# Patient Record
Sex: FEMALE | Race: WHITE | NOT HISPANIC OR LATINO | Employment: OTHER | URBAN - METROPOLITAN AREA
[De-identification: names, ages, dates, MRNs, and addresses within clinical notes are randomized per-mention and may not be internally consistent; named-entity substitution may affect disease eponyms.]

---

## 2017-01-09 ENCOUNTER — TELEPHONE (OUTPATIENT)
Dept: PHARMACY | Facility: CLINIC | Age: 67
End: 2017-01-09

## 2017-01-09 NOTE — TELEPHONE ENCOUNTER
renu f/u complete. Name/ confirmed. Medication list reviewed. Consultation included: indication; goals of treatment; administration; storage and handling; side effects; how to handle side effects; the importance of compliance; how to handle missed doses; the importance of laboratory monitoring; the importance of keeping all follow up appointments. Patient understands to report any medication changes to [facility] and provider. All questions answered and addressed to patients satisfaction. Patient feels well. She is surprised how tolerable the medication is. Very few side effects. Has workeed through fatigue and occassionally has a mild headache but is taking nothing for it. Patient understands goals of treatment and thearpy.

## 2017-01-19 ENCOUNTER — TELEPHONE (OUTPATIENT)
Dept: PHARMACY | Facility: CLINIC | Age: 67
End: 2017-01-19

## 2017-01-27 ENCOUNTER — LAB VISIT (OUTPATIENT)
Dept: LAB | Facility: HOSPITAL | Age: 67
End: 2017-01-27
Attending: INTERNAL MEDICINE
Payer: MEDICARE

## 2017-01-27 ENCOUNTER — EPISODE CHANGES (OUTPATIENT)
Dept: HEPATOLOGY | Facility: CLINIC | Age: 67
End: 2017-01-27

## 2017-01-27 DIAGNOSIS — Z11.4 ENCOUNTER FOR SCREENING FOR HIV: ICD-10-CM

## 2017-01-27 DIAGNOSIS — B18.2 CHRONIC HEPATITIS C WITHOUT HEPATIC COMA: ICD-10-CM

## 2017-01-27 LAB
ALBUMIN SERPL BCP-MCNC: 4.2 G/DL
ALP SERPL-CCNC: 66 U/L
ALT SERPL W/O P-5'-P-CCNC: 12 U/L
ANION GAP SERPL CALC-SCNC: 7 MMOL/L
AST SERPL-CCNC: 18 U/L
BILIRUB SERPL-MCNC: 0.5 MG/DL
BUN SERPL-MCNC: 11 MG/DL
CALCIUM SERPL-MCNC: 9.9 MG/DL
CHLORIDE SERPL-SCNC: 98 MMOL/L
CO2 SERPL-SCNC: 26 MMOL/L
CREAT SERPL-MCNC: 0.7 MG/DL
EST. GFR  (AFRICAN AMERICAN): >60 ML/MIN/1.73 M^2
EST. GFR  (NON AFRICAN AMERICAN): >60 ML/MIN/1.73 M^2
GLUCOSE SERPL-MCNC: 81 MG/DL
HBV CORE AB SERPL QL IA: NEGATIVE
HBV SURFACE AB SER-ACNC: NEGATIVE M[IU]/ML
HBV SURFACE AG SERPL QL IA: NEGATIVE
HEPATITIS A ANTIBODY, IGG: POSITIVE
HIV 1+2 AB+HIV1 P24 AG SERPL QL IA: NEGATIVE
POTASSIUM SERPL-SCNC: 4.4 MMOL/L
PROT SERPL-MCNC: 7.9 G/DL
SODIUM SERPL-SCNC: 131 MMOL/L

## 2017-01-27 PROCEDURE — 87340 HEPATITIS B SURFACE AG IA: CPT

## 2017-01-27 PROCEDURE — 86703 HIV-1/HIV-2 1 RESULT ANTBDY: CPT

## 2017-01-27 PROCEDURE — 86706 HEP B SURFACE ANTIBODY: CPT

## 2017-01-27 PROCEDURE — 86704 HEP B CORE ANTIBODY TOTAL: CPT

## 2017-01-27 PROCEDURE — 36415 COLL VENOUS BLD VENIPUNCTURE: CPT | Mod: PO

## 2017-01-27 PROCEDURE — 87522 HEPATITIS C REVRS TRNSCRPJ: CPT

## 2017-01-27 PROCEDURE — 80053 COMPREHEN METABOLIC PANEL: CPT

## 2017-01-27 PROCEDURE — 86790 VIRUS ANTIBODY NOS: CPT

## 2017-01-30 LAB
HCV LOG: 1.28 LOG (10) IU/ML
HCV RNA QUANT PCR: 19 IU/ML
HCV, QUALITATIVE: DETECTED IU/ML

## 2017-01-31 ENCOUNTER — TELEPHONE (OUTPATIENT)
Dept: HEPATOLOGY | Facility: CLINIC | Age: 67
End: 2017-01-31

## 2017-01-31 DIAGNOSIS — B19.20 HEPATITIS C VIRUS INFECTION WITHOUT HEPATIC COMA, UNSPECIFIED CHRONICITY: Primary | ICD-10-CM

## 2017-01-31 NOTE — TELEPHONE ENCOUNTER
HCV LAB REVIEW  Week 4 of Felicia, planning on 12 weeks treatment    Pertinent labs:  CMP: stable, Na 131  HCV RNA: 19 IU/mL  (+) immunity to HAV  (-) immunity to HBV, (-)HBsAg  HIV negative    [facility] message sent to pt:    Next labs due / pls schedule:  - CMP at week 8 - 02/24/17  - CMP, HCV RNA at week 12 - end of treatment - 03/27/17

## 2017-01-31 NOTE — TELEPHONE ENCOUNTER
----- Message from Ariel Gregory, PharmGEORGIA sent at 1/31/2017  2:43 PM CST -----  Regarding: recombivax  Patient's insurance does not cover immunizations in the pharmacy. Called the patient and let her know that your office would contact her about getting the shot in infectious disease.

## 2017-02-01 NOTE — TELEPHONE ENCOUNTER
Patient called back.  She reports talking with insurance carrier and being told that vaccine series is covered if prior auth obtained.  I spoke with financial.  They will run if required before visit.

## 2017-02-14 ENCOUNTER — TELEPHONE (OUTPATIENT)
Dept: PHARMACY | Facility: CLINIC | Age: 67
End: 2017-02-14

## 2017-02-15 ENCOUNTER — CLINICAL SUPPORT (OUTPATIENT)
Dept: INFECTIOUS DISEASES | Facility: CLINIC | Age: 67
End: 2017-02-15
Payer: MEDICARE

## 2017-02-15 DIAGNOSIS — B19.20 HEPATITIS C VIRUS INFECTION WITHOUT HEPATIC COMA, UNSPECIFIED CHRONICITY: ICD-10-CM

## 2017-02-15 PROCEDURE — 99999 PR PBB SHADOW E&M-EST. PATIENT-LVL I: CPT | Mod: PBBFAC,,,

## 2017-02-15 PROCEDURE — 90746 HEPB VACCINE 3 DOSE ADULT IM: CPT | Mod: S$GLB,,, | Performed by: INTERNAL MEDICINE

## 2017-02-15 PROCEDURE — G0010 ADMIN HEPATITIS B VACCINE: HCPCS | Mod: S$GLB,,, | Performed by: INTERNAL MEDICINE

## 2017-02-24 ENCOUNTER — LAB VISIT (OUTPATIENT)
Dept: LAB | Facility: HOSPITAL | Age: 67
End: 2017-02-24
Attending: INTERNAL MEDICINE
Payer: MEDICARE

## 2017-02-24 ENCOUNTER — EPISODE CHANGES (OUTPATIENT)
Dept: HEPATOLOGY | Facility: CLINIC | Age: 67
End: 2017-02-24

## 2017-02-24 DIAGNOSIS — B19.20 HEPATITIS C VIRUS INFECTION WITHOUT HEPATIC COMA, UNSPECIFIED CHRONICITY: ICD-10-CM

## 2017-02-24 DIAGNOSIS — B18.2 CHRONIC HEPATITIS C WITHOUT HEPATIC COMA: ICD-10-CM

## 2017-02-24 LAB
ALBUMIN SERPL BCP-MCNC: 3.8 G/DL
ALP SERPL-CCNC: 67 U/L
ALT SERPL W/O P-5'-P-CCNC: 13 U/L
ANION GAP SERPL CALC-SCNC: 8 MMOL/L
AST SERPL-CCNC: 18 U/L
BILIRUB SERPL-MCNC: 0.4 MG/DL
BUN SERPL-MCNC: 10 MG/DL
CALCIUM SERPL-MCNC: 9.7 MG/DL
CHLORIDE SERPL-SCNC: 102 MMOL/L
CO2 SERPL-SCNC: 26 MMOL/L
CREAT SERPL-MCNC: 0.7 MG/DL
EST. GFR  (AFRICAN AMERICAN): >60 ML/MIN/1.73 M^2
EST. GFR  (NON AFRICAN AMERICAN): >60 ML/MIN/1.73 M^2
GLUCOSE SERPL-MCNC: 70 MG/DL
POTASSIUM SERPL-SCNC: 4.1 MMOL/L
PROT SERPL-MCNC: 7.6 G/DL
SODIUM SERPL-SCNC: 136 MMOL/L

## 2017-02-24 PROCEDURE — 80053 COMPREHEN METABOLIC PANEL: CPT

## 2017-02-24 PROCEDURE — 36415 COLL VENOUS BLD VENIPUNCTURE: CPT | Mod: PO

## 2017-02-24 PROCEDURE — 87522 HEPATITIS C REVRS TRNSCRPJ: CPT

## 2017-02-27 ENCOUNTER — TELEPHONE (OUTPATIENT)
Dept: HEPATOLOGY | Facility: CLINIC | Age: 67
End: 2017-02-27

## 2017-02-27 LAB
HCV LOG: <1.08 LOG (10) IU/ML
HCV RNA QUANT PCR: <12 IU/ML
HCV, QUALITATIVE: NOT DETECTED IU/ML

## 2017-02-27 NOTE — TELEPHONE ENCOUNTER
HCV LAB REVIEW  Week 8 of Felicia, planning on 12 weeks treatment    Pertinent labs:  CMP: stable      [facility] message sent to pt:    Next labs due / pls schedule:  - CMP, HCV RNA at week 12 - end of treatment - 03/27/17

## 2017-03-14 ENCOUNTER — CLINICAL SUPPORT (OUTPATIENT)
Dept: AUDIOLOGY | Facility: CLINIC | Age: 67
End: 2017-03-14
Payer: MEDICARE

## 2017-03-14 ENCOUNTER — CLINICAL SUPPORT (OUTPATIENT)
Dept: INFECTIOUS DISEASES | Facility: CLINIC | Age: 67
End: 2017-03-14
Payer: MEDICARE

## 2017-03-14 ENCOUNTER — OFFICE VISIT (OUTPATIENT)
Dept: HEPATOLOGY | Facility: CLINIC | Age: 67
End: 2017-03-14
Payer: MEDICARE

## 2017-03-14 ENCOUNTER — OFFICE VISIT (OUTPATIENT)
Dept: OTOLARYNGOLOGY | Facility: CLINIC | Age: 67
End: 2017-03-14
Payer: MEDICARE

## 2017-03-14 VITALS — DIASTOLIC BLOOD PRESSURE: 76 MMHG | SYSTOLIC BLOOD PRESSURE: 142 MMHG | TEMPERATURE: 98 F | HEART RATE: 71 BPM

## 2017-03-14 VITALS
BODY MASS INDEX: 26.01 KG/M2 | TEMPERATURE: 98 F | OXYGEN SATURATION: 98 % | HEART RATE: 80 BPM | SYSTOLIC BLOOD PRESSURE: 134 MMHG | WEIGHT: 141.31 LBS | DIASTOLIC BLOOD PRESSURE: 73 MMHG | HEIGHT: 62 IN

## 2017-03-14 DIAGNOSIS — B18.2 CHRONIC HEPATITIS C WITHOUT HEPATIC COMA: Primary | ICD-10-CM

## 2017-03-14 DIAGNOSIS — H93.11 TINNITUS, RIGHT: ICD-10-CM

## 2017-03-14 DIAGNOSIS — H90.3 SENSORINEURAL HEARING LOSS, BILATERAL: Primary | ICD-10-CM

## 2017-03-14 DIAGNOSIS — B19.20 HEPATITIS C VIRUS INFECTION WITHOUT HEPATIC COMA, UNSPECIFIED CHRONICITY: ICD-10-CM

## 2017-03-14 DIAGNOSIS — H93.8X3 EAR PRESSURE, BILATERAL: Primary | ICD-10-CM

## 2017-03-14 DIAGNOSIS — H61.23 IMPACTED CERUMEN, BILATERAL: ICD-10-CM

## 2017-03-14 DIAGNOSIS — J30.9 ALLERGIC RHINITIS, UNSPECIFIED ALLERGIC RHINITIS TRIGGER, UNSPECIFIED RHINITIS SEASONALITY: ICD-10-CM

## 2017-03-14 DIAGNOSIS — H90.3 HEARING LOSS, SENSORINEURAL, HIGH FREQUENCY, BILATERAL: ICD-10-CM

## 2017-03-14 PROCEDURE — 99499 UNLISTED E&M SERVICE: CPT | Mod: S$GLB,,, | Performed by: PHYSICIAN ASSISTANT

## 2017-03-14 PROCEDURE — 1157F ADVNC CARE PLAN IN RCRD: CPT | Mod: S$GLB,,, | Performed by: OTOLARYNGOLOGY

## 2017-03-14 PROCEDURE — 99999 PR PBB SHADOW E&M-EST. PATIENT-LVL I: CPT | Mod: PBBFAC,,,

## 2017-03-14 PROCEDURE — 1126F AMNT PAIN NOTED NONE PRSNT: CPT | Mod: S$GLB,,, | Performed by: OTOLARYNGOLOGY

## 2017-03-14 PROCEDURE — 99999 PR PBB SHADOW E&M-EST. PATIENT-LVL IV: CPT | Mod: PBBFAC,,, | Performed by: PHYSICIAN ASSISTANT

## 2017-03-14 PROCEDURE — 1159F MED LIST DOCD IN RCRD: CPT | Mod: S$GLB,,, | Performed by: OTOLARYNGOLOGY

## 2017-03-14 PROCEDURE — 92557 COMPREHENSIVE HEARING TEST: CPT | Mod: S$GLB,,, | Performed by: AUDIOLOGIST-HEARING AID FITTER

## 2017-03-14 PROCEDURE — G0010 ADMIN HEPATITIS B VACCINE: HCPCS | Mod: S$GLB,,, | Performed by: INTERNAL MEDICINE

## 2017-03-14 PROCEDURE — 1160F RVW MEDS BY RX/DR IN RCRD: CPT | Mod: S$GLB,,, | Performed by: PHYSICIAN ASSISTANT

## 2017-03-14 PROCEDURE — 99999 PR PBB SHADOW E&M-EST. PATIENT-LVL III: CPT | Mod: PBBFAC,,, | Performed by: OTOLARYNGOLOGY

## 2017-03-14 PROCEDURE — 1159F MED LIST DOCD IN RCRD: CPT | Mod: S$GLB,,, | Performed by: PHYSICIAN ASSISTANT

## 2017-03-14 PROCEDURE — 92567 TYMPANOMETRY: CPT | Mod: S$GLB,,, | Performed by: AUDIOLOGIST-HEARING AID FITTER

## 2017-03-14 PROCEDURE — 99213 OFFICE O/P EST LOW 20 MIN: CPT | Mod: S$GLB,,, | Performed by: PHYSICIAN ASSISTANT

## 2017-03-14 PROCEDURE — 1157F ADVNC CARE PLAN IN RCRD: CPT | Mod: S$GLB,,, | Performed by: PHYSICIAN ASSISTANT

## 2017-03-14 PROCEDURE — 1160F RVW MEDS BY RX/DR IN RCRD: CPT | Mod: S$GLB,,, | Performed by: OTOLARYNGOLOGY

## 2017-03-14 PROCEDURE — 1126F AMNT PAIN NOTED NONE PRSNT: CPT | Mod: S$GLB,,, | Performed by: PHYSICIAN ASSISTANT

## 2017-03-14 PROCEDURE — 99213 OFFICE O/P EST LOW 20 MIN: CPT | Mod: S$GLB,,, | Performed by: OTOLARYNGOLOGY

## 2017-03-14 PROCEDURE — 90746 HEPB VACCINE 3 DOSE ADULT IM: CPT | Mod: S$GLB,,, | Performed by: INTERNAL MEDICINE

## 2017-03-14 NOTE — PROGRESS NOTES
Chief complaint: Nose and ear fullness status post ALLERGY attack   HPI:Ms. Reed is a 66-year-old  female who indicates an ALLERGY attack 1 month ago treated with antihistamines i.e. Claritin.  She recently switched to Allegra over the past weekend.  She had been described a nasal steroid spray in the past i.e. Nasacort I believe.  She indicates ear fullness and nasal fullness symptoms which which is not going away.    I last examined her in January 2015 for an ear cleaning procedure.  She complained of right ear tinnitus at that time as well as right otalgia symptoms.  Audiometry was not performed then.  TMJ otalgia symptoms were briefly discussed then.    Medical problem list includes malignant melanoma of the skin of her trunk, hepatitis C, nontoxic multinodular goiter, osteopenia, hyperlipidemia, leukocytopenia and interstitial cystitis.  ALLERGIES: Iodine and iodide-containing products and sulfa    PE: Blood pressure 142/76 pulse 71 temperature 97.7  Gen.: Alert and oriented lady in no acute distress  The patient's ears were examined and cleaned under the microscope and the micro-procedure room.  Hair and some wax is carefully extracted from each ear canal with microforceps.  Both eardrums are intact and clear as visualized directly and there is no evidence for a right or left serous otitis media condition.  Nasal exam reveals evidence for hyperemia of the mucosa of the inferior turbinates bilaterally.  Oropharyngeal exam is unremarkable for inflammation infection ulceration.  Neck examination is benign within normal limits.    The patient was immediately sent for an audiometric study performed by the [facility] service.  The results of duplicated below and reviewed with the patient in detail.    DIAGNOSIS:     ICD-10-CM ICD-9-CM    1. Ear pressure, bilateral H93.8X3 388.8    2. Allergic rhinitis, unspecified allergic rhinitis trigger, unspecified rhinitis seasonality J30.9  477.9     on Claritin; switched to Allegra   3. Impacted cerumen, bilateral H61.23 380.4    4. Hearing loss, sensorineural, high frequency, bilateral H91.93 389.8     AD > AS 4 K SNHL   5. Tinnitus, right H93.11 388.30      PLAN: Hair/wax removed from both eacs with microforceps  Audiometry reviewed: bilateral high frequency SNHL  Pt. is a marginal candidate for hearing amplification   copy of audiogram provided  Hearing protection encouraged prn  Monitor hearing q 2-3 years/prn  Tinnitus literature provided  Continue iuse of non-sedating antihistamine + NSS 1-2 sprays @ nostril one a day  ( Flonase or Nasacort otc)   E.T. Literature provided

## 2017-03-14 NOTE — MR AVS SNAPSHOT
[facility] - Otorhinolaryngology  [address]  [facility] [address]  Phone: [phone]  Fax: [phone]                  Tomasa Reed   3/14/2017 9:45 AM   Office Visit    Description:  Female : 1950   Provider:  Alfonso Mayes III, MD   Department:  [facility] - Otorhinolaryngology           Diagnoses this Visit        Comments    Ear pressure, bilateral    -  Primary     Allergic rhinitis, unspecified allergic rhinitis trigger, unspecified rhinitis seasonality     on Claritin; switched to Allegra    Impacted cerumen, bilateral         Hearing loss, sensorineural, high frequency, bilateral     AD > AS 4 K SNHL    Tinnitus, right                To Do List           Future Appointments        Provider Department Dept Phone    3/27/2017 7:00 AM LAB, [facility] - Laboratory [phone]    2017 8:40 AM MD [facility] - Internal Medicine [phone]    8/15/2017 8:30 AM INJECTION, INFECTIOUS DISEASES [facility]- ID Injection Room [phone]      Goals (5 Years of Data)     None      [facility]     [facility] -  Assistance  Registered nurses in the [facility] provide clinical advisement, health education, appointment booking, and other advisory services.  Call for this free service at [phone].             Medications           Message regarding Medications     Verify the changes and/or additions to your medication regime listed below are the same as discussed with your clinician today.  If any of these changes or additions are incorrect, please notify your healthcare provider.             Verify that the below list of medications is an accurate representation of the medications you are currently taking.  If none reported, the list may be blank. If incorrect, please contact your healthcare provider. Carry this list with you in case of emergency.           Current Medications     B-complex with  vitamin C tablet Take 1 tablet by mouth once daily.      calcium-vitamin D (CALCIUM-VITAMIN D) 500 mg(1,250mg) -200 unit per tablet Twice a day    cetirizine (ZYRTEC) 10 mg Cap PRN    ledipasvir-sofosbuvir (HARVONI)  mg Tab Take 1 tablet by mouth once daily.    MISCELLANEOUS MEDICAL SUPPLY MISC as needed. EYE ITCH RELIEF 0.25 EYE DROPS    MISCELLANEOUS MEDICAL SUPPLY MISC TOPICAL ANALGESIC CREAM CAPS    multivitamin (THERAGRAN) per tablet Every day    multivitamin with minerals tablet Take 1 tablet by mouth once daily.    naproxen sodium (ALEVE) 220 mg Cap PRN    NON FORMULARY MEDICATION 2 (two) times daily. Tumeric-Bid     co-enzyme Q-10 30 mg capsule     glucosamine sulfate 1,000 mg Cap Take by mouth.      glycerin adult suppository as needed.     OCEAN NASAL 0.65 % nasal spray as needed.     THERAPEUTIC MINERAL ICE 2 % Gel     TUMS 200 mg calcium (500 mg) chewable tablet 1 tablet as needed.            Clinical Reference Information           Your Vitals Were     BP Pulse Temp             142/76 (BP Location: Right arm, Patient Position: Sitting, BP Method: Automatic) 71 97.7 °F (36.5 °C) (Tympanic)         Blood Pressure          Most Recent Value    BP  (!)  142/76      Allergies as of 3/14/2017     Iodine And Iodide Containing Products    Sulfa (Sulfonamide Antibiotics)      Immunizations Administered on Date of Encounter - 3/14/2017     Name Date Dose VIS Date Route    Hepatitis B, Adult 3/14/2017 1 mL 7/20/2016 Intramuscular      Instructions    Hair/wax removed from both eacs with microforceps  Audiometry reviewed: bilateral high frequency SNHL  Pt. is a marginal candidate for hearing amplification   copy of audiogram provided  Hearing protection encouraged prn  Monitor hearing q 2-3 years/prn  Tinnitus literature provided  Continue iuse of non-sedating antihistamine + NSS 1-2 sprays @ nostril one a day  ( Flonase or Nasacort otc)   E.T. Literature provided         Language Assistance Services      ATTENTION: Language assistance services are available, free of charge. Please call [phone].      ATENCIÓN: Si habla español, tiene a gutierrez disposición servicios gratuitos de asistencia lingüística. Llame al [phone].     CHÚ Ý: N?u b?n nói Ti?ng Vi?t, có các d?ch v? h? tr? ngôn ng? mi?n phí dành cho b?n. G?i s? [phone].         [facility] complies with applicable Federal civil rights laws and does not discriminate on the basis of race, color, national origin, age, disability, or sex.

## 2017-03-14 NOTE — PROGRESS NOTES
Tomasa Reed was seen in the clinic today for a hearing evaluation. Ms. Reed reported bilateral hearing loss and aural fullness. She stated tinnitus in her right ear.    Audiological testing revealed a mild mid to high frequency sensorineural hearing loss in the left ear and a mild to moderate rising to mild mid to high frequency sensorineural hearing loss in the right ear. A speech reception threshold was obtained at 20 dBHL in both ears. Speech discrimination was 92%, bilaterally.    Tympanometry revealed normal Type A tympanograms in both ears.    Recommendations:  1. Otologic evaluation  2. Annual hearing evaluation  3. Consider hearing aid consult

## 2017-03-14 NOTE — PATIENT INSTRUCTIONS
Hair/wax removed from both eacs with microforceps  Audiometry reviewed: bilateral high frequency SNHL  Pt. is a marginal candidate for hearing amplification   copy of audiogram provided  Hearing protection encouraged prn  Monitor hearing q 2-3 years/prn  Tinnitus literature provided  Continue iuse of non-sedating antihistamine + NSS 1-2 sprays @ nostril one a day  ( Flonase or Nasacort otc)   E.T. Literature provided

## 2017-03-14 NOTE — PROGRESS NOTES
HEPATOLOGY CLINIC VISIT NOTE - HCV clinic    REFERRING PROVIDER: Dr. Rosanna Huizar    CHIEF COMPLAINT: Hepatitis C - discuss treatment    HISTORY: This is a 66 y.o. White female with chronic hepatitis C who returns for on treatment follow up. She has no F0-F1 fibrosis but a pretreatment viral load >6 million. She is currently on week 10 of HCV treatment with Harvoni. Her week 4 HCV RNA was 19 IU/mL and week 8 was unquantifiable. She reports that she feel well. She reports a slight headache with the start of treatment but that it resolved shortly after starting. She does report tiredness. She complains today of sinus pressure and ear fullness and will be seeing her PCP.     PMH significant for goiter, HLD, OA, osteopenia, cataract, neutropenia. She has a h/o malignant melanoma and is followed by dermatology for this.     HCV history:  Genotype 1a  Pre treatment HCV RNA: 6,173,376 IU/mL  Week 4 HCV RNA: 19  Week 8 HCV RNA: <12, not detected  Treatment naive     Risk Factors:  Blood transfusion- ? hospitalization with child birth     Liver staging:  Fibroscan F0-F1  Liver biopsy may years ago, no cirrhosis   AST 33, ALT 30; AST>ALT   Tbili WNL  Albumin >3.5  PLTs WNL     Denies jaundice, dark urine, abdominal distention, hematemesis, melena, slowed mentation.   No abnormal skin rashes. No generalized joint pain.                   Past Medical History:   Diagnosis Date    Acute hepatitis C without mention of hepatic coma     Arthritis     Cataract     OU    Chronic interstitial cystitis     HLD (hyperlipidemia)     Leukocytopenia, unspecified     Malignant melanoma of skin of trunk, except scrotum     Melanoma 2011    right chest    Migraine, unspecified, without mention of intractable migraine without mention of status migrainosus     Nonspecific abnormal results of liver function study     Nontoxic multinodular goiter     Osteopenia        Past Surgical History:   Procedure Laterality Date     COLONOSCOPY N/A 12/14/2016    Procedure: COLONOSCOPY;  Surgeon: Wicho Painting MD;  Location: [facility] ([address] [place] [address]);  Service: Endoscopy;  Laterality: N/A;    FINGER SURGERY Right 2010    index finger fused    MOLE REMOVAL      TONSILLECTOMY, ADENOIDECTOMY      TOTAL ABDOMINAL HYSTERECTOMY       FAMILY HISTORY: Negative for liver disease    SOCIAL HISTORY:   2 children  History   Smoking Status    Never Smoker   Smokeless Tobacco    Never Used       History   Alcohol Use    Yes     Comment: weekends one drink       History   Drug Use Not on file   Denies     ROS:   No fever, chills, (+) fatigue  No acid reflux   No headaches, (+) sinus pressure and ear fullness    PHYSICAL EXAM:  Friendly White female, in no acute distress; alert and oriented to person, place and time  VITALS: reviewed  HEENT: Sclerae anicteric.   NECK: Supple  LUNGS: Normal respiratory effort.  ABDOMEN: Flat, soft, nontender.   SKIN: Warm and dry. No jaundice, No obvious rashes.   NEURO/PSYCH: Normal gate. Memory intact. Thought and speech pattern appropriate. Behavior normal. No depression or anxiety noted.    RECENT LABS:  Lab Results   Component Value Date    WBC 3.34 (L) 11/08/2016    HGB 14.0 11/08/2016     11/08/2016     Lab Results   Component Value Date    INR 1.0 06/27/2005     Lab Results   Component Value Date    AST 18 02/24/2017    ALT 13 02/24/2017    BILITOT 0.4 02/24/2017    ALBUMIN 3.8 02/24/2017    ALKPHOS 67 02/24/2017    CREATININE 0.7 02/24/2017    BUN 10 02/24/2017     02/24/2017    K 4.1 02/24/2017     RECENT IMAGING:  U/S abdomen 12/2016  Narrative   COMPLETE ABDOMINAL ULTRASOUND    Comparison: Ultrasound 10/20/06    Findings:     The liver is normal in size measuring 15 cm in length without extension below the costal margin. The liver is homogeneous in echogenicity with no focal lesions identified.      No intrahepatic biliary ductal dilatation is detected. The common bile duct is within normal limits  at 0.2 cm.  No gallstones, gallbladder wall thickening, pericholecystic fluid, or focal tenderness over the gallbladder.      The visualized portions of the pancreas are within normal limits.     The visualized inferior vena cava and abdominal aorta demonstrate nothing unusual.    The kidneys are normal in size bilaterally. No hydronephrosis or renal masses are identified.      The spleen is unremarkable.   Impression     No sonographic abnormalities detected.     ASSESSMENT  66 y.o. White female with:  1. CHRONIC HEPATITIS C, GENOTYPE 1a - currently on week 10 of HCV treatment with Harvoni  -- Prior HCV treatment - none, naive   -- Elevated transaminases  -- (+) immunity to HAV, undergoing HBV vaccinations, will receive second today.     2. NEUTROPENIA  -- not in the setting of pancytopenia or cirrhosis  -- managed by PCP    EDUCATION:  Discussed that SVR 12=cure   Discussed that there's still a small chance the virus could return between now and then    Discussed that the HCV AB will remain positive for life, that she will be unable to donate blood, and that it does not give immunity. She could become reinfected if ever exposed.     PLAN:  1. Continue Harvoni until all doses are taken  2. Await future labs  3. Continue HBV vaccinations  4. Pt may resume her supplements the day after treatment is complete    Duration of visit: 30 minutes   With >50% of visit spent on counseling pt on HCV end of treatment   Antonino Ty PA-C

## 2017-03-27 ENCOUNTER — EPISODE CHANGES (OUTPATIENT)
Dept: HEPATOLOGY | Facility: CLINIC | Age: 67
End: 2017-03-27

## 2017-03-27 ENCOUNTER — LAB VISIT (OUTPATIENT)
Dept: LAB | Facility: HOSPITAL | Age: 67
End: 2017-03-27
Attending: INTERNAL MEDICINE
Payer: MEDICARE

## 2017-03-27 DIAGNOSIS — B18.2 CHRONIC HEPATITIS C WITHOUT HEPATIC COMA: ICD-10-CM

## 2017-03-27 LAB
ALBUMIN SERPL BCP-MCNC: 3.9 G/DL
ALP SERPL-CCNC: 59 U/L
ALT SERPL W/O P-5'-P-CCNC: 11 U/L
ANION GAP SERPL CALC-SCNC: 8 MMOL/L
AST SERPL-CCNC: 18 U/L
BILIRUB SERPL-MCNC: 0.4 MG/DL
BUN SERPL-MCNC: 9 MG/DL
CALCIUM SERPL-MCNC: 9.7 MG/DL
CHLORIDE SERPL-SCNC: 101 MMOL/L
CO2 SERPL-SCNC: 26 MMOL/L
CREAT SERPL-MCNC: 0.7 MG/DL
EST. GFR  (AFRICAN AMERICAN): >60 ML/MIN/1.73 M^2
EST. GFR  (NON AFRICAN AMERICAN): >60 ML/MIN/1.73 M^2
GLUCOSE SERPL-MCNC: 63 MG/DL
POTASSIUM SERPL-SCNC: 4.3 MMOL/L
PROT SERPL-MCNC: 7.4 G/DL
SODIUM SERPL-SCNC: 135 MMOL/L

## 2017-03-27 PROCEDURE — 80053 COMPREHEN METABOLIC PANEL: CPT

## 2017-03-27 PROCEDURE — 87522 HEPATITIS C REVRS TRNSCRPJ: CPT

## 2017-03-27 PROCEDURE — 36415 COLL VENOUS BLD VENIPUNCTURE: CPT | Mod: PO

## 2017-03-29 ENCOUNTER — EPISODE CHANGES (OUTPATIENT)
Dept: HEPATOLOGY | Facility: CLINIC | Age: 67
End: 2017-03-29

## 2017-03-29 ENCOUNTER — TELEPHONE (OUTPATIENT)
Dept: HEPATOLOGY | Facility: CLINIC | Age: 67
End: 2017-03-29

## 2017-03-29 DIAGNOSIS — B19.20 HEPATITIS C VIRUS INFECTION WITHOUT HEPATIC COMA, UNSPECIFIED CHRONICITY: Primary | ICD-10-CM

## 2017-03-29 LAB
HCV LOG: <1.08 LOG (10) IU/ML
HCV RNA QUANT PCR: <12 IU/ML
HCV, QUALITATIVE: NOT DETECTED IU/ML

## 2017-03-29 NOTE — TELEPHONE ENCOUNTER
HCV LAB REVIEW  Week 12 of Harvoni, End of treatment     Pertinent labs:  CMP: stable  HCV RNA: <12, not detected     [facility] message sent to pt:    Next labs due/Please schedule:  HCV RNA in 12 weeks- SVR 12: 06/19/17

## 2017-05-01 ENCOUNTER — PATIENT MESSAGE (OUTPATIENT)
Dept: INTERNAL MEDICINE | Facility: CLINIC | Age: 67
End: 2017-05-01

## 2017-05-01 DIAGNOSIS — E04.2 MULTINODULAR GOITER: ICD-10-CM

## 2017-05-01 DIAGNOSIS — E78.5 HYPERLIPIDEMIA, UNSPECIFIED HYPERLIPIDEMIA TYPE: Primary | ICD-10-CM

## 2017-05-02 ENCOUNTER — PATIENT MESSAGE (OUTPATIENT)
Dept: INTERNAL MEDICINE | Facility: CLINIC | Age: 67
End: 2017-05-02

## 2017-05-04 ENCOUNTER — LAB VISIT (OUTPATIENT)
Dept: LAB | Facility: HOSPITAL | Age: 67
End: 2017-05-04
Attending: INTERNAL MEDICINE
Payer: MEDICARE

## 2017-05-04 DIAGNOSIS — E04.2 MULTINODULAR GOITER: ICD-10-CM

## 2017-05-04 DIAGNOSIS — E78.5 HYPERLIPIDEMIA, UNSPECIFIED HYPERLIPIDEMIA TYPE: ICD-10-CM

## 2017-05-04 LAB
ALBUMIN SERPL BCP-MCNC: 4 G/DL
ALP SERPL-CCNC: 57 U/L
ALT SERPL W/O P-5'-P-CCNC: 11 U/L
ANION GAP SERPL CALC-SCNC: 10 MMOL/L
AST SERPL-CCNC: 19 U/L
BILIRUB SERPL-MCNC: 0.4 MG/DL
BUN SERPL-MCNC: 19 MG/DL
CALCIUM SERPL-MCNC: 9.9 MG/DL
CHLORIDE SERPL-SCNC: 102 MMOL/L
CHOLEST/HDLC SERPL: 3.5 {RATIO}
CO2 SERPL-SCNC: 25 MMOL/L
CREAT SERPL-MCNC: 0.9 MG/DL
EST. GFR  (AFRICAN AMERICAN): >60 ML/MIN/1.73 M^2
EST. GFR  (NON AFRICAN AMERICAN): >60 ML/MIN/1.73 M^2
GLUCOSE SERPL-MCNC: 89 MG/DL
HDL/CHOLESTEROL RATIO: 28.5 %
HDLC SERPL-MCNC: 221 MG/DL
HDLC SERPL-MCNC: 63 MG/DL
LDLC SERPL CALC-MCNC: 144.6 MG/DL
NONHDLC SERPL-MCNC: 158 MG/DL
POTASSIUM SERPL-SCNC: 4.4 MMOL/L
PROT SERPL-MCNC: 7.6 G/DL
SODIUM SERPL-SCNC: 137 MMOL/L
TRIGL SERPL-MCNC: 67 MG/DL
TSH SERPL DL<=0.005 MIU/L-ACNC: 0.72 UIU/ML

## 2017-05-04 PROCEDURE — 84443 ASSAY THYROID STIM HORMONE: CPT

## 2017-05-04 PROCEDURE — 80053 COMPREHEN METABOLIC PANEL: CPT

## 2017-05-04 PROCEDURE — 80061 LIPID PANEL: CPT

## 2017-05-04 PROCEDURE — 36415 COLL VENOUS BLD VENIPUNCTURE: CPT | Mod: PO

## 2017-05-05 ENCOUNTER — TELEPHONE (OUTPATIENT)
Dept: INTERNAL MEDICINE | Facility: CLINIC | Age: 67
End: 2017-05-05

## 2017-05-09 ENCOUNTER — OFFICE VISIT (OUTPATIENT)
Dept: INTERNAL MEDICINE | Facility: CLINIC | Age: 67
End: 2017-05-09
Payer: MEDICARE

## 2017-05-09 VITALS
DIASTOLIC BLOOD PRESSURE: 75 MMHG | BODY MASS INDEX: 26.09 KG/M2 | WEIGHT: 141.75 LBS | SYSTOLIC BLOOD PRESSURE: 122 MMHG | TEMPERATURE: 98 F | HEIGHT: 62 IN | RESPIRATION RATE: 16 BRPM | HEART RATE: 83 BPM

## 2017-05-09 DIAGNOSIS — E78.5 HYPERLIPIDEMIA, UNSPECIFIED HYPERLIPIDEMIA TYPE: Primary | ICD-10-CM

## 2017-05-09 DIAGNOSIS — Z78.0 POSTMENOPAUSAL ESTROGEN DEFICIENCY: ICD-10-CM

## 2017-05-09 DIAGNOSIS — E04.2 NONTOXIC MULTINODULAR GOITER: ICD-10-CM

## 2017-05-09 DIAGNOSIS — B19.20 HEPATITIS C VIRUS INFECTION WITHOUT HEPATIC COMA, UNSPECIFIED CHRONICITY: ICD-10-CM

## 2017-05-09 DIAGNOSIS — M85.80 OSTEOPENIA, UNSPECIFIED LOCATION: ICD-10-CM

## 2017-05-09 DIAGNOSIS — D70.9 NEUTROPENIA, UNSPECIFIED TYPE: ICD-10-CM

## 2017-05-09 PROCEDURE — 99214 OFFICE O/P EST MOD 30 MIN: CPT | Mod: S$GLB,,, | Performed by: INTERNAL MEDICINE

## 2017-05-09 PROCEDURE — 1160F RVW MEDS BY RX/DR IN RCRD: CPT | Mod: S$GLB,,, | Performed by: INTERNAL MEDICINE

## 2017-05-09 PROCEDURE — 99999 PR PBB SHADOW E&M-EST. PATIENT-LVL III: CPT | Mod: PBBFAC,,, | Performed by: INTERNAL MEDICINE

## 2017-05-09 PROCEDURE — 1159F MED LIST DOCD IN RCRD: CPT | Mod: S$GLB,,, | Performed by: INTERNAL MEDICINE

## 2017-05-09 PROCEDURE — 1126F AMNT PAIN NOTED NONE PRSNT: CPT | Mod: S$GLB,,, | Performed by: INTERNAL MEDICINE

## 2017-05-09 PROCEDURE — 99499 UNLISTED E&M SERVICE: CPT | Mod: S$GLB,,, | Performed by: INTERNAL MEDICINE

## 2017-05-09 NOTE — PROGRESS NOTES
CC:   Chief Complaint   Patient presents with    Follow-up     6 month   HLD, hepatitis C,s/p Harvoni tx, and thyroid ds    66 y.o. female presents for above    HLD, tx w/diet, less exercise w/ winter mos  Denied angina or CANTU  Chol==> 221    Thyroid nodules, 12/2016 US of thyroid revealed smaller and pt now asx  Denied dysphagia or nodule that she had c/o in past  TSH==> 0.719    Neutropenia, x several yrs  Denied increased URI or infections  HCV, RNA Quant <12 ( not detected)  Hep C, s/p Harrvoni  + fatigue w/ sx    MEDCARD:Reviewed  ROS:  No fever, chills , or night sweats  No visual disturbance or itchy/watery eyes  No ear or sinus pain/pressure; + intermittent PND  No chest pain or palpitations  No dysuria or nocturia  No joint swelling or redness  No skin rashes or blisters  No cold or heat intolerance  No increased thirst or urination  No HA or focal deficits  Remainder of review negative except as previously noted    PMHX: Reviewed  PSHX: Reviewed  SHX: Reviewed  FHX: Reviewed    PE:  VSS  GEN: WDWN, A&O, NAD, conversant and co-operative; pleasant as always  EYES: Conj/lids unremarkable, sclera anicteric pupils reactive  ENT:   Nasal mucosa/turbinates w/o exudate or edema  O/P w/o exudate or edema, mucus membranes moist;  Sinus nontender  NECK: Supple w/o lymphadenopathy or thyromegaly  RESP: Efforts unlabored, lungs CTAP  CV: Heart RRR, no carotid bruits, 1+ pedal pulses,   no LE edema  GI:BS+. Soft, NT/ND, -HSM noted  MSK: Gait normal. No CCE noted  NEURO: LARSEN. No tremor noted  SKIN: Warm and dry    IMPRESSION:  HLD, elevated LDL  Thyroid nodules, asx  Neutropenia, asx  Hep C, s/p Harvoni  Osteopenia    PLAN:  Diet  Exercise  Weight loss, pt goal 10 #  Call prn  RTC 6 mos EPP w/ DEXA

## 2017-05-16 ENCOUNTER — OFFICE VISIT (OUTPATIENT)
Dept: OPTOMETRY | Facility: CLINIC | Age: 67
End: 2017-05-16
Payer: MEDICARE

## 2017-05-16 ENCOUNTER — APPOINTMENT (OUTPATIENT)
Dept: RADIOLOGY | Facility: CLINIC | Age: 67
End: 2017-05-16
Attending: INTERNAL MEDICINE
Payer: MEDICARE

## 2017-05-16 DIAGNOSIS — Z78.0 POSTMENOPAUSAL ESTROGEN DEFICIENCY: ICD-10-CM

## 2017-05-16 DIAGNOSIS — H52.4 MYOPIA WITH ASTIGMATISM AND PRESBYOPIA, LEFT: ICD-10-CM

## 2017-05-16 DIAGNOSIS — H52.12 MYOPIA WITH ASTIGMATISM AND PRESBYOPIA, LEFT: ICD-10-CM

## 2017-05-16 DIAGNOSIS — H25.13 NUCLEAR SCLEROSIS, BILATERAL: Primary | ICD-10-CM

## 2017-05-16 DIAGNOSIS — H52.202 MYOPIA WITH ASTIGMATISM AND PRESBYOPIA, LEFT: ICD-10-CM

## 2017-05-16 PROCEDURE — 77080 DXA BONE DENSITY AXIAL: CPT | Mod: 26,,, | Performed by: INTERNAL MEDICINE

## 2017-05-16 PROCEDURE — 99499 UNLISTED E&M SERVICE: CPT | Mod: S$GLB,,, | Performed by: OPTOMETRIST

## 2017-05-16 PROCEDURE — 99999 PR PBB SHADOW E&M-EST. PATIENT-LVL II: CPT | Mod: PBBFAC,,, | Performed by: OPTOMETRIST

## 2017-05-16 PROCEDURE — 92014 COMPRE OPH EXAM EST PT 1/>: CPT | Mod: S$GLB,,, | Performed by: OPTOMETRIST

## 2017-05-16 PROCEDURE — 92310 CONTACT LENS FITTING OU: CPT | Mod: ,,, | Performed by: OPTOMETRIST

## 2017-05-16 PROCEDURE — 92015 DETERMINE REFRACTIVE STATE: CPT | Mod: S$GLB,,, | Performed by: OPTOMETRIST

## 2017-05-16 PROCEDURE — 77080 DXA BONE DENSITY AXIAL: CPT | Mod: TC

## 2017-05-16 NOTE — PROGRESS NOTES
HPI     Wears contact OD for distance OS without correction for near.  Patient   was able to get a double supply of contacts since she only wears one lens,   would like rx for new glasses today.  Hasn't noticed any vision change.       Last edited by Kandice Dubois on 5/16/2017  9:11 AM.     ROS     Negative for: Constitutional, Gastrointestinal, Neurological, Skin,   Genitourinary, Musculoskeletal, HENT, Endocrine, Cardiovascular, Eyes,   Respiratory, Psychiatric, Allergic/Imm, Heme/Lymph    Last edited by Eric Barnes, OD on 5/16/2017  9:40 AM. (History)        Assessment /Plan     For exam results, see Encounter Report.    Nuclear sclerosis, bilateral    Myopia with astigmatism and presbyopia, left      1. Educated pt on presence of cataracts and effects on vision. No surgery at this time. Recheck in one year.  2. Spec Rx given and Contact lens Rx released to pt. Daily wear only advised, with education to risks of extended wear.  Discussed lens care, compliance and solutions. RTC yearly contact lens follow up.    Different lens options discussed with patient. RTC 1 year full exam.

## 2017-05-19 ENCOUNTER — TELEPHONE (OUTPATIENT)
Dept: INTERNAL MEDICINE | Facility: CLINIC | Age: 67
End: 2017-05-19

## 2017-05-19 NOTE — TELEPHONE ENCOUNTER
----- Message from Rosanna Huizar MD sent at 5/18/2017  6:39 PM CDT -----  Please note that your bone density revealed osteopenia ( thinning of the bone)  RECOMMENDATIONS:  1. Adequate Calcium and Vitamin D  2. Repeat BMD in 2 years    Please do not hesitate to call/email if you have any questions or concerns  Rosanna Dominguez

## 2017-06-19 ENCOUNTER — LAB VISIT (OUTPATIENT)
Dept: LAB | Facility: HOSPITAL | Age: 67
End: 2017-06-19
Attending: INTERNAL MEDICINE
Payer: MEDICARE

## 2017-06-19 ENCOUNTER — EPISODE CHANGES (OUTPATIENT)
Dept: HEPATOLOGY | Facility: CLINIC | Age: 67
End: 2017-06-19

## 2017-06-19 DIAGNOSIS — B19.20 HEPATITIS C VIRUS INFECTION WITHOUT HEPATIC COMA, UNSPECIFIED CHRONICITY: ICD-10-CM

## 2017-06-19 PROCEDURE — 87522 HEPATITIS C REVRS TRNSCRPJ: CPT

## 2017-06-19 PROCEDURE — 36415 COLL VENOUS BLD VENIPUNCTURE: CPT | Mod: PO

## 2017-06-21 LAB
HCV LOG: <1.08 LOG (10) IU/ML
HCV RNA QUANT PCR: <12 IU/ML
HCV, QUALITATIVE: NOT DETECTED IU/ML

## 2017-06-22 ENCOUNTER — TELEPHONE (OUTPATIENT)
Dept: HEPATOLOGY | Facility: CLINIC | Age: 67
End: 2017-06-22

## 2017-06-22 DIAGNOSIS — Z86.19 HISTORY OF HEPATITIS C: Primary | ICD-10-CM

## 2017-06-22 NOTE — TELEPHONE ENCOUNTER
HCV LAB REVIEW  SVR 12    Pertinent labs:  HCV RNA: <12, not detected     [facility] sent to pt:    Next labs due/Please schedule:  HCV RNA in 12 weeks- SVR 24: 09/08/17  HCV RNA in 1 year: 06/19/18

## 2017-06-23 ENCOUNTER — EPISODE CHANGES (OUTPATIENT)
Dept: HEPATOLOGY | Facility: CLINIC | Age: 67
End: 2017-06-23

## 2017-06-23 ENCOUNTER — PATIENT MESSAGE (OUTPATIENT)
Dept: HEPATOLOGY | Facility: CLINIC | Age: 67
End: 2017-06-23

## 2017-07-10 ENCOUNTER — PATIENT MESSAGE (OUTPATIENT)
Dept: INTERNAL MEDICINE | Facility: CLINIC | Age: 67
End: 2017-07-10

## 2017-07-10 DIAGNOSIS — Z12.31 ENCOUNTER FOR SCREENING MAMMOGRAM FOR BREAST CANCER: Primary | ICD-10-CM

## 2017-07-12 ENCOUNTER — HOSPITAL ENCOUNTER (OUTPATIENT)
Dept: RADIOLOGY | Facility: HOSPITAL | Age: 67
Discharge: HOME | End: 2017-07-12
Attending: INTERNAL MEDICINE
Payer: MEDICARE

## 2017-07-12 DIAGNOSIS — Z12.31 ENCOUNTER FOR SCREENING MAMMOGRAM FOR BREAST CANCER: ICD-10-CM

## 2017-07-12 PROCEDURE — 77067 SCR MAMMO BI INCL CAD: CPT | Mod: 26,,, | Performed by: RADIOLOGY

## 2017-07-12 PROCEDURE — 77067 SCR MAMMO BI INCL CAD: CPT | Mod: TC

## 2017-07-12 PROCEDURE — 77063 BREAST TOMOSYNTHESIS BI: CPT | Mod: 26,,, | Performed by: RADIOLOGY

## 2017-08-15 ENCOUNTER — CLINICAL SUPPORT (OUTPATIENT)
Dept: INFECTIOUS DISEASES | Facility: CLINIC | Age: 67
End: 2017-08-15
Payer: MEDICARE

## 2017-08-15 DIAGNOSIS — B19.20 HEPATITIS C VIRUS INFECTION WITHOUT HEPATIC COMA, UNSPECIFIED CHRONICITY: ICD-10-CM

## 2017-08-15 PROCEDURE — 90746 HEPB VACCINE 3 DOSE ADULT IM: CPT | Mod: S$GLB,,, | Performed by: INTERNAL MEDICINE

## 2017-08-15 PROCEDURE — G0010 ADMIN HEPATITIS B VACCINE: HCPCS | Mod: S$GLB,,, | Performed by: INTERNAL MEDICINE

## 2017-08-15 PROCEDURE — 99999 PR PBB SHADOW E&M-EST. PATIENT-LVL I: CPT | Mod: PBBFAC,,,

## 2017-09-08 ENCOUNTER — EPISODE CHANGES (OUTPATIENT)
Dept: HEPATOLOGY | Facility: CLINIC | Age: 67
End: 2017-09-08

## 2017-09-08 ENCOUNTER — LAB VISIT (OUTPATIENT)
Dept: LAB | Facility: HOSPITAL | Age: 67
End: 2017-09-08
Attending: INTERNAL MEDICINE
Payer: MEDICARE

## 2017-09-08 DIAGNOSIS — Z86.19 HISTORY OF HEPATITIS C: ICD-10-CM

## 2017-09-08 PROCEDURE — 36415 COLL VENOUS BLD VENIPUNCTURE: CPT | Mod: PO

## 2017-09-08 PROCEDURE — 87522 HEPATITIS C REVRS TRNSCRPJ: CPT

## 2017-09-11 ENCOUNTER — TELEPHONE (OUTPATIENT)
Dept: HEPATOLOGY | Facility: CLINIC | Age: 67
End: 2017-09-11

## 2017-09-11 LAB
HCV LOG: <1.08 LOG (10) IU/ML
HCV RNA QUANT PCR: <12 IU/ML
HCV, QUALITATIVE: NOT DETECTED IU/ML

## 2017-09-11 NOTE — TELEPHONE ENCOUNTER
HCV LAB REVIEW  SVR 24    Pertinent labs:  HCV RNA: <12, not detected     Will mail letter    Next labs due/Please schedule:  HCV RNA in 1 year: 06/19/18

## 2017-09-21 ENCOUNTER — OFFICE VISIT (OUTPATIENT)
Dept: INTERNAL MEDICINE | Facility: CLINIC | Age: 67
End: 2017-09-21
Payer: MEDICARE

## 2017-09-21 VITALS
HEIGHT: 62 IN | TEMPERATURE: 99 F | HEART RATE: 71 BPM | BODY MASS INDEX: 25.03 KG/M2 | SYSTOLIC BLOOD PRESSURE: 130 MMHG | DIASTOLIC BLOOD PRESSURE: 70 MMHG | WEIGHT: 136 LBS | RESPIRATION RATE: 15 BRPM | OXYGEN SATURATION: 99 %

## 2017-09-21 DIAGNOSIS — M19.049 OA (OSTEOARTHRITIS) OF FINGER, UNSPECIFIED LATERALITY: ICD-10-CM

## 2017-09-21 DIAGNOSIS — Z86.19 HISTORY OF HEPATITIS C: ICD-10-CM

## 2017-09-21 DIAGNOSIS — Z00.00 ENCOUNTER FOR PREVENTIVE HEALTH EXAMINATION: Primary | ICD-10-CM

## 2017-09-21 DIAGNOSIS — H25.011 CORTICAL AGE-RELATED CATARACT OF RIGHT EYE: ICD-10-CM

## 2017-09-21 DIAGNOSIS — E78.5 HYPERLIPIDEMIA, UNSPECIFIED HYPERLIPIDEMIA TYPE: ICD-10-CM

## 2017-09-21 DIAGNOSIS — H61.20 CERUMEN DEBRIS ON TYMPANIC MEMBRANE, UNSPECIFIED LATERALITY: ICD-10-CM

## 2017-09-21 DIAGNOSIS — Z01.419 ENCOUNTER FOR WELL WOMAN EXAM WITH ROUTINE GYNECOLOGICAL EXAM: ICD-10-CM

## 2017-09-21 DIAGNOSIS — M85.80 OSTEOPENIA, UNSPECIFIED LOCATION: ICD-10-CM

## 2017-09-21 DIAGNOSIS — M25.549 ARTHRALGIA OF HAND, UNSPECIFIED LATERALITY: ICD-10-CM

## 2017-09-21 DIAGNOSIS — Z85.820 HISTORY OF MELANOMA: ICD-10-CM

## 2017-09-21 DIAGNOSIS — D70.9 NEUTROPENIA, UNSPECIFIED TYPE: ICD-10-CM

## 2017-09-21 DIAGNOSIS — E04.2 NONTOXIC MULTINODULAR GOITER: ICD-10-CM

## 2017-09-21 PROCEDURE — 99499 UNLISTED E&M SERVICE: CPT | Mod: S$GLB,,, | Performed by: NURSE PRACTITIONER

## 2017-09-21 PROCEDURE — 99999 PR PBB SHADOW E&M-EST. PATIENT-LVL V: CPT | Mod: PBBFAC,,, | Performed by: NURSE PRACTITIONER

## 2017-09-21 PROCEDURE — G0439 PPPS, SUBSEQ VISIT: HCPCS | Mod: S$GLB,,, | Performed by: NURSE PRACTITIONER

## 2017-09-21 RX ORDER — BIOTIN 1 MG
1000 TABLET ORAL DAILY
COMMUNITY
End: 2020-11-30

## 2017-09-21 NOTE — PROGRESS NOTES
"Tomasa Reed presented for a  Medicare AWV and comprehensive Health Risk Assessment today. The following components were reviewed and updated:    · Medical history  · Family History  · Social history  · Allergies and Current Medications  · Health Risk Assessment  · Health Maintenance  · Care Team     ** See Completed Assessments for Annual Wellness Visit within the encounter summary.**       The following assessments were completed:  · Living Situation  · CAGE  · Depression Screening  · Timed Get Up and Go  · Whisper Test  · Cognitive Function Screening  · Nutrition Screening  · ADL Screening  · PAQ Screening    Vitals:    09/21/17 0938   BP: 130/70   Pulse: 71   Resp: 15   Temp: 98.6 °F (37 °C)   TempSrc: Oral   SpO2: 99%   Weight: 61.7 kg (136 lb)   Height: 5' 2" (1.575 m)     Body mass index is 24.87 kg/m².  Physical Exam   Constitutional: She is oriented to person, place, and time. She appears well-developed and well-nourished.   HENT:   Head: Normocephalic and atraumatic.   Cardiovascular: Normal rate, regular rhythm and normal heart sounds.    No murmur heard.  Pulmonary/Chest: Effort normal and breath sounds normal.   Abdominal: Soft. Bowel sounds are normal. There is no tenderness.   Musculoskeletal: She exhibits no edema.   Neurological: She is alert and oriented to person, place, and time.   Skin: Skin is warm and dry.   Psychiatric: She has a normal mood and affect. Her behavior is normal. Judgment and thought content normal.   Nursing note and vitals reviewed.        Diagnoses and health risks identified today and associated recommendations/orders:    1. Osteopenia, unspecified location  Stable- followed by PCP    2. Nontoxic multinodular goiter  Stable- followed by PCP    3. OA (osteoarthritis) of finger, unspecified laterality  Stable- followed by PCP    4. Hyperlipidemia, unspecified hyperlipidemia type  Stable- followed by PCP    5. History of melanoma  Stable- followed by PCP    6. History of " hepatitis C; S/p RX with SVR 24 documented 09/2017  Stable- followed by hepatology    7. Cortical age-related cataract of right eye  Stable- followed by opthalmology    8. Neutropenia, unspecified type  Stable- followed by PCP    9. Arthralgia of hand, unspecified laterality  Stable- followed by PCP    10. Cerumen debris on tympanic membrane, unspecified laterality  Stable- followed by PCP  - Ambulatory referral to ENT    11. Encounter for preventive health examination  Assessments completed  Preventative health recommendations reviewed       12. Encounter for well woman exam with routine gynecological exam  - Ambulatory Referral to Gynecology      Provided Tomasa with a 5-10 year written screening schedule and personal prevention plan. Recommendations were developed using the USPSTF age appropriate recommendations. Education, counseling, and referrals were provided as needed. After Visit Summary printed and given to patient which includes a list of additional screenings\tests needed.    Return in about 1 year (around 9/21/2018) for HRA.    Eleonora Montenegro NP

## 2017-09-21 NOTE — PATIENT INSTRUCTIONS
Counseling and Referral of Other Preventative  (Italic type indicates deductible and co-insurance are waived)    Patient Name: Tomasa Reed  Today's Date: 9/21/2017      SERVICE LIMITATIONS RECOMMENDATION    Vaccines    · Pneumococcal (once after 65)    · Influenza (annually)    · Hepatitis B (if medium/high risk)    · Prevnar 13      Hepatitis B medium/high risk factors:       - End-stage renal disease       - Hemophiliacs who received Factor VII or         IX concentrates       - Clients of institutions for the mentally             retarded       - Persons who live in the same house as          a HepB carrier       - Homosexual men       - Illicit injectable drug abusers     Pneumococcal: CURRENT      Influenza: DUE     Hepatitis B: CURRENT     Prevnar 13: CURRENT    Mammogram (biennial age 50-74)  Annually (age 40 or over)  7/12/17    Pap (up to age 70 and after 70 if unknown history or abnormal study last 10 years)      due       Colorectal cancer screening (to age 75)    · Fecal occult blood test (annual)  · Flexible sigmoidoscopy (5y)  · Screening colonoscopy (10y)  · Barium enema     12/14/16-due in 2019    Diabetes self-management training (no USPSTF recommendations)  Requires referral by treating physician for patient with diabetes or renal disease. 10 hours of initial DSMT sessions of no less than 30 minutes each in a continuous 12-month period. 2 hours of follow-up DSMT in subsequent years.  n/a    Bone mass measurements (age 65 & older, biennial)  Requires diagnosis related to osteoporosis or estrogen deficiency. Biennial benefit unless patient has history of long-term glucocorticoid  5/6/17- due in 2019    Glaucoma screening (no USPSTF recommendation)  Diabetes mellitus, family history   , age 50 or over    American, age 65 or over  current-5/16/17    Medical nutrition therapy for diabetes or renal disease (no recommended schedule)  Requires referral by treating physician  for patient with diabetes or renal disease or kidney transplant within the past 3 years.  Can be provided in same year as diabetes self-management training (DSMT), and CMS recommends medical nutrition therapy take place after DSMT. Up to 3 hours for initial year and 2 hours in subsequent years.  n/a    Cardiovascular screening blood tests (every 5 years)  · Fasting lipid panel  Order as a panel if possible  current 5/4/17    Diabetes screening tests (at least every 3 years, Medicare covers annually or at 6-month intervals for prediabetic patients)  · Fasting blood sugar (FBS) or glucose tolerance test (GTT)  Patient must be diagnosed with one of the following:       - Hypertension       - Dyslipidemia       - Obesity (BMI 30kg/m2)       - Previous elevated impaired FBS or GTT       ... or any two of the following:       - Overweight (BMI 25 but <30)       - Family history of diabetes       - Age 65 or older       - History of gestational diabetes or birth of baby weighing more than 9 pounds  current    HIV screening (annually for increased risk patients)  · HIV-1 and HIV-2 by EIA, or KASANDRA, rapid antibody test or oral mucosa transudate  Patients must be at increased risk for HIV infection per USPSTF guidelines or pregnant. Tests covered annually for patient at increased risk or as requested by the patient. Pregnant patients may receive up to 3 tests during pregnancy.  already done 1/27/17    Smoking cessation counseling (up to 8 sessions per year)  Patients must be asymptomatic of tobacco-related conditions to receive as a preventative service.  n/a    Subsequent annual wellness visit  At least 12 months since last AWV  Return in one year     The following information is provided to all patients.  This information is to help you find resources for any of the problems found today that may be affecting your health:                Living healthy guide: www.[facility].[place].gov      Understanding Diabetes: www.diabetes.org       Eating healthy: www.cdc.gov/healthyweight      [facility] home safety checklist: www.cdc.gov/steadi/patient.html      Agency on Aging: www.goea.[place].gov      Alcoholics anonymous (AA): www.aa.org      Physical Activity: www.alisson.nih.gov/ju2mazb      Tobacco use: www.quitwithusla.org

## 2017-10-09 ENCOUNTER — PATIENT MESSAGE (OUTPATIENT)
Dept: INTERNAL MEDICINE | Facility: CLINIC | Age: 67
End: 2017-10-09

## 2017-10-10 ENCOUNTER — OFFICE VISIT (OUTPATIENT)
Dept: OTOLARYNGOLOGY | Facility: CLINIC | Age: 67
End: 2017-10-10
Payer: MEDICARE

## 2017-10-10 VITALS — DIASTOLIC BLOOD PRESSURE: 74 MMHG | TEMPERATURE: 97 F | HEART RATE: 76 BPM | SYSTOLIC BLOOD PRESSURE: 140 MMHG

## 2017-10-10 DIAGNOSIS — R51.9 LEFT FACIAL PRESSURE AND PAIN: ICD-10-CM

## 2017-10-10 DIAGNOSIS — Z88.9 HISTORY OF SEASONAL ALLERGIES: ICD-10-CM

## 2017-10-10 DIAGNOSIS — R09.81 NASAL CONGESTION: ICD-10-CM

## 2017-10-10 DIAGNOSIS — H92.03 EAR DISCOMFORT, BILATERAL: Primary | ICD-10-CM

## 2017-10-10 PROCEDURE — 99213 OFFICE O/P EST LOW 20 MIN: CPT | Mod: S$GLB,,, | Performed by: OTOLARYNGOLOGY

## 2017-10-10 PROCEDURE — 99999 PR PBB SHADOW E&M-EST. PATIENT-LVL III: CPT | Mod: PBBFAC,,, | Performed by: OTOLARYNGOLOGY

## 2017-10-10 NOTE — PATIENT INSTRUCTIONS
Some wax removed from both eacs with microforceps  Use NSS ( fluticasone) 2 sprays @ lateral nostril once a day  Continue Allegra use daily prn  May use Afin type spray x 3 days/nights  Call for celestone injectiuon vs oral prednisone pending course( to receive flu vaccine in 3 days)

## 2017-10-10 NOTE — PROGRESS NOTES
Subjective:       Patient ID: Tomasa Reed is a 67 y.o. female.    Chief Complaint: No chief complaint on file.    HPI: Ms. Reed is a 67 year old CF who indicates ear discomfort. She believes that wax may be the cause of her symptoms.  She also indiacytes exacerbation of sinus and allergy sx causing her to switch from Claritin to Allegra yesterday; it is working a little better than the Claritin .She is also using fluticasone NSS.  She indicates left facial pressure/pain sx lately. She denies any purulent nasl discharge.  She is scheduled to receive a flu vaccine later this week.  She was examined by nurse practitioner 9/21/17 of the internal medicine department for a comprehensive health risk assessment.  She was diagnosed with osteopenia, nontoxic multinodular goiter, osteoarthritis of finger, hyperlipidemia, history of melanoma stable, history of hepatitis C, cataract of right eye, neutropenia, arthralgia hand and cerumen debris visualized on the tympanic membranes which she was referred here.  I had examined her in mid March of this year for ALLERGIC rhinitis symptoms, right ear tinnitus and bilateral ear pressure.  Audiometry was performed which documented 20 dB bilateral SRT scores and 4K right ear greater than left sensorineural hearing loss.  The study is duplicated below        Her medical problem list includes history of melanoma, nontoxic multinodular goiter, osteopenia, hyperlipidemia, neutropenia, osteoarthritis of finger, cataract, history of hepatitis C status post treatment with SVR 24 documented 9/2017  Review of Systems  ALL:  iodine and sulfa       Objective:    /74 T 96.6 P 76  Gen.:Alert and oriented CF in no acute distress  Both ears were examined under the microscope in the micro-procedure room.  Some wax is extracted from the right ear canal manually.  The right eardrum is intact and clear and the right middle ear space is well aerated.  Some wax is extracted from left ear  canal.  Left eardrum is intact and clear and the left middle ear space is well aerated.    Physical Exam   HENT:   Head:       Ears:    Nose:           Assessment:       1. Ear discomfort, bilateral    2. Left facial pressure and pain    3. History of seasonal allergies    4. Nasal congestion      5.    Flu shot scheduled in 3 days  Plan:     Some wax removed from both eacs with microforceps  Use NSS ( fluticasone) 2 sprays @ lateral nostril once a day  Continue Allegra use daily prn  May use Afin type spray x 3 days/nights  Call for celestone injectiuon vs oral prednisone pending course ( to receive flu vaccine in 3 days)

## 2017-10-13 ENCOUNTER — CLINICAL SUPPORT (OUTPATIENT)
Dept: INTERNAL MEDICINE | Facility: CLINIC | Age: 67
End: 2017-10-13
Payer: MEDICARE

## 2017-10-13 PROCEDURE — 90662 IIV NO PRSV INCREASED AG IM: CPT | Mod: S$GLB,,, | Performed by: INTERNAL MEDICINE

## 2017-10-13 PROCEDURE — G0008 ADMIN INFLUENZA VIRUS VAC: HCPCS | Mod: S$GLB,,, | Performed by: INTERNAL MEDICINE

## 2017-10-13 NOTE — PROGRESS NOTES
Flu vaccine administered IM to the RD per standing orders. Pt to wait in lobby for 15 minutes. Pt tolerated injection well with no complaints.

## 2017-11-13 NOTE — PROGRESS NOTES
67 year-old female presents  for well care.   Nonsmoker, nonalcohol consumer.     SCREENING TESTS:   Cholesterol/ lab, pending   Colonoscopy 12/2016 by Dr Painting- 2 tubular adenoma polyps- Mom d. Colon cancer   He recommended 3- years. -update pending  Mammogram, 2017  WWE, MARLENE, ovaries intact.  DEXA, 5/2017   Low bone mass with a significant decrease of 3.4% in the hip BMD compared with the prior study. The estimated 10 year probability of hip fracture is 1.5% and of a major osteoporotic fracture 14%, respectively, using FRAX.   RECOMMENDATIONS:  1. Adequate Calcium and Vitamin D, 1200 mg/day and 800 units/day, respectively.  2. Repeat BMD in 2 years.    2012- holiday off of her Fosamax; no Rx rec   Taking Calcium and Vit D3    Eye-UTD  DDS-UTD    VACCINATIONS:  Tdap, 12/2012  Zostavax, 10/2012, pharmacist sharonbraxton gave vaccine  Flu vaccine yearly  Pneumovax, 12/2013   Prevnar, 9/2015      MEDS: Reviewed.     REVIEW OF SYSTEMS:   GEN: No fever or chills or night sweats.   EYES: No visual disturbances, no diplopia, or d/c.  ENT: occ sinus, postnasal drip, seasonal ;       tx : Nasonex and Benadryl hs prn- now using Allegra     No dysphagia or early satiety  Cv: No chest pain, palpitations, PND, or orthopnea.   No calf pressure   RESPIRATORY: No cough or wheezing.   GI: No change in bowels or blood in stool.   No abdominal pain, no dark urine or light colored stool  RECALL Hep C +, s/p treatment  : Rare nocturia. No dysuria or hematuria. She does have the interstitial cystitis and doing well;   no recur x 5 yrs  MUSCULOSKELETAL: Occ joint pain, hands. Worked out yesterday I@ gym and in yard  Tx: Aleve w/ improvment  NEUROLOGICALLY: No unusual headaches.   No weakness in arms or legs or slurred speech.   ADL's: 100% independent  Memory: Good  Mental Health: Good  AD's: + will, and living will and M/POA   Nutrition: Good  Gait: No falls  Safety: Intact  Urinary incontinence:  + nocturia x 1, close  Remainder of review  negative except as previously noted.     PAST MEDICAL HISTORY:   Hep C,   Constipation,   Neutropenia, ANC 1122    Osteopenia ( osteoporosis s/p Fosamax),   Multinodular thyroid goiter with negative workup,   Interstitial cystitis, Dr. Sexton  DJGEORGIA, neck and back in particular and now the thumb.  L5-S1 ruptured discs, s/p epidural injections  Melanoma, s/p excision       PHYSICAL EXAM:  VSS:  GENERAL: Alert and oriented, in no acute distress. Well developed, well   nourished, conversant and cooperative, pleasant as always   EYES: Conjunctivae and lids unremarkable. Sclerae anictericPupils are reactive. EOMI  E. NT: Canals w/o cerumen, and TMs visualized dull. Nasal mucosa, turbinates, injected w/o exudate  oropharynx injected w/o exudate; sinus NT  NECK: Supple. Prominent thyroid, no LN  RESPIRATORY: Efforts unlabored.   LUNGS: Clear to auscultation.   HEART: Regular rate and rhythm. No carotid bruits noted,   1+ pedal pulse. No edema.   ABDOMEN: Bowel sounds present, soft, nontender.   No hepatosplenomegaly.  MUSCULOSKELETAL: Gait normal.   No clubbing, cyanosis or edema noted.   Calves nontender  NEURO: LARSEN. No tremor noted  SKIN: warm and dry    IMPRESSION:    Annual PE.    PHx melanoma, yearly monitoring   Family history of colon cancer./dm    Hx Hep C - s/p tx   Neutropenia , asx   Osteopenia, on supplements   HLD, elevated LDL   Multinodular thyroid, asx  Abnormal TSH   Vit D deficiency    PLAN:  Fasting lab  Prevnar  Low fat diet  Call prn .  RTC 6mos w/ lab

## 2017-11-14 ENCOUNTER — OFFICE VISIT (OUTPATIENT)
Dept: INTERNAL MEDICINE | Facility: CLINIC | Age: 67
End: 2017-11-14
Payer: MEDICARE

## 2017-11-14 VITALS
TEMPERATURE: 98 F | HEIGHT: 62 IN | HEART RATE: 64 BPM | RESPIRATION RATE: 14 BRPM | DIASTOLIC BLOOD PRESSURE: 68 MMHG | BODY MASS INDEX: 26.21 KG/M2 | SYSTOLIC BLOOD PRESSURE: 132 MMHG | WEIGHT: 142.44 LBS

## 2017-11-14 DIAGNOSIS — M85.80 OSTEOPENIA, UNSPECIFIED LOCATION: ICD-10-CM

## 2017-11-14 DIAGNOSIS — R79.89 ABNORMAL TSH: ICD-10-CM

## 2017-11-14 DIAGNOSIS — E04.2 NONTOXIC MULTINODULAR GOITER: ICD-10-CM

## 2017-11-14 DIAGNOSIS — Z00.00 ANNUAL PHYSICAL EXAM: Primary | ICD-10-CM

## 2017-11-14 DIAGNOSIS — E78.5 HYPERLIPIDEMIA, UNSPECIFIED HYPERLIPIDEMIA TYPE: ICD-10-CM

## 2017-11-14 DIAGNOSIS — D70.9 NEUTROPENIA, UNSPECIFIED TYPE: ICD-10-CM

## 2017-11-14 DIAGNOSIS — E55.9 VITAMIN D DEFICIENCY: ICD-10-CM

## 2017-11-14 DIAGNOSIS — Z86.19 HISTORY OF HEPATITIS C: ICD-10-CM

## 2017-11-14 PROCEDURE — 99999 PR PBB SHADOW E&M-EST. PATIENT-LVL III: CPT | Mod: PBBFAC,,, | Performed by: INTERNAL MEDICINE

## 2017-11-14 PROCEDURE — 99397 PER PM REEVAL EST PAT 65+ YR: CPT | Mod: S$GLB,,, | Performed by: INTERNAL MEDICINE

## 2017-11-17 ENCOUNTER — PATIENT MESSAGE (OUTPATIENT)
Dept: INTERNAL MEDICINE | Facility: CLINIC | Age: 67
End: 2017-11-17

## 2017-11-20 ENCOUNTER — LAB VISIT (OUTPATIENT)
Dept: LAB | Facility: HOSPITAL | Age: 67
End: 2017-11-20
Attending: INTERNAL MEDICINE
Payer: MEDICARE

## 2017-11-20 DIAGNOSIS — E78.5 HYPERLIPIDEMIA, UNSPECIFIED HYPERLIPIDEMIA TYPE: ICD-10-CM

## 2017-11-20 DIAGNOSIS — R79.89 ABNORMAL TSH: ICD-10-CM

## 2017-11-20 DIAGNOSIS — D64.9 ANEMIA, UNSPECIFIED TYPE: Primary | ICD-10-CM

## 2017-11-20 DIAGNOSIS — D70.9 NEUTROPENIA, UNSPECIFIED TYPE: ICD-10-CM

## 2017-11-20 DIAGNOSIS — E55.9 VITAMIN D DEFICIENCY: ICD-10-CM

## 2017-11-20 LAB
25(OH)D3+25(OH)D2 SERPL-MCNC: 28 NG/ML
ALBUMIN SERPL BCP-MCNC: 3.8 G/DL
ALP SERPL-CCNC: 56 U/L
ALT SERPL W/O P-5'-P-CCNC: 11 U/L
ANION GAP SERPL CALC-SCNC: 7 MMOL/L
AST SERPL-CCNC: 18 U/L
BASOPHILS # BLD AUTO: 0.02 K/UL
BASOPHILS NFR BLD: 0.7 %
BILIRUB SERPL-MCNC: 0.5 MG/DL
BUN SERPL-MCNC: 12 MG/DL
CALCIUM SERPL-MCNC: 9.6 MG/DL
CHLORIDE SERPL-SCNC: 104 MMOL/L
CHOLEST SERPL-MCNC: 192 MG/DL
CHOLEST/HDLC SERPL: 2.9 {RATIO}
CO2 SERPL-SCNC: 26 MMOL/L
CREAT SERPL-MCNC: 0.7 MG/DL
DIFFERENTIAL METHOD: ABNORMAL
EOSINOPHIL # BLD AUTO: 0.1 K/UL
EOSINOPHIL NFR BLD: 2.4 %
ERYTHROCYTE [DISTWIDTH] IN BLOOD BY AUTOMATED COUNT: 12.5 %
EST. GFR  (AFRICAN AMERICAN): >60 ML/MIN/1.73 M^2
EST. GFR  (NON AFRICAN AMERICAN): >60 ML/MIN/1.73 M^2
GLUCOSE SERPL-MCNC: 85 MG/DL
HCT VFR BLD AUTO: 36.6 %
HDLC SERPL-MCNC: 67 MG/DL
HDLC SERPL: 34.9 %
HGB BLD-MCNC: 12.7 G/DL
IMM GRANULOCYTES # BLD AUTO: 0 K/UL
IMM GRANULOCYTES NFR BLD AUTO: 0 %
LDLC SERPL CALC-MCNC: 114.2 MG/DL
LYMPHOCYTES # BLD AUTO: 1.5 K/UL
LYMPHOCYTES NFR BLD: 51.6 %
MCH RBC QN AUTO: 32.2 PG
MCHC RBC AUTO-ENTMCNC: 34.7 G/DL
MCV RBC AUTO: 93 FL
MONOCYTES # BLD AUTO: 0.2 K/UL
MONOCYTES NFR BLD: 7.3 %
NEUTROPHILS # BLD AUTO: 1.1 K/UL
NEUTROPHILS NFR BLD: 38 %
NONHDLC SERPL-MCNC: 125 MG/DL
NRBC BLD-RTO: 0 /100 WBC
PLATELET # BLD AUTO: 269 K/UL
PMV BLD AUTO: 9.5 FL
POTASSIUM SERPL-SCNC: 4.3 MMOL/L
PROT SERPL-MCNC: 7.4 G/DL
RBC # BLD AUTO: 3.95 M/UL
SODIUM SERPL-SCNC: 137 MMOL/L
TRIGL SERPL-MCNC: 54 MG/DL
TSH SERPL DL<=0.005 MIU/L-ACNC: 0.55 UIU/ML
WBC # BLD AUTO: 2.87 K/UL

## 2017-11-20 PROCEDURE — 84443 ASSAY THYROID STIM HORMONE: CPT

## 2017-11-20 PROCEDURE — 85025 COMPLETE CBC W/AUTO DIFF WBC: CPT

## 2017-11-20 PROCEDURE — 82306 VITAMIN D 25 HYDROXY: CPT

## 2017-11-20 PROCEDURE — 36415 COLL VENOUS BLD VENIPUNCTURE: CPT | Mod: PO

## 2017-11-20 PROCEDURE — 80053 COMPREHEN METABOLIC PANEL: CPT

## 2017-11-20 PROCEDURE — 80061 LIPID PANEL: CPT

## 2017-11-28 ENCOUNTER — PATIENT MESSAGE (OUTPATIENT)
Dept: INTERNAL MEDICINE | Facility: CLINIC | Age: 67
End: 2017-11-28

## 2017-11-29 ENCOUNTER — PATIENT MESSAGE (OUTPATIENT)
Dept: INTERNAL MEDICINE | Facility: CLINIC | Age: 67
End: 2017-11-29

## 2017-11-29 NOTE — TELEPHONE ENCOUNTER
Notes Recorded by Rosanna Huizar MD on 11/26/2017 at 7:53 PM CST  Please note that your vitamin D level was decreased, please take 2,000 IU of Vitamin D 3 daily  If you are already on that dose, please advise, so other options can be reviewed  Your thyroid and cholesterol levels are good  Your blood count revealed a borderline anemia, often it is reflective of a change in diet or recent illness  If you have noticed blood in your urine or stool please advise  Otherwise-- plan to recheck the blood count in 4 weeks with iron studies  Rosanna Dominguez  ( Mansi, need CBC and iron studies in 4 weeks - non fasting)

## 2017-12-28 ENCOUNTER — LAB VISIT (OUTPATIENT)
Dept: LAB | Facility: HOSPITAL | Age: 67
End: 2017-12-28
Attending: INTERNAL MEDICINE
Payer: MEDICARE

## 2017-12-28 DIAGNOSIS — D64.9 ANEMIA, UNSPECIFIED TYPE: ICD-10-CM

## 2017-12-28 LAB
BASOPHILS # BLD AUTO: 0.04 K/UL
BASOPHILS NFR BLD: 1.1 %
DIFFERENTIAL METHOD: ABNORMAL
EOSINOPHIL # BLD AUTO: 0.1 K/UL
EOSINOPHIL NFR BLD: 1.9 %
ERYTHROCYTE [DISTWIDTH] IN BLOOD BY AUTOMATED COUNT: 12.2 %
HCT VFR BLD AUTO: 36.9 %
HGB BLD-MCNC: 12.8 G/DL
IMM GRANULOCYTES # BLD AUTO: 0.01 K/UL
IMM GRANULOCYTES NFR BLD AUTO: 0.3 %
IRON SERPL-MCNC: 101 UG/DL
LYMPHOCYTES # BLD AUTO: 1.7 K/UL
LYMPHOCYTES NFR BLD: 45.9 %
MCH RBC QN AUTO: 31.8 PG
MCHC RBC AUTO-ENTMCNC: 34.7 G/DL
MCV RBC AUTO: 92 FL
MONOCYTES # BLD AUTO: 0.3 K/UL
MONOCYTES NFR BLD: 7.5 %
NEUTROPHILS # BLD AUTO: 1.6 K/UL
NEUTROPHILS NFR BLD: 43.3 %
NRBC BLD-RTO: 0 /100 WBC
PLATELET # BLD AUTO: 288 K/UL
PMV BLD AUTO: 9.2 FL
RBC # BLD AUTO: 4.02 M/UL
SATURATED IRON: 28 %
TOTAL IRON BINDING CAPACITY: 361 UG/DL
TRANSFERRIN SERPL-MCNC: 244 MG/DL
WBC # BLD AUTO: 3.62 K/UL

## 2017-12-28 PROCEDURE — 36415 COLL VENOUS BLD VENIPUNCTURE: CPT | Mod: PO

## 2017-12-28 PROCEDURE — 85025 COMPLETE CBC W/AUTO DIFF WBC: CPT

## 2017-12-28 PROCEDURE — 83540 ASSAY OF IRON: CPT

## 2018-01-05 ENCOUNTER — TELEPHONE (OUTPATIENT)
Dept: INTERNAL MEDICINE | Facility: CLINIC | Age: 68
End: 2018-01-05

## 2018-01-05 NOTE — TELEPHONE ENCOUNTER
----- Message from Rosanna Huizar MD sent at 1/4/2018  7:07 PM CST -----  Please note that your blood count is stable and your iron studies are in the normal range  Rosanna Dominguez

## 2018-03-13 ENCOUNTER — OFFICE VISIT (OUTPATIENT)
Dept: INTERNAL MEDICINE | Facility: CLINIC | Age: 68
End: 2018-03-13
Payer: MEDICARE

## 2018-03-13 VITALS
TEMPERATURE: 98 F | WEIGHT: 141.75 LBS | RESPIRATION RATE: 16 BRPM | DIASTOLIC BLOOD PRESSURE: 79 MMHG | HEIGHT: 62 IN | SYSTOLIC BLOOD PRESSURE: 131 MMHG | HEART RATE: 70 BPM | BODY MASS INDEX: 26.09 KG/M2

## 2018-03-13 DIAGNOSIS — J30.9 ALLERGIC SINUSITIS: Primary | ICD-10-CM

## 2018-03-13 DIAGNOSIS — D70.9 NEUTROPENIA, UNSPECIFIED TYPE: ICD-10-CM

## 2018-03-13 DIAGNOSIS — R09.82 POST-NASAL DRIP: ICD-10-CM

## 2018-03-13 PROCEDURE — 99999 PR PBB SHADOW E&M-EST. PATIENT-LVL III: CPT | Mod: PBBFAC,,, | Performed by: INTERNAL MEDICINE

## 2018-03-13 PROCEDURE — 99214 OFFICE O/P EST MOD 30 MIN: CPT | Mod: S$GLB,,, | Performed by: INTERNAL MEDICINE

## 2018-03-13 PROCEDURE — 99499 UNLISTED E&M SERVICE: CPT | Mod: S$GLB,,, | Performed by: INTERNAL MEDICINE

## 2018-03-13 RX ORDER — METHYLPREDNISOLONE 4 MG/1
TABLET ORAL
Qty: 1 PACKAGE | Refills: 0 | Status: SHIPPED | OUTPATIENT
Start: 2018-03-13 | End: 2018-05-22

## 2018-03-13 NOTE — PROGRESS NOTES
Subjective:       Patient ID: Tomasa Reed is a 67 y.o. female.    Chief Complaint: Sinus Problem    HPI   Pt with neutropenia is here for evaluation of 10 days of persistent sinus congestion, post nasal drip, runny nose, ear pressure and sore throat. It all started after gardening and cleaning her father's kathleen garage. Minimal relief with Claritin, Nasacort, Dayquil/Nyquil.   Review of Systems   Constitutional: Negative for activity change, appetite change, chills, diaphoresis, fatigue, fever and unexpected weight change.   HENT: Positive for congestion, ear pain, postnasal drip, rhinorrhea, sinus pain and sinus pressure. Negative for sneezing, sore throat, trouble swallowing and voice change.    Respiratory: Negative for cough, shortness of breath and wheezing.    Cardiovascular: Negative for chest pain, palpitations and leg swelling.   Gastrointestinal: Negative for abdominal pain, blood in stool, constipation, diarrhea, nausea and vomiting.   Genitourinary: Negative for dysuria.   Musculoskeletal: Negative for arthralgias and myalgias.   Skin: Negative for rash and wound.   Allergic/Immunologic: Negative for environmental allergies and food allergies.   Hematological: Negative for adenopathy. Does not bruise/bleed easily.       Objective:      Physical Exam   Constitutional: She is oriented to person, place, and time. She appears well-developed and well-nourished. No distress.   HENT:   Head: Normocephalic and atraumatic.   Right Ear: External ear normal.   Left Ear: External ear normal.   Nose: Mucosal edema and rhinorrhea present.   Mouth/Throat: Oropharynx is clear and moist. No oropharyngeal exudate.   Eyes: Conjunctivae and EOM are normal. Pupils are equal, round, and reactive to light. Right eye exhibits no discharge. Left eye exhibits no discharge. No scleral icterus.   Neck: Neck supple. No JVD present.   Cardiovascular: Normal rate, regular rhythm, normal heart sounds and intact distal  pulses.    Pulmonary/Chest: Effort normal and breath sounds normal. No respiratory distress. She has no wheezes. She has no rales.   Abdominal: Soft. Bowel sounds are normal.   Musculoskeletal: She exhibits no edema.   Lymphadenopathy:     She has no cervical adenopathy.   Neurological: She is alert and oriented to person, place, and time.   Skin: Skin is warm and dry. No rash noted. She is not diaphoretic. No pallor.       Assessment:       1. Allergic sinusitis    2. Post-nasal drip    3. Neutropenia, unspecified type        Plan:    1/2. Rx Medrol pack and continue daily antihistamine/Nasacort    3. Stable, continue to monitor

## 2018-04-30 ENCOUNTER — PES CALL (OUTPATIENT)
Dept: ADMINISTRATIVE | Facility: CLINIC | Age: 68
End: 2018-04-30

## 2018-05-22 ENCOUNTER — OFFICE VISIT (OUTPATIENT)
Dept: OPTOMETRY | Facility: CLINIC | Age: 68
End: 2018-05-22
Payer: MEDICARE

## 2018-05-22 DIAGNOSIS — H52.202 MYOPIA WITH ASTIGMATISM AND PRESBYOPIA, LEFT: ICD-10-CM

## 2018-05-22 DIAGNOSIS — H25.13 NUCLEAR SCLEROSIS, BILATERAL: Primary | ICD-10-CM

## 2018-05-22 DIAGNOSIS — H52.12 MYOPIA WITH ASTIGMATISM AND PRESBYOPIA, LEFT: ICD-10-CM

## 2018-05-22 DIAGNOSIS — H52.4 MYOPIA WITH ASTIGMATISM AND PRESBYOPIA, LEFT: ICD-10-CM

## 2018-05-22 DIAGNOSIS — Z46.0 FITTING AND ADJUSTMENT OF SPECTACLES AND CONTACT LENSES: Primary | ICD-10-CM

## 2018-05-22 PROCEDURE — 99999 PR PBB SHADOW E&M-EST. PATIENT-LVL II: CPT | Mod: PBBFAC,,, | Performed by: OPTOMETRIST

## 2018-05-22 PROCEDURE — 92015 DETERMINE REFRACTIVE STATE: CPT | Mod: S$GLB,,, | Performed by: OPTOMETRIST

## 2018-05-22 PROCEDURE — 99499 UNLISTED E&M SERVICE: CPT | Mod: S$PBB,,, | Performed by: OPTOMETRIST

## 2018-05-22 PROCEDURE — 92014 COMPRE OPH EXAM EST PT 1/>: CPT | Mod: S$GLB,,, | Performed by: OPTOMETRIST

## 2018-05-22 PROCEDURE — 92310 CONTACT LENS FITTING OU: CPT | Mod: 52,,, | Performed by: OPTOMETRIST

## 2018-05-22 NOTE — PROGRESS NOTES
HPI     ISABEL 05/2017 wearing contact OD only, comfortable but vision at a a   distance not as clear.  Glasses about 1 yr. Old, only wears at night after   removing contact, still seem fine.  Blur ou at dist, x mos, no assoc pain or red, no relief over time,   constant    Last edited by Eric Barnes, OD on 5/22/2018 10:12 AM. (History)            Assessment /Plan     For exam results, see Encounter Report.    Nuclear sclerosis, bilateral    Myopia with astigmatism and presbyopia, left      1. Educated pt on presence of cataracts and effects on vision. No surgery at this time. Recheck in one year.  2. Spec Rx given and Contact lens Rx released to pt. Daily wear only advised, with education to risks of extended wear.  Discussed lens care, compliance and solutions. RTC yearly contact lens follow up.   . Different lens options discussed with patient. RTC 1 year full exam.           Addendum 5/23/18 gave trial near lens for os per pt request.

## 2018-05-23 ENCOUNTER — TELEPHONE (OUTPATIENT)
Dept: OPTOMETRY | Facility: CLINIC | Age: 68
End: 2018-05-23

## 2018-06-19 ENCOUNTER — LAB VISIT (OUTPATIENT)
Dept: LAB | Facility: HOSPITAL | Age: 68
End: 2018-06-19
Attending: INTERNAL MEDICINE
Payer: MEDICARE

## 2018-06-19 DIAGNOSIS — Z86.19 HISTORY OF HEPATITIS C: ICD-10-CM

## 2018-06-19 PROCEDURE — 36415 COLL VENOUS BLD VENIPUNCTURE: CPT | Mod: PO

## 2018-06-19 PROCEDURE — 87522 HEPATITIS C REVRS TRNSCRPJ: CPT

## 2018-06-21 LAB
HCV LOG: <1.08 LOG (10) IU/ML
HCV RNA QUANT PCR: <12 IU/ML
HCV, QUALITATIVE: NOT DETECTED IU/ML

## 2018-06-25 ENCOUNTER — TELEPHONE (OUTPATIENT)
Dept: HEPATOLOGY | Facility: CLINIC | Age: 68
End: 2018-06-25

## 2018-06-28 ENCOUNTER — OFFICE VISIT (OUTPATIENT)
Dept: INTERNAL MEDICINE | Facility: CLINIC | Age: 68
End: 2018-06-28
Payer: MEDICARE

## 2018-06-28 ENCOUNTER — PATIENT MESSAGE (OUTPATIENT)
Dept: OTOLARYNGOLOGY | Facility: CLINIC | Age: 68
End: 2018-06-28

## 2018-06-28 VITALS
DIASTOLIC BLOOD PRESSURE: 60 MMHG | RESPIRATION RATE: 15 BRPM | WEIGHT: 137 LBS | SYSTOLIC BLOOD PRESSURE: 110 MMHG | BODY MASS INDEX: 25.21 KG/M2 | OXYGEN SATURATION: 98 % | HEIGHT: 62 IN | TEMPERATURE: 98 F | HEART RATE: 77 BPM

## 2018-06-28 DIAGNOSIS — Z00.00 ENCOUNTER FOR PREVENTIVE HEALTH EXAMINATION: Primary | ICD-10-CM

## 2018-06-28 DIAGNOSIS — H61.20 WAX IN EAR: ICD-10-CM

## 2018-06-28 DIAGNOSIS — D70.9 NEUTROPENIA, UNSPECIFIED TYPE: ICD-10-CM

## 2018-06-28 DIAGNOSIS — E55.9 VITAMIN D DEFICIENCY: ICD-10-CM

## 2018-06-28 DIAGNOSIS — E78.5 HYPERLIPIDEMIA, UNSPECIFIED HYPERLIPIDEMIA TYPE: ICD-10-CM

## 2018-06-28 DIAGNOSIS — M85.80 OSTEOPENIA, UNSPECIFIED LOCATION: ICD-10-CM

## 2018-06-28 DIAGNOSIS — E04.2 NONTOXIC MULTINODULAR GOITER: ICD-10-CM

## 2018-06-28 DIAGNOSIS — H25.019 CORTICAL AGE-RELATED CATARACT, UNSPECIFIED LATERALITY: ICD-10-CM

## 2018-06-28 DIAGNOSIS — Z86.19 HISTORY OF HEPATITIS C: ICD-10-CM

## 2018-06-28 DIAGNOSIS — M19.049 OSTEOARTHRITIS OF FINGER, UNSPECIFIED LATERALITY: ICD-10-CM

## 2018-06-28 DIAGNOSIS — Z85.820 HISTORY OF MELANOMA: ICD-10-CM

## 2018-06-28 DIAGNOSIS — E66.3 OVERWEIGHT (BMI 25.0-29.9): ICD-10-CM

## 2018-06-28 DIAGNOSIS — Z12.39 SCREENING FOR BREAST CANCER: ICD-10-CM

## 2018-06-28 DIAGNOSIS — Z12.83 SCREENING EXAM FOR SKIN CANCER: ICD-10-CM

## 2018-06-28 DIAGNOSIS — H90.3 SENSORINEURAL HEARING LOSS (SNHL) OF BOTH EARS: ICD-10-CM

## 2018-06-28 PROCEDURE — 99499 UNLISTED E&M SERVICE: CPT | Mod: S$PBB,,, | Performed by: NURSE PRACTITIONER

## 2018-06-28 PROCEDURE — 99999 PR PBB SHADOW E&M-EST. PATIENT-LVL V: CPT | Mod: PBBFAC,,, | Performed by: NURSE PRACTITIONER

## 2018-06-28 PROCEDURE — G0439 PPPS, SUBSEQ VISIT: HCPCS | Mod: S$GLB,,, | Performed by: NURSE PRACTITIONER

## 2018-06-28 RX ORDER — CHOLECALCIFEROL (VITAMIN D3) 25 MCG
1000 TABLET ORAL DAILY
COMMUNITY
End: 2022-01-11

## 2018-06-28 NOTE — PATIENT INSTRUCTIONS
Counseling and Referral of Other Preventative  (Italic type indicates deductible and co-insurance are waived)    Patient Name: Tomasa Reed  Today's Date: 6/28/2018    Health Maintenance       Date Due Completion Date    Influenza Vaccine 08/01/2018 10/13/2017        Mammogram 07/12/2018 7/12/2017    DEXA SCAN 05/16/2019 5/16/2017    Lipid Panel 11/20/2018 11/20/2017    TETANUS VACCINE 12/03/2022   12/3/2012    Colonoscopy 12/14/2019 12/14/2016        Orders Placed This Encounter   Procedures    Mammo Digital Screening Bilat with Tomosynthesis_CAD    Ambulatory Referral to Dermatology    Ambulatory referral to ENT     The following information is provided to all patients.  This information is to help you find resources for any of the problems found today that may be affecting your health:                Living healthy guide: www.[facility].[place].[facility]      Understanding Diabetes: www.diabetes.org      Eating healthy: www.cdc.gov/healthyweight      [facility] home safety checklist: www.cdc.gov/steadi/patient.html      Agency on Aging: www.goea.[place].[facility]      Alcoholics anonymous (AA): www.aa.org      Physical Activity: www.alisson.nih.gov/dg7miwe      Tobacco use: www.quitwithusla.org     Preventing Falls in the Home  An adult or child can fall for many reasons. If you are an older adult, you may fall because your reaction time slows down. Your muscles and joints may get stiff, weak, or less flexible because of illness, medicines, or a physical condition. These things can also make a child more likely to fall or be injured in a fall.  Other health problems that make falls more likely include:  · Arthritis  · Dizziness or lightheadedness when you get out of bed (orthostatic hypotension)  · History of a stroke  · Dizziness  · Anemia  · Certain medicines taken for mental illness  · Problems with balance or gait  · History of falls with or without an injury  · Changes in vision (vision impairment)  · Changes in  thinking skills and memory (cognitive impairment)  Injuries from a fall can include broken bones, dislocated joints, and cuts. When these injuries are serious enough, they can make it impossible for you or a child who is injured in a fall to live on his or her own.  Prevention tips  To help prevent falls and fall-related injuries, follow the tips below.   Floors  Make floors safer by doing the following:   · Put nonskid pads under area rugs.  · Remove throw rugs.  · Replace worn floor coverings.  · Tack carpets firmly to each step on carpeted stairs. Put nonskid strips on the edges of uncarpeted stairs.  · Keep floors and stairs free of clutter and cords.  · Arrange furniture so there are clear pathways.  · Clean up any spills right away.  · Wear shoes that fit.  Bathrooms    Make bathrooms safer by doing the following:   · Install grab bars in the tub or shower.  · Apply nonskid strips or put a nonskid rubber mat in the tub or shower.  · Sit on a bath chair to bathe.  · Use bathmats with nonskid backing.  Lighting and the environment  Improve lighting in your home by doing the following:   · Keep a flashlight in each room. Or put a lamp next to the bed within easy reach.  · Put nightlights in the bedrooms, hallways, kitchen, and bathrooms.  · Make sure all stairways have good lighting.  · Take your time when going up and down stairs.  · Put handrails on both sides of stairs and in walkways for more support. To prevent injury to your wrist or arm, dont use handrails to pull yourself up.  · Install grab bars to pull yourself up.  · Move or rearrange items that you use often. This will make them easier to find or reach.  · Look at your home to find any safety hazards. Especially look at doorways, walkways, and the driveway. Remove or repair any safety problems that you find.  Date Last Reviewed: 8/1/2016  © 5773-6580 [employer]. [address] [place] [address], [place] [address]. All rights reserved. This  "information is not intended as a substitute for professional medical care. Always follow your healthcare professional's instructions.        Exercises to Prevent Falls  Certain types of exercises may help make you less likely to fall. Try the ones below. Or do other exercises that your health care provider suggests. Depending on your health, you may need to start slowly. Don't let that stop you. Even small amounts of exercise can help you. Be sure to talk to your health care provider before starting any exercise program.       Improve balance  Many types of exercise can help improve balance. Nakul chi and yoga are good examples. Here's another one to try. You can do it anytime and almost anywhere.  · Stand next to a counter or solid support.  · Push yourself up onto your tiptoes.  · Hold for 5 seconds. If you start to lose your balance, hold on to the counter.  · Rest and repeat 5 times. Work up to holding for 20 to 30 seconds, if you can. Increase flexibility  Being more flexible makes it easier for you to move around safely. Try exercises like the seated hamstring stretch.  · Sit in a chair and put one foot on a stool.  · Straighten your leg and reach with both hands down either side of your leg. Reach as far down your leg as you can.  · Hold for about 20 seconds.  · Go back to the starting position. Then repeat 5 times. Switch legs. Build strength  "Resistance" exercises help build strength. You can do them without equipment. Or you can use weights, elastic bands, or special machines. One such exercise is called the biceps curl. You can hold a 1-pound weight or even a can of soup. Do this exercise at least 3 times a week. Strive for every day.  · Sit up straight in a chair.  · Keep your elbow close to your body and your wrist straight.  · Bend your arm, moving your hand up to your shoulder. Then slowly lower your arm.  · Repeat 5 times. Switch to the other arm.   Build your staying power  Aerobic exercises make " your heart and lungs stronger so you can keep moving longer. Walking and swimming are two of the best types of exercises you can do. Using a stationary bike is great, too. Find an aerobic exercise that you enjoy. Start slowly and build up. Even 5 minutes is helpful. Aim for a goal of 30 minutes, at least 3 times a week. You don't have to do 30 minutes in 1 session. Break it up and walk a little throughout the day.  More helpful tips  · Start easy. Slowly work up to doing more.  · Talk with your health care provider about the best exercises for you.  · Call senior centers or health clubs about exercise programs.  · If needed, have a family member watch you walk every so often to check your stability.  · Exercise with a friend. Choose an activity you both enjoy.  · Consider pedro chi or yoga to strengthen your balance.  · Try exercises that you can do anytime, anywhere. Here are 2 examples. Have someone with you when you first try these:  ¨ Practice walking by placing 1 foot right in front of the other.  ¨ Stand up and sit down 10 times. Repeat this throughout the day.   Date Last Reviewed: 6/13/2015  © 4445-5234 [employer]. [address] [place] [address], [place] [address]. All rights reserved. This information is not intended as a substitute for professional medical care. Always follow your healthcare professional's instructions.

## 2018-06-28 NOTE — PROGRESS NOTES
I offered to discuss end of life issues, including information on how to make advance directives that the patient could use to name someone who would make medical decisions on their behalf if they became too ill to make themselves.    _  _X_Patient  Has already.

## 2018-06-28 NOTE — PROGRESS NOTES
"Tomasa Reed presented for a  Medicare AWV and comprehensive Health Risk Assessment today. The following components were reviewed and updated:    · Medical history  · Family History  · Social history  · Allergies and Current Medications  · Health Risk Assessment  · Health Maintenance  · Care Team     ** See Completed Assessments for Annual Wellness Visit within the encounter summary.**       The following assessments were completed:  · Living Situation  · CAGE  · Depression Screening  · Timed Get Up and Go  · Whisper Test  · Cognitive Function Screening  · Nutrition Screening  · ADL Screening  · PAQ Screening    Vitals:    06/28/18 0833   BP: 110/60   Pulse: 77   Resp: 15   Temp: 98.4 °F (36.9 °C)   TempSrc: Oral   SpO2: 98%   Weight: 62.1 kg (137 lb)   Height: 5' 2" (1.575 m)     Body mass index is 25.06 kg/m².  Physical Exam   Constitutional: She is oriented to person, place, and time. She appears well-developed and well-nourished.   HENT:   Head: Normocephalic and atraumatic.   Cardiovascular: Normal rate, regular rhythm and normal heart sounds.    No murmur heard.  Pulmonary/Chest: Effort normal and breath sounds normal. No respiratory distress. She has no wheezes. She has no rales.   Musculoskeletal: Normal range of motion. She exhibits no edema, tenderness or deformity.   Neurological: She is alert and oriented to person, place, and time. No cranial nerve deficit.   Skin: Skin is warm and dry.   Psychiatric: She has a normal mood and affect. Her behavior is normal.   Nursing note and vitals reviewed.        Diagnoses and health risks identified today and associated recommendations/orders:    1. History of melanoma  stable- followed by dermatology  - Ambulatory Referral to Dermatology    2. Nontoxic multinodular goiter  Stable- followed by PCP- last thyroid u/s- 11/21/16    3. Osteopenia, unspecified location  Stable- followed by PCP-last DXA 5/16/17    4. Osteoarthritis of finger, unspecified " laterality  Stable- followed by PCP    5. Cortical age-related cataract, unspecified laterality  Stable- followed by opthalmology    6. History of hepatitis C; S/p RX with SVR 24 documented 09/2017  Stable- followed by hepatology    7. Hyperlipidemia, unspecified hyperlipidemia type  Stable- followed by PCP    8. Sensorineural hearing loss (SNHL) of both ears  Stable- followed by ENT, audiology    9. Neutropenia, unspecified type  Stable- followed by PCP    10. Vitamin D deficiency  Stable- followed by PCP    11. Screening for breast cancer  Stable- followed by PCP  - Mammo Digital Screening Bilat with Tomosynthesis_CAD; Future    12. Screening exam for skin cancer  Stable- followed by PCP  - Ambulatory Referral to Dermatology    13. Wax in ear  Stable- followed by PCP  - Ambulatory referral to ENT    14. Encounter for preventive health examination  Assessments completed  Preventative health recommendations reviewed       15. Overweight (BMI 25.0-29.9)  CHronic with no recent weight loss.Reviewed current BMI & ideal BMI range. Encouraged weight loss,  diet and exercise. Followed by PCP.      Provided Tomasa with a 5-10 year written screening schedule and personal prevention plan. Recommendations were developed using the USPSTF age appropriate recommendations. Education, counseling, and referrals were provided as needed. After Visit Summary printed and given to patient which includes a list of additional screenings\tests needed.    Follow-up in about 1 year (around 6/28/2019) for HRA.    Eleonora Montenegro NP

## 2018-07-02 ENCOUNTER — TELEPHONE (OUTPATIENT)
Dept: INTERNAL MEDICINE | Facility: CLINIC | Age: 68
End: 2018-07-02

## 2018-07-02 DIAGNOSIS — E78.5 HYPERLIPIDEMIA, UNSPECIFIED HYPERLIPIDEMIA TYPE: Primary | ICD-10-CM

## 2018-07-02 DIAGNOSIS — E55.9 VITAMIN D DEFICIENCY: ICD-10-CM

## 2018-07-02 NOTE — TELEPHONE ENCOUNTER
----- Message from Sarai Mccarthy sent at 7/2/2018 12:51 PM CDT -----  Contact: Self Call [phone]  Doctor appointment and lab have been scheduled.  Please link lab orders to the lab appointment.  Date of doctor appointment:  11/19  Physical or EP:  Epp  Date of lab appointment:  11/12  Comments:

## 2018-07-13 ENCOUNTER — OFFICE VISIT (OUTPATIENT)
Dept: DERMATOLOGY | Facility: CLINIC | Age: 68
End: 2018-07-13
Payer: MEDICARE

## 2018-07-13 DIAGNOSIS — L21.9 ACUTE SEBORRHEIC DERMATITIS: ICD-10-CM

## 2018-07-13 DIAGNOSIS — Z86.006 HISTORY OF MELANOMA IN SITU: ICD-10-CM

## 2018-07-13 DIAGNOSIS — L57.0 AK (ACTINIC KERATOSIS): Primary | ICD-10-CM

## 2018-07-13 DIAGNOSIS — D18.00 ANGIOMA: ICD-10-CM

## 2018-07-13 DIAGNOSIS — L81.4 LENTIGINES: ICD-10-CM

## 2018-07-13 DIAGNOSIS — D22.9 MULTIPLE BENIGN NEVI: ICD-10-CM

## 2018-07-13 DIAGNOSIS — Z12.83 SKIN CANCER SCREENING: ICD-10-CM

## 2018-07-13 DIAGNOSIS — L82.1 SK (SEBORRHEIC KERATOSIS): ICD-10-CM

## 2018-07-13 PROCEDURE — 99999 PR PBB SHADOW E&M-EST. PATIENT-LVL II: CPT | Mod: PBBFAC,,, | Performed by: DERMATOLOGY

## 2018-07-13 PROCEDURE — 99214 OFFICE O/P EST MOD 30 MIN: CPT | Mod: 25,S$GLB,, | Performed by: DERMATOLOGY

## 2018-07-13 PROCEDURE — 17000 DESTRUCT PREMALG LESION: CPT | Mod: S$GLB,,, | Performed by: DERMATOLOGY

## 2018-07-13 RX ORDER — TRIAMCINOLONE ACETONIDE 0.25 MG/G
CREAM TOPICAL
Qty: 30 G | Refills: 1 | Status: SHIPPED | OUTPATIENT
Start: 2018-07-13 | End: 2020-11-30

## 2018-07-13 RX ORDER — KETOCONAZOLE 20 MG/G
CREAM TOPICAL
Qty: 30 G | Refills: 1 | Status: SHIPPED | OUTPATIENT
Start: 2018-07-13 | End: 2018-11-19

## 2018-07-13 NOTE — PROGRESS NOTES
Subjective:       Patient ID:  Tomasa Reed is a 68 y.o. female who presents for   Chief Complaint   Patient presents with    Skin Check     tbse     HPI  Interested in total body skin check today.  Pt had a melanoma in situ removed from her R chest in 10/2011 by Dr. Shaw. Also had a mildly atypical nevus biopsied from L chest in 2013.  Pt has a new spot on her nose x few months. Asymptomatic and no prior treatment.  Has intermittent scaling in L ear x yrs. No tx.      Review of Systems   Constitutional: Negative for fever, chills, weight loss, weight gain, fatigue, night sweats and malaise.   Skin: Positive for daily sunscreen use and activity-related sunscreen use. Negative for recent sunburn.   Hematologic/Lymphatic: Bruises/bleeds easily.        Objective:    Physical Exam   Constitutional: She appears well-developed and well-nourished. No distress.   Neurological: She is alert and oriented to person, place, and time. She is not disoriented.   Psychiatric: She has a normal mood and affect.   Skin:   Areas Examined (abnormalities noted in diagram):   Scalp / Hair Palpated and Inspected  Head / Face Inspection Performed  Neck Inspection Performed  Chest / Axilla Inspection Performed  Abdomen Inspection Performed  Genitals / Buttocks / Groin Inspection Performed  Back Inspection Performed  RUE Inspected  LUE Inspection Performed  RLE Inspected  LLE Inspection Performed  Nails and Digits Inspection Performed                   Diagram Legend     Erythematous scaling macule/papule c/w actinic keratosis       Vascular papule c/w angioma      Pigmented verrucoid papule/plaque c/w seborrheic keratosis      Yellow umbilicated papule c/w sebaceous hyperplasia      Irregularly shaped tan macule c/w lentigo     1-2 mm smooth white papules consistent with Milia      Movable subcutaneous cyst with punctum c/w epidermal inclusion cyst      Subcutaneous movable cyst c/w pilar cyst      Firm pink to brown papule  c/w dermatofibroma      Pedunculated fleshy papule(s) c/w skin tag(s)      Evenly pigmented macule c/w junctional nevus     Mildly variegated pigmented, slightly irregular-bordered macule c/w mildly atypical nevus      Flesh colored to evenly pigmented papule c/w intradermal nevus       Pink pearly papule/plaque c/w basal cell carcinoma      Erythematous hyperkeratotic cursted plaque c/w SCC      Surgical scar with no sign of skin cancer recurrence      Open and closed comedones      Inflammatory papules and pustules      Verrucoid papule consistent consistent with wart     Erythematous eczematous patches and plaques     Dystrophic onycholytic nail with subungual debris c/w onychomycosis     Umbilicated papule    Erythematous-base heme-crusted tan verrucoid plaque consistent with inflamed seborrheic keratosis     Erythematous Silvery Scaling Plaque c/w Psoriasis     See annotation      Assessment / Plan:        AK (actinic keratosis)  Cryosurgery Procedure Note    Verbal consent from the patient is obtained including, but not limited to, risk of hypopigmentation/hyperpigmentation, scar, recurrence of lesion. The patient is aware of the precancerous quality and need for treatment of these lesions. Liquid nitrogen cryosurgery is applied to the 1 actinic keratoses, as detailed in the physical exam, to produce a freeze injury. The patient is aware that blisters may form and is instructed on wound care with gentle cleansing and use of vaseline ointment to keep moist until healed. The patient is supplied a handout on cryosurgery and is instructed to call if lesions do not completely resolve.    SK (seborrheic keratosis)  These are benign inherited growths without a malignant potential. Reassurance given to patient. No treatment is necessary.   Treatment of benign, asymptomatic lesions may be considered cosmetic.  Warned about risk of hypo- or hyperpigmentation with treatment and risk of recurrence.    Acute seborrheic  dermatitis  -     ketoconazole (NIZORAL) 2 % cream; AAA inside ear BID x 1-2 wks then prn flares only  Dispense: 30 g; Refill: 1  -     triamcinolone acetonide 0.025% (KENALOG) 0.025 % cream; AAA inside ear BID x 1-2 wks then prn flares only  Dispense: 30 g; Refill: 1    Lentigines  These are benign sun spots which should be monitored for changes. Patient instructed in importance of daily broad spectrum sunscreen use with spf at least 30. Sun avoidance and topical protection/protective clothing discussed.    Multiple benign nevi  Benign-appearing on exam today. Counseled pt to monitor mole(s) and return to clinic if any changes noted or symptoms (bleeding, itching, pain, etc) noted. Brochure provided.    Angioma  This is a benign vascular lesion. Reassurance given. No treatment required. Treatment of benign, asymptomatic lesions may be considered cosmetic.    Skin cancer screening and History of melanoma in situ  Total body skin examination performed today including at least 12 points as noted in physical examination. No lesions suspicious for malignancy noted.  Patient instructed in importance of daily broad spectrum sunscreen use with spf at least 30. Sun avoidance and topical protection/protective clothing discussed.      Follow-up in about 1 year (around 7/13/2019) for skin check or sooner for any concerns.

## 2018-07-13 NOTE — LETTER
July 15, 2018      Rosanna Huizar MD  [address] [facility] [place] [address]           [facility]  [address]  [place] [address]  Phone: [phone]  Fax: [phone]          Patient: Tomasa Reed   MR Number: [id number]   YOB: 1950   Date of Visit: 7/13/2018       Dear Dr. Rosanna Huizar:    Thank you for referring Tomasa Reed to me for evaluation. Attached you will find relevant portions of my assessment and plan of care.    If you have questions, please do not hesitate to call me. I look forward to following Tomasa Reed along with you.    Sincerely,    Angelique Hauser MD    Enclosure  CC:  No Recipients    If you would like to receive this communication electronically, please contact [email or web address] or [phone] to request more information on [facility] access.    For providers and/or their staff who would like to refer a patient to [facility], please contact us through our one-stop-shop provider referral line, [facility], at [phone].    If you feel you have received this communication in error or would no longer like to receive these types of communications, please e-mail [email or web address]

## 2018-07-13 NOTE — PATIENT INSTRUCTIONS
CRYOSURGERY      Your doctor has used a method called cryosurgery to treat your skin condition. Cryosurgery refers to the use of very cold substances to treat a variety of skin conditions such as warts, pre-skin cancers, molluscum contagiosum, sun spots, and several benign growths. The substance we use in cryosurgery is liquid nitrogen and is so cold (-195 degrees Celsius) that is burns when administered.     Following treatment in the office, the skin may immediately burn and become red. You may find the area around the lesion is affected as well. It is sometimes necessary to treat not only the lesion, but a small area of the surrounding normal skin to achieve a good response.     A blister, and even a blood filled blister, may form after treatment.   This is a normal response. If the blister is painful, it is acceptable to sterilize a needle and with rubbing alcohol and gently pop the blister. It is important that you gently wash the area with soap and warm water as the blister fluid may contain wart virus if a wart was treated. Do no remove the roof of the blister.     The area treated can take anywhere from 1-3 weeks to heal. Healing time depends on the kind skin lesion treated, the location, and how aggressively the lesion was treated. It is recommended that the areas treated are covered with Vaseline or bacitracin ointment and a band-aid. If a band-aid is not practical, just ointment applied several times per day will do. Keeping these areas moist will speed the healing time.    Treatment with liquid nitrogen can leave a scar. In dark skin, it may be a light or dark scar, in light skin it may be a white or pink scar. These will generally fade with time, but may never go away completely.     If you have any concerns after your treatment, please feel free to call the office.       [address] [place] [address]/ [phone] FAX/ [email or web address]    Summer Sun Protection      The  [facility] would like to remind you of the importance of sun protection all year round and particularly during the summer when the suns rays are the strongest. It has been proven that both acute and chronic sun exposure damages our cells and leads to skin cancer. Beyond skin cancer, the sun causes 90% of the symptoms of pre-mature skin aging, including wrinkles, lentigines (brown spots), and thin, easily bruised skin. Proper sun protection can help prevent these unwanted conditions.    Many patients report that the dont go in the sun. It has been shown that the average person receives 18 hours of incidental sun exposure per week during activities such as walking through parking lots, driving, or sitting next to windows. This accumulates to several bad sunburns per year!    In choosing sunscreen, you want one that protects against both UVA and UVB rays. It is recommended that you use one of SPF 30 or higher. It is important to apply the sunscreen about 20 minutes prior to sun exposure. Most sunscreens are chemical sunscreens and a reaction must take place in the skin so that they are effective. If they are applied and then you are immediately exposed to the sun or start sweating, this reaction has not had time to take place and you are therefore unprotected. Sunscreen needs to be reapplied every 2 hours if you are participating in water sports or sweating. We recommend Elta MD or Neutrogena Ultra Sheer Dry Touch SPF 55 for daily use; however there are many options and it is most important for you to find one that you will use on a consistent basis.    If you have sensitive skin, you may do best with a sunscreen that contains only physical blockers such as titanium dioxide or zinc oxide. These are typically thicker and harder to apply, however they afford very good protection. Neutrogena Sensitive Skin, Blue Lizard Sensitive Skin (pink top) or Neutrogena Pure and Free are popular ones.      Aside from sunscreen, clothes with UV protection, wide brimmed hats, and sunglasses are other means of sun protection that we recommend.                        [facility] - DERMATOLOGY  [address]  [place] [address]  Dept: [phone]  Dept Fax: [phone]                                                                               SEBORRHEIC KERATOSES        What causes seborrheic keratoses?    Seborrheic keratoses are harmless, common skin growths that first appear during adult life.  As time goes by, more growths appear.  Some persons have a very large number of them.  Seborrheic keratoses appear on both covered and uncovered parts of the body; they are not caused by sunlight.  The tendency to develop seborrheic keratoses is inherited.    Seborrheic keratoses are harmless and never become malignant.  They begin as slightly raised, light brown spots.  Gradually they thicken and take on a rough wartlike surface.  They slowly darken and may turn black.  These color changes are harmless.  Seborrheic keratoses are superficial and look as if they were stuck on the skin.  Persons who have had several seborrheic keratoses can usually recognize this type of benign growth.  However, if you are concerned or unsure about any growth, consult me.    Treatment    Seborrheic keratoses can easily be removed in the office.  The only reason for removing a seborrheic keratosis is your wish to get rid of it.

## 2018-07-18 ENCOUNTER — OFFICE VISIT (OUTPATIENT)
Dept: OTOLARYNGOLOGY | Facility: CLINIC | Age: 68
End: 2018-07-18
Payer: MEDICARE

## 2018-07-18 ENCOUNTER — CLINICAL SUPPORT (OUTPATIENT)
Dept: AUDIOLOGY | Facility: CLINIC | Age: 68
End: 2018-07-18
Payer: MEDICARE

## 2018-07-18 ENCOUNTER — HOSPITAL ENCOUNTER (OUTPATIENT)
Dept: RADIOLOGY | Facility: HOSPITAL | Age: 68
Discharge: HOME | End: 2018-07-18
Attending: NURSE PRACTITIONER
Payer: MEDICARE

## 2018-07-18 VITALS — TEMPERATURE: 97 F | DIASTOLIC BLOOD PRESSURE: 74 MMHG | HEART RATE: 69 BPM | SYSTOLIC BLOOD PRESSURE: 146 MMHG

## 2018-07-18 DIAGNOSIS — H90.3 SENSORINEURAL HEARING LOSS, BILATERAL: ICD-10-CM

## 2018-07-18 DIAGNOSIS — H90.3 SENSORINEURAL HEARING LOSS, BILATERAL: Primary | ICD-10-CM

## 2018-07-18 DIAGNOSIS — Z12.39 SCREENING FOR BREAST CANCER: ICD-10-CM

## 2018-07-18 DIAGNOSIS — H61.22 IMPACTED CERUMEN, LEFT EAR: Primary | ICD-10-CM

## 2018-07-18 DIAGNOSIS — H93.13 TINNITUS, BILATERAL: ICD-10-CM

## 2018-07-18 PROCEDURE — 92567 TYMPANOMETRY: CPT | Mod: S$GLB,,, | Performed by: AUDIOLOGIST

## 2018-07-18 PROCEDURE — 77063 BREAST TOMOSYNTHESIS BI: CPT | Mod: 26,,, | Performed by: RADIOLOGY

## 2018-07-18 PROCEDURE — 99213 OFFICE O/P EST LOW 20 MIN: CPT | Mod: S$GLB,,, | Performed by: OTOLARYNGOLOGY

## 2018-07-18 PROCEDURE — 92557 COMPREHENSIVE HEARING TEST: CPT | Mod: S$GLB,,, | Performed by: AUDIOLOGIST

## 2018-07-18 PROCEDURE — 77067 SCR MAMMO BI INCL CAD: CPT | Mod: 26,,, | Performed by: RADIOLOGY

## 2018-07-18 PROCEDURE — 99999 PR PBB SHADOW E&M-EST. PATIENT-LVL I: CPT | Mod: PBBFAC,,,

## 2018-07-18 PROCEDURE — 77067 SCR MAMMO BI INCL CAD: CPT | Mod: TC

## 2018-07-18 PROCEDURE — 99999 PR PBB SHADOW E&M-EST. PATIENT-LVL III: CPT | Mod: PBBFAC,,, | Performed by: OTOLARYNGOLOGY

## 2018-07-18 NOTE — PROGRESS NOTES
Ms. Tomasa Reed was referred by Dr. Mayes for an audiological evaluation today after he removed cerumen from Ms. Reed's ears.      Audiological testing revealed a mild to moderate sensorineural hearing loss with excellent discrimination ability and Type A tympanograms, bilaterally.      Recommendations:  1. Annual hearing evaluation  2. Hearing protection when in noise   3. Hearing aid consultation

## 2018-07-18 NOTE — PATIENT INSTRUCTIONS
Audiometry reviewed, compared to 2017 study  Pt. is a candidate for amplification for one or both ears  Copy of audiogram  provided  Some cerumen removed fro AS eac  Indications for MRI scanninfg briefly discussed  Monitor hearing yearly; ear cleaning prn  Tinnitus Rx options sheet provided

## 2018-07-18 NOTE — PROGRESS NOTES
"CC: Ear cleaning; audiometric assessment; right greater than left tinnitus   HPI:Ms. Reed is a 68-year-old  female who is quite aggravated this AM as she was scheduled for an appointment at 8:30 this morning and it is now 10:00.  She was supposed of completed an audiogram which was deferred due to the finding of cerumen in her ear canal.  I am just examining her at  10:00 AM in order to clean her ears and then send her for completion of the audiometric study.  She has a mammogram scheduled for 11:00 AM later this morning.  She continues to complain of right ear greater than left tinnitus perception which is described as a constant high-pitched sound.  Her research indicates the possibility of a tumor causing the problem.    I ndications for MRI scanning are discussed with her at this point.  She was last examined by me in October 2017 for ear discomfort.  Some wax was extracted from both ear canals. She had indicated some left periorbital facial and pressure symptoms with congestion at that time.    She had completed an audiometric study in March 2017;  the results of that study are duplicated below for reference.  She had complained of ear fullness and nasal fullness symptoms which she related to a "ALLERGY attack".    Her medical problem list includes malignant melanoma of the skin of her trunk, hepatitis C, nontoxic goiter, osteopenia, hyperlipidemia, leukocytopenia and interstitial cystitis.    Past Medical History:   Diagnosis Date    Arthritis     Cataract     OU    Chronic interstitial cystitis     History of hepatitis C; S/p RX with SVR 12 documented 06/2017 6/22/2017    HLD (hyperlipidemia)     Leukocytopenia, unspecified     Malignant melanoma of skin of trunk, except scrotum     Melanoma 2011    right chest    Migraine, unspecified, without mention of intractable migraine without mention of status migrainosus     Nonspecific abnormal results of liver function study     Nontoxic " multinodular goiter     Osteopenia     ALLERGIES: Iodine, sulfa  Current Outpatient Prescriptions on File Prior to Visit   Medication Sig Dispense Refill    biotin 1 mg tablet Take 1,000 mcg by mouth once daily.      cetirizine (ZYRTEC) 10 mg Cap PRN      dietary supplement Pack Take 1 tablet by mouth 2 (two) times daily.      glycerin adult suppository as needed.       ketoconazole (NIZORAL) 2 % cream AAA inside ear BID x 1-2 wks then prn flares only 30 g 1    MISCELLANEOUS MEDICAL SUPPLY MIS as needed. EYE ITCH RELIEF 0.25 EYE DROPS      naproxen sodium (ALEVE) 220 mg Cap PRN      OCEAN NASAL 0.65 % nasal spray as needed.       triamcinolone acetonide 0.025% (KENALOG) 0.025 % cream AAA inside ear BID x 1-2 wks then prn flares only 30 g 1    TUMS 200 mg calcium (500 mg) chewable tablet 1 tablet as needed.       vitamin D 1000 units Tab Take 1,000 Units by mouth once daily.       No current facility-administered medications on file prior to visit.          PE: Blood pressure 146/74 pulse 69 temperature 97.3 height 5 feet 2 inches weight 137 pounds  Gen.: Alert and oriented lady in no acute distress  Both ears were examined under the microscope and the micro-procedure room.  Right ear canal is devoid of wax.  Some hairs are extracted from the ear canal with microforceps.  Left ear canal contains some wax is removed which is removed with a curette.  Both eardrums are intact and clear when visualized directly.    The patient is immediately scheduled for an audiometric study which is performed by the [facility] audiology service.  The study is reviewed with the patient and comapred with a 2017 study.  There is some diminution in bilateral pure-tone responses and SRT scores.      DIAGNOSIS:     ICD-10-CM ICD-9-CM    1. Impacted cerumen, left ear H61.22 380.4    2. Sensorineural hearing loss, bilateral H90.3 389.18    3. Tinnitus, bilateral H93.13 388.30     AD louder; high pitched, constant   4.      Hx  melanoma  PLAN: Audiometry reviewed, compared to 2017 study  Pt. is a  candidate for amplification for one or both ears  Copy of audiogram  provided  Some cerumen removed fro AS eac  Indications for MRI scanninfg briefly discussed; no study ordered  Monitor hearing yearly; ear cleaning prn  Tinnitus Rx options sheet provided

## 2018-08-07 ENCOUNTER — TELEPHONE (OUTPATIENT)
Dept: ORTHOPEDICS | Facility: CLINIC | Age: 68
End: 2018-08-07

## 2018-08-07 NOTE — TELEPHONE ENCOUNTER
----- Message from Yeimy Chavez sent at 8/7/2018 11:54 AM CDT -----  Contact: self  Pt is returning call Can be reached [phone]    She wanted me to schedule another MD  done

## 2018-08-29 DIAGNOSIS — M79.642 LEFT HAND PAIN: Primary | ICD-10-CM

## 2018-09-05 ENCOUNTER — TELEPHONE (OUTPATIENT)
Dept: ORTHOPEDICS | Facility: CLINIC | Age: 68
End: 2018-09-05

## 2018-09-05 NOTE — TELEPHONE ENCOUNTER
Tomasa Reed reminded of appointment on 9/6/18 with Dr. ANDRIA Cao w/time and location. Notified of need for xray before OV w/date, time, and location of appts.

## 2018-09-06 ENCOUNTER — OFFICE VISIT (OUTPATIENT)
Dept: ORTHOPEDICS | Facility: CLINIC | Age: 68
End: 2018-09-06
Payer: MEDICARE

## 2018-09-06 ENCOUNTER — HOSPITAL ENCOUNTER (OUTPATIENT)
Dept: RADIOLOGY | Facility: OTHER | Age: 68
Discharge: HOME | End: 2018-09-06
Attending: ORTHOPAEDIC SURGERY
Payer: MEDICARE

## 2018-09-06 VITALS
SYSTOLIC BLOOD PRESSURE: 144 MMHG | BODY MASS INDEX: 25.21 KG/M2 | HEART RATE: 68 BPM | WEIGHT: 137 LBS | HEIGHT: 62 IN | DIASTOLIC BLOOD PRESSURE: 75 MMHG

## 2018-09-06 DIAGNOSIS — M19.041 ARTHRITIS OF FINGER OF BOTH HANDS: Primary | ICD-10-CM

## 2018-09-06 DIAGNOSIS — M19.042 ARTHRITIS OF FINGER OF BOTH HANDS: Primary | ICD-10-CM

## 2018-09-06 DIAGNOSIS — M79.642 LEFT HAND PAIN: ICD-10-CM

## 2018-09-06 PROCEDURE — 99213 OFFICE O/P EST LOW 20 MIN: CPT | Mod: PBBFAC,25 | Performed by: ORTHOPAEDIC SURGERY

## 2018-09-06 PROCEDURE — 1101F PT FALLS ASSESS-DOCD LE1/YR: CPT | Mod: CPTII,,, | Performed by: ORTHOPAEDIC SURGERY

## 2018-09-06 PROCEDURE — 20600 DRAIN/INJ JOINT/BURSA W/O US: CPT | Mod: PBBFAC | Performed by: ORTHOPAEDIC SURGERY

## 2018-09-06 PROCEDURE — 99999 PR PBB SHADOW E&M-EST. PATIENT-LVL III: CPT | Mod: PBBFAC,,, | Performed by: ORTHOPAEDIC SURGERY

## 2018-09-06 PROCEDURE — 73130 X-RAY EXAM OF HAND: CPT | Mod: TC,FY,LT

## 2018-09-06 PROCEDURE — 73130 X-RAY EXAM OF HAND: CPT | Mod: 26,LT,, | Performed by: RADIOLOGY

## 2018-09-06 PROCEDURE — 99214 OFFICE O/P EST MOD 30 MIN: CPT | Mod: 25,S$PBB,, | Performed by: ORTHOPAEDIC SURGERY

## 2018-09-06 RX ADMIN — Medication 4 MG: at 11:09

## 2018-09-06 NOTE — PROCEDURES
Small Joint Aspiration/Injection: L index DIP  Date/Time: 9/6/2018 11:56 AM  Performed by: Tabatha Cao MD  Authorized by: Tabatha Cao MD     Consent Done?:  Yes (Verbal)  Indications:  Pain  Timeout: Prior to procedure the correct patient, procedure, and site was verified      Location:  Index finger  Site:  L index DIP  Prep: Patient was prepped and draped in usual sterile fashion    Needle size:  25 G  Medications:  4 mg dexamethasone 4 mg/mL

## 2018-09-10 ENCOUNTER — PATIENT MESSAGE (OUTPATIENT)
Dept: ORTHOPEDICS | Facility: CLINIC | Age: 68
End: 2018-09-10

## 2018-09-10 RX ORDER — DEXAMETHASONE SODIUM PHOSPHATE 4 MG/ML
4 INJECTION, SOLUTION INTRA-ARTICULAR; INTRALESIONAL; INTRAMUSCULAR; INTRAVENOUS; SOFT TISSUE
Status: DISCONTINUED | OUTPATIENT
Start: 2018-09-06 | End: 2018-09-10 | Stop reason: HOSPADM

## 2018-09-11 NOTE — PROGRESS NOTES
CHIEF COMPLAINT:  Pain in joint, left index finger.    HISTORY OF PRESENT ILLNESS:  Ms. Reed is a 68-year-old female, right-hand   dominant.  She complains of a left index finger joint.  She states it is very   painful and it hurts really to bend it.  She has been going on for some time.    She states when she bends it, it is about 6/10 pain, no injury.  No numbness,   tingling.  No fever or chills.    PAST MEDICAL HISTORY, SOCIAL HISTORY, SURGICAL HISTORY, REVIEW OF SYSTEMS:  Per   electronic medical record.    PHYSICAL EXAMINATION:  VITAL SIGNS:  Please see computer entry.  GENERAL:  Alert and oriented x3, well developed, well nourished, in no apparent   distress, friendly individual, well groomed, appears stated age.  MUSCULOSKELETAL:  Gait is normal and nonantalgic.  EXTREMITIES:  On examination of bilateral upper extremity, median, radial,   ulnar, anterior interosseous, posterior interosseous, motor and sensation to   light touch intact.  Brisk capillary refill.  Examination of the finger, she   does have Heberden nodes with deformity at the DIP joint.    Radiographs review show osteoarthritis, left index DIP joint.    PLAN:  After much discussion with the patient, we talked about fusion versus   therapy versus compound cream and splinting.  The patient elected for an   injection and compound cream today.  Please see procedure note.      LES/HN  dd: 09/10/2018 14:14:33 (CDT)  td: 09/10/2018 23:18:28 (CDT)  Doc ID   #[id number]  Job ID #[id number]    CC:

## 2018-09-12 NOTE — PROGRESS NOTES
CC: LEFT knee pain    68 y.o. Female who presents as a new patient to me. Complaint is left knee pain x 3 months of atraumatic onset. Pain localizes along the medial joint line & is worse after her exercise class, which often includes lunges and squats. Mild occasional swelling episdoes. No prominent mechanical symptoms. Denies instability. Better with rest.Treatment thus far has included rest, activity modifications, oral medications.  Here today to discuss diagnosis and treatment options.  She denies any prior pain or problems.  No prior injections.    Negative for tobacco.   Negative for diabetes.     Pain Score:   3    REVIEW OF SYSTEMS:   Constitution: Negative. Negative for chills, fever and night sweats.    Hematologic/Lymphatic: Negative for bleeding problem. Does not bruise/bleed easily.   Skin: Negative for dry skin, itching and rash.   Musculoskeletal: Negative for falls. Positive for left knee pain and  muscle weakness.     PAST MEDICAL HISTORY:   Past Medical History:   Diagnosis Date    Arthritis     Cataract     OU    Chronic interstitial cystitis     History of hepatitis C; S/p RX with SVR 12 documented 06/2017 6/22/2017    HLD (hyperlipidemia)     Leukocytopenia, unspecified     Malignant melanoma of skin of trunk, except scrotum     Melanoma 2011    right chest    Migraine, unspecified, without mention of intractable migraine without mention of status migrainosus     Nonspecific abnormal results of liver function study     Nontoxic multinodular goiter     Osteopenia        PAST SURGICAL HISTORY:   Past Surgical History:   Procedure Laterality Date    BREAST BIOPSY      COLONOSCOPY N/A 12/14/2016    Procedure: COLONOSCOPY;  Surgeon: Wicho Painting MD;  Location: [facility] ([address] [place] [address]);  Service: Endoscopy;  Laterality: N/A;    COLONOSCOPY N/A 12/14/2016    Performed by Wicho Painting MD at [facility] (4TH FLR)    FINGER SURGERY Right 2010    index finger fused    MOLE REMOVAL       TONSILLECTOMY, ADENOIDECTOMY      TOTAL ABDOMINAL HYSTERECTOMY         FAMILY HISTORY:   Family History   Problem Relation Age of Onset    Osteoarthritis Mother         s/p hip fx    Diabetes Mother     Cancer Mother         colon    Thyroid disease Mother     Kidney failure Mother         d.93 s/p PAD procedure    Cataracts Mother     Macular degeneration Mother     Heart failure Father     Stroke Father         d.97 complications    Hypertension Father     Cataracts Father     Fibromyalgia Sister     No Known Problems Sister     No Known Problems Daughter     No Known Problems Daughter     Melanoma Neg Hx     Amblyopia Neg Hx     Blindness Neg Hx     Glaucoma Neg Hx     Retinal detachment Neg Hx     Strabismus Neg Hx        SOCIAL HISTORY:   Social History     Socioeconomic History    Marital status:      Spouse name: Not on file    Number of children: Not on file    Years of education: Not on file    Highest education level: Not on file   Social Needs    Financial resource strain: Not on file    Food insecurity - worry: Not on file    Food insecurity - inability: Not on file    Transportation needs - medical: Not on file    Transportation needs - non-medical: Not on file   Occupational History    Occupation: retired     Employer: [employer]   Tobacco Use    Smoking status: Never Smoker    Smokeless tobacco: Never Used   Substance and Sexual Activity    Alcohol use: Yes     Comment: weekends one drink    Drug use: Not on file    Sexual activity: Yes     Partners: Male   Other Topics Concern    Are you pregnant or think you may be? Not Asked    Breast-feeding Not Asked   Social History Narrative        Retired  of an elementary school    Has 2 daughters, no thyroid problems    Helping out w/ 19yo grandson now @ [employer]    Exercising @ [employer] 2 days/wk            FAMILY HISTORY:     Mom d.93 status post colon cancer, status post hip   "   fracture. She has severe DJD and is diabetic.     Dad d. 97 status post stroke, CHF. Dependent, bed to w/c.      Significant memory decline, usually doesn't recognize family        Two sisters in good health.         SCREENING TESTS:        MEDICATIONS:     Current Outpatient Medications:     biotin 1 mg tablet, Take 1,000 mcg by mouth once daily., Disp: , Rfl:     cetirizine (ZYRTEC) 10 mg Cap, PRN, Disp: , Rfl:     dietary supplement Pack, Take 1 tablet by mouth 2 (two) times daily., Disp: , Rfl:     glycerin adult suppository, as needed. , Disp: , Rfl:     ketoconazole (NIZORAL) 2 % cream, AAA inside ear BID x 1-2 wks then prn flares only, Disp: 30 g, Rfl: 1    MISCELLANEOUS MEDICAL SUPPLY [facility], as needed. EYE ITCH RELIEF 0.25 EYE DROPS, Disp: , Rfl:     naproxen sodium (ALEVE) 220 mg Cap, PRN, Disp: , Rfl:     OCEAN NASAL 0.65 % nasal spray, as needed. , Disp: , Rfl:     triamcinolone acetonide 0.025% (KENALOG) 0.025 % cream, AAA inside ear BID x 1-2 wks then prn flares only, Disp: 30 g, Rfl: 1    TUMS 200 mg calcium (500 mg) chewable tablet, 1 tablet as needed. , Disp: , Rfl:     vitamin D 1000 units Tab, Take 1,000 Units by mouth once daily., Disp: , Rfl:     ALLERGIES:   Review of patient's allergies indicates:   Allergen Reactions    Iodine and iodide containing products Nausea And Vomiting     Other reaction(s): SEVERE    Sulfa (sulfonamide antibiotics)      Other reaction(s): unknown  Other reaction(s): RASH        PHYSICAL EXAMINATION:  /75   Pulse 72   Ht 5' 2" (1.575 m)   Wt 62.1 kg (137 lb)   BMI 25.06 kg/m²   General: Well-developed well-nourished 68 y.o. femalein no acute distress   Cardiovascular: Regular rhythm by palpation of distal pulse, normal color and temperature, no concerning varicosities on symptomatic side   Lungs: No labored breathing or wheezing appreciated   Neuro: Alert and oriented ×3   Psychiatric: well oriented to person, place and time, demonstrates " normal mood and affect   Skin: No rashes, lesions or ulcers, normal temperature, turgor, and texture on involved extremity    Ortho/SPM Exam  Examination of the left knee demonstrates mild swelling without significant effusion.  Intact extensor mechanism. Mildly positive patellar grind test.  Minimal crepitus.  Prominent medial joint line tenderness. Pain medially with varus load.  Minimal tenderness over the pes bursa.  Ligamentously stable.  Neurovascularly intact distally.    IMAGING:  X-rays including standing, weight bearing AP and flexion bilateral knees, LEFT knee lateral and sunrise views ordered and images reviewed by me show:    Mild to moderate degenerative changes most prominent in the medial compartment with joint space narrowing      ASSESSMENT:      ICD-10-CM ICD-9-CM   1. Primary osteoarthritis of left knee M17.12 715.16   2. Left knee pain, unspecified chronicity M25.562 719.46       PLAN:     Findings consistent with symptomatic arthritis.  The usual operative and non operative treatment measures were discussed.  Conservative treatment is recommended.  Intra-articular steroid injection was given. Discussed modification of exercise activities.  Ice as needed.  Continue oral medication.  I will see her back in 6 weeks as needed.    Large Joint Aspiration/Injection: L knee  Date/Time: 9/13/2018 10:00 AM  Performed by: REMY Zuñiga MD  Authorized by: REMY Zuñiga MD     Consent Done?:  Yes (Verbal)  Indications:  Pain  Procedure site marked: Yes    Timeout: Prior to procedure the correct patient, procedure, and site was verified      Location:  Knee  Site:  L knee  Prep: Patient was prepped and draped in usual sterile fashion    Ultrasonic Guidance for needle placement: No  Needle size:  22 G  Approach:  Lateral  Medications:  40 mg triamcinolone acetonide 40 mg/mL  Patient tolerance:  Patient tolerated the procedure well with no immediate complications

## 2018-09-13 ENCOUNTER — HOSPITAL ENCOUNTER (OUTPATIENT)
Dept: RADIOLOGY | Facility: HOSPITAL | Age: 68
Discharge: HOME | End: 2018-09-13
Attending: ORTHOPAEDIC SURGERY
Payer: MEDICARE

## 2018-09-13 ENCOUNTER — OFFICE VISIT (OUTPATIENT)
Dept: SPORTS MEDICINE | Facility: CLINIC | Age: 68
End: 2018-09-13
Payer: MEDICARE

## 2018-09-13 VITALS
WEIGHT: 137 LBS | BODY MASS INDEX: 25.21 KG/M2 | HEART RATE: 72 BPM | DIASTOLIC BLOOD PRESSURE: 75 MMHG | HEIGHT: 62 IN | SYSTOLIC BLOOD PRESSURE: 131 MMHG

## 2018-09-13 DIAGNOSIS — M25.562 LEFT KNEE PAIN, UNSPECIFIED CHRONICITY: ICD-10-CM

## 2018-09-13 DIAGNOSIS — M17.12 PRIMARY OSTEOARTHRITIS OF LEFT KNEE: Primary | ICD-10-CM

## 2018-09-13 PROCEDURE — 99204 OFFICE O/P NEW MOD 45 MIN: CPT | Mod: 25,S$PBB,, | Performed by: ORTHOPAEDIC SURGERY

## 2018-09-13 PROCEDURE — 73564 X-RAY EXAM KNEE 4 OR MORE: CPT | Mod: TC,FY,PO,LT

## 2018-09-13 PROCEDURE — 73562 X-RAY EXAM OF KNEE 3: CPT | Mod: 26,XS,RT, | Performed by: RADIOLOGY

## 2018-09-13 PROCEDURE — 20610 DRAIN/INJ JOINT/BURSA W/O US: CPT | Mod: PBBFAC,PO | Performed by: ORTHOPAEDIC SURGERY

## 2018-09-13 PROCEDURE — 1101F PT FALLS ASSESS-DOCD LE1/YR: CPT | Mod: CPTII,,, | Performed by: ORTHOPAEDIC SURGERY

## 2018-09-13 PROCEDURE — 99999 PR PBB SHADOW E&M-EST. PATIENT-LVL III: CPT | Mod: PBBFAC,,, | Performed by: ORTHOPAEDIC SURGERY

## 2018-09-13 PROCEDURE — 73564 X-RAY EXAM KNEE 4 OR MORE: CPT | Mod: 26,LT,, | Performed by: RADIOLOGY

## 2018-09-13 PROCEDURE — 99213 OFFICE O/P EST LOW 20 MIN: CPT | Mod: PBBFAC,25,PO | Performed by: ORTHOPAEDIC SURGERY

## 2018-09-13 RX ORDER — TRIAMCINOLONE ACETONIDE 40 MG/ML
40 INJECTION, SUSPENSION INTRA-ARTICULAR; INTRAMUSCULAR
Status: DISCONTINUED | OUTPATIENT
Start: 2018-09-13 | End: 2018-09-13 | Stop reason: HOSPADM

## 2018-09-13 RX ADMIN — TRIAMCINOLONE ACETONIDE 40 MG: 40 INJECTION, SUSPENSION INTRA-ARTICULAR; INTRAMUSCULAR at 10:09

## 2018-10-10 ENCOUNTER — PATIENT MESSAGE (OUTPATIENT)
Dept: INTERNAL MEDICINE | Facility: CLINIC | Age: 68
End: 2018-10-10

## 2018-10-11 ENCOUNTER — PATIENT MESSAGE (OUTPATIENT)
Dept: INTERNAL MEDICINE | Facility: CLINIC | Age: 68
End: 2018-10-11

## 2018-10-22 ENCOUNTER — TELEPHONE (OUTPATIENT)
Dept: INTERNAL MEDICINE | Facility: CLINIC | Age: 68
End: 2018-10-22

## 2018-10-22 NOTE — TELEPHONE ENCOUNTER
Spoke to pt. Pt stated that she is having burning when urinating, bladder fullness, flank pain, and discomfort. Pt advised that she needs an appointment since she is having kidney pain.  Pt scheduled to see Dr. Wood tomorrow, 10/23/18 at 11:20 AM. Pt offered a sooner visit today, but declined. Pt opted to wait to see Dr. Wood since she has seen him before.

## 2018-10-22 NOTE — TELEPHONE ENCOUNTER
----- Message from Anselmo Sue sent at 10/22/2018 12:40 PM CDT -----  Contact: Self [phone]  Pt will like to speak to doctor or nurse in regards to UTI, pt will like to the doctor to call in medication.

## 2018-10-23 ENCOUNTER — OFFICE VISIT (OUTPATIENT)
Dept: INTERNAL MEDICINE | Facility: CLINIC | Age: 68
End: 2018-10-23
Payer: MEDICARE

## 2018-10-23 VITALS
DIASTOLIC BLOOD PRESSURE: 72 MMHG | HEART RATE: 73 BPM | RESPIRATION RATE: 15 BRPM | WEIGHT: 142 LBS | BODY MASS INDEX: 25.97 KG/M2 | TEMPERATURE: 99 F | SYSTOLIC BLOOD PRESSURE: 148 MMHG

## 2018-10-23 DIAGNOSIS — N39.0 URINARY TRACT INFECTION WITHOUT HEMATURIA, SITE UNSPECIFIED: Primary | ICD-10-CM

## 2018-10-23 LAB
BILIRUB SERPL-MCNC: NEGATIVE MG/DL
BILIRUB UR QL STRIP: NEGATIVE
BLOOD URINE, POC: NEGATIVE
CLARITY UR REFRACT.AUTO: CLEAR
COLOR UR AUTO: YELLOW
COLOR, POC UA: YELLOW
GLUCOSE UR QL STRIP: NEGATIVE
GLUCOSE UR QL STRIP: NORMAL
HGB UR QL STRIP: NEGATIVE
KETONES UR QL STRIP: NEGATIVE
KETONES UR QL STRIP: NEGATIVE
LEUKOCYTE ESTERASE UR QL STRIP: NEGATIVE
LEUKOCYTE ESTERASE URINE, POC: NORMAL
NITRITE UR QL STRIP: NEGATIVE
NITRITE, POC UA: NORMAL
PH UR STRIP: 8 [PH] (ref 5–8)
PH, POC UA: 9
PROT UR QL STRIP: NEGATIVE
PROTEIN, POC: NORMAL
SP GR UR STRIP: 1 (ref 1–1.03)
SPECIFIC GRAVITY, POC UA: 1
URN SPEC COLLECT METH UR: NORMAL
UROBILINOGEN, POC UA: NEGATIVE

## 2018-10-23 PROCEDURE — 1101F PT FALLS ASSESS-DOCD LE1/YR: CPT | Mod: CPTII,,, | Performed by: INTERNAL MEDICINE

## 2018-10-23 PROCEDURE — 99999 PR PBB SHADOW E&M-EST. PATIENT-LVL III: CPT | Mod: PBBFAC,,, | Performed by: INTERNAL MEDICINE

## 2018-10-23 PROCEDURE — 81003 URINALYSIS AUTO W/O SCOPE: CPT

## 2018-10-23 PROCEDURE — 87086 URINE CULTURE/COLONY COUNT: CPT

## 2018-10-23 PROCEDURE — 99213 OFFICE O/P EST LOW 20 MIN: CPT | Mod: PBBFAC,PO | Performed by: INTERNAL MEDICINE

## 2018-10-23 PROCEDURE — 99212 OFFICE O/P EST SF 10 MIN: CPT | Mod: S$PBB,,, | Performed by: INTERNAL MEDICINE

## 2018-10-23 PROCEDURE — 81001 URINALYSIS AUTO W/SCOPE: CPT | Mod: PBBFAC,PO | Performed by: INTERNAL MEDICINE

## 2018-10-23 RX ORDER — CIPROFLOXACIN 500 MG/1
500 TABLET ORAL 2 TIMES DAILY
Qty: 14 TABLET | Refills: 0 | Status: SHIPPED | OUTPATIENT
Start: 2018-10-23 | End: 2018-10-30

## 2018-10-24 LAB
BACTERIA UR CULT: NORMAL
BACTERIA UR CULT: NORMAL

## 2018-10-29 NOTE — PROGRESS NOTES
Subjective:       Patient ID: Tomasa Reed is a 68 y.o. female.    Chief Complaint: Urinary Tract Infection and Back Pain    HPI     The patient has been noting symptoms suspicious for UTI for the last 2 weeks.  She has noted increased urinary frequency and burning with urination.  Mild right flank pain and cramping in the lower abdomen have also been noted.  No gross hematuria has been noted.  The patient does not have a history of recurrent UTIs.  She relates a past medical history of interstitial cystitis but she has been asymptomatic for nearly 6 years now.  Review of Systems   Constitutional: Negative for activity change, chills and fever.   Gastrointestinal: Negative for abdominal distention.   Genitourinary: Positive for dysuria, flank pain and frequency. Negative for hematuria.       Objective:      Physical Exam   Constitutional: She is oriented to person, place, and time. She appears well-developed and well-nourished. No distress.   HENT:   Head: Normocephalic.   Cardiovascular: Normal rate and regular rhythm.   Pulmonary/Chest: Effort normal and breath sounds normal.   Abdominal: Soft. Bowel sounds are normal. She exhibits no distension. There is no tenderness.   Musculoskeletal: Normal range of motion.   No CVA percussion tenderness is present.   Neurological: She is alert and oriented to person, place, and time.   Skin: Skin is warm and dry. No rash noted.   Nursing note and vitals reviewed.        Dipstick urinalysis showed trace leukocytes.  Assessment:       1. Urinary tract infection without hematuria, site unspecified        Plan:       Tomasa was seen today for urinary tract infection and back pain.  Empiric treatment will be ordered with Cipro.  A urine culture will be obtained.  The patient is to increase her oral water intake.  The patient may follow up as needed.    Diagnoses and all orders for this visit:    Urinary tract infection without hematuria, site unspecified  -     POCT  urinalysis, dipstick or tablet reag  -     Urinalysis  -     Urine culture    Other orders  -     ciprofloxacin HCl (CIPRO) 500 MG tablet; Take 1 tablet (500 mg total) by mouth 2 (two) times daily. for 7 days

## 2018-11-12 ENCOUNTER — PATIENT MESSAGE (OUTPATIENT)
Dept: INTERNAL MEDICINE | Facility: CLINIC | Age: 68
End: 2018-11-12

## 2018-11-12 ENCOUNTER — OFFICE VISIT (OUTPATIENT)
Dept: INTERNAL MEDICINE | Facility: CLINIC | Age: 68
End: 2018-11-12
Payer: MEDICARE

## 2018-11-12 VITALS
BODY MASS INDEX: 25.92 KG/M2 | SYSTOLIC BLOOD PRESSURE: 124 MMHG | HEART RATE: 91 BPM | RESPIRATION RATE: 18 BRPM | TEMPERATURE: 99 F | WEIGHT: 140.88 LBS | DIASTOLIC BLOOD PRESSURE: 78 MMHG | HEIGHT: 62 IN

## 2018-11-12 DIAGNOSIS — J20.9 ACUTE BRONCHITIS, UNSPECIFIED ORGANISM: ICD-10-CM

## 2018-11-12 DIAGNOSIS — J32.9 VIRAL SINUSITIS: Primary | ICD-10-CM

## 2018-11-12 DIAGNOSIS — R51.9 SINUS HEADACHE: ICD-10-CM

## 2018-11-12 DIAGNOSIS — B97.89 VIRAL SINUSITIS: Primary | ICD-10-CM

## 2018-11-12 PROCEDURE — 96372 THER/PROPH/DIAG INJ SC/IM: CPT | Mod: S$GLB,,, | Performed by: INTERNAL MEDICINE

## 2018-11-12 PROCEDURE — 99999 PR PBB SHADOW E&M-EST. PATIENT-LVL III: CPT | Mod: PBBFAC,,, | Performed by: INTERNAL MEDICINE

## 2018-11-12 PROCEDURE — 1101F PT FALLS ASSESS-DOCD LE1/YR: CPT | Mod: CPTII,S$GLB,, | Performed by: INTERNAL MEDICINE

## 2018-11-12 PROCEDURE — 99214 OFFICE O/P EST MOD 30 MIN: CPT | Mod: 25,S$GLB,, | Performed by: INTERNAL MEDICINE

## 2018-11-12 RX ORDER — TRIAMCINOLONE ACETONIDE 40 MG/ML
40 INJECTION, SUSPENSION INTRA-ARTICULAR; INTRAMUSCULAR
Status: COMPLETED | OUTPATIENT
Start: 2018-11-12 | End: 2018-11-12

## 2018-11-12 RX ORDER — CODEINE PHOSPHATE AND GUAIFENESIN 10; 100 MG/5ML; MG/5ML
5 SOLUTION ORAL 3 TIMES DAILY PRN
Qty: 236 ML | Refills: 0 | Status: SHIPPED | OUTPATIENT
Start: 2018-11-12 | End: 2018-11-22

## 2018-11-12 RX ORDER — FLUTICASONE PROPIONATE 50 MCG
2 SPRAY, SUSPENSION (ML) NASAL DAILY
Qty: 16 G | Refills: 12 | Status: SHIPPED | OUTPATIENT
Start: 2018-11-12 | End: 2018-12-12

## 2018-11-12 RX ADMIN — TRIAMCINOLONE ACETONIDE 40 MG: 40 INJECTION, SUSPENSION INTRA-ARTICULAR; INTRAMUSCULAR at 03:11

## 2018-11-12 NOTE — PROGRESS NOTES
Subjective:       Patient ID: Tomasa Reed is a 68 y.o. female.    Chief Complaint: Sore Throat; Nasal Congestion; Cough; and Chest Congestion    HPI   Pt c/o 3 days of worsening sinus/chest congestion, mild productive cough, post nasal drip, sore throat, sinus headache. Minimal relief with Allegra and saline rinses.   Review of Systems   Constitutional: Negative for activity change, appetite change, chills, diaphoresis, fatigue, fever and unexpected weight change.   HENT: Positive for congestion, postnasal drip, rhinorrhea, sinus pressure, sinus pain and sore throat. Negative for sneezing, trouble swallowing and voice change.    Respiratory: Positive for cough. Negative for shortness of breath and wheezing.    Cardiovascular: Negative for chest pain, palpitations and leg swelling.   Gastrointestinal: Negative for abdominal pain, blood in stool, constipation, diarrhea, nausea and vomiting.   Genitourinary: Negative for dysuria.   Musculoskeletal: Negative for arthralgias and myalgias.   Skin: Negative for rash and wound.   Allergic/Immunologic: Negative for environmental allergies and food allergies.   Hematological: Negative for adenopathy. Does not bruise/bleed easily.       Objective:      Physical Exam   Constitutional: She is oriented to person, place, and time. She appears well-developed and well-nourished. No distress.   HENT:   Head: Normocephalic and atraumatic.   Right Ear: External ear normal.   Left Ear: External ear normal.   Nose: Mucosal edema and rhinorrhea present.   Mouth/Throat: Oropharynx is clear and moist. No oropharyngeal exudate.   Eyes: Conjunctivae and EOM are normal. Pupils are equal, round, and reactive to light. Right eye exhibits no discharge. Left eye exhibits no discharge. No scleral icterus.   Neck: Neck supple. No JVD present.   Cardiovascular: Normal rate, regular rhythm, normal heart sounds and intact distal pulses.   Pulmonary/Chest: Effort normal and breath sounds  normal. No respiratory distress. She has no wheezes. She has no rales.   Abdominal: Soft. Bowel sounds are normal. There is no tenderness.   Musculoskeletal: She exhibits no edema.   Lymphadenopathy:     She has no cervical adenopathy.   Neurological: She is alert and oriented to person, place, and time.   Skin: Skin is warm and dry. No rash noted. She is not diaphoretic. No pallor.       Assessment:       1. Viral sinusitis    2. Acute bronchitis, unspecified organism    3. Sinus headache        Plan:    1. Rx Flonase, Kenalog 40 mg IM and continue Allegra   2. Rx Cheratussin AC TID PRN   3. NSAIDs PRN

## 2018-11-14 ENCOUNTER — LAB VISIT (OUTPATIENT)
Dept: LAB | Facility: HOSPITAL | Age: 68
End: 2018-11-14
Attending: INTERNAL MEDICINE
Payer: MEDICARE

## 2018-11-14 DIAGNOSIS — E55.9 VITAMIN D DEFICIENCY: ICD-10-CM

## 2018-11-14 DIAGNOSIS — E78.5 HYPERLIPIDEMIA, UNSPECIFIED HYPERLIPIDEMIA TYPE: ICD-10-CM

## 2018-11-14 LAB
25(OH)D3+25(OH)D2 SERPL-MCNC: 40 NG/ML
ALBUMIN SERPL BCP-MCNC: 4.1 G/DL
ALP SERPL-CCNC: 74 U/L
ALT SERPL W/O P-5'-P-CCNC: 14 U/L
ANION GAP SERPL CALC-SCNC: 10 MMOL/L
AST SERPL-CCNC: 20 U/L
BASOPHILS # BLD AUTO: 0.03 K/UL
BASOPHILS NFR BLD: 0.6 %
BILIRUB SERPL-MCNC: 0.5 MG/DL
BUN SERPL-MCNC: 10 MG/DL
CALCIUM SERPL-MCNC: 9.9 MG/DL
CHLORIDE SERPL-SCNC: 102 MMOL/L
CHOLEST SERPL-MCNC: 189 MG/DL
CHOLEST/HDLC SERPL: 2.6 {RATIO}
CO2 SERPL-SCNC: 26 MMOL/L
CREAT SERPL-MCNC: 0.7 MG/DL
DIFFERENTIAL METHOD: ABNORMAL
EOSINOPHIL # BLD AUTO: 0.1 K/UL
EOSINOPHIL NFR BLD: 2.4 %
ERYTHROCYTE [DISTWIDTH] IN BLOOD BY AUTOMATED COUNT: 11.9 %
EST. GFR  (AFRICAN AMERICAN): >60 ML/MIN/1.73 M^2
EST. GFR  (NON AFRICAN AMERICAN): >60 ML/MIN/1.73 M^2
GLUCOSE SERPL-MCNC: 99 MG/DL
HCT VFR BLD AUTO: 38.8 %
HDLC SERPL-MCNC: 72 MG/DL
HDLC SERPL: 38.1 %
HGB BLD-MCNC: 13.3 G/DL
IMM GRANULOCYTES # BLD AUTO: 0.01 K/UL
IMM GRANULOCYTES NFR BLD AUTO: 0.2 %
LDLC SERPL CALC-MCNC: 108.8 MG/DL
LYMPHOCYTES # BLD AUTO: 1.5 K/UL
LYMPHOCYTES NFR BLD: 27 %
MCH RBC QN AUTO: 32.4 PG
MCHC RBC AUTO-ENTMCNC: 34.3 G/DL
MCV RBC AUTO: 94 FL
MONOCYTES # BLD AUTO: 0.5 K/UL
MONOCYTES NFR BLD: 8.4 %
NEUTROPHILS # BLD AUTO: 3.4 K/UL
NEUTROPHILS NFR BLD: 61.4 %
NONHDLC SERPL-MCNC: 117 MG/DL
NRBC BLD-RTO: 0 /100 WBC
PLATELET # BLD AUTO: 302 K/UL
PMV BLD AUTO: 9.6 FL
POTASSIUM SERPL-SCNC: 4.6 MMOL/L
PROT SERPL-MCNC: 7.4 G/DL
RBC # BLD AUTO: 4.11 M/UL
SODIUM SERPL-SCNC: 138 MMOL/L
TRIGL SERPL-MCNC: 41 MG/DL
TSH SERPL DL<=0.005 MIU/L-ACNC: 0.76 UIU/ML
WBC # BLD AUTO: 5.45 K/UL

## 2018-11-14 PROCEDURE — 82306 VITAMIN D 25 HYDROXY: CPT

## 2018-11-14 PROCEDURE — 85025 COMPLETE CBC W/AUTO DIFF WBC: CPT

## 2018-11-14 PROCEDURE — 80061 LIPID PANEL: CPT

## 2018-11-14 PROCEDURE — 84443 ASSAY THYROID STIM HORMONE: CPT

## 2018-11-14 PROCEDURE — 80053 COMPREHEN METABOLIC PANEL: CPT

## 2018-11-14 PROCEDURE — 36415 COLL VENOUS BLD VENIPUNCTURE: CPT | Mod: PO

## 2018-11-15 ENCOUNTER — TELEPHONE (OUTPATIENT)
Dept: INTERNAL MEDICINE | Facility: CLINIC | Age: 68
End: 2018-11-15

## 2018-11-15 ENCOUNTER — PATIENT MESSAGE (OUTPATIENT)
Dept: INTERNAL MEDICINE | Facility: CLINIC | Age: 68
End: 2018-11-15

## 2018-11-15 RX ORDER — AZITHROMYCIN 250 MG/1
TABLET, FILM COATED ORAL
Qty: 6 TABLET | Refills: 0 | Status: SHIPPED | OUTPATIENT
Start: 2018-11-15 | End: 2018-11-20

## 2018-11-15 RX ORDER — METHYLPREDNISOLONE 4 MG/1
TABLET ORAL
Qty: 1 PACKAGE | Refills: 0 | Status: SHIPPED | OUTPATIENT
Start: 2018-11-15 | End: 2019-04-09

## 2018-11-15 NOTE — TELEPHONE ENCOUNTER
----- Message from Rosanna Huizar MD sent at 11/14/2018  9:52 PM CST -----  Please review your lab work and we will discuss at your pending office visit.  Rosanna Dominguez

## 2018-11-18 NOTE — PROGRESS NOTES
68 year-old female presents  for well care.   Nonsmoker, nonalcohol consumer.     SCREENING TESTS:   Cholesterol/ lab, pending   Colonoscopy 12/2016 by Dr Painting- 2 tubular adenoma polyps- Mom d. Colon cancer   He recommended 3- years. -update pending  Mammogram, 2017  WWE, MARLENE, ovaries intact.  DEXA, 5/2017- update pending 2019- calcium and vitamin D -in pack of MVI   Low bone mass with a significant decrease of 3.4% in the hip BMD compared with the prior study. The estimated 10 year probability of hip fracture is 1.5% and of a major osteoporotic fracture 14%, respectively, using FRAX.   RECOMMENDATIONS:  1. Adequate Calcium and Vitamin D, 1200 mg/day and 800 units/day, respectively.  2. Repeat BMD in 2 years.    2012- holiday off of her Fosamax; no Rx rec   Taking Calcium and Vit D3    Eye-UTD  DDS-UTD    VACCINATIONS:  Tdap, 12/2012  Zostavax, 10/2012, pharmacist niece gave vaccine  Shingrix: pt to consider  Flu vaccine yearly  Pneumovax, 12/2013   Prevnar, 9/2015      MEDS: Reviewed.     REVIEW OF SYSTEMS:   GEN: No fever or chills or night sweats.   EYES: No visual disturbances, no diplopia, or d/c.  ENT: occ sinus, postnasal drip, seasonal ;       tx : Nasonex and Benadryl hs prn- now using Allegra     No dysphagia or early satiety  Cv: No chest pain, palpitations, PND, or orthopnea.   No calf pressure   RESPIRATORY: No cough or wheezing.   GI: No change in bowels or blood in stool.   No abdominal pain, no dark urine or light colored stool  RECALL Hep C +, s/p treatment  : Rare nocturia. No dysuria or hematuria. She does have the interstitial cystitis and doing well;   no recur x 5 yrs  MUSCULOSKELETAL: Occ joint pain, hands. Worked out yesterday I@ gym and in yard  Tx: Aleve w/ improvment  NEUROLOGICALLY: No unusual headaches.   No weakness in arms or legs or slurred speech.   ADL's: 100% independent  Memory: Good  Mental Health: Good  AD's: + will, and living will and M/POA   Nutrition: Good  Gait: No  falls  Safety: Intact  Urinary incontinence:  + nocturia x 1, close  Remainder of review negative except as previously noted.     PAST MEDICAL HISTORY:   Hep C,   Constipation,   Neutropenia, ANC 1122    Osteopenia ( osteoporosis s/p Fosamax),   Multinodular thyroid goiter with negative workup,   Interstitial cystitis, Dr. Carlie FOOTE, neck and back in particular and now the thumb.  L5-S1 ruptured discs, s/p epidural injections  Melanoma, s/p excision       PHYSICAL EXAM:  VSS:  GENERAL: Alert and oriented, in no acute distress. Well developed, well   nourished, conversant and cooperative, pleasant as always   EYES: Conjunctivae and lids unremarkable. Sclerae anictericPupils are reactive. EOMI  ENT: Canals w/ cerumen, and TMs visualized dull and injected. Nasal mucosa, turbinates, injected w/o exudate  oropharynx injected w/o exudate; sinus NT  NECK: Supple. Prominent thyroid, no LN  RESPIRATORY: Efforts unlabored.   LUNGS: Clear to auscultation.   HEART: Regular rate and rhythm. No carotid bruits noted,   1+ pedal pulse. No edema.   ABDOMEN: Bowel sounds present, soft, nontender.   No hepatosplenomegaly.  MUSCULOSKELETAL: Gait normal.   No clubbing, cyanosis or edema noted.   Calves nontender  NEURO: LARSEN. No tremor noted  SKIN: warm and dry    IMPRESSION:    Annual PE.    PHx melanoma, yearly monitoring   Family history of colon cancer./dm    Hx Hep C - s/p tx   Neutropenia , asx- increased WBC w/ URI   Osteopenia, on supplements, 2012- started Fosamax  holiday   HLD, elevated LDL   Multinodular thyroid, asx   Acute bronchitis/sinusitis, resolving    PLAN:  Hydration  Low fat diet  Call prn .  RTC 6mos w/ lab

## 2018-11-19 ENCOUNTER — LAB VISIT (OUTPATIENT)
Dept: LAB | Facility: HOSPITAL | Age: 68
End: 2018-11-19
Attending: INTERNAL MEDICINE
Payer: MEDICARE

## 2018-11-19 ENCOUNTER — OFFICE VISIT (OUTPATIENT)
Dept: INTERNAL MEDICINE | Facility: CLINIC | Age: 68
End: 2018-11-19
Payer: MEDICARE

## 2018-11-19 VITALS
DIASTOLIC BLOOD PRESSURE: 72 MMHG | HEIGHT: 62 IN | BODY MASS INDEX: 25.52 KG/M2 | HEART RATE: 86 BPM | SYSTOLIC BLOOD PRESSURE: 118 MMHG | RESPIRATION RATE: 18 BRPM | WEIGHT: 138.69 LBS | OXYGEN SATURATION: 98 % | TEMPERATURE: 99 F

## 2018-11-19 DIAGNOSIS — D70.9 NEUTROPENIA, UNSPECIFIED TYPE: ICD-10-CM

## 2018-11-19 DIAGNOSIS — Z86.19 HISTORY OF HEPATITIS C: ICD-10-CM

## 2018-11-19 DIAGNOSIS — E78.5 HYPERLIPIDEMIA, UNSPECIFIED HYPERLIPIDEMIA TYPE: ICD-10-CM

## 2018-11-19 DIAGNOSIS — Z00.00 ANNUAL PHYSICAL EXAM: ICD-10-CM

## 2018-11-19 DIAGNOSIS — M85.80 OSTEOPENIA, UNSPECIFIED LOCATION: ICD-10-CM

## 2018-11-19 DIAGNOSIS — E04.2 NONTOXIC MULTINODULAR GOITER: ICD-10-CM

## 2018-11-19 DIAGNOSIS — Z00.00 ANNUAL PHYSICAL EXAM: Primary | ICD-10-CM

## 2018-11-19 LAB
BILIRUB UR QL STRIP: NEGATIVE
CLARITY UR REFRACT.AUTO: ABNORMAL
COLOR UR AUTO: YELLOW
GLUCOSE UR QL STRIP: NEGATIVE
HGB UR QL STRIP: NEGATIVE
KETONES UR QL STRIP: NEGATIVE
LEUKOCYTE ESTERASE UR QL STRIP: NEGATIVE
NITRITE UR QL STRIP: NEGATIVE
PH UR STRIP: 8 [PH] (ref 5–8)
PROT UR QL STRIP: NEGATIVE
SP GR UR STRIP: 1.01 (ref 1–1.03)
URN SPEC COLLECT METH UR: ABNORMAL

## 2018-11-19 PROCEDURE — 81003 URINALYSIS AUTO W/O SCOPE: CPT

## 2018-11-19 PROCEDURE — 99999 PR PBB SHADOW E&M-EST. PATIENT-LVL III: CPT | Mod: PBBFAC,,, | Performed by: INTERNAL MEDICINE

## 2018-11-19 PROCEDURE — 99397 PER PM REEVAL EST PAT 65+ YR: CPT | Mod: S$GLB,,, | Performed by: INTERNAL MEDICINE

## 2018-11-21 ENCOUNTER — TELEPHONE (OUTPATIENT)
Dept: INTERNAL MEDICINE | Facility: CLINIC | Age: 68
End: 2018-11-21

## 2018-11-21 NOTE — TELEPHONE ENCOUNTER
----- Message from Rosanna Huizar MD sent at 11/21/2018  3:22 PM CST -----  Please note that your urine was unremarkable  Rosanna Dominguez

## 2019-04-08 ENCOUNTER — TELEPHONE (OUTPATIENT)
Dept: INTERNAL MEDICINE | Facility: CLINIC | Age: 69
End: 2019-04-08

## 2019-04-09 ENCOUNTER — OFFICE VISIT (OUTPATIENT)
Dept: INTERNAL MEDICINE | Facility: CLINIC | Age: 69
End: 2019-04-09
Payer: MEDICARE

## 2019-04-09 VITALS
HEART RATE: 81 BPM | HEIGHT: 61 IN | RESPIRATION RATE: 15 BRPM | WEIGHT: 136 LBS | SYSTOLIC BLOOD PRESSURE: 120 MMHG | BODY MASS INDEX: 25.68 KG/M2 | OXYGEN SATURATION: 97 % | DIASTOLIC BLOOD PRESSURE: 71 MMHG

## 2019-04-09 DIAGNOSIS — Z00.00 ENCOUNTER FOR PREVENTIVE HEALTH EXAMINATION: Primary | ICD-10-CM

## 2019-04-09 DIAGNOSIS — E04.2 NONTOXIC MULTINODULAR GOITER: ICD-10-CM

## 2019-04-09 DIAGNOSIS — M19.049 OSTEOARTHRITIS OF FINGER, UNSPECIFIED LATERALITY: ICD-10-CM

## 2019-04-09 DIAGNOSIS — H90.3 SENSORINEURAL HEARING LOSS (SNHL) OF BOTH EARS: ICD-10-CM

## 2019-04-09 DIAGNOSIS — Z86.19 HISTORY OF HEPATITIS C: ICD-10-CM

## 2019-04-09 DIAGNOSIS — H61.23 BILATERAL IMPACTED CERUMEN: ICD-10-CM

## 2019-04-09 DIAGNOSIS — H25.019 CORTICAL AGE-RELATED CATARACT, UNSPECIFIED LATERALITY: ICD-10-CM

## 2019-04-09 DIAGNOSIS — Z71.84 TRAVEL ADVICE ENCOUNTER: ICD-10-CM

## 2019-04-09 DIAGNOSIS — E55.9 VITAMIN D DEFICIENCY: ICD-10-CM

## 2019-04-09 DIAGNOSIS — Z71.84 COUNSELING ABOUT TRAVEL: ICD-10-CM

## 2019-04-09 DIAGNOSIS — M85.80 OSTEOPENIA, UNSPECIFIED LOCATION: ICD-10-CM

## 2019-04-09 DIAGNOSIS — E78.5 HYPERLIPIDEMIA, UNSPECIFIED HYPERLIPIDEMIA TYPE: ICD-10-CM

## 2019-04-09 DIAGNOSIS — H91.93 DECREASED HEARING OF BOTH EARS: ICD-10-CM

## 2019-04-09 DIAGNOSIS — Z85.820 HISTORY OF MELANOMA: ICD-10-CM

## 2019-04-09 PROBLEM — E66.3 OVERWEIGHT (BMI 25.0-29.9): Status: RESOLVED | Noted: 2018-06-28 | Resolved: 2019-04-09

## 2019-04-09 PROCEDURE — 99999 PR PBB SHADOW E&M-EST. PATIENT-LVL V: ICD-10-PCS | Mod: PBBFAC,,, | Performed by: NURSE PRACTITIONER

## 2019-04-09 PROCEDURE — 99999 PR PBB SHADOW E&M-EST. PATIENT-LVL V: CPT | Mod: PBBFAC,,, | Performed by: NURSE PRACTITIONER

## 2019-04-09 PROCEDURE — 99499 UNLISTED E&M SERVICE: CPT | Mod: S$GLB,,, | Performed by: NURSE PRACTITIONER

## 2019-04-09 PROCEDURE — 99499 RISK ADDL DX/OHS AUDIT: ICD-10-PCS | Mod: S$GLB,,, | Performed by: NURSE PRACTITIONER

## 2019-04-09 PROCEDURE — G0439 PR MEDICARE ANNUAL WELLNESS SUBSEQUENT VISIT: ICD-10-PCS | Mod: S$GLB,,, | Performed by: NURSE PRACTITIONER

## 2019-04-09 PROCEDURE — G0439 PPPS, SUBSEQ VISIT: HCPCS | Mod: S$GLB,,, | Performed by: NURSE PRACTITIONER

## 2019-04-09 NOTE — PROGRESS NOTES
"I offered to discuss end of life issues, including information on how to make advance directives that the patient could use to name someone who would make medical decisions on their behalf if they became too ill to make themselves.    ___Patient declined  _X_Patient has completed.  Tomasa Reed presented for a  Medicare AWV and comprehensive Health Risk Assessment today. The following components were reviewed and updated:    · Medical history  · Family History  · Social history  · Allergies and Current Medications  · Health Risk Assessment  · Health Maintenance  · Care Team     ** See Completed Assessments for Annual Wellness Visit within the encounter summary.**       The following assessments were completed:  · Living Situation  · CAGE  · Depression Screening  · Timed Get Up and Go  · Whisper Test  · Cognitive Function Screening  · Nutrition Screening  · ADL Screening  · PAQ Screening    Vitals:    04/09/19 0838   BP: 120/71   Pulse: 81   Resp: 15   SpO2: 97%   Weight: 61.7 kg (136 lb)   Height: 5' 1" (1.549 m)     Body mass index is 25.7 kg/m².  Physical Exam   Constitutional: She is oriented to person, place, and time. She appears well-developed and well-nourished.   HENT:   Head: Normocephalic and atraumatic.   Ear wax   Cardiovascular: Normal rate, regular rhythm and normal heart sounds.   No murmur heard.  Pulmonary/Chest: Effort normal and breath sounds normal. No respiratory distress. She has no decreased breath sounds. She has no wheezes. She has no rhonchi. She has no rales.   Abdominal: Soft. Bowel sounds are normal.   Musculoskeletal: Normal range of motion. She exhibits no edema.   Neurological: She is alert and oriented to person, place, and time. She exhibits normal muscle tone.   Skin: Skin is warm and dry. She is not diaphoretic. No pallor.   Psychiatric: She has a normal mood and affect. Her speech is normal and behavior is normal. Judgment and thought content normal.   Nursing note and vitals " reviewed.        Diagnoses and health risks identified today and associated recommendations/orders:    1. Bilateral impacted cerumen  Stable-followed by ENT  - Ambulatory referral to ENT    2. Decreased hearing of both ears  Stable-followed by ENT    - Ambulatory Referral to Audiology    3. Encounter for preventive health examination  Assessments completed. Preventative health recommendations reviewed.       4. Travel advice encounter  Stable-followed by PCP  - Ambulatory Referral to Travel Clinic    5. Counseling about travel  Stable-followed by PCP  - Ambulatory Referral to Travel Clinic    6. Sensorineural hearing loss (SNHL) of both ears  Stable-followed by audiology    7. History of hepatitis C; S/p RX with SVR 24 documented 09/2017  Stable-followed by PCP, hepatology    8. Cortical age-related cataract, unspecified laterality  Stable-followed by opth    9. History of melanoma  Stable-followed by derm    10. Nontoxic multinodular goiter  Stable-followed by PCP-last u/s 11/21/16    11. Osteopenia, unspecified location  Stable-followed by PCP-last DXA 5/16/17    12. Hyperlipidemia, unspecified hyperlipidemia type  Stable-followed by PCP    13. Osteoarthritis of finger, unspecified laterality  Stable-followed by PCP    14. Vitamin D deficiency  Stable-followed by PCP      Provided Tomasa with a 5-10 year written screening schedule and personal prevention plan. Recommendations were developed using the USPSTF age appropriate recommendations. Education, counseling, and referrals were provided as needed. After Visit Summary printed and given to patient which includes a list of additional screenings\tests needed. She will be traveling to [facility] & seeking evaluation of travel immunizations, etc- referral to travel health clinic. Fall prevention.    Follow up in about 1 year (around 4/9/2020) for HRA.    Eleonora Montenegro NP

## 2019-04-09 NOTE — Clinical Note
Primary Care Providers:Rosanna Huizar MD, MD (General)Your patient was seen today for a HRA visit. NO Gap(s) in care (HEDIS gaps) have been identified during this visit that require additional testing and possible follow up.Orders Placed This Encounter    Ambulatory referral to ENT        Referral Priority:Routine        Referral Type:Consultation        Referral Reason:Specialty Services Required        Requested Specialty:Otolaryngology        Number of Visits Requested:1    Ambulatory Referral to Audiology        Referral Priority:Routine        Referral Type:Audiology Exam        Referral Reason:Specialty Services Required        Requested Specialty:Audiology        Number of Visits Requested:1    Ambulatory Referral to Travel Clinic        Referral Priority:Routine        Referral Type:Consultation        Referral Reason:Specialty Services Required        Requested Specialty:Infectious Diseases        Number of Visits Requested:1These order

## 2019-04-09 NOTE — PATIENT INSTRUCTIONS
Counseling and Referral of Other Preventative  (Italic type indicates deductible and co-insurance are waived)    Patient Name: Tomasa Reed  Today's Date: 4/9/2019    Health Maintenance       Date Due Completion Date    DEXA SCAN 05/16/2020 5/16/2017    Mammogram 07/18/2019 7/18/2018    TETANUS VACCINE 12/03/2022   12/3/2012    Lipid Panel 11/14/2019 11/14/2018    Colonoscopy    Thyroid u/s- 12/14/2019 11/21/16 12/14/2016        Orders Placed This Encounter   Procedures    Ambulatory referral to ENT    Ambulatory Referral to Audiology     The following information is provided to all patients.  This information is to help you find resources for any of the problems found today that may be affecting your health:                Living healthy guide: www.[facility].[place].gov      Understanding Diabetes: www.diabetes.org      Eating healthy: www.cdc.gov/healthyweight      [facility] home safety checklist: www.cdc.gov/steadi/patient.html      Agency on Aging: www.goea.[place].gov      Alcoholics anonymous (AA): www.aa.org      Physical Activity: www.alisson.nih.gov/ge4osbu      Tobacco use: www.quitwithusla.org

## 2019-04-16 ENCOUNTER — TELEPHONE (OUTPATIENT)
Dept: OPTOMETRY | Facility: CLINIC | Age: 69
End: 2019-04-16

## 2019-04-16 NOTE — TELEPHONE ENCOUNTER
----- Message from Izzy De Anda sent at 4/16/2019  9:18 AM CDT -----  Contact: Tomasa Reed   Pt would like to speak with  nurse,pt was trying to be seen sooner 05/28/2019,pt can be reached at [phone] please thank you.

## 2019-04-25 DIAGNOSIS — H93.13 TINNITUS, BILATERAL: Primary | ICD-10-CM

## 2019-04-30 ENCOUNTER — OFFICE VISIT (OUTPATIENT)
Dept: OTOLARYNGOLOGY | Facility: CLINIC | Age: 69
End: 2019-04-30
Payer: MEDICARE

## 2019-04-30 ENCOUNTER — CLINICAL SUPPORT (OUTPATIENT)
Dept: AUDIOLOGY | Facility: CLINIC | Age: 69
End: 2019-04-30
Payer: MEDICARE

## 2019-04-30 VITALS
HEART RATE: 72 BPM | DIASTOLIC BLOOD PRESSURE: 72 MMHG | SYSTOLIC BLOOD PRESSURE: 153 MMHG | WEIGHT: 141 LBS | BODY MASS INDEX: 26.64 KG/M2

## 2019-04-30 DIAGNOSIS — Z86.69 HISTORY OF HEARING LOSS: ICD-10-CM

## 2019-04-30 DIAGNOSIS — H91.90 PERCEIVED HEARING LOSS: Primary | ICD-10-CM

## 2019-04-30 DIAGNOSIS — H93.11 TINNITUS, RIGHT EAR: ICD-10-CM

## 2019-04-30 DIAGNOSIS — H90.3 SENSORINEURAL HEARING LOSS, BILATERAL: Primary | ICD-10-CM

## 2019-04-30 PROCEDURE — 99212 OFFICE O/P EST SF 10 MIN: CPT | Mod: 25,S$GLB,, | Performed by: OTOLARYNGOLOGY

## 2019-04-30 PROCEDURE — 92567 PR TYMPA2METRY: ICD-10-PCS | Mod: S$GLB,,, | Performed by: AUDIOLOGIST

## 2019-04-30 PROCEDURE — 99999 PR PBB SHADOW E&M-EST. PATIENT-LVL I: CPT | Mod: PBBFAC,,,

## 2019-04-30 PROCEDURE — G0268 PR REMOVAL OF IMPACTED WAX MD: ICD-10-PCS | Mod: S$GLB,,, | Performed by: OTOLARYNGOLOGY

## 2019-04-30 PROCEDURE — 99212 PR OFFICE/OUTPT VISIT, EST, LEVL II, 10-19 MIN: ICD-10-PCS | Mod: 25,S$GLB,, | Performed by: OTOLARYNGOLOGY

## 2019-04-30 PROCEDURE — 92567 TYMPANOMETRY: CPT | Mod: S$GLB,,, | Performed by: AUDIOLOGIST

## 2019-04-30 PROCEDURE — 92557 PR COMPREHENSIVE HEARING TEST: ICD-10-PCS | Mod: S$GLB,,, | Performed by: AUDIOLOGIST

## 2019-04-30 PROCEDURE — 1101F PT FALLS ASSESS-DOCD LE1/YR: CPT | Mod: CPTII,S$GLB,, | Performed by: OTOLARYNGOLOGY

## 2019-04-30 PROCEDURE — 99999 PR PBB SHADOW E&M-EST. PATIENT-LVL IV: ICD-10-PCS | Mod: PBBFAC,,, | Performed by: OTOLARYNGOLOGY

## 2019-04-30 PROCEDURE — 99999 PR PBB SHADOW E&M-EST. PATIENT-LVL IV: CPT | Mod: PBBFAC,,, | Performed by: OTOLARYNGOLOGY

## 2019-04-30 PROCEDURE — 99999 PR PBB SHADOW E&M-EST. PATIENT-LVL I: ICD-10-PCS | Mod: PBBFAC,,,

## 2019-04-30 PROCEDURE — 92557 COMPREHENSIVE HEARING TEST: CPT | Mod: S$GLB,,, | Performed by: AUDIOLOGIST

## 2019-04-30 PROCEDURE — 1101F PR PT FALLS ASSESS DOC 0-1 FALLS W/OUT INJ PAST YR: ICD-10-PCS | Mod: CPTII,S$GLB,, | Performed by: OTOLARYNGOLOGY

## 2019-04-30 PROCEDURE — G0268 REMOVAL OF IMPACTED WAX MD: HCPCS | Mod: S$GLB,,, | Performed by: OTOLARYNGOLOGY

## 2019-04-30 NOTE — PROGRESS NOTES
Ms. Reed was seen today for a hearing evaluation.     Pure tone audiometry revealed a mild-moderate SNHL, AU  SRT and PTA are in good agreement bilaterally.  Excellent speech discrimination scores bilaterally   Tympanometry revealed Type A (normal middle ear function), bilaterally      Recommendations:  1. Otologic Evaluation  2. Annual Audiogram or repeat audiogram as needed  3. Hearing Protection  4. Hearing Aid Consult

## 2019-04-30 NOTE — PATIENT INSTRUCTIONS
Cerumen removed from bilateral small diameter hairy ear canals with blunt curette; AS C.I. > AD C.I.  Audiometry performed today; results compared to previous study; no significant change in status noted  Patient remains a marginal candidate for amplification for 1 or both ears  Tinnitus treatment options briefly discussed  Return to clinic p.r.n. ear cleaning

## 2019-04-30 NOTE — PROGRESS NOTES
CC:  Ear cleaning +  HPI:Ms. Reed is a 68-year-old  female with a history of treated hepatitis-C and melanoma who originally itended to have her ears cleaned today, only.   Instead, she was scheduled for an audiogram which was temporally postponed this afternoon due to the finding of cerumen in her ear canals.  She waited an hour to be seen for ear cleaning prior to audiometry which was ultimately performed.  She had indicated a muffled sensation in her right ear and right ear tinnitus perception (as before).  She also said that her ear felt blocked recently.  She indicated fullness in her right ear several weeks duration specifically..  She has completed several audiometric studies in the past.  She is aware there is no specific treatment for tinnitus perception per se.  She was recently examined by an internal medicine nurse practitioner 04/09/2019 for a preventative health examination.  She was diagnosed with bilateral cerumen impactions and decreased hearing of both ears for which she was referred to the ENT service for specialty care.  She was also given travel advice as well as diagnosed with history of hepatitis C status post SVR 24 treatment, cortical age-related cataract, history of melanoma, nontoxic MN G, osteopenia, hyperlipidemia, osteoarthritis of the finger and vitamin-D deficiency.    She last completed audiogram here in July 2018.  Prior to that, she completed an audiogram in March 2017.   All of these studies are duplicated below and compared to today's audiometric study which was performed after her ears were cleaned.    Past Medical History:   Diagnosis Date    Cataract     OU    Chronic interstitial cystitis     History of hepatitis C; S/p RX with SVR 12 documented 06/2017 6/22/2017    HLD (hyperlipidemia)     Malignant melanoma of skin of trunk, except scrotum     Migraine, unspecified, without mention of intractable migraine without mention of status migrainosus      Nonspecific abnormal results of liver function study     Nontoxic multinodular goiter     Osteopenia     Allergies:  Iodine and sulfa  Current Outpatient Medications on File Prior to Visit   Medication Sig Dispense Refill    biotin 1 mg tablet Take 1,000 mcg by mouth once daily.      cetirizine (ZYRTEC) 10 mg Cap PRN      dietary supplement Pack Take 1 tablet by mouth 2 (two) times daily.      glycerin adult suppository as needed.       MISCELLANEOUS MEDICAL SUPPLY MISC as needed. EYE ITCH RELIEF 0.25 EYE DROPS      naproxen sodium (ALEVE) 220 mg Cap PRN      OCEAN NASAL 0.65 % nasal spray as needed.       triamcinolone acetonide 0.025% (KENALOG) 0.025 % cream AAA inside ear BID x 1-2 wks then prn flares only 30 g 1    TUMS 200 mg calcium (500 mg) chewable tablet 1 tablet as needed.       vitamin D 1000 units Tab Take 1,000 Units by mouth once daily.       No current facility-administered medications on file prior to visit.          PE:  Blood pressure 153/72 pulse 72 height 5 ft 1 in weight 141 lb  General:  Alert and oriented lady in no acute distress; she is  perturbed about how long the visit has taken this afternoon in its entirety.  Both ears were examined under the microscope in the micro procedure room  Cerumen is removed from the hairy small ear canal the blunt curette.  Right eardrum is intact and clear as visualized.  The right middle ear space is aerated.  A hair is removed from the right ear canal with micro forceps.  A larger volume of cerumen is removed from the hairy left ear canal with a blunt curette.  Left eardrum is intact and clear left middle ear space is well aerated.    The patient is immediately sent for an audiometric study the results of which are duplicated below and compared to her 2 previous studies.      DIAGNOSIS:     ICD-10-CM ICD-9-CM    1. Perceived hearing loss H90.5 389.8 Ambulatory consult to Audiology   2. History of hearing loss Z86.69 V12.49 Ambulatory consult to  Audiology   3. Tinnitus, right ear H93.11 388.30 Ambulatory consult to Audiology    laura     PLAN: Cerumen removed from bilateral small diameter hairy ear canals with blunt curette; AS C.I. > AD C.I.  Audiometry performed today; results compared to previous study; no significant change in status noted  Patient remains a marginal candidate for amplification for 1 or both ears  Tinnitus treatment options briefly discussed  Return to clinic p.r.n. ear cleaning

## 2019-05-27 ENCOUNTER — CLINICAL SUPPORT (OUTPATIENT)
Dept: INFECTIOUS DISEASES | Facility: CLINIC | Age: 69
End: 2019-05-27
Payer: MEDICARE

## 2019-05-27 ENCOUNTER — OFFICE VISIT (OUTPATIENT)
Dept: INFECTIOUS DISEASES | Facility: CLINIC | Age: 69
End: 2019-05-27
Payer: MEDICARE

## 2019-05-27 VITALS
BODY MASS INDEX: 26.68 KG/M2 | DIASTOLIC BLOOD PRESSURE: 76 MMHG | TEMPERATURE: 99 F | HEIGHT: 61 IN | WEIGHT: 141.31 LBS | HEART RATE: 75 BPM | SYSTOLIC BLOOD PRESSURE: 137 MMHG

## 2019-05-27 DIAGNOSIS — Z71.84 TRAVEL ADVICE ENCOUNTER: ICD-10-CM

## 2019-05-27 DIAGNOSIS — Z23 IMMUNIZATION DUE: Primary | ICD-10-CM

## 2019-05-27 DIAGNOSIS — Z23 IMMUNIZATION DUE: ICD-10-CM

## 2019-05-27 PROCEDURE — 90691 TYPHOID VACCINE IM: CPT | Mod: ,,, | Performed by: INTERNAL MEDICINE

## 2019-05-27 PROCEDURE — 99401 PR PREVENT COUNSEL,INDIV,15 MIN: ICD-10-PCS | Mod: 25,,, | Performed by: INTERNAL MEDICINE

## 2019-05-27 PROCEDURE — 90471 YELLOW FEVER VACCINE SQ: ICD-10-PCS | Mod: ,,, | Performed by: INTERNAL MEDICINE

## 2019-05-27 PROCEDURE — 90717 YELLOW FEVER VACCINE SQ: ICD-10-PCS | Mod: ,,, | Performed by: INTERNAL MEDICINE

## 2019-05-27 PROCEDURE — 99401 PREV MED CNSL INDIV APPRX 15: CPT | Mod: 25,,, | Performed by: INTERNAL MEDICINE

## 2019-05-27 PROCEDURE — 99999 PR PBB SHADOW E&M-EST. PATIENT-LVL III: ICD-10-PCS | Mod: PBBFAC,,, | Performed by: INTERNAL MEDICINE

## 2019-05-27 PROCEDURE — 90691 TYPHOID VICPS VACCINE IM: ICD-10-PCS | Mod: ,,, | Performed by: INTERNAL MEDICINE

## 2019-05-27 PROCEDURE — 90472 TYPHOID VICPS VACCINE IM: ICD-10-PCS | Mod: ,,, | Performed by: INTERNAL MEDICINE

## 2019-05-27 PROCEDURE — 99999 PR PBB SHADOW E&M-EST. PATIENT-LVL III: CPT | Mod: PBBFAC,,, | Performed by: INTERNAL MEDICINE

## 2019-05-27 PROCEDURE — 90471 IMMUNIZATION ADMIN: CPT | Mod: ,,, | Performed by: INTERNAL MEDICINE

## 2019-05-27 PROCEDURE — 90717 YELLOW FEVER VACCINE SUBQ: CPT | Mod: ,,, | Performed by: INTERNAL MEDICINE

## 2019-05-27 PROCEDURE — 90472 IMMUNIZATION ADMIN EACH ADD: CPT | Mod: ,,, | Performed by: INTERNAL MEDICINE

## 2019-05-27 RX ORDER — ACETAZOLAMIDE 125 MG/1
TABLET ORAL
Qty: 30 TABLET | Refills: 0 | Status: SHIPPED | OUTPATIENT
Start: 2019-05-27 | End: 2020-02-20

## 2019-05-27 RX ORDER — ONDANSETRON 4 MG/1
4 TABLET, ORALLY DISINTEGRATING ORAL ONCE AS NEEDED
Qty: 10 TABLET | Refills: 0 | Status: SHIPPED | OUTPATIENT
Start: 2019-05-27 | End: 2019-05-27

## 2019-05-27 RX ORDER — AZITHROMYCIN 500 MG/1
500 TABLET, FILM COATED ORAL DAILY
Qty: 3 TABLET | Refills: 0 | Status: SHIPPED | OUTPATIENT
Start: 2019-05-27 | End: 2019-05-30

## 2019-05-27 RX ORDER — ATOVAQUONE AND PROGUANIL HYDROCHLORIDE 250; 100 MG/1; MG/1
TABLET, FILM COATED ORAL
Qty: 14 TABLET | Refills: 0 | Status: SHIPPED | OUTPATIENT
Start: 2019-05-27 | End: 2020-02-20 | Stop reason: ALTCHOICE

## 2019-05-27 NOTE — PROGRESS NOTES
Subjective:      Chief Complaint:   Chief Complaint   Patient presents with    Travel Consult     History of Present Illness    Patient  68 y.o. female who presents today for routine pretravel consultation.  The patient reports a past medical history of tinitus in right ear.  The patient reports the following medication allergies; sulfa (rash), iodine.  The patient reports the following food allergies; none .  The patient will be traveling to  [place] on June 23.  He will fly into [facility].  Then travel to an [facility] lodge.  [place].  [place] 5 they will fly to [facility].  [place] [facility] [place] [facility].  Then they will visit [facility].  Then return to [facility] and return.  The patient will be at this destination for 11 days.  The patient will be lodging at hotels.  The patient has travelled to the following other countries in the past; [place].  The patient reports that they received all their childhood vaccinations.  She believes that she had measles as a child.  The patient reports receipt of the following travel related vaccinations; none.  The purpose of this trip is vacation.      Review of Systems   HENT: Positive for tinnitus.    Endo/Heme/Allergies: Bruises/bleeds easily.       Objective:   Physical Exam   Assessment:     Pre-Travel clinic assessment    Plan:   Patient specific risks:      Patient is of advanced age.    Destination specific risks:      -Infectious Disease risks:       Mosquito Borne pathogens:  Reviewed basic mosquito avoidance precautions including wearing long sleeve clothing and insect repellant.  Risk of yellow fever vaccination in persons of advanced age was discussed with the patient.  She wishes to proceed.  Will order yellow fever vaccination.  Malarone for malaria prevention ordered.     Food Borne pathogens:  Reviewed basic hand, food and water sanitation precautions.  Patient instructed to take hand  on their trip.   She is immune to hepatitis A.  Will give typhoid vaccination.  Zofran was prescribed for use as needed for nausea.  Azithromycin was prescribed for use as needed for severe diarrhea.     Blood Borne Pathogens:  She received 3 doses of hepatitis b.     Routine:  She is up to date on tetanus vaccination.    -Environmental risks:     Precautions to minimize risk/exposure to crime and motor vehicle accidents were reviewed with the patient.     Acetazolamide was prescribed for altitude sickness prevention.

## 2019-05-27 NOTE — LETTER
May 29, 2019      Eleonora Montenegro, SUSY  [address]  [place] [address]           [facility]  [address]  [facility] [address]  Phone: [phone]  Fax: [phone]          Patient: Tomasa Reed   MR Number: [id number]   YOB: 1950   Date of Visit: 5/27/2019       Dear Eleonora Montenegro:    Thank you for referring Tomasa Reed to me for evaluation. Attached you will find relevant portions of my assessment and plan of care.    If you have questions, please do not hesitate to call me. I look forward to following Tomasa Reed along with you.    Sincerely,    Ronnell Jay MD    Enclosure  CC:  No Recipients    If you would like to receive this communication electronically, please contact [email or web address] or [phone] to request more information on [facility] access.    For providers and/or their staff who would like to refer a patient to [facility], please contact us through our one-stop-shop provider referral line, [facility], at [phone].    If you feel you have received this communication in error or would no longer like to receive these types of communications, please e-mail [email or web address]

## 2019-05-27 NOTE — PROGRESS NOTES
Ms. Reed received Yellow Fever and Typhoid vaccines   Tolerated well  Left unit in NAD  Yellow card given to patient

## 2019-05-28 ENCOUNTER — OFFICE VISIT (OUTPATIENT)
Dept: OPTOMETRY | Facility: CLINIC | Age: 69
End: 2019-05-28
Payer: MEDICARE

## 2019-05-28 DIAGNOSIS — H52.202 MYOPIA WITH ASTIGMATISM AND PRESBYOPIA, LEFT: ICD-10-CM

## 2019-05-28 DIAGNOSIS — Z46.0 FITTING AND ADJUSTMENT OF SPECTACLES AND CONTACT LENSES: Primary | ICD-10-CM

## 2019-05-28 DIAGNOSIS — H25.13 NUCLEAR SCLEROSIS, BILATERAL: Primary | ICD-10-CM

## 2019-05-28 DIAGNOSIS — H52.4 MYOPIA WITH ASTIGMATISM AND PRESBYOPIA, LEFT: ICD-10-CM

## 2019-05-28 DIAGNOSIS — H52.12 MYOPIA WITH ASTIGMATISM AND PRESBYOPIA, LEFT: ICD-10-CM

## 2019-05-28 PROCEDURE — 99999 PR PBB SHADOW E&M-EST. PATIENT-LVL II: CPT | Mod: PBBFAC,,, | Performed by: OPTOMETRIST

## 2019-05-28 PROCEDURE — 92310 PR CONTACT LENS FITTING (NO CHANGE): ICD-10-PCS | Mod: ,,, | Performed by: OPTOMETRIST

## 2019-05-28 PROCEDURE — 92310 CONTACT LENS FITTING OU: CPT | Mod: ,,, | Performed by: OPTOMETRIST

## 2019-05-28 PROCEDURE — 92014 PR EYE EXAM, EST PATIENT,COMPREHESV: ICD-10-PCS | Mod: S$GLB,,, | Performed by: OPTOMETRIST

## 2019-05-28 PROCEDURE — 99999 PR PBB SHADOW E&M-EST. PATIENT-LVL II: ICD-10-PCS | Mod: PBBFAC,,, | Performed by: OPTOMETRIST

## 2019-05-28 PROCEDURE — 92014 COMPRE OPH EXAM EST PT 1/>: CPT | Mod: S$GLB,,, | Performed by: OPTOMETRIST

## 2019-05-28 NOTE — PROGRESS NOTES
HPI     Blur ou at dist, x mos, no assoc pain or red, no relief over time,   constant    Last edited by Eric Barnes, OD on 5/28/2019  9:29 AM. (History)            Assessment /Plan     For exam results, see Encounter Report.    Nuclear sclerosis, bilateral    Myopia with astigmatism and presbyopia, left      1. Educated pt on presence of cataracts and effects on vision. No surgery at this time. Recheck in one year.  2. Contact lens Rx released to pt. Daily wear only advised, with education to risks of extended wear.  Discussed lens care, compliance and solutions. RTC yearly contact lens follow up.

## 2019-06-06 ENCOUNTER — IMMUNIZATION (OUTPATIENT)
Dept: PHARMACY | Facility: CLINIC | Age: 69
End: 2019-06-06
Payer: MEDICARE

## 2019-07-11 ENCOUNTER — TELEPHONE (OUTPATIENT)
Dept: INTERNAL MEDICINE | Facility: CLINIC | Age: 69
End: 2019-07-11

## 2019-07-11 DIAGNOSIS — Z12.31 VISIT FOR SCREENING MAMMOGRAM: Primary | ICD-10-CM

## 2019-07-11 NOTE — TELEPHONE ENCOUNTER
----- Message from Tanesha Sandhu sent at 7/11/2019  3:30 PM CDT -----  Contact: pt  Needs Advice    Reason for call: pt calling to have orders entered into the system for her yearly mmg.         Communication Preference: [phone]     Additional Information:

## 2019-07-17 ENCOUNTER — OFFICE VISIT (OUTPATIENT)
Dept: DERMATOLOGY | Facility: CLINIC | Age: 69
End: 2019-07-17
Payer: MEDICARE

## 2019-07-17 DIAGNOSIS — L82.1 SK (SEBORRHEIC KERATOSIS): ICD-10-CM

## 2019-07-17 DIAGNOSIS — D22.9 MULTIPLE BENIGN NEVI: ICD-10-CM

## 2019-07-17 DIAGNOSIS — L81.4 LENTIGINES: ICD-10-CM

## 2019-07-17 DIAGNOSIS — D18.00 ANGIOMA: ICD-10-CM

## 2019-07-17 DIAGNOSIS — Z12.83 SKIN CANCER SCREENING: Primary | ICD-10-CM

## 2019-07-17 PROCEDURE — 99999 PR PBB SHADOW E&M-EST. PATIENT-LVL II: ICD-10-PCS | Mod: PBBFAC,,, | Performed by: DERMATOLOGY

## 2019-07-17 PROCEDURE — 1101F PR PT FALLS ASSESS DOC 0-1 FALLS W/OUT INJ PAST YR: ICD-10-PCS | Mod: CPTII,S$GLB,, | Performed by: DERMATOLOGY

## 2019-07-17 PROCEDURE — 99999 PR PBB SHADOW E&M-EST. PATIENT-LVL II: CPT | Mod: PBBFAC,,, | Performed by: DERMATOLOGY

## 2019-07-17 PROCEDURE — 99214 PR OFFICE/OUTPT VISIT, EST, LEVL IV, 30-39 MIN: ICD-10-PCS | Mod: S$GLB,,, | Performed by: DERMATOLOGY

## 2019-07-17 PROCEDURE — 1101F PT FALLS ASSESS-DOCD LE1/YR: CPT | Mod: CPTII,S$GLB,, | Performed by: DERMATOLOGY

## 2019-07-17 PROCEDURE — 99214 OFFICE O/P EST MOD 30 MIN: CPT | Mod: S$GLB,,, | Performed by: DERMATOLOGY

## 2019-07-17 NOTE — PROGRESS NOTES
Subjective:       Patient ID:  Tomasa Reed is a 69 y.o. female who presents for   Chief Complaint   Patient presents with    Skin Check     tbse     [facility]  Interested in total body skin check today.  Pt had a melanoma in situ removed from her R chest in 10/2011 by Dr. Shaw. Also had a mildly atypical nevus biopsied from L chest in 2013.    Pt is concerned about a mole on her L breast x yrs. Asymptomatic and no prior treatment.    Review of Systems   Constitutional: Negative for fever, chills, weight loss, weight gain, fatigue, night sweats and malaise.   Skin: Positive for daily sunscreen use and activity-related sunscreen use. Negative for recent sunburn.   Hematologic/Lymphatic: Bruises/bleeds easily.        Objective:    Physical Exam   Constitutional: She appears well-developed and well-nourished. No distress.   Lymphadenopathy: No supraclavicular adenopathy is present.     She has no cervical adenopathy.     She has no axillary adenopathy.     She has no inguinal adenopathy.   Neurological: She is alert and oriented to person, place, and time. She is not disoriented.   Psychiatric: She has a normal mood and affect.   Skin:   Areas Examined (abnormalities noted in diagram):   Scalp / Hair Palpated and Inspected  Head / Face Inspection Performed  Neck Inspection Performed  Chest / Axilla Inspection Performed  Abdomen Inspection Performed  Genitals / Buttocks / Groin Inspection Performed  Back Inspection Performed  RUE Inspected  LUE Inspection Performed  RLE Inspected  LLE Inspection Performed  Nails and Digits Inspection Performed                   Diagram Legend     Erythematous scaling macule/papule c/w actinic keratosis       Vascular papule c/w angioma      Pigmented verrucoid papule/plaque c/w seborrheic keratosis      Yellow umbilicated papule c/w sebaceous hyperplasia      Irregularly shaped tan macule c/w lentigo     1-2 mm smooth white papules consistent with Milia      Movable  subcutaneous cyst with punctum c/w epidermal inclusion cyst      Subcutaneous movable cyst c/w pilar cyst      Firm pink to brown papule c/w dermatofibroma      Pedunculated fleshy papule(s) c/w skin tag(s)      Evenly pigmented macule c/w junctional nevus     Mildly variegated pigmented, slightly irregular-bordered macule c/w mildly atypical nevus      Flesh colored to evenly pigmented papule c/w intradermal nevus       Pink pearly papule/plaque c/w basal cell carcinoma      Erythematous hyperkeratotic cursted plaque c/w SCC      Surgical scar with no sign of skin cancer recurrence      Open and closed comedones      Inflammatory papules and pustules      Verrucoid papule consistent consistent with wart     Erythematous eczematous patches and plaques     Dystrophic onycholytic nail with subungual debris c/w onychomycosis     Umbilicated papule    Erythematous-base heme-crusted tan verrucoid plaque consistent with inflamed seborrheic keratosis     Erythematous Silvery Scaling Plaque c/w Psoriasis     See annotation      Assessment / Plan:        Skin cancer screening  Total body skin examination performed today including at least 12 points as noted in physical examination. No lesions suspicious for malignancy noted.  Patient instructed in importance of daily broad spectrum sunscreen use with spf at least 30. Sun avoidance and topical protection/protective clothing discussed.    Multiple benign nevi  Benign-appearing on exam today. Counseled pt to monitor mole(s) and return to clinic if any changes noted or symptoms (bleeding, itching, pain, etc) noted. Brochure provided.    SK (seborrheic keratosis)  These are benign inherited growths without a malignant potential. Reassurance given to patient. No treatment is necessary.   Treatment of benign, asymptomatic lesions may be considered cosmetic.  Warned about risk of hypo- or hyperpigmentation with treatment and risk of recurrence.    Lentigines  These are benign sun  spots which should be monitored for changes. Patient instructed in importance of daily broad spectrum sunscreen use with spf at least 30. Sun avoidance and topical protection/protective clothing discussed.    Angioma  This is a benign vascular lesion. Reassurance given. No treatment required. Treatment of benign, asymptomatic lesions may be considered cosmetic.    Follow up in about 1 year (around 7/17/2020) for skin check or sooner for any concerns.

## 2019-07-17 NOTE — PATIENT INSTRUCTIONS

## 2019-07-31 ENCOUNTER — TELEPHONE (OUTPATIENT)
Dept: INTERNAL MEDICINE | Facility: CLINIC | Age: 69
End: 2019-07-31

## 2019-07-31 ENCOUNTER — HOSPITAL ENCOUNTER (OUTPATIENT)
Dept: RADIOLOGY | Facility: HOSPITAL | Age: 69
Discharge: HOME | End: 2019-07-31
Attending: INTERNAL MEDICINE
Payer: MEDICARE

## 2019-07-31 DIAGNOSIS — Z12.31 VISIT FOR SCREENING MAMMOGRAM: ICD-10-CM

## 2019-07-31 PROCEDURE — 77063 MAMMO DIGITAL SCREENING BILAT WITH TOMOSYNTHESIS_CAD: ICD-10-PCS | Mod: 26,,, | Performed by: RADIOLOGY

## 2019-07-31 PROCEDURE — 77067 MAMMO DIGITAL SCREENING BILAT WITH TOMOSYNTHESIS_CAD: ICD-10-PCS | Mod: 26,,, | Performed by: RADIOLOGY

## 2019-07-31 PROCEDURE — 77063 BREAST TOMOSYNTHESIS BI: CPT | Mod: 26,,, | Performed by: RADIOLOGY

## 2019-07-31 PROCEDURE — 77067 SCR MAMMO BI INCL CAD: CPT | Mod: 26,,, | Performed by: RADIOLOGY

## 2019-07-31 PROCEDURE — 77067 SCR MAMMO BI INCL CAD: CPT | Mod: TC

## 2019-07-31 NOTE — TELEPHONE ENCOUNTER
----- Message from Rosanna Huizar MD sent at 7/31/2019  1:42 PM CDT -----  Please note that your mammogram was read as follows  Impression:  There is no mammographic evidence of malignancy.  Rosanna Dominguez

## 2019-08-01 ENCOUNTER — TELEPHONE (OUTPATIENT)
Dept: INTERNAL MEDICINE | Facility: CLINIC | Age: 69
End: 2019-08-01

## 2019-08-01 DIAGNOSIS — E04.2 NONTOXIC MULTINODULAR GOITER: ICD-10-CM

## 2019-08-01 DIAGNOSIS — E78.5 HYPERLIPIDEMIA, UNSPECIFIED HYPERLIPIDEMIA TYPE: Primary | ICD-10-CM

## 2019-08-01 DIAGNOSIS — E55.9 VITAMIN D DEFICIENCY: ICD-10-CM

## 2019-08-01 NOTE — TELEPHONE ENCOUNTER
----- Message from Anselmo Sue sent at 8/1/2019  8:51 AM CDT -----  Doctor appointment and lab have been scheduled.  Please link lab orders to the lab appointment.  Date of doctor appointment: 12/2   Date of lab appointment:  11/25  Physical or EP: physical  Comments:

## 2019-08-07 ENCOUNTER — IMMUNIZATION (OUTPATIENT)
Dept: PHARMACY | Facility: CLINIC | Age: 69
End: 2019-08-07
Payer: MEDICARE

## 2019-09-16 ENCOUNTER — IMMUNIZATION (OUTPATIENT)
Dept: PHARMACY | Facility: CLINIC | Age: 69
End: 2019-09-16
Payer: MEDICARE

## 2019-11-25 ENCOUNTER — LAB VISIT (OUTPATIENT)
Dept: LAB | Facility: HOSPITAL | Age: 69
End: 2019-11-25
Attending: INTERNAL MEDICINE
Payer: MEDICARE

## 2019-11-25 DIAGNOSIS — E04.2 NONTOXIC MULTINODULAR GOITER: ICD-10-CM

## 2019-11-25 DIAGNOSIS — E55.9 VITAMIN D DEFICIENCY: ICD-10-CM

## 2019-11-25 DIAGNOSIS — E78.5 HYPERLIPIDEMIA, UNSPECIFIED HYPERLIPIDEMIA TYPE: ICD-10-CM

## 2019-11-25 LAB
25(OH)D3+25(OH)D2 SERPL-MCNC: 53 NG/ML (ref 30–96)
ALBUMIN SERPL BCP-MCNC: 4.4 G/DL (ref 3.5–5.2)
ALP SERPL-CCNC: 66 U/L (ref 55–135)
ALT SERPL W/O P-5'-P-CCNC: 22 U/L (ref 10–44)
ANION GAP SERPL CALC-SCNC: 9 MMOL/L (ref 8–16)
AST SERPL-CCNC: 24 U/L (ref 10–40)
BASOPHILS # BLD AUTO: 0.02 K/UL (ref 0–0.2)
BASOPHILS NFR BLD: 0.7 % (ref 0–1.9)
BILIRUB SERPL-MCNC: 0.7 MG/DL (ref 0.1–1)
BUN SERPL-MCNC: 10 MG/DL (ref 8–23)
CALCIUM SERPL-MCNC: 10.1 MG/DL (ref 8.7–10.5)
CHLORIDE SERPL-SCNC: 101 MMOL/L (ref 95–110)
CHOLEST SERPL-MCNC: 211 MG/DL (ref 120–199)
CHOLEST/HDLC SERPL: 2.5 {RATIO} (ref 2–5)
CO2 SERPL-SCNC: 26 MMOL/L (ref 23–29)
CREAT SERPL-MCNC: 0.7 MG/DL (ref 0.5–1.4)
DIFFERENTIAL METHOD: ABNORMAL
EOSINOPHIL # BLD AUTO: 0.1 K/UL (ref 0–0.5)
EOSINOPHIL NFR BLD: 3.9 % (ref 0–8)
ERYTHROCYTE [DISTWIDTH] IN BLOOD BY AUTOMATED COUNT: 12.8 % (ref 11.5–14.5)
EST. GFR  (AFRICAN AMERICAN): >60 ML/MIN/1.73 M^2
EST. GFR  (NON AFRICAN AMERICAN): >60 ML/MIN/1.73 M^2
GLUCOSE SERPL-MCNC: 87 MG/DL (ref 70–110)
HCT VFR BLD AUTO: 42.5 % (ref 37–48.5)
HDLC SERPL-MCNC: 84 MG/DL (ref 40–75)
HDLC SERPL: 39.8 % (ref 20–50)
HGB BLD-MCNC: 13.8 G/DL (ref 12–16)
IMM GRANULOCYTES # BLD AUTO: 0 K/UL (ref 0–0.04)
IMM GRANULOCYTES NFR BLD AUTO: 0 % (ref 0–0.5)
LDLC SERPL CALC-MCNC: 114.2 MG/DL (ref 63–159)
LYMPHOCYTES # BLD AUTO: 1.6 K/UL (ref 1–4.8)
LYMPHOCYTES NFR BLD: 52.3 % (ref 18–48)
MCH RBC QN AUTO: 32.1 PG (ref 27–31)
MCHC RBC AUTO-ENTMCNC: 32.5 G/DL (ref 32–36)
MCV RBC AUTO: 99 FL (ref 82–98)
MONOCYTES # BLD AUTO: 0.2 K/UL (ref 0.3–1)
MONOCYTES NFR BLD: 7.9 % (ref 4–15)
NEUTROPHILS # BLD AUTO: 1.1 K/UL (ref 1.8–7.7)
NEUTROPHILS NFR BLD: 35.2 % (ref 38–73)
NONHDLC SERPL-MCNC: 127 MG/DL
NRBC BLD-RTO: 0 /100 WBC
PLATELET # BLD AUTO: 310 K/UL (ref 150–350)
PMV BLD AUTO: 9.8 FL (ref 9.2–12.9)
POTASSIUM SERPL-SCNC: 3.9 MMOL/L (ref 3.5–5.1)
PROT SERPL-MCNC: 7.8 G/DL (ref 6–8.4)
RBC # BLD AUTO: 4.3 M/UL (ref 4–5.4)
SODIUM SERPL-SCNC: 136 MMOL/L (ref 136–145)
TRIGL SERPL-MCNC: 64 MG/DL (ref 30–150)
TSH SERPL DL<=0.005 MIU/L-ACNC: 0.69 UIU/ML (ref 0.4–4)
WBC # BLD AUTO: 3.04 K/UL (ref 3.9–12.7)

## 2019-11-25 PROCEDURE — 84443 ASSAY THYROID STIM HORMONE: CPT

## 2019-11-25 PROCEDURE — 80061 LIPID PANEL: CPT

## 2019-11-25 PROCEDURE — 80053 COMPREHEN METABOLIC PANEL: CPT

## 2019-11-25 PROCEDURE — 36415 COLL VENOUS BLD VENIPUNCTURE: CPT | Mod: PO

## 2019-11-25 PROCEDURE — 82306 VITAMIN D 25 HYDROXY: CPT

## 2019-11-25 PROCEDURE — 85025 COMPLETE CBC W/AUTO DIFF WBC: CPT

## 2019-11-27 ENCOUNTER — TELEPHONE (OUTPATIENT)
Dept: INTERNAL MEDICINE | Facility: CLINIC | Age: 69
End: 2019-11-27

## 2019-11-27 NOTE — TELEPHONE ENCOUNTER
----- Message from Rosanna Huizar MD sent at 11/26/2019  7:40 PM CST -----  Please review your lab work and we will discuss at your pending office visit.  Rosanna Dominguez
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
## 2019-12-02 ENCOUNTER — OFFICE VISIT (OUTPATIENT)
Dept: INTERNAL MEDICINE | Facility: CLINIC | Age: 69
End: 2019-12-02
Payer: MEDICARE

## 2019-12-02 VITALS
SYSTOLIC BLOOD PRESSURE: 134 MMHG | BODY MASS INDEX: 25.8 KG/M2 | DIASTOLIC BLOOD PRESSURE: 78 MMHG | RESPIRATION RATE: 18 BRPM | WEIGHT: 140.19 LBS | HEART RATE: 68 BPM | HEIGHT: 62 IN | TEMPERATURE: 98 F

## 2019-12-02 DIAGNOSIS — E78.5 HYPERLIPIDEMIA, UNSPECIFIED HYPERLIPIDEMIA TYPE: ICD-10-CM

## 2019-12-02 DIAGNOSIS — M85.80 OSTEOPENIA, UNSPECIFIED LOCATION: ICD-10-CM

## 2019-12-02 DIAGNOSIS — Z23 NEED FOR 23-POLYVALENT PNEUMOCOCCAL POLYSACCHARIDE VACCINE: ICD-10-CM

## 2019-12-02 DIAGNOSIS — E04.2 NONTOXIC MULTINODULAR GOITER: ICD-10-CM

## 2019-12-02 DIAGNOSIS — Z12.11 COLON CANCER SCREENING: ICD-10-CM

## 2019-12-02 DIAGNOSIS — Z00.00 ANNUAL PHYSICAL EXAM: Primary | ICD-10-CM

## 2019-12-02 DIAGNOSIS — D70.9 NEUTROPENIA, UNSPECIFIED TYPE: ICD-10-CM

## 2019-12-02 DIAGNOSIS — R22.1 MASS OF LEFT SIDE OF NECK: ICD-10-CM

## 2019-12-02 DIAGNOSIS — Z78.0 POSTMENOPAUSAL ESTROGEN DEFICIENCY: ICD-10-CM

## 2019-12-02 DIAGNOSIS — Z86.19 HISTORY OF HEPATITIS C: ICD-10-CM

## 2019-12-02 PROCEDURE — 93010 EKG 12-LEAD: ICD-10-PCS | Mod: S$GLB,,, | Performed by: INTERNAL MEDICINE

## 2019-12-02 PROCEDURE — 99499 UNLISTED E&M SERVICE: CPT | Mod: S$GLB,,, | Performed by: INTERNAL MEDICINE

## 2019-12-02 PROCEDURE — 93005 EKG 12-LEAD: ICD-10-PCS | Mod: S$GLB,,, | Performed by: INTERNAL MEDICINE

## 2019-12-02 PROCEDURE — 99499 RISK ADDL DX/OHS AUDIT: ICD-10-PCS | Mod: S$GLB,,, | Performed by: INTERNAL MEDICINE

## 2019-12-02 PROCEDURE — 90732 PPSV23 VACC 2 YRS+ SUBQ/IM: CPT | Mod: S$GLB,,, | Performed by: INTERNAL MEDICINE

## 2019-12-02 PROCEDURE — 99999 PR PBB SHADOW E&M-EST. PATIENT-LVL IV: CPT | Mod: PBBFAC,,, | Performed by: INTERNAL MEDICINE

## 2019-12-02 PROCEDURE — 93010 ELECTROCARDIOGRAM REPORT: CPT | Mod: S$GLB,,, | Performed by: INTERNAL MEDICINE

## 2019-12-02 PROCEDURE — 99214 PR OFFICE/OUTPT VISIT, EST, LEVL IV, 30-39 MIN: ICD-10-PCS | Mod: 25,S$GLB,, | Performed by: INTERNAL MEDICINE

## 2019-12-02 PROCEDURE — G0009 PNEUMOCOCCAL POLYSACCHARIDE VACCINE 23-VALENT =>2YO SQ IM: ICD-10-PCS | Mod: S$GLB,,, | Performed by: INTERNAL MEDICINE

## 2019-12-02 PROCEDURE — 99999 PR PBB SHADOW E&M-EST. PATIENT-LVL IV: ICD-10-PCS | Mod: PBBFAC,,, | Performed by: INTERNAL MEDICINE

## 2019-12-02 PROCEDURE — G0009 ADMIN PNEUMOCOCCAL VACCINE: HCPCS | Mod: S$GLB,,, | Performed by: INTERNAL MEDICINE

## 2019-12-02 PROCEDURE — 99214 OFFICE O/P EST MOD 30 MIN: CPT | Mod: 25,S$GLB,, | Performed by: INTERNAL MEDICINE

## 2019-12-02 PROCEDURE — 90732 PNEUMOCOCCAL POLYSACCHARIDE VACCINE 23-VALENT =>2YO SQ IM: ICD-10-PCS | Mod: S$GLB,,, | Performed by: INTERNAL MEDICINE

## 2019-12-02 PROCEDURE — 93005 ELECTROCARDIOGRAM TRACING: CPT | Mod: S$GLB,,, | Performed by: INTERNAL MEDICINE

## 2019-12-04 DIAGNOSIS — Z86.010 ENCOUNTER FOR COLONOSCOPY DUE TO HISTORY OF COLONIC POLYP: Primary | ICD-10-CM

## 2019-12-04 DIAGNOSIS — Z12.11 ENCOUNTER FOR COLONOSCOPY DUE TO HISTORY OF COLONIC POLYP: Primary | ICD-10-CM

## 2019-12-04 RX ORDER — SODIUM, POTASSIUM,MAG SULFATES 17.5-3.13G
1 SOLUTION, RECONSTITUTED, ORAL ORAL DAILY
Qty: 1 KIT | Refills: 0 | Status: SHIPPED | OUTPATIENT
Start: 2019-12-04 | End: 2019-12-06

## 2019-12-09 ENCOUNTER — HOSPITAL ENCOUNTER (OUTPATIENT)
Dept: RADIOLOGY | Facility: HOSPITAL | Age: 69
Discharge: HOME | End: 2019-12-09
Attending: INTERNAL MEDICINE
Payer: MEDICARE

## 2019-12-09 DIAGNOSIS — R22.1 MASS OF LEFT SIDE OF NECK: ICD-10-CM

## 2019-12-09 PROCEDURE — 76536 US EXAM OF HEAD AND NECK: CPT | Mod: 26,,, | Performed by: RADIOLOGY

## 2019-12-09 PROCEDURE — 76536 US EXAM OF HEAD AND NECK: CPT | Mod: TC

## 2019-12-09 PROCEDURE — 76536 US SOFT TISSUE HEAD NECK THYROID: ICD-10-PCS | Mod: 26,,, | Performed by: RADIOLOGY

## 2019-12-12 ENCOUNTER — APPOINTMENT (OUTPATIENT)
Dept: RADIOLOGY | Facility: CLINIC | Age: 69
End: 2019-12-12
Attending: INTERNAL MEDICINE
Payer: MEDICARE

## 2019-12-12 DIAGNOSIS — Z78.0 POSTMENOPAUSAL ESTROGEN DEFICIENCY: ICD-10-CM

## 2019-12-12 PROCEDURE — 77080 DXA BONE DENSITY AXIAL: CPT | Mod: TC,PO

## 2019-12-12 PROCEDURE — 77080 DXA BONE DENSITY AXIAL: CPT | Mod: 26,,, | Performed by: INTERNAL MEDICINE

## 2019-12-12 PROCEDURE — 77080 DEXA BONE DENSITY SPINE HIP: ICD-10-PCS | Mod: 26,,, | Performed by: INTERNAL MEDICINE

## 2020-01-02 ENCOUNTER — PATIENT MESSAGE (OUTPATIENT)
Dept: INTERNAL MEDICINE | Facility: CLINIC | Age: 70
End: 2020-01-02

## 2020-01-02 ENCOUNTER — TELEPHONE (OUTPATIENT)
Dept: INTERNAL MEDICINE | Facility: CLINIC | Age: 70
End: 2020-01-02

## 2020-01-02 NOTE — TELEPHONE ENCOUNTER
----- Message from Rosanna Huizar MD sent at 1/2/2020  4:07 PM CST -----  Please note that your bone density revealed thinning of your bone, significant enough for a recommendation for a prescriptive medication.   Alendronate ( Fosamax) is the first line medication, it is taken once weekly first morning with 8 + oz of water  And recommendations to remain upright for one hour.   In addition, you should be taking calcium and vitamin D supplements to help keep your bone strong.  Please advise if you are agreeable to starting the medication and a 90 day supply will be sent to your pharmacy.  Rosanna Dominguez

## 2020-01-08 ENCOUNTER — TELEPHONE (OUTPATIENT)
Dept: ENDOSCOPY | Facility: HOSPITAL | Age: 70
End: 2020-01-08

## 2020-01-08 NOTE — TELEPHONE ENCOUNTER
Called patient to confirm colonoscopy on 1/14/20. No answer. Left voicemail with direct number and Endoscopy scheduling department number to confirm or reschedule if needed.

## 2020-01-14 ENCOUNTER — HOSPITAL ENCOUNTER (OUTPATIENT)
Facility: HOSPITAL | Age: 70
Discharge: HOME | End: 2020-01-14
Attending: COLON & RECTAL SURGERY | Admitting: COLON & RECTAL SURGERY
Payer: MEDICARE

## 2020-01-14 ENCOUNTER — PATIENT MESSAGE (OUTPATIENT)
Dept: INTERNAL MEDICINE | Facility: CLINIC | Age: 70
End: 2020-01-14

## 2020-01-14 ENCOUNTER — ANESTHESIA (OUTPATIENT)
Dept: ENDOSCOPY | Facility: HOSPITAL | Age: 70
End: 2020-01-14
Payer: MEDICARE

## 2020-01-14 ENCOUNTER — ANESTHESIA EVENT (OUTPATIENT)
Dept: ENDOSCOPY | Facility: HOSPITAL | Age: 70
End: 2020-01-14
Payer: MEDICARE

## 2020-01-14 VITALS
RESPIRATION RATE: 18 BRPM | SYSTOLIC BLOOD PRESSURE: 144 MMHG | HEART RATE: 63 BPM | HEIGHT: 62 IN | OXYGEN SATURATION: 99 % | TEMPERATURE: 97 F | WEIGHT: 128 LBS | BODY MASS INDEX: 23.55 KG/M2 | DIASTOLIC BLOOD PRESSURE: 67 MMHG

## 2020-01-14 DIAGNOSIS — Z12.11 SCREENING FOR COLON CANCER: ICD-10-CM

## 2020-01-14 PROCEDURE — 63600175 PHARM REV CODE 636 W HCPCS: Performed by: COLON & RECTAL SURGERY

## 2020-01-14 PROCEDURE — 63600175 PHARM REV CODE 636 W HCPCS: Performed by: NURSE ANESTHETIST, CERTIFIED REGISTERED

## 2020-01-14 PROCEDURE — G0105 COLORECTAL SCRN; HI RISK IND: HCPCS | Performed by: COLON & RECTAL SURGERY

## 2020-01-14 PROCEDURE — E9220 PRA ENDO ANESTHESIA: ICD-10-PCS | Mod: ,,, | Performed by: NURSE ANESTHETIST, CERTIFIED REGISTERED

## 2020-01-14 PROCEDURE — G0105 COLORECTAL SCRN; HI RISK IND: ICD-10-PCS | Mod: 53,,, | Performed by: COLON & RECTAL SURGERY

## 2020-01-14 PROCEDURE — 37000008 HC ANESTHESIA 1ST 15 MINUTES: Performed by: COLON & RECTAL SURGERY

## 2020-01-14 PROCEDURE — G0105 COLORECTAL SCRN; HI RISK IND: HCPCS | Mod: 53,,, | Performed by: COLON & RECTAL SURGERY

## 2020-01-14 PROCEDURE — 37000009 HC ANESTHESIA EA ADD 15 MINS: Performed by: COLON & RECTAL SURGERY

## 2020-01-14 PROCEDURE — E9220 PRA ENDO ANESTHESIA: HCPCS | Mod: ,,, | Performed by: NURSE ANESTHETIST, CERTIFIED REGISTERED

## 2020-01-14 RX ORDER — PROPOFOL 10 MG/ML
VIAL (ML) INTRAVENOUS
Status: DISCONTINUED | OUTPATIENT
Start: 2020-01-14 | End: 2020-01-14

## 2020-01-14 RX ORDER — SODIUM CHLORIDE 9 MG/ML
INJECTION, SOLUTION INTRAVENOUS CONTINUOUS
Status: DISCONTINUED | OUTPATIENT
Start: 2020-01-14 | End: 2020-01-14 | Stop reason: HOSPADM

## 2020-01-14 RX ORDER — LIDOCAINE HCL/PF 100 MG/5ML
SYRINGE (ML) INTRAVENOUS
Status: DISCONTINUED | OUTPATIENT
Start: 2020-01-14 | End: 2020-01-14

## 2020-01-14 RX ORDER — PROPOFOL 10 MG/ML
VIAL (ML) INTRAVENOUS CONTINUOUS PRN
Status: DISCONTINUED | OUTPATIENT
Start: 2020-01-14 | End: 2020-01-14

## 2020-01-14 RX ADMIN — PROPOFOL 150 MCG/KG/MIN: 10 INJECTION, EMULSION INTRAVENOUS at 11:01

## 2020-01-14 RX ADMIN — PROPOFOL 70 MG: 10 INJECTION, EMULSION INTRAVENOUS at 11:01

## 2020-01-14 RX ADMIN — LIDOCAINE HYDROCHLORIDE 50 MG: 20 INJECTION, SOLUTION INTRAVENOUS at 11:01

## 2020-01-14 RX ADMIN — SODIUM CHLORIDE: 0.9 INJECTION, SOLUTION INTRAVENOUS at 09:01

## 2020-01-14 NOTE — ANESTHESIA POSTPROCEDURE EVALUATION
Anesthesia Post Evaluation    Patient: Tomasa Reed    Procedure(s) Performed: Procedure(s) (LRB):  COLONOSCOPY (N/A)    Final Anesthesia Type: general    Patient location during evaluation: PACU  Patient participation: Yes- Able to Participate  Level of consciousness: awake and alert  Post-procedure vital signs: reviewed and stable  Pain management: adequate  Airway patency: patent    PONV status at discharge: No PONV  Anesthetic complications: no      Cardiovascular status: blood pressure returned to baseline  Respiratory status: spontaneous ventilation and room air  Hydration status: euvolemic  Follow-up not needed.          Vitals Value Taken Time   /72 1/14/2020 12:36 PM   Temp 36.3 °C (97.4 °F) 1/14/2020 12:21 PM   Pulse 62 1/14/2020 12:36 PM   Resp 18 1/14/2020 12:36 PM   SpO2 100 % 1/14/2020 12:36 PM         No case tracking events are documented in the log.      Pain/Manny Score: Manny Score: 10 (1/14/2020 12:36 PM)

## 2020-01-14 NOTE — ANESTHESIA PREPROCEDURE EVALUATION
01/14/2020  Tomasa Reed is a 69 y.o., female.    Past Medical History:   Diagnosis Date    Cataract     OU    Chronic interstitial cystitis     History of hepatitis C; S/p RX with SVR 12 documented 06/2017 6/22/2017    HLD (hyperlipidemia)     Malignant melanoma of skin of trunk, except scrotum     Migraine, unspecified, without mention of intractable migraine without mention of status migrainosus     Nonspecific abnormal results of liver function study     Nontoxic multinodular goiter     Osteopenia      Patient Active Problem List   Diagnosis    History of melanoma    Nontoxic multinodular goiter    Osteopenia    HLD (hyperlipidemia)    Neutropenia    OA (osteoarthritis) of finger    Cataract    History of hepatitis C; S/p RX with SVR 24 documented 09/2017    Sensorineural hearing loss (SNHL) of both ears    Vitamin D deficiency    Screening for colon cancer     Past Surgical History:   Procedure Laterality Date    BREAST BIOPSY      COLONOSCOPY N/A 12/14/2016    Procedure: COLONOSCOPY;  Surgeon: Wicho Painting MD;  Location: [address];  Service: Endoscopy;  Laterality: N/A;    FINGER SURGERY Right 2010    index finger fused    MOLE REMOVAL      TONSILLECTOMY, ADENOIDECTOMY      TOTAL ABDOMINAL HYSTERECTOMY           Anesthesia Evaluation    I have reviewed the Patient Summary Reports.        Review of Systems  Anesthesia Hx:  No problems with previous Anesthesia    Social:  Non-Smoker, Social Alcohol Use    Cardiovascular:   Exercise tolerance: good    Hepatic/GI:   Liver Disease,    Musculoskeletal:   Arthritis         Physical Exam  General:  Well nourished    Airway/Jaw/Neck:  Airway Findings: Mouth Opening: Normal Tongue: Normal  General Airway Assessment: Adult  Mallampati: II  Improves to II with phonation.       Chest/Lungs:  Chest/Lungs Clear     Heart/Vascular:  Heart Findings: Normal            Anesthesia Plan  Type of Anesthesia, risks & benefits discussed:  Anesthesia Type:  general  Patient's Preference:   Intra-op Monitoring Plan: standard ASA monitors  Intra-op Monitoring Plan Comments:   Post Op Pain Control Plan:   Post Op Pain Control Plan Comments:   Induction:   IV  Beta Blocker:         Informed Consent: Patient understands risks and agrees with Anesthesia plan.  Questions answered. Anesthesia consent signed with patient.  ASA Score: 2     Day of Surgery Review of History & Physical: I have interviewed and examined the patient. I have reviewed the patient's H&P dated:            Ready For Surgery From Anesthesia Perspective.

## 2020-01-14 NOTE — PROVATION PATIENT INSTRUCTIONS
Discharge Summary/Instructions after an Endoscopic Procedure  Patient Name: Tomasa Reed  Patient MRN: [id number]  Patient YOB: 1950 Tuesday, January 14, 2020  Ppaa Maher MD  RESTRICTIONS:  During your procedure today, you received medications for sedation.  These   medications may affect your judgment, balance and coordination.  Therefore,   for 24 hours, you have the following restrictions:   - DO NOT drive a car, operate machinery, make legal/financial decisions,   sign important papers or drink alcohol.    ACTIVITY:  Today: no heavy lifting, straining or running due to procedural   sedation/anesthesia.  The following day: return to full activity including work.  DIET:  Eat and drink normally unless instructed otherwise.     TREATMENT FOR COMMON SIDE EFFECTS:  - Mild abdominal pain, nausea, belching, bloating or excessive gas:  rest,   eat lightly and use a heating pad.  - Sore Throat: treat with throat lozenges and/or gargle with warm salt   water.  - Because air was used during the procedure, expelling large amounts of air   from your rectum or belching is normal.  - If a bowel prep was taken, you may not have a bowel movement for 1-3 days.    This is normal.  SYMPTOMS TO WATCH FOR AND REPORT TO YOUR PHYSICIAN:  1. Abdominal pain or bloating, other than gas cramps.  2. Chest pain.  3. Back pain.  4. Signs of infection such as: chills or fever occurring within 24 hours   after the procedure.  5. Rectal bleeding, which would show as bright red, maroon, or black stools.   (A tablespoon of blood from the rectum is not serious, especially if   hemorrhoids are present.)  6. Vomiting.  7. Weakness or dizziness.  GO DIRECTLY TO THE NEAREST EMERGENCY ROOM IF YOU HAVE ANY OF THE FOLLOWING:      Difficulty breathing              Chills and/or fever over 101 F   Persistent vomiting and/or vomiting blood   Severe abdominal pain   Severe chest pain   Black, tarry stools   Bleeding- more than one  tablespoon   Any other symptom or condition that you feel may need urgent attention  Your doctor recommends these additional instructions:  If any biopsies were taken, your doctors clinic will contact you in 1 to 2   weeks with any results.  - Discharge patient to home (ambulatory).   - Patient has a contact number available for emergencies.  The signs and   symptoms of potential delayed complications were discussed with the   patient.  Return to normal activities tomorrow.  Written discharge   instructions were provided to the patient.   - Clear liquid diet today.   - Perform an air contrast barium enema today.   - Continue present medications.   - Repeat colonoscopy in 5 years for surveillance.  For questions, problems or results please call your physician - Papa Maher MD at Work:  [phone].  [facility], EMERGENCY ROOM PHONE NUMBER: [phone]  IF A COMPLICATION OR EMERGENCY SITUATION ARISES AND YOU ARE UNABLE TO REACH   YOUR PHYSICIAN - GO DIRECTLY TO THE EMERGENCY ROOM.  Papa Maher MD  1/14/2020 12:22:25 PM  This report has been verified and signed electronically.  [employer]

## 2020-01-14 NOTE — H&P
Colon and Rectal Surgery  Endoscopy H&P    Procedure: Colonoscopy    HPI:   Tomasa Reed is a 69 y.o. female who presents today for screening colonoscopy.     Patient reports no interval change in bowel habits, hematochezia, melena, abdominal pain, or thin stools.     Last colonoscopy - 2016    - 3 mm polyps x2 in ascending colon. Both tubular adenoma  Family history of CRC - mother with colon cancer diagnosed in 40s,  in 90s  Family history of IBD - denies     No personal or family history of problems with sedation      Past Medical History:   Diagnosis Date    Cataract     OU    Chronic interstitial cystitis     History of hepatitis C; S/p RX with SVR 12 documented 2017    HLD (hyperlipidemia)     Malignant melanoma of skin of trunk, except scrotum     Migraine, unspecified, without mention of intractable migraine without mention of status migrainosus     Nonspecific abnormal results of liver function study     Nontoxic multinodular goiter     Osteopenia        Family History   Problem Relation Age of Onset    Osteoarthritis Mother         s/p hip fx    Diabetes Mother     Cancer Mother         colon    Thyroid disease Mother     Kidney failure Mother         d.93 s/p PAD procedure    Cataracts Mother     Macular degeneration Mother     Heart failure Father     Stroke Father         d.97 complications    Hypertension Father     Cataracts Father     Fibromyalgia Sister     Glaucoma Sister         dry eyes, s/p duct surgery    Other Daughter         inflammation, better off red meat    Chronic back pain Daughter     GI problems Daughter         liver w/up and on cholestyramine    Obesity Daughter     Melanoma Neg Hx     Amblyopia Neg Hx     Blindness Neg Hx     Retinal detachment Neg Hx     Strabismus Neg Hx        Social History     Socioeconomic History    Marital status:      Spouse name: Not on file    Number of children:  Not on file    Years of education: Not on file    Highest education level: Not on file   Occupational History    Occupation: retired     Employer: [employer]   Social Needs    Financial resource strain: Not hard at all    Food insecurity:     Worry: Never true     Inability: Never true    Transportation needs:     Medical: No     Non-medical: No   Tobacco Use    Smoking status: Never Smoker    Smokeless tobacco: Never Used   Substance and Sexual Activity    Alcohol use: Yes     Frequency: 2-3 times a week     Drinks per session: 1 or 2     Binge frequency: Never     Comment: weekends one drink    Drug use: Not on file    Sexual activity: Yes     Partners: Male   Lifestyle    Physical activity:     Days per week: 3 days     Minutes per session: 40 min    Stress: To some extent   Relationships    Social connections:     Talks on phone: More than three times a week     Gets together: More than three times a week     Attends Synagogue service: Not on file     Active member of club or organization: Yes     Attends meetings of clubs or organizations: More than 4 times per year     Relationship status:    Other Topics Concern    Are you pregnant or think you may be? 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 She has severe DJD and is diabetic.     Dad d. 97 status post stroke, CHF. Dependent, bed to w/c.      Significant memory decline, usually doesn't recognize family        Two sisters in good health.         SCREENING TESTS:        Review of patient's allergies indicates:   Allergen Reactions    Iodine and iodide containing products Nausea And Vomiting     Other reaction(s): SEVERE    Sulfa  (sulfonamide antibiotics)      Other reaction(s): unknown  Other reaction(s): RASH       No current facility-administered medications for this encounter.     Current Outpatient Medications:     acetaZOLAMIDE (DIAMOX) 125 MG Tab, 1 table twice a day to prevent altitude sickness. Start 1 day before high altitude ([place]). Continue for 4 days at high altitude., Disp: 30 tablet, Rfl: 0    atovaquone-proguanil (MALARONE) 250-100 mg Tab, Take 1 tablet daily for malaria prevention. Begin 1 day before [facility]  and continue for 1 week after leaving [facility]., Disp: 14 tablet, Rfl: 0    biotin 1 mg tablet, Take 1,000 mcg by mouth once daily., Disp: , Rfl:     cetirizine (ZYRTEC) 10 mg Cap, PRN, Disp: , Rfl:     dietary supplement Pack, Take 1 tablet by mouth 2 (two) times daily., Disp: , Rfl:     glycerin adult suppository, as needed. , Disp: , Rfl:     MISCELLANEOUS MEDICAL SUPPLY MISC, as needed. EYE ITCH RELIEF 0.25 EYE DROPS, Disp: , Rfl:     naproxen sodium (ALEVE) 220 mg Cap, PRN, Disp: , Rfl:     OCEAN NASAL 0.65 % nasal spray, as needed. , Disp: , Rfl:     triamcinolone acetonide 0.025% (KENALOG) 0.025 % cream, AAA inside ear BID x 1-2 wks then prn flares only (Patient not taking: Reported on 12/2/2019), Disp: 30 g, Rfl: 1    TUMS 200 mg calcium (500 mg) chewable tablet, 1 tablet as needed. , Disp: , Rfl:     vitamin D 1000 units Tab, Take 1,000 Units by mouth once daily., Disp: , Rfl:     Review of Systems:  Respiratory ROS: no cough, shortness of breath, or wheezing  Cardiovascular ROS: no chest pain or dyspnea on exertion  Gastrointestinal ROS: no abdominal pain, change in bowel habits, or black or bloody stools  Musculoskeletal ROS: negative  Neurological ROS: no TIA or stroke symptoms      Physical Exam:  General: Well appearing, NAD  Head: Normocephalic  Airway: Normal oropharynx, airway normal  Neck:: Trachea midline  Lungs: Breathing non-labored  Heart: Regular rate, regular  rhythm  Abdomen: Soft, non-tender, non-distended  Extremities: Warm and well perfused      Deep Sedation: Mallampati Score per anesthesia     Sedation Plan: Choice     ASA: II    Plan:  - Proceed with screening colonoscopy  - Risks, benefits, alternatives to the procedure discussed with the patient who wishes to proceed  - All questions and concerns addressed  - Consents obtained today    Caroline Dyer MD  General Surgery, PGY-1  [phone]

## 2020-01-14 NOTE — TRANSFER OF CARE
"Anesthesia Transfer of Care Note    Patient: Tomasa Reed    Procedure(s) Performed: Procedure(s) (LRB):  COLONOSCOPY (N/A)    Patient location: PACU    Anesthesia Type: general    Post pain: adequate analgesia    Post assessment: no apparent anesthetic complications and tolerated procedure well    Post vital signs: stable    Level of consciousness: awake    Nausea/Vomiting: no nausea/vomiting    Complications: none    Transfer of care protocol was followed      Last vitals:   Visit Vitals  BP (!) 143/88 (BP Location: Left arm, Patient Position: Lying)   Pulse 74   Temp 36.3 °C (97.4 °F) (Temporal)   Resp 14   Ht 5' 2" (1.575 m)   Wt 58.1 kg (128 lb)   SpO2 98%   Breastfeeding? No   BMI 23.41 kg/m²     "

## 2020-01-15 ENCOUNTER — TELEPHONE (OUTPATIENT)
Dept: SURGERY | Facility: CLINIC | Age: 70
End: 2020-01-15

## 2020-01-15 ENCOUNTER — PATIENT MESSAGE (OUTPATIENT)
Dept: INTERNAL MEDICINE | Facility: CLINIC | Age: 70
End: 2020-01-15

## 2020-01-15 ENCOUNTER — TELEPHONE (OUTPATIENT)
Dept: INTERNAL MEDICINE | Facility: CLINIC | Age: 70
End: 2020-01-15

## 2020-01-15 ENCOUNTER — PATIENT MESSAGE (OUTPATIENT)
Dept: SURGERY | Facility: CLINIC | Age: 70
End: 2020-01-15

## 2020-01-15 NOTE — TELEPHONE ENCOUNTER
----- Message from Rosanna Huizar MD sent at 1/15/2020  1:01 PM CST -----  Do we have paperwork for this order?    ----- Message -----  From: NANCY Maher MD  Sent: 1/14/2020   1:01 PM CST  To: Rosanna Huizar MD    Hi Rosanna Singletary  Unfortunately I was not able to complete the colonoscopy as planned due to marked tortuosity of the sigmoid/ left colon.  I had a long discussion with the pt about screening options.  She favors a stool test and I recommended Cologuard.      Could you order this test for her?      I would be very grateful.    Thanks  Papa

## 2020-01-16 NOTE — TELEPHONE ENCOUNTER
----- Message from Aleida Parra sent at 1/15/2020 12:34 PM CST -----  Contact: pt  Pt would like to be called back regarding stool test- need orders her primary Dr does not orde this type of test    Pt can be reached at [phone]

## 2020-01-27 ENCOUNTER — PATIENT MESSAGE (OUTPATIENT)
Dept: INTERNAL MEDICINE | Facility: CLINIC | Age: 70
End: 2020-01-27

## 2020-01-28 ENCOUNTER — PATIENT MESSAGE (OUTPATIENT)
Dept: INTERNAL MEDICINE | Facility: CLINIC | Age: 70
End: 2020-01-28

## 2020-01-29 ENCOUNTER — OFFICE VISIT (OUTPATIENT)
Dept: INTERNAL MEDICINE | Facility: CLINIC | Age: 70
End: 2020-01-29
Payer: MEDICARE

## 2020-01-29 VITALS
SYSTOLIC BLOOD PRESSURE: 126 MMHG | DIASTOLIC BLOOD PRESSURE: 68 MMHG | WEIGHT: 140 LBS | RESPIRATION RATE: 18 BRPM | HEIGHT: 62 IN | HEART RATE: 75 BPM | TEMPERATURE: 98 F | BODY MASS INDEX: 25.76 KG/M2

## 2020-01-29 DIAGNOSIS — D70.9 NEUTROPENIA, UNSPECIFIED TYPE: ICD-10-CM

## 2020-01-29 DIAGNOSIS — R51.9 SINUS HEADACHE: ICD-10-CM

## 2020-01-29 DIAGNOSIS — J32.9 VIRAL SINUSITIS: Primary | ICD-10-CM

## 2020-01-29 DIAGNOSIS — B97.89 VIRAL SINUSITIS: Primary | ICD-10-CM

## 2020-01-29 DIAGNOSIS — J20.9 ACUTE BRONCHITIS, UNSPECIFIED ORGANISM: ICD-10-CM

## 2020-01-29 PROCEDURE — 99499 UNLISTED E&M SERVICE: CPT | Mod: S$GLB,,, | Performed by: INTERNAL MEDICINE

## 2020-01-29 PROCEDURE — 1159F PR MEDICATION LIST DOCUMENTED IN MEDICAL RECORD: ICD-10-PCS | Mod: S$GLB,,, | Performed by: INTERNAL MEDICINE

## 2020-01-29 PROCEDURE — 99214 OFFICE O/P EST MOD 30 MIN: CPT | Mod: S$GLB,,, | Performed by: INTERNAL MEDICINE

## 2020-01-29 PROCEDURE — 1159F MED LIST DOCD IN RCRD: CPT | Mod: S$GLB,,, | Performed by: INTERNAL MEDICINE

## 2020-01-29 PROCEDURE — 99499 RISK ADDL DX/OHS AUDIT: ICD-10-PCS | Mod: S$GLB,,, | Performed by: INTERNAL MEDICINE

## 2020-01-29 PROCEDURE — 99214 PR OFFICE/OUTPT VISIT, EST, LEVL IV, 30-39 MIN: ICD-10-PCS | Mod: S$GLB,,, | Performed by: INTERNAL MEDICINE

## 2020-01-29 PROCEDURE — 99999 PR PBB SHADOW E&M-EST. PATIENT-LVL III: CPT | Mod: PBBFAC,,, | Performed by: INTERNAL MEDICINE

## 2020-01-29 PROCEDURE — 1101F PR PT FALLS ASSESS DOC 0-1 FALLS W/OUT INJ PAST YR: ICD-10-PCS | Mod: CPTII,S$GLB,, | Performed by: INTERNAL MEDICINE

## 2020-01-29 PROCEDURE — 1126F PR PAIN SEVERITY QUANTIFIED, NO PAIN PRESENT: ICD-10-PCS | Mod: S$GLB,,, | Performed by: INTERNAL MEDICINE

## 2020-01-29 PROCEDURE — 99999 PR PBB SHADOW E&M-EST. PATIENT-LVL III: ICD-10-PCS | Mod: PBBFAC,,, | Performed by: INTERNAL MEDICINE

## 2020-01-29 PROCEDURE — 1126F AMNT PAIN NOTED NONE PRSNT: CPT | Mod: S$GLB,,, | Performed by: INTERNAL MEDICINE

## 2020-01-29 PROCEDURE — 1101F PT FALLS ASSESS-DOCD LE1/YR: CPT | Mod: CPTII,S$GLB,, | Performed by: INTERNAL MEDICINE

## 2020-01-29 RX ORDER — IPRATROPIUM BROMIDE 21 UG/1
2 SPRAY, METERED NASAL 3 TIMES DAILY
Qty: 30 ML | Refills: 0 | Status: SHIPPED | OUTPATIENT
Start: 2020-01-29 | End: 2020-11-30

## 2020-01-29 RX ORDER — METHYLPREDNISOLONE 4 MG/1
TABLET ORAL
Qty: 1 PACKAGE | Refills: 0 | Status: SHIPPED | OUTPATIENT
Start: 2020-01-29 | End: 2020-02-20 | Stop reason: ALTCHOICE

## 2020-01-29 RX ORDER — DIPHENHYDRAMINE HCL 25 MG
25 CAPSULE ORAL NIGHTLY PRN
COMMUNITY
End: 2022-01-11

## 2020-01-29 RX ORDER — MOMETASONE FUROATE 50 UG/1
2 SPRAY, METERED NASAL DAILY
COMMUNITY

## 2020-01-29 NOTE — PROGRESS NOTES
Subjective:       Patient ID: Tomasa Reed is a 69 y.o. female.    Chief Complaint: Sinus Problem    HPI   Pt with Neutropenia is here for evaluation of 12 days of persistent sinus/chest congestion, dry cough, post nasal drip, runny nose, sinus headache. Minimal relief with Zyrtec/Benadryl/Flonase.   Review of Systems   Constitutional: Negative for activity change, appetite change, chills, diaphoresis, fatigue, fever and unexpected weight change.   HENT: Positive for congestion, postnasal drip, rhinorrhea, sinus pressure, sinus pain and sore throat. Negative for sneezing, trouble swallowing and voice change.    Respiratory: Positive for cough. Negative for shortness of breath and wheezing.    Cardiovascular: Negative for chest pain, palpitations and leg swelling.   Gastrointestinal: Negative for abdominal pain, blood in stool, constipation, diarrhea, nausea and vomiting.   Genitourinary: Negative for dysuria.   Musculoskeletal: Negative for arthralgias and myalgias.   Skin: Negative for rash and wound.   Allergic/Immunologic: Negative for environmental allergies and food allergies.   Neurological: Positive for headaches.   Hematological: Negative for adenopathy. Does not bruise/bleed easily.       Objective:      Physical Exam   Constitutional: She is oriented to person, place, and time. She appears well-developed and well-nourished. No distress.   HENT:   Head: Normocephalic and atraumatic.   Right Ear: External ear normal.   Left Ear: External ear normal.   Nose: Mucosal edema and rhinorrhea present.   Mouth/Throat: Oropharynx is clear and moist. No oropharyngeal exudate.   Eyes: Pupils are equal, round, and reactive to light. Conjunctivae and EOM are normal. Right eye exhibits no discharge. Left eye exhibits no discharge. No scleral icterus.   Neck: Neck supple. No JVD present.   Cardiovascular: Normal rate, regular rhythm, normal heart sounds and intact distal pulses.   Pulmonary/Chest: Effort normal  and breath sounds normal. No respiratory distress. She has no wheezes. She has no rales.   Musculoskeletal: She exhibits no edema.   Lymphadenopathy:     She has no cervical adenopathy.   Neurological: She is alert and oriented to person, place, and time.   Skin: Skin is warm and dry. No rash noted. She is not diaphoretic. No pallor.       Assessment:       1. Viral sinusitis    2. Acute bronchitis, unspecified organism    3. Sinus headache    4. Neutropenia, unspecified type        Plan:    1. Continue Zyrtec/Flonase, Rx Medrol Pack   2. May use Mucinex DM PRN    3. NSAIDs PRN   4. Stable, will follow

## 2020-02-19 ENCOUNTER — LAB VISIT (OUTPATIENT)
Dept: LAB | Facility: HOSPITAL | Age: 70
End: 2020-02-19
Attending: INTERNAL MEDICINE
Payer: MEDICARE

## 2020-02-19 ENCOUNTER — OFFICE VISIT (OUTPATIENT)
Dept: INTERNAL MEDICINE | Facility: CLINIC | Age: 70
End: 2020-02-19
Payer: MEDICARE

## 2020-02-19 VITALS
WEIGHT: 143.31 LBS | SYSTOLIC BLOOD PRESSURE: 118 MMHG | HEART RATE: 75 BPM | DIASTOLIC BLOOD PRESSURE: 80 MMHG | TEMPERATURE: 98 F | BODY MASS INDEX: 26.37 KG/M2 | HEIGHT: 62 IN | RESPIRATION RATE: 16 BRPM

## 2020-02-19 DIAGNOSIS — Z01.818 PRE-OP EVALUATION: Primary | ICD-10-CM

## 2020-02-19 DIAGNOSIS — Z01.818 PRE-OP EVALUATION: ICD-10-CM

## 2020-02-19 DIAGNOSIS — H25.019 CORTICAL AGE-RELATED CATARACT, UNSPECIFIED LATERALITY: ICD-10-CM

## 2020-02-19 LAB
ANION GAP SERPL CALC-SCNC: 6 MMOL/L (ref 8–16)
BASOPHILS # BLD AUTO: 0.03 K/UL (ref 0–0.2)
BASOPHILS NFR BLD: 0.9 % (ref 0–1.9)
BUN SERPL-MCNC: 13 MG/DL (ref 8–23)
CALCIUM SERPL-MCNC: 9.7 MG/DL (ref 8.7–10.5)
CHLORIDE SERPL-SCNC: 102 MMOL/L (ref 95–110)
CO2 SERPL-SCNC: 26 MMOL/L (ref 23–29)
CREAT SERPL-MCNC: 0.7 MG/DL (ref 0.5–1.4)
DIFFERENTIAL METHOD: ABNORMAL
EOSINOPHIL # BLD AUTO: 0.1 K/UL (ref 0–0.5)
EOSINOPHIL NFR BLD: 2 % (ref 0–8)
ERYTHROCYTE [DISTWIDTH] IN BLOOD BY AUTOMATED COUNT: 12.5 % (ref 11.5–14.5)
EST. GFR  (AFRICAN AMERICAN): >60 ML/MIN/1.73 M^2
EST. GFR  (NON AFRICAN AMERICAN): >60 ML/MIN/1.73 M^2
GLUCOSE SERPL-MCNC: 80 MG/DL (ref 70–110)
HCT VFR BLD AUTO: 40.5 % (ref 37–48.5)
HGB BLD-MCNC: 13.3 G/DL (ref 12–16)
IMM GRANULOCYTES # BLD AUTO: 0 K/UL (ref 0–0.04)
IMM GRANULOCYTES NFR BLD AUTO: 0 % (ref 0–0.5)
LYMPHOCYTES # BLD AUTO: 1.2 K/UL (ref 1–4.8)
LYMPHOCYTES NFR BLD: 35.1 % (ref 18–48)
MCH RBC QN AUTO: 31.9 PG (ref 27–31)
MCHC RBC AUTO-ENTMCNC: 32.8 G/DL (ref 32–36)
MCV RBC AUTO: 97 FL (ref 82–98)
MONOCYTES # BLD AUTO: 0.3 K/UL (ref 0.3–1)
MONOCYTES NFR BLD: 9.2 % (ref 4–15)
NEUTROPHILS # BLD AUTO: 1.8 K/UL (ref 1.8–7.7)
NEUTROPHILS NFR BLD: 52.8 % (ref 38–73)
NRBC BLD-RTO: 0 /100 WBC
PLATELET # BLD AUTO: 274 K/UL (ref 150–350)
PMV BLD AUTO: 9.9 FL (ref 9.2–12.9)
POTASSIUM SERPL-SCNC: 4.5 MMOL/L (ref 3.5–5.1)
RBC # BLD AUTO: 4.17 M/UL (ref 4–5.4)
SODIUM SERPL-SCNC: 134 MMOL/L (ref 136–145)
WBC # BLD AUTO: 3.48 K/UL (ref 3.9–12.7)

## 2020-02-19 PROCEDURE — 85025 COMPLETE CBC W/AUTO DIFF WBC: CPT

## 2020-02-19 PROCEDURE — 1101F PT FALLS ASSESS-DOCD LE1/YR: CPT | Mod: CPTII,S$GLB,, | Performed by: INTERNAL MEDICINE

## 2020-02-19 PROCEDURE — 99214 PR OFFICE/OUTPT VISIT, EST, LEVL IV, 30-39 MIN: ICD-10-PCS | Mod: S$GLB,,, | Performed by: INTERNAL MEDICINE

## 2020-02-19 PROCEDURE — 80048 BASIC METABOLIC PNL TOTAL CA: CPT

## 2020-02-19 PROCEDURE — 99999 PR PBB SHADOW E&M-EST. PATIENT-LVL III: ICD-10-PCS | Mod: PBBFAC,,, | Performed by: INTERNAL MEDICINE

## 2020-02-19 PROCEDURE — 1101F PR PT FALLS ASSESS DOC 0-1 FALLS W/OUT INJ PAST YR: ICD-10-PCS | Mod: CPTII,S$GLB,, | Performed by: INTERNAL MEDICINE

## 2020-02-19 PROCEDURE — 1159F PR MEDICATION LIST DOCUMENTED IN MEDICAL RECORD: ICD-10-PCS | Mod: S$GLB,,, | Performed by: INTERNAL MEDICINE

## 2020-02-19 PROCEDURE — 1159F MED LIST DOCD IN RCRD: CPT | Mod: S$GLB,,, | Performed by: INTERNAL MEDICINE

## 2020-02-19 PROCEDURE — 99999 PR PBB SHADOW E&M-EST. PATIENT-LVL III: CPT | Mod: PBBFAC,,, | Performed by: INTERNAL MEDICINE

## 2020-02-19 PROCEDURE — 36415 COLL VENOUS BLD VENIPUNCTURE: CPT | Mod: PO

## 2020-02-19 PROCEDURE — 1126F AMNT PAIN NOTED NONE PRSNT: CPT | Mod: S$GLB,,, | Performed by: INTERNAL MEDICINE

## 2020-02-19 PROCEDURE — 99214 OFFICE O/P EST MOD 30 MIN: CPT | Mod: S$GLB,,, | Performed by: INTERNAL MEDICINE

## 2020-02-19 PROCEDURE — 1126F PR PAIN SEVERITY QUANTIFIED, NO PAIN PRESENT: ICD-10-PCS | Mod: S$GLB,,, | Performed by: INTERNAL MEDICINE

## 2020-02-19 RX ORDER — NEPAFENAC 3 MG/ML
SUSPENSION/ DROPS OPHTHALMIC
COMMUNITY
Start: 2020-02-18 | End: 2020-11-30

## 2020-02-19 RX ORDER — OFLOXACIN 3 MG/ML
SOLUTION/ DROPS OPHTHALMIC
COMMUNITY
Start: 2020-02-18 | End: 2020-11-30

## 2020-02-19 NOTE — PROGRESS NOTES
Subjective:       Patient ID: Tomasa Reed is a 69 y.o. female.    Chief Complaint: Pre-op Exam (cataract 3-2-2020)    HPI   Pt here for pre-op evaluation for cataract surgery scheduled for 3/2/20. Pt denies any hx of CAD/MI/CVA or any acute cardiopulmonary complaints today.   Review of Systems   Constitutional: Negative for activity change, appetite change, chills, diaphoresis, fatigue, fever and unexpected weight change.   HENT: Negative for congestion, mouth sores, postnasal drip, rhinorrhea, sinus pressure, sneezing, sore throat, trouble swallowing and voice change.    Eyes: Negative for pain, discharge and visual disturbance.   Respiratory: Negative for cough, shortness of breath and wheezing.    Cardiovascular: Negative for chest pain, palpitations and leg swelling.   Gastrointestinal: Negative for abdominal pain, blood in stool, constipation, diarrhea, nausea and vomiting.   Endocrine: Negative for cold intolerance and heat intolerance.   Genitourinary: Negative for difficulty urinating, dysuria, frequency, hematuria and urgency.   Musculoskeletal: Negative for arthralgias and myalgias.   Skin: Negative for rash and wound.   Allergic/Immunologic: Negative for environmental allergies and food allergies.   Neurological: Negative for dizziness, tremors, seizures, syncope, weakness, light-headedness and headaches.   Hematological: Negative for adenopathy. Does not bruise/bleed easily.   Psychiatric/Behavioral: Negative for confusion and sleep disturbance. The patient is not nervous/anxious.        Objective:      Physical Exam   Constitutional: She is oriented to person, place, and time. She appears well-developed and well-nourished. No distress.   HENT:   Head: Normocephalic and atraumatic.   Right Ear: External ear normal.   Left Ear: External ear normal.   Nose: Nose normal.   Mouth/Throat: Oropharynx is clear and moist. No oropharyngeal exudate.   Eyes: Pupils are equal, round, and reactive to  light. Conjunctivae and EOM are normal. Right eye exhibits no discharge. Left eye exhibits no discharge. No scleral icterus.   Neck: Neck supple. No JVD present. No thyromegaly present.   Cardiovascular: Normal rate, regular rhythm, normal heart sounds and intact distal pulses.   No murmur heard.  Pulmonary/Chest: Effort normal and breath sounds normal. No respiratory distress. She has no wheezes. She has no rales. She exhibits no tenderness.   Abdominal: Soft. Bowel sounds are normal. She exhibits no distension. There is no tenderness. There is no guarding.   Musculoskeletal: She exhibits no edema.   Lymphadenopathy:     She has no cervical adenopathy.   Neurological: She is alert and oriented to person, place, and time. No cranial nerve deficit. Coordination normal.   Skin: Skin is warm and dry. No rash noted. She is not diaphoretic. No pallor.   Psychiatric: She has a normal mood and affect. Judgment normal.   Nursing note and vitals reviewed.      Assessment:       1. Pre-op evaluation    2. Cortical age-related cataract, unspecified laterality        Plan:    1. CBC/BMP ordered, recent EKG reviewed and is normal    2. Surgery scheduled for 3/2/20    May proceed with cataract surgery, pt is low risk for complications.

## 2020-02-20 ENCOUNTER — TELEPHONE (OUTPATIENT)
Dept: INTERNAL MEDICINE | Facility: CLINIC | Age: 70
End: 2020-02-20

## 2020-02-28 ENCOUNTER — TELEPHONE (OUTPATIENT)
Dept: INTERNAL MEDICINE | Facility: CLINIC | Age: 70
End: 2020-02-28

## 2020-02-28 NOTE — TELEPHONE ENCOUNTER
I spoke to pt and notified her the results of cologuard was negative.    Pt verbalized understanding

## 2020-03-05 ENCOUNTER — PATIENT OUTREACH (OUTPATIENT)
Dept: ADMINISTRATIVE | Facility: HOSPITAL | Age: 70
End: 2020-03-05

## 2020-03-26 ENCOUNTER — PATIENT MESSAGE (OUTPATIENT)
Dept: OTOLARYNGOLOGY | Facility: CLINIC | Age: 70
End: 2020-03-26

## 2020-06-01 ENCOUNTER — OFFICE VISIT (OUTPATIENT)
Dept: INTERNAL MEDICINE | Facility: CLINIC | Age: 70
End: 2020-06-01
Payer: MEDICARE

## 2020-06-01 VITALS
RESPIRATION RATE: 18 BRPM | BODY MASS INDEX: 26.37 KG/M2 | TEMPERATURE: 98 F | OXYGEN SATURATION: 99 % | HEIGHT: 62 IN | SYSTOLIC BLOOD PRESSURE: 128 MMHG | HEART RATE: 70 BPM | WEIGHT: 143.31 LBS | DIASTOLIC BLOOD PRESSURE: 72 MMHG

## 2020-06-01 DIAGNOSIS — Z01.818 PREOP EXAMINATION: Primary | ICD-10-CM

## 2020-06-01 PROCEDURE — 93010 ELECTROCARDIOGRAM REPORT: CPT | Mod: S$GLB,,, | Performed by: INTERNAL MEDICINE

## 2020-06-01 PROCEDURE — 1101F PT FALLS ASSESS-DOCD LE1/YR: CPT | Mod: CPTII,S$GLB,, | Performed by: HOSPITALIST

## 2020-06-01 PROCEDURE — 99999 PR PBB SHADOW E&M-EST. PATIENT-LVL III: ICD-10-PCS | Mod: PBBFAC,,, | Performed by: HOSPITALIST

## 2020-06-01 PROCEDURE — 1126F AMNT PAIN NOTED NONE PRSNT: CPT | Mod: S$GLB,,, | Performed by: HOSPITALIST

## 2020-06-01 PROCEDURE — 1159F MED LIST DOCD IN RCRD: CPT | Mod: S$GLB,,, | Performed by: HOSPITALIST

## 2020-06-01 PROCEDURE — 1126F PR PAIN SEVERITY QUANTIFIED, NO PAIN PRESENT: ICD-10-PCS | Mod: S$GLB,,, | Performed by: HOSPITALIST

## 2020-06-01 PROCEDURE — 93005 ELECTROCARDIOGRAM TRACING: CPT | Mod: S$GLB,,, | Performed by: HOSPITALIST

## 2020-06-01 PROCEDURE — 93010 EKG 12-LEAD: ICD-10-PCS | Mod: S$GLB,,, | Performed by: INTERNAL MEDICINE

## 2020-06-01 PROCEDURE — 99214 OFFICE O/P EST MOD 30 MIN: CPT | Mod: S$GLB,,, | Performed by: HOSPITALIST

## 2020-06-01 PROCEDURE — 99214 PR OFFICE/OUTPT VISIT, EST, LEVL IV, 30-39 MIN: ICD-10-PCS | Mod: S$GLB,,, | Performed by: HOSPITALIST

## 2020-06-01 PROCEDURE — 1159F PR MEDICATION LIST DOCUMENTED IN MEDICAL RECORD: ICD-10-PCS | Mod: S$GLB,,, | Performed by: HOSPITALIST

## 2020-06-01 PROCEDURE — 1101F PR PT FALLS ASSESS DOC 0-1 FALLS W/OUT INJ PAST YR: ICD-10-PCS | Mod: CPTII,S$GLB,, | Performed by: HOSPITALIST

## 2020-06-01 PROCEDURE — 93005 EKG 12-LEAD: ICD-10-PCS | Mod: S$GLB,,, | Performed by: HOSPITALIST

## 2020-06-01 PROCEDURE — 99999 PR PBB SHADOW E&M-EST. PATIENT-LVL III: CPT | Mod: PBBFAC,,, | Performed by: HOSPITALIST

## 2020-06-01 NOTE — PROGRESS NOTES
Subjective:     @Patient ID: Tomasa Reed is a 70 y.o. female.    Chief Complaint: Pre-op Exam (eye surgery)    HPI    70 y.o. female here for pre-op evaluation of  L cataract removal with Dr. Jermaine Escobar planned for 6/15/2020. Pt had R eye cataract removal on 3/2/2020 and tolerated well   Pt has pmhx of HLD, Hx of hep C s/p treatment, osteopenia, osteoarthritis, Cataract. Pt reports she is doing well. No complaints. She exercises regularly by walking. Plans to start bike riding and take darian class.    History of prior anesthetic complications: none  Chronic Steroid usage: none  none tobacco, occ EtOH,      Surgical Risk Assessment     Active cardiac issues:  Active decompensated heart failure? No   Unstable angina?  No   Significant uncontrolled arrhythmias? No   Severe valvular heart disease-Aortic or Mitral Stenosis? No   Recent MI or coronary revascularization < 30 days? No     Clinical risk factors predicting perioperative major adverse cardiac events per RCRI  High risk surgery (suprainguinal vascular, intraperitoneal, or intrathoracic surgery)? No   History of CAD/ischemic heart disease? No   History of cerebrovascular disease (CVA or TIA)? No   History of compensated heart failure? No   Type 2 diabetes requiring insulin? No   Serum Creatinine > 2? No   Total cardiac risk factors 0     0 predictors = 0.4%, 1 predictor = 0.9%, 2 predictors = 6.6%, ?3 predictors = >11%    According to the revised cardiac risk index, the risk of jozef-procedural major cardiac complications (cardiac death, nonfatal MI, nonfatal cardiac arrest, postoperative cardiogenic pulmonary edema, complete heart block) is: 0.4%     If patient has a low risk of MACE (<1%), proceed to surgery. If the patient is at elevated risk of MACE, then determine functional capacity (pt reported activity or DASI model).     If the patient has moderate, good, or excellent functional capacity (?4 METs), then proceed to surgery without further  "evaluation. If patient has poor or unknown functional capacity, will further testing impact decision making or perioperative care? If yes, then pharmacological stress testing is appropriate. In those patients with unknown functional capacity, exercise stress testing may be reasonable to perform.     Patient's functional mets: > 4 METS     < 4 METs -unable to walk > 2 blocks on level ground without stopping due to symptoms  - eating, dressing, toileting, walking indoors, light housework. POOR   > 4 METs -climbing > 1 flight of stairs without stopping  -walking up hill > 1-2 blocks  -scrubbing floors  -moving furniture  - golf, bowling, dancing or tennis  -running short distance MODERATE to EXCELLENT     OR   [email or web address]    Review of Systems   Constitutional: Negative for chills and fever.   HENT: Negative for congestion and sore throat.    Eyes: Negative for pain and visual disturbance.   Respiratory: Negative for cough and shortness of breath.    Cardiovascular: Negative for chest pain and leg swelling.   Gastrointestinal: Negative for abdominal pain, nausea and vomiting.   Endocrine: Negative for polydipsia and polyuria.   Genitourinary: Negative for difficulty urinating and dysuria.   Musculoskeletal: Negative for arthralgias and back pain.   Skin: Negative for rash and wound.   Neurological: Negative for dizziness, weakness and headaches.   Psychiatric/Behavioral: Negative for agitation and confusion.     Past medical history, surgical history, and family medical history reviewed and updated as appropriate.    Medications and allergies reviewed.     Objective:     Vitals:    06/01/20 0929   BP: 128/72   BP Location: Right arm   Patient Position: Sitting   BP Method: Medium (Manual)   Pulse: 70   Resp: 18   Temp: 98.3 °F (36.8 °C)   TempSrc: Other (see comments)   SpO2: 99%   Weight: 65 kg (143 lb 4.8 oz)   Height: 5' 2" (1.575 m)     Body mass index is 26.21 " kg/m².  Physical Exam   Constitutional: She is oriented to person, place, and time. She appears well-developed and well-nourished. No distress.   HENT:   Head: Normocephalic and atraumatic.   Eyes: Pupils are equal, round, and reactive to light. Conjunctivae are normal. Right eye exhibits no discharge. Left eye exhibits no discharge.   Neck: Normal range of motion. Neck supple.   Cardiovascular: Normal rate and regular rhythm. Exam reveals no friction rub.   No murmur heard.  Pulmonary/Chest: Effort normal and breath sounds normal.   Abdominal: Soft. Bowel sounds are normal. She exhibits no distension. There is no tenderness. There is no guarding.   Musculoskeletal: Normal range of motion. She exhibits no edema.   Neurological: She is alert and oriented to person, place, and time.   Skin: Skin is warm and dry.   Psychiatric: She has a normal mood and affect. Her behavior is normal.   Vitals reviewed.      Lab Results   Component Value Date    WBC 3.48 (L) 02/19/2020    HGB 13.3 02/19/2020    HCT 40.5 02/19/2020     02/19/2020    CHOL 211 (H) 11/25/2019    TRIG 64 11/25/2019    HDL 84 (H) 11/25/2019    ALT 22 11/25/2019    AST 24 11/25/2019     (L) 02/19/2020    K 4.5 02/19/2020     02/19/2020    CREATININE 0.7 02/19/2020    BUN 13 02/19/2020    CO2 26 02/19/2020    TSH 0.686 11/25/2019    INR 1.0 06/27/2005    HGBA1C 4.9 06/23/2004       Assessment:     1. Preop examination      Plan:   Tomasa was seen today for pre-op exam.    Diagnoses and all orders for this visit:    Preop examination  - Pt is low risk for cardiopulmonary complications. Ok to proceed with cataract surgery as planned   -     IN OFFICE EKG 12-LEAD (to Muse)         Alisha López MD  Internal Medicine    6/1/2020

## 2020-07-27 ENCOUNTER — PATIENT MESSAGE (OUTPATIENT)
Dept: INTERNAL MEDICINE | Facility: CLINIC | Age: 70
End: 2020-07-27

## 2020-07-27 DIAGNOSIS — Z12.31 VISIT FOR SCREENING MAMMOGRAM: Primary | ICD-10-CM

## 2020-08-14 ENCOUNTER — HOSPITAL ENCOUNTER (OUTPATIENT)
Dept: RADIOLOGY | Facility: HOSPITAL | Age: 70
Discharge: HOME | End: 2020-08-14
Attending: INTERNAL MEDICINE
Payer: MEDICARE

## 2020-08-14 ENCOUNTER — OFFICE VISIT (OUTPATIENT)
Dept: DERMATOLOGY | Facility: CLINIC | Age: 70
End: 2020-08-14
Payer: MEDICARE

## 2020-08-14 DIAGNOSIS — D18.01 CHERRY ANGIOMA: ICD-10-CM

## 2020-08-14 DIAGNOSIS — D22.9 MULTIPLE BENIGN NEVI: ICD-10-CM

## 2020-08-14 DIAGNOSIS — Z12.31 VISIT FOR SCREENING MAMMOGRAM: ICD-10-CM

## 2020-08-14 DIAGNOSIS — L82.1 SK (SEBORRHEIC KERATOSIS): Primary | ICD-10-CM

## 2020-08-14 DIAGNOSIS — Z85.820 HISTORY OF MELANOMA: ICD-10-CM

## 2020-08-14 DIAGNOSIS — L81.4 LENTIGINES: ICD-10-CM

## 2020-08-14 DIAGNOSIS — Z12.83 SKIN CANCER SCREENING: ICD-10-CM

## 2020-08-14 PROCEDURE — 99214 PR OFFICE/OUTPT VISIT, EST, LEVL IV, 30-39 MIN: ICD-10-PCS | Mod: S$GLB,,, | Performed by: DERMATOLOGY

## 2020-08-14 PROCEDURE — 77063 BREAST TOMOSYNTHESIS BI: CPT | Mod: 26,,, | Performed by: RADIOLOGY

## 2020-08-14 PROCEDURE — 1159F PR MEDICATION LIST DOCUMENTED IN MEDICAL RECORD: ICD-10-PCS | Mod: S$GLB,,, | Performed by: DERMATOLOGY

## 2020-08-14 PROCEDURE — 77063 MAMMO DIGITAL SCREENING BILAT WITH TOMOSYNTHESIS_CAD: ICD-10-PCS | Mod: 26,,, | Performed by: RADIOLOGY

## 2020-08-14 PROCEDURE — 99999 PR PBB SHADOW E&M-EST. PATIENT-LVL III: ICD-10-PCS | Mod: PBBFAC,,, | Performed by: DERMATOLOGY

## 2020-08-14 PROCEDURE — 1101F PT FALLS ASSESS-DOCD LE1/YR: CPT | Mod: CPTII,S$GLB,, | Performed by: DERMATOLOGY

## 2020-08-14 PROCEDURE — 99214 OFFICE O/P EST MOD 30 MIN: CPT | Mod: S$GLB,,, | Performed by: DERMATOLOGY

## 2020-08-14 PROCEDURE — 1126F PR PAIN SEVERITY QUANTIFIED, NO PAIN PRESENT: ICD-10-PCS | Mod: S$GLB,,, | Performed by: DERMATOLOGY

## 2020-08-14 PROCEDURE — 99999 PR PBB SHADOW E&M-EST. PATIENT-LVL III: CPT | Mod: PBBFAC,,, | Performed by: DERMATOLOGY

## 2020-08-14 PROCEDURE — 77067 SCR MAMMO BI INCL CAD: CPT | Mod: 26,,, | Performed by: RADIOLOGY

## 2020-08-14 PROCEDURE — 77067 SCR MAMMO BI INCL CAD: CPT | Mod: TC

## 2020-08-14 PROCEDURE — 1159F MED LIST DOCD IN RCRD: CPT | Mod: S$GLB,,, | Performed by: DERMATOLOGY

## 2020-08-14 PROCEDURE — 1126F AMNT PAIN NOTED NONE PRSNT: CPT | Mod: S$GLB,,, | Performed by: DERMATOLOGY

## 2020-08-14 PROCEDURE — 1101F PR PT FALLS ASSESS DOC 0-1 FALLS W/OUT INJ PAST YR: ICD-10-PCS | Mod: CPTII,S$GLB,, | Performed by: DERMATOLOGY

## 2020-08-14 PROCEDURE — 77067 MAMMO DIGITAL SCREENING BILAT WITH TOMOSYNTHESIS_CAD: ICD-10-PCS | Mod: 26,,, | Performed by: RADIOLOGY

## 2020-08-14 NOTE — PROGRESS NOTES
Subjective:       Patient ID:  Tomasa Reed is a 70 y.o. female who presents for   Chief Complaint   Patient presents with    Skin Check     tbse      [facility]    Interested in total body skin check today.  Pt had a melanoma in situ removed from her R chest in 10/2011 by Dr. Shaw. Also had a mildly atypical nevus biopsied from L chest in 2013.     States that a red mole on left breast has become darker in color. Denies any other concerns on skin today.     Review of Systems   Constitutional: Negative for fever and chills.   Skin: Negative for itching and rash.        Objective:    Physical Exam   Constitutional: She appears well-developed and well-nourished. No distress.   Lymphadenopathy: No supraclavicular adenopathy is present.     She has no cervical adenopathy.     She has no axillary adenopathy.     She has no inguinal adenopathy.   Neurological: She is alert and oriented to person, place, and time. She is not disoriented.   Psychiatric: She has a normal mood and affect.   Skin:   Areas Examined (abnormalities noted in diagram):   Scalp / Hair Palpated and Inspected  Head / Face Inspection Performed  Neck Inspection Performed  Chest / Axilla Inspection Performed  Abdomen Inspection Performed  Genitals / Buttocks / Groin Inspection Performed  Back Inspection Performed  RUE Inspected  LUE Inspection Performed  RLE Inspected  LLE Inspection Performed  Nails and Digits Inspection Performed              Diagram Legend     Erythematous scaling macule/papule c/w actinic keratosis       Vascular papule c/w angioma      Pigmented verrucoid papule/plaque c/w seborrheic keratosis      Yellow umbilicated papule c/w sebaceous hyperplasia      Irregularly shaped tan macule c/w lentigo     1-2 mm smooth white papules consistent with Milia      Movable subcutaneous cyst with punctum c/w epidermal inclusion cyst      Subcutaneous movable cyst c/w pilar cyst      Firm pink to brown papule c/w dermatofibroma       Pedunculated fleshy papule(s) c/w skin tag(s)      Evenly pigmented macule c/w junctional nevus     Mildly variegated pigmented, slightly irregular-bordered macule c/w mildly atypical nevus      Flesh colored to evenly pigmented papule c/w intradermal nevus       Pink pearly papule/plaque c/w basal cell carcinoma      Erythematous hyperkeratotic cursted plaque c/w SCC      Surgical scar with no sign of skin cancer recurrence      Open and closed comedones      Inflammatory papules and pustules      Verrucoid papule consistent consistent with wart     Erythematous eczematous patches and plaques     Dystrophic onycholytic nail with subungual debris c/w onychomycosis     Umbilicated papule    Erythematous-base heme-crusted tan verrucoid plaque consistent with inflamed seborrheic keratosis     Erythematous Silvery Scaling Plaque c/w Psoriasis     See annotation      Assessment / Plan:        SK (seborrheic keratosis)  These are benign inherited growths without a malignant potential. Reassurance given to patient. No treatment is necessary.   Treatment of benign, asymptomatic lesions may be considered cosmetic.  Warned about risk of hypo- or hyperpigmentation with treatment and risk of recurrence.    Multiple benign nevi  Benign-appearing on exam today. Counseled pt to monitor mole(s) and return to clinic if any changes noted or symptoms (bleeding, itching, pain, etc) noted. Brochure provided.    Lentigines  These are benign sun spots which should be monitored for changes. Patient instructed in importance of daily broad spectrum sunscreen use with spf at least 30. Sun avoidance and topical protection/protective clothing discussed.    Hilton angioma  This is a benign vascular lesion. Reassurance given. No treatment required. Treatment of benign, asymptomatic lesions may be considered cosmetic.    Skin cancer screening, History of melanoma in situ  Total body skin examination performed today including at least 12 points as  noted in physical examination. No lesions suspicious for malignancy noted.  Patient instructed in importance of daily broad spectrum sunscreen use with spf at least 30. Sun avoidance and topical protection/protective clothing discussed.  No LAD.    Follow up in about 1 year (around 8/14/2021) for skin check or sooner for any concerns.

## 2020-08-14 NOTE — PATIENT INSTRUCTIONS

## 2020-08-19 ENCOUNTER — PATIENT MESSAGE (OUTPATIENT)
Dept: INTERNAL MEDICINE | Facility: CLINIC | Age: 70
End: 2020-08-19

## 2020-08-19 DIAGNOSIS — E78.5 HYPERLIPIDEMIA, UNSPECIFIED HYPERLIPIDEMIA TYPE: Primary | ICD-10-CM

## 2020-10-05 ENCOUNTER — IMMUNIZATION (OUTPATIENT)
Dept: PHARMACY | Facility: CLINIC | Age: 70
End: 2020-10-05
Payer: MEDICARE

## 2020-11-23 ENCOUNTER — LAB VISIT (OUTPATIENT)
Dept: LAB | Facility: HOSPITAL | Age: 70
End: 2020-11-23
Attending: INTERNAL MEDICINE
Payer: MEDICARE

## 2020-11-23 DIAGNOSIS — E78.5 HYPERLIPIDEMIA, UNSPECIFIED HYPERLIPIDEMIA TYPE: ICD-10-CM

## 2020-11-23 LAB
ALBUMIN SERPL BCP-MCNC: 4.2 G/DL (ref 3.5–5.2)
ALP SERPL-CCNC: 54 U/L (ref 55–135)
ALT SERPL W/O P-5'-P-CCNC: 14 U/L (ref 10–44)
ANION GAP SERPL CALC-SCNC: 10 MMOL/L (ref 8–16)
AST SERPL-CCNC: 18 U/L (ref 10–40)
BASOPHILS # BLD AUTO: 0.03 K/UL (ref 0–0.2)
BASOPHILS NFR BLD: 1 % (ref 0–1.9)
BILIRUB SERPL-MCNC: 0.4 MG/DL (ref 0.1–1)
BUN SERPL-MCNC: 14 MG/DL (ref 8–23)
CALCIUM SERPL-MCNC: 9.5 MG/DL (ref 8.7–10.5)
CHLORIDE SERPL-SCNC: 102 MMOL/L (ref 95–110)
CHOLEST SERPL-MCNC: 196 MG/DL (ref 120–199)
CHOLEST/HDLC SERPL: 2.8 {RATIO} (ref 2–5)
CO2 SERPL-SCNC: 26 MMOL/L (ref 23–29)
CREAT SERPL-MCNC: 0.7 MG/DL (ref 0.5–1.4)
DIFFERENTIAL METHOD: ABNORMAL
EOSINOPHIL # BLD AUTO: 0.1 K/UL (ref 0–0.5)
EOSINOPHIL NFR BLD: 2.9 % (ref 0–8)
ERYTHROCYTE [DISTWIDTH] IN BLOOD BY AUTOMATED COUNT: 12.3 % (ref 11.5–14.5)
EST. GFR  (AFRICAN AMERICAN): >60 ML/MIN/1.73 M^2
EST. GFR  (NON AFRICAN AMERICAN): >60 ML/MIN/1.73 M^2
GLUCOSE SERPL-MCNC: 83 MG/DL (ref 70–110)
HCT VFR BLD AUTO: 39.1 % (ref 37–48.5)
HDLC SERPL-MCNC: 69 MG/DL (ref 40–75)
HDLC SERPL: 35.2 % (ref 20–50)
HGB BLD-MCNC: 13 G/DL (ref 12–16)
IMM GRANULOCYTES # BLD AUTO: 0 K/UL (ref 0–0.04)
IMM GRANULOCYTES NFR BLD AUTO: 0 % (ref 0–0.5)
LDLC SERPL CALC-MCNC: 115.4 MG/DL (ref 63–159)
LYMPHOCYTES # BLD AUTO: 1.7 K/UL (ref 1–4.8)
LYMPHOCYTES NFR BLD: 54.6 % (ref 18–48)
MCH RBC QN AUTO: 31.4 PG (ref 27–31)
MCHC RBC AUTO-ENTMCNC: 33.2 G/DL (ref 32–36)
MCV RBC AUTO: 94 FL (ref 82–98)
MONOCYTES # BLD AUTO: 0.3 K/UL (ref 0.3–1)
MONOCYTES NFR BLD: 8.3 % (ref 4–15)
NEUTROPHILS # BLD AUTO: 1.1 K/UL (ref 1.8–7.7)
NEUTROPHILS NFR BLD: 33.2 % (ref 38–73)
NONHDLC SERPL-MCNC: 127 MG/DL
NRBC BLD-RTO: 0 /100 WBC
PLATELET # BLD AUTO: 291 K/UL (ref 150–350)
PMV BLD AUTO: 9.5 FL (ref 9.2–12.9)
POTASSIUM SERPL-SCNC: 4.4 MMOL/L (ref 3.5–5.1)
PROT SERPL-MCNC: 7.2 G/DL (ref 6–8.4)
RBC # BLD AUTO: 4.14 M/UL (ref 4–5.4)
SODIUM SERPL-SCNC: 138 MMOL/L (ref 136–145)
TRIGL SERPL-MCNC: 58 MG/DL (ref 30–150)
TSH SERPL DL<=0.005 MIU/L-ACNC: 0.66 UIU/ML (ref 0.4–4)
WBC # BLD AUTO: 3.15 K/UL (ref 3.9–12.7)

## 2020-11-23 PROCEDURE — 80053 COMPREHEN METABOLIC PANEL: CPT

## 2020-11-23 PROCEDURE — 85025 COMPLETE CBC W/AUTO DIFF WBC: CPT

## 2020-11-23 PROCEDURE — 84443 ASSAY THYROID STIM HORMONE: CPT

## 2020-11-23 PROCEDURE — 80061 LIPID PANEL: CPT

## 2020-11-23 PROCEDURE — 36415 COLL VENOUS BLD VENIPUNCTURE: CPT | Mod: PO

## 2020-11-24 ENCOUNTER — TELEPHONE (OUTPATIENT)
Dept: INTERNAL MEDICINE | Facility: CLINIC | Age: 70
End: 2020-11-24

## 2020-11-24 NOTE — TELEPHONE ENCOUNTER
----- Message from Rosanna Huizar MD sent at 11/23/2020  8:16 PM CST -----  Please review your lab work and we will discuss at your pending office visit.  Rosanna Dominguez

## 2020-11-28 NOTE — PROGRESS NOTES
70 year-old female presents  for well care.   Nonsmoker, nonalcohol consumer.     SCREENING TESTS:   Cholesterol/ lab, pending   Colonoscopy 12/2016 by Dr Painting- 2 tubular adenoma polyps- Mom d. Colon cancer   He recommended 3- years. -update pending  Mammogram, 2017  WWE, MARLENE, ovaries intact.  DEXA, 5/2017- update pending 2019- calcium and vitamin D -in pack of MVI   Low bone mass with a significant decrease of 3.4% in the hip BMD compared with the prior study. The estimated 10 year probability of hip fracture is 1.5% and of a major osteoporotic fracture 14%, respectively, using FRAX.   RECOMMENDATIONS:  1. Adequate Calcium and Vitamin D, 1200 mg/day and 800 units/day, respectively.  2. Repeat BMD in 2 years.    2012- holiday off of her Fosamax; no Rx rec   Taking Calcium and Vit D3    Eye-UTD  DDS-UTD    VACCINATIONS:  Tdap, 12/2012  Zostavax, 10/2012, pharmacist niece gave vaccine  Shingrix: UTD  Flu vaccine yearly  Pneumovax, 12/2013   Prevnar, 9/2015      MEDS: Reviewed.     REVIEW OF SYSTEMS:   Answers for HPI/ROS submitted by the patient on 11/27/2020   activity change: Yes--decreased w Covid  unexpected weight change: No  neck pain: No  hearing loss: Yes-- s/p hearing test, tinnitus in right ear,  Borderline needing hearing aides  rhinorrhea: No  trouble swallowing: No  eye discharge: No  visual disturbance: No  chest tightness: No  wheezing: No  chest pain: No  palpitations: No  blood in stool: No  constipation: No  vomiting: No  diarrhea: No  polydipsia: No  polyuria: No  difficulty urinating: No  hematuria: No  menstrual problem: No  dysuria: No  joint swelling: Yes-- 3rd right hand- Dr. Cao , Rx CBD butter  Helps significantly  arthralgias: Yes--had 2nd right fused  headaches: No  weakness: No  confusion: No  dysphoric mood: No    ADL's: 100% independent  Memory: Good  Mental Health: Good  AD's: + will, and living will and M/POA   Nutrition: Good  Gait: No falls- 2020  Safety: Intact  Urinary  incontinence:  + nocturia x 1  Remainder of review negative except as previously noted.       PHYSICAL EXAM:  VSS:  GENERAL: Alert and oriented, in no acute distress. Well developed, well   nourished, conversant and cooperative, pleasant as always   EYES: Conjunctivae and lids unremarkable. Sclerae anictericPupils are reactive. EOMI  NECK: Supple. Prominent thyroid, no LN, left lateral small mass, movable, but NT  RESPIRATORY: Efforts unlabored.   LUNGS: Clear to auscultation.   HEART: Regular rate and rhythm. No carotid bruits noted,   1+ pedal pulse. No edema.   ABDOMEN: Bowel sounds present, soft, nontender.   No hepatosplenomegaly.  MUSCULOSKELETAL: Gait normal.   No clubbing, cyanosis or edema noted.   Calves nontender  NEURO: LARSEN. No tremor noted  SKIN: warm and dry    IMPRESSION:    Annual PE.    PHx melanoma, yearly monitoring   Family history of colon cancer./dm    Hx Hep C - s/p tx   Neutropenia , asx- [id number]   Osteopenia, on supplements, 2012- started Fosamax  Holiday- will restart today, pt did not see her Faisal message last year   HLD, elevated LDL   Multinodular thyroid, asx   Vitamin D deficiency     PLAN:  Rx alendronate- w// orecautions  Hydration  Low fat diet  Call prn .  RTC 1 year

## 2020-11-30 ENCOUNTER — OFFICE VISIT (OUTPATIENT)
Dept: INTERNAL MEDICINE | Facility: CLINIC | Age: 70
End: 2020-11-30
Payer: MEDICARE

## 2020-11-30 VITALS
HEIGHT: 62 IN | DIASTOLIC BLOOD PRESSURE: 66 MMHG | BODY MASS INDEX: 26.21 KG/M2 | TEMPERATURE: 97 F | HEART RATE: 83 BPM | RESPIRATION RATE: 16 BRPM | SYSTOLIC BLOOD PRESSURE: 136 MMHG | WEIGHT: 142.44 LBS

## 2020-11-30 DIAGNOSIS — Z00.00 ANNUAL PHYSICAL EXAM: Primary | ICD-10-CM

## 2020-11-30 DIAGNOSIS — D70.9 NEUTROPENIA, UNSPECIFIED TYPE: ICD-10-CM

## 2020-11-30 DIAGNOSIS — E78.5 HYPERLIPIDEMIA, UNSPECIFIED HYPERLIPIDEMIA TYPE: ICD-10-CM

## 2020-11-30 DIAGNOSIS — M85.80 OSTEOPENIA, UNSPECIFIED LOCATION: ICD-10-CM

## 2020-11-30 DIAGNOSIS — Z86.19 HISTORY OF HEPATITIS C: ICD-10-CM

## 2020-11-30 DIAGNOSIS — E04.2 NONTOXIC MULTINODULAR GOITER: ICD-10-CM

## 2020-11-30 DIAGNOSIS — Z85.820 HISTORY OF MELANOMA: ICD-10-CM

## 2020-11-30 PROCEDURE — 99499 UNLISTED E&M SERVICE: CPT | Mod: S$GLB,,, | Performed by: INTERNAL MEDICINE

## 2020-11-30 PROCEDURE — 3288F PR FALLS RISK ASSESSMENT DOCUMENTED: ICD-10-PCS | Mod: CPTII,S$GLB,, | Performed by: INTERNAL MEDICINE

## 2020-11-30 PROCEDURE — 3008F PR BODY MASS INDEX (BMI) DOCUMENTED: ICD-10-PCS | Mod: CPTII,S$GLB,, | Performed by: INTERNAL MEDICINE

## 2020-11-30 PROCEDURE — 99213 OFFICE O/P EST LOW 20 MIN: CPT | Mod: S$GLB,,, | Performed by: INTERNAL MEDICINE

## 2020-11-30 PROCEDURE — 1126F PR PAIN SEVERITY QUANTIFIED, NO PAIN PRESENT: ICD-10-PCS | Mod: S$GLB,,, | Performed by: INTERNAL MEDICINE

## 2020-11-30 PROCEDURE — 1101F PR PT FALLS ASSESS DOC 0-1 FALLS W/OUT INJ PAST YR: ICD-10-PCS | Mod: CPTII,S$GLB,, | Performed by: INTERNAL MEDICINE

## 2020-11-30 PROCEDURE — 3008F BODY MASS INDEX DOCD: CPT | Mod: CPTII,S$GLB,, | Performed by: INTERNAL MEDICINE

## 2020-11-30 PROCEDURE — 99999 PR PBB SHADOW E&M-EST. PATIENT-LVL IV: CPT | Mod: PBBFAC,,, | Performed by: INTERNAL MEDICINE

## 2020-11-30 PROCEDURE — 99213 PR OFFICE/OUTPT VISIT, EST, LEVL III, 20-29 MIN: ICD-10-PCS | Mod: S$GLB,,, | Performed by: INTERNAL MEDICINE

## 2020-11-30 PROCEDURE — 99999 PR PBB SHADOW E&M-EST. PATIENT-LVL IV: ICD-10-PCS | Mod: PBBFAC,,, | Performed by: INTERNAL MEDICINE

## 2020-11-30 PROCEDURE — 1126F AMNT PAIN NOTED NONE PRSNT: CPT | Mod: S$GLB,,, | Performed by: INTERNAL MEDICINE

## 2020-11-30 PROCEDURE — 1101F PT FALLS ASSESS-DOCD LE1/YR: CPT | Mod: CPTII,S$GLB,, | Performed by: INTERNAL MEDICINE

## 2020-11-30 PROCEDURE — 99499 RISK ADDL DX/OHS AUDIT: ICD-10-PCS | Mod: S$GLB,,, | Performed by: INTERNAL MEDICINE

## 2020-11-30 PROCEDURE — 3288F FALL RISK ASSESSMENT DOCD: CPT | Mod: CPTII,S$GLB,, | Performed by: INTERNAL MEDICINE

## 2020-11-30 RX ORDER — ALENDRONATE SODIUM 70 MG/1
70 TABLET ORAL
Qty: 12 TABLET | Refills: 3 | Status: SHIPPED | OUTPATIENT
Start: 2020-11-30 | End: 2021-01-15

## 2021-01-08 ENCOUNTER — IMMUNIZATION (OUTPATIENT)
Dept: INTERNAL MEDICINE | Facility: CLINIC | Age: 71
End: 2021-01-08
Payer: MEDICARE

## 2021-01-08 DIAGNOSIS — Z23 NEED FOR VACCINATION: ICD-10-CM

## 2021-01-08 PROCEDURE — 91300 COVID-19, MRNA, LNP-S, PF, 30 MCG/0.3 ML DOSE VACCINE: CPT | Mod: PBBFAC | Performed by: INTERNAL MEDICINE

## 2021-01-14 ENCOUNTER — PATIENT MESSAGE (OUTPATIENT)
Dept: INTERNAL MEDICINE | Facility: CLINIC | Age: 71
End: 2021-01-14

## 2021-01-22 DIAGNOSIS — R52 PAIN: Primary | ICD-10-CM

## 2021-01-25 ENCOUNTER — TELEPHONE (OUTPATIENT)
Dept: ORTHOPEDICS | Facility: CLINIC | Age: 71
End: 2021-01-25

## 2021-01-28 ENCOUNTER — HOSPITAL ENCOUNTER (OUTPATIENT)
Dept: RADIOLOGY | Facility: OTHER | Age: 71
Discharge: HOME | End: 2021-01-28
Attending: ORTHOPAEDIC SURGERY
Payer: MEDICARE

## 2021-01-28 ENCOUNTER — OFFICE VISIT (OUTPATIENT)
Dept: ORTHOPEDICS | Facility: CLINIC | Age: 71
End: 2021-01-28
Payer: MEDICARE

## 2021-01-28 VITALS
DIASTOLIC BLOOD PRESSURE: 70 MMHG | BODY MASS INDEX: 26.21 KG/M2 | HEART RATE: 74 BPM | HEIGHT: 62 IN | WEIGHT: 142.44 LBS | SYSTOLIC BLOOD PRESSURE: 118 MMHG

## 2021-01-28 DIAGNOSIS — M79.644 PAIN OF RIGHT MIDDLE FINGER: Primary | ICD-10-CM

## 2021-01-28 DIAGNOSIS — M18.0 ARTHRITIS OF CARPOMETACARPAL (CMC) JOINT OF BOTH THUMBS: ICD-10-CM

## 2021-01-28 DIAGNOSIS — R52 PAIN: ICD-10-CM

## 2021-01-28 PROCEDURE — 20600 DRAIN/INJ JOINT/BURSA W/O US: CPT | Mod: 51,F7,S$GLB, | Performed by: ORTHOPAEDIC SURGERY

## 2021-01-28 PROCEDURE — 1125F PR PAIN SEVERITY QUANTIFIED, PAIN PRESENT: ICD-10-PCS | Mod: S$GLB,,, | Performed by: ORTHOPAEDIC SURGERY

## 2021-01-28 PROCEDURE — 3288F PR FALLS RISK ASSESSMENT DOCUMENTED: ICD-10-PCS | Mod: CPTII,S$GLB,, | Performed by: ORTHOPAEDIC SURGERY

## 2021-01-28 PROCEDURE — 1159F MED LIST DOCD IN RCRD: CPT | Mod: S$GLB,,, | Performed by: ORTHOPAEDIC SURGERY

## 2021-01-28 PROCEDURE — 73130 X-RAY EXAM OF HAND: CPT | Mod: 26,LT,, | Performed by: RADIOLOGY

## 2021-01-28 PROCEDURE — 73130 X-RAY EXAM OF HAND: CPT | Mod: 26,RT,, | Performed by: RADIOLOGY

## 2021-01-28 PROCEDURE — 73130 XR HAND COMPLETE 3 VIEWS BILATERAL: ICD-10-PCS | Mod: 26,RT,, | Performed by: RADIOLOGY

## 2021-01-28 PROCEDURE — 3288F FALL RISK ASSESSMENT DOCD: CPT | Mod: CPTII,S$GLB,, | Performed by: ORTHOPAEDIC SURGERY

## 2021-01-28 PROCEDURE — 1159F PR MEDICATION LIST DOCUMENTED IN MEDICAL RECORD: ICD-10-PCS | Mod: S$GLB,,, | Performed by: ORTHOPAEDIC SURGERY

## 2021-01-28 PROCEDURE — 99214 OFFICE O/P EST MOD 30 MIN: CPT | Mod: 25,S$GLB,, | Performed by: ORTHOPAEDIC SURGERY

## 2021-01-28 PROCEDURE — 20600 SMALL JOINT ASPIRATION/INJECTION: R LONG DIP: ICD-10-PCS | Mod: 51,F7,S$GLB, | Performed by: ORTHOPAEDIC SURGERY

## 2021-01-28 PROCEDURE — 20605 DRAIN/INJ JOINT/BURSA W/O US: CPT | Mod: LT,S$GLB,, | Performed by: ORTHOPAEDIC SURGERY

## 2021-01-28 PROCEDURE — 1125F AMNT PAIN NOTED PAIN PRSNT: CPT | Mod: S$GLB,,, | Performed by: ORTHOPAEDIC SURGERY

## 2021-01-28 PROCEDURE — 1101F PT FALLS ASSESS-DOCD LE1/YR: CPT | Mod: CPTII,S$GLB,, | Performed by: ORTHOPAEDIC SURGERY

## 2021-01-28 PROCEDURE — 1101F PR PT FALLS ASSESS DOC 0-1 FALLS W/OUT INJ PAST YR: ICD-10-PCS | Mod: CPTII,S$GLB,, | Performed by: ORTHOPAEDIC SURGERY

## 2021-01-28 PROCEDURE — 73130 X-RAY EXAM OF HAND: CPT | Mod: TC,50,FY

## 2021-01-28 PROCEDURE — 3008F BODY MASS INDEX DOCD: CPT | Mod: CPTII,S$GLB,, | Performed by: ORTHOPAEDIC SURGERY

## 2021-01-28 PROCEDURE — 99214 PR OFFICE/OUTPT VISIT, EST, LEVL IV, 30-39 MIN: ICD-10-PCS | Mod: 25,S$GLB,, | Performed by: ORTHOPAEDIC SURGERY

## 2021-01-28 PROCEDURE — 3008F PR BODY MASS INDEX (BMI) DOCUMENTED: ICD-10-PCS | Mod: CPTII,S$GLB,, | Performed by: ORTHOPAEDIC SURGERY

## 2021-01-28 PROCEDURE — 20605 INTERMEDIATE JOINT ASPIRATION/INJECTION: L INTERCARPAL: ICD-10-PCS | Mod: LT,S$GLB,, | Performed by: ORTHOPAEDIC SURGERY

## 2021-01-28 PROCEDURE — 99999 PR PBB SHADOW E&M-EST. PATIENT-LVL III: CPT | Mod: PBBFAC,,, | Performed by: ORTHOPAEDIC SURGERY

## 2021-01-28 PROCEDURE — 99999 PR PBB SHADOW E&M-EST. PATIENT-LVL III: ICD-10-PCS | Mod: PBBFAC,,, | Performed by: ORTHOPAEDIC SURGERY

## 2021-01-28 RX ADMIN — DEXAMETHASONE SODIUM PHOSPHATE 4 MG: 4 INJECTION, SOLUTION INTRA-ARTICULAR; INTRALESIONAL; INTRAMUSCULAR; INTRAVENOUS; SOFT TISSUE at 09:01

## 2021-01-29 RX ORDER — DEXAMETHASONE SODIUM PHOSPHATE 4 MG/ML
4 INJECTION, SOLUTION INTRA-ARTICULAR; INTRALESIONAL; INTRAMUSCULAR; INTRAVENOUS; SOFT TISSUE
Status: DISCONTINUED | OUTPATIENT
Start: 2021-01-28 | End: 2021-01-29 | Stop reason: HOSPADM

## 2021-01-30 ENCOUNTER — IMMUNIZATION (OUTPATIENT)
Dept: INTERNAL MEDICINE | Facility: CLINIC | Age: 71
End: 2021-01-30
Payer: MEDICARE

## 2021-01-30 DIAGNOSIS — Z23 NEED FOR VACCINATION: Primary | ICD-10-CM

## 2021-01-30 PROCEDURE — 0002A PR IMMUNIZ ADMIN, SARS-COV-2 COVID-19 VACC, 30MCG/0.3ML, 2ND DOSE: CPT | Mod: PBBFAC | Performed by: INTERNAL MEDICINE

## 2021-01-30 PROCEDURE — 91300 PR SARS-COV- 2 COVID-19 VACCINE, NO PRSV, 30MCG/0.3ML, IM: CPT | Mod: PBBFAC | Performed by: INTERNAL MEDICINE

## 2021-01-30 RX ADMIN — RNA INGREDIENT BNT-162B2 0.3 ML: 0.23 INJECTION, SUSPENSION INTRAMUSCULAR at 08:01

## 2021-03-18 ENCOUNTER — PATIENT MESSAGE (OUTPATIENT)
Dept: RESEARCH | Facility: HOSPITAL | Age: 71
End: 2021-03-18

## 2021-03-26 ENCOUNTER — PATIENT MESSAGE (OUTPATIENT)
Dept: RESEARCH | Facility: HOSPITAL | Age: 71
End: 2021-03-26

## 2021-06-29 ENCOUNTER — PATIENT MESSAGE (OUTPATIENT)
Dept: INTERNAL MEDICINE | Facility: CLINIC | Age: 71
End: 2021-06-29

## 2021-06-29 ENCOUNTER — TELEPHONE (OUTPATIENT)
Dept: INTERNAL MEDICINE | Facility: CLINIC | Age: 71
End: 2021-06-29

## 2021-08-04 ENCOUNTER — PATIENT MESSAGE (OUTPATIENT)
Dept: INTERNAL MEDICINE | Facility: CLINIC | Age: 71
End: 2021-08-04

## 2021-08-04 DIAGNOSIS — Z12.31 ENCOUNTER FOR SCREENING MAMMOGRAM FOR BREAST CANCER: Primary | ICD-10-CM

## 2021-08-17 ENCOUNTER — HOSPITAL ENCOUNTER (OUTPATIENT)
Dept: RADIOLOGY | Facility: HOSPITAL | Age: 71
Discharge: HOME | End: 2021-08-17
Attending: INTERNAL MEDICINE
Payer: MEDICARE

## 2021-08-17 VITALS — BODY MASS INDEX: 25.49 KG/M2 | WEIGHT: 135 LBS | HEIGHT: 61 IN

## 2021-08-17 DIAGNOSIS — Z12.31 ENCOUNTER FOR SCREENING MAMMOGRAM FOR BREAST CANCER: ICD-10-CM

## 2021-08-17 PROCEDURE — 77063 MAMMO DIGITAL SCREENING BILAT WITH TOMO: ICD-10-PCS | Mod: 26,,, | Performed by: RADIOLOGY

## 2021-08-17 PROCEDURE — 77067 SCR MAMMO BI INCL CAD: CPT | Mod: 26,,, | Performed by: RADIOLOGY

## 2021-08-17 PROCEDURE — 77067 SCR MAMMO BI INCL CAD: CPT | Mod: TC

## 2021-08-17 PROCEDURE — 77063 BREAST TOMOSYNTHESIS BI: CPT | Mod: 26,,, | Performed by: RADIOLOGY

## 2021-08-17 PROCEDURE — 77067 MAMMO DIGITAL SCREENING BILAT WITH TOMO: ICD-10-PCS | Mod: 26,,, | Performed by: RADIOLOGY

## 2021-08-18 ENCOUNTER — TELEPHONE (OUTPATIENT)
Dept: INTERNAL MEDICINE | Facility: CLINIC | Age: 71
End: 2021-08-18

## 2021-09-17 ENCOUNTER — OFFICE VISIT (OUTPATIENT)
Dept: DERMATOLOGY | Facility: CLINIC | Age: 71
End: 2021-09-17
Payer: MEDICARE

## 2021-09-17 ENCOUNTER — TELEPHONE (OUTPATIENT)
Dept: OTOLARYNGOLOGY | Facility: CLINIC | Age: 71
End: 2021-09-17

## 2021-09-17 DIAGNOSIS — L81.4 LENTIGINES: ICD-10-CM

## 2021-09-17 DIAGNOSIS — D22.9 MULTIPLE BENIGN NEVI: ICD-10-CM

## 2021-09-17 DIAGNOSIS — R22.1 SUBCUTANEOUS MASS OF NECK: ICD-10-CM

## 2021-09-17 DIAGNOSIS — D18.01 CHERRY ANGIOMA: ICD-10-CM

## 2021-09-17 DIAGNOSIS — L82.1 SK (SEBORRHEIC KERATOSIS): ICD-10-CM

## 2021-09-17 DIAGNOSIS — L72.0 MILIA: ICD-10-CM

## 2021-09-17 DIAGNOSIS — Z86.006 HISTORY OF MELANOMA IN SITU: ICD-10-CM

## 2021-09-17 DIAGNOSIS — Z12.83 SKIN CANCER SCREENING: ICD-10-CM

## 2021-09-17 DIAGNOSIS — L21.9 ACUTE SEBORRHEIC DERMATITIS: Primary | ICD-10-CM

## 2021-09-17 PROCEDURE — 1160F PR REVIEW ALL MEDS BY PRESCRIBER/CLIN PHARMACIST DOCUMENTED: ICD-10-PCS | Mod: CPTII,S$GLB,, | Performed by: DERMATOLOGY

## 2021-09-17 PROCEDURE — 99213 PR OFFICE/OUTPT VISIT, EST, LEVL III, 20-29 MIN: ICD-10-PCS | Mod: S$GLB,,, | Performed by: DERMATOLOGY

## 2021-09-17 PROCEDURE — 1159F MED LIST DOCD IN RCRD: CPT | Mod: CPTII,S$GLB,, | Performed by: DERMATOLOGY

## 2021-09-17 PROCEDURE — 1159F PR MEDICATION LIST DOCUMENTED IN MEDICAL RECORD: ICD-10-PCS | Mod: CPTII,S$GLB,, | Performed by: DERMATOLOGY

## 2021-09-17 PROCEDURE — 99213 OFFICE O/P EST LOW 20 MIN: CPT | Mod: S$GLB,,, | Performed by: DERMATOLOGY

## 2021-09-17 PROCEDURE — 99999 PR PBB SHADOW E&M-EST. PATIENT-LVL II: CPT | Mod: PBBFAC,,, | Performed by: DERMATOLOGY

## 2021-09-17 PROCEDURE — 99999 PR PBB SHADOW E&M-EST. PATIENT-LVL II: ICD-10-PCS | Mod: PBBFAC,,, | Performed by: DERMATOLOGY

## 2021-09-17 PROCEDURE — 1160F RVW MEDS BY RX/DR IN RCRD: CPT | Mod: CPTII,S$GLB,, | Performed by: DERMATOLOGY

## 2021-09-17 RX ORDER — KETOCONAZOLE 20 MG/ML
SHAMPOO, SUSPENSION TOPICAL
Qty: 120 ML | Refills: 5 | Status: SHIPPED | OUTPATIENT
Start: 2021-09-17 | End: 2022-09-27 | Stop reason: SDUPTHER

## 2021-09-17 RX ORDER — BETAMETHASONE VALERATE 1.2 MG/G
CREAM TOPICAL 2 TIMES DAILY
Qty: 45 G | Refills: 1 | Status: SHIPPED | OUTPATIENT
Start: 2021-09-17 | End: 2022-12-09

## 2021-09-20 ENCOUNTER — OFFICE VISIT (OUTPATIENT)
Dept: OTOLARYNGOLOGY | Facility: CLINIC | Age: 71
End: 2021-09-20
Payer: MEDICARE

## 2021-09-20 VITALS
WEIGHT: 139.56 LBS | HEART RATE: 87 BPM | DIASTOLIC BLOOD PRESSURE: 81 MMHG | SYSTOLIC BLOOD PRESSURE: 136 MMHG | BODY MASS INDEX: 26.37 KG/M2

## 2021-09-20 DIAGNOSIS — Z86.006 HISTORY OF MELANOMA IN SITU: ICD-10-CM

## 2021-09-20 DIAGNOSIS — R22.1 SUBCUTANEOUS MASS OF NECK: ICD-10-CM

## 2021-09-20 PROCEDURE — 1125F PR PAIN SEVERITY QUANTIFIED, PAIN PRESENT: ICD-10-PCS | Mod: CPTII,S$GLB,, | Performed by: OTOLARYNGOLOGY

## 2021-09-20 PROCEDURE — 3288F PR FALLS RISK ASSESSMENT DOCUMENTED: ICD-10-PCS | Mod: CPTII,S$GLB,, | Performed by: OTOLARYNGOLOGY

## 2021-09-20 PROCEDURE — 99213 PR OFFICE/OUTPT VISIT, EST, LEVL III, 20-29 MIN: ICD-10-PCS | Mod: S$GLB,,, | Performed by: OTOLARYNGOLOGY

## 2021-09-20 PROCEDURE — 1160F RVW MEDS BY RX/DR IN RCRD: CPT | Mod: CPTII,S$GLB,, | Performed by: OTOLARYNGOLOGY

## 2021-09-20 PROCEDURE — 3079F PR MOST RECENT DIASTOLIC BLOOD PRESSURE 80-89 MM HG: ICD-10-PCS | Mod: CPTII,S$GLB,, | Performed by: OTOLARYNGOLOGY

## 2021-09-20 PROCEDURE — 1101F PT FALLS ASSESS-DOCD LE1/YR: CPT | Mod: CPTII,S$GLB,, | Performed by: OTOLARYNGOLOGY

## 2021-09-20 PROCEDURE — 3008F PR BODY MASS INDEX (BMI) DOCUMENTED: ICD-10-PCS | Mod: CPTII,S$GLB,, | Performed by: OTOLARYNGOLOGY

## 2021-09-20 PROCEDURE — 1159F PR MEDICATION LIST DOCUMENTED IN MEDICAL RECORD: ICD-10-PCS | Mod: CPTII,S$GLB,, | Performed by: OTOLARYNGOLOGY

## 2021-09-20 PROCEDURE — 3075F SYST BP GE 130 - 139MM HG: CPT | Mod: CPTII,S$GLB,, | Performed by: OTOLARYNGOLOGY

## 2021-09-20 PROCEDURE — 1159F MED LIST DOCD IN RCRD: CPT | Mod: CPTII,S$GLB,, | Performed by: OTOLARYNGOLOGY

## 2021-09-20 PROCEDURE — 99213 OFFICE O/P EST LOW 20 MIN: CPT | Mod: S$GLB,,, | Performed by: OTOLARYNGOLOGY

## 2021-09-20 PROCEDURE — 1101F PR PT FALLS ASSESS DOC 0-1 FALLS W/OUT INJ PAST YR: ICD-10-PCS | Mod: CPTII,S$GLB,, | Performed by: OTOLARYNGOLOGY

## 2021-09-20 PROCEDURE — 1125F AMNT PAIN NOTED PAIN PRSNT: CPT | Mod: CPTII,S$GLB,, | Performed by: OTOLARYNGOLOGY

## 2021-09-20 PROCEDURE — 3288F FALL RISK ASSESSMENT DOCD: CPT | Mod: CPTII,S$GLB,, | Performed by: OTOLARYNGOLOGY

## 2021-09-20 PROCEDURE — 3075F PR MOST RECENT SYSTOLIC BLOOD PRESS GE 130-139MM HG: ICD-10-PCS | Mod: CPTII,S$GLB,, | Performed by: OTOLARYNGOLOGY

## 2021-09-20 PROCEDURE — 99999 PR PBB SHADOW E&M-EST. PATIENT-LVL IV: CPT | Mod: PBBFAC,,, | Performed by: OTOLARYNGOLOGY

## 2021-09-20 PROCEDURE — 99999 PR PBB SHADOW E&M-EST. PATIENT-LVL IV: ICD-10-PCS | Mod: PBBFAC,,, | Performed by: OTOLARYNGOLOGY

## 2021-09-20 PROCEDURE — 3079F DIAST BP 80-89 MM HG: CPT | Mod: CPTII,S$GLB,, | Performed by: OTOLARYNGOLOGY

## 2021-09-20 PROCEDURE — 3008F BODY MASS INDEX DOCD: CPT | Mod: CPTII,S$GLB,, | Performed by: OTOLARYNGOLOGY

## 2021-09-20 PROCEDURE — 1160F PR REVIEW ALL MEDS BY PRESCRIBER/CLIN PHARMACIST DOCUMENTED: ICD-10-PCS | Mod: CPTII,S$GLB,, | Performed by: OTOLARYNGOLOGY

## 2021-09-23 ENCOUNTER — HOSPITAL ENCOUNTER (OUTPATIENT)
Dept: RADIOLOGY | Facility: HOSPITAL | Age: 71
Discharge: HOME | End: 2021-09-23
Attending: OTOLARYNGOLOGY
Payer: MEDICARE

## 2021-09-23 DIAGNOSIS — R22.1 SUBCUTANEOUS MASS OF NECK: ICD-10-CM

## 2021-09-23 PROCEDURE — 70490 CT SOFT TISSUE NECK W/O DYE: CPT | Mod: 26,,, | Performed by: RADIOLOGY

## 2021-09-23 PROCEDURE — 70490 CT SOFT TISSUE NECK W/O DYE: CPT | Mod: TC

## 2021-09-23 PROCEDURE — 70490 CT SOFT TISSUE NECK WITHOUT CONTRAST: ICD-10-PCS | Mod: 26,,, | Performed by: RADIOLOGY

## 2021-10-08 ENCOUNTER — PES CALL (OUTPATIENT)
Dept: ADMINISTRATIVE | Facility: CLINIC | Age: 71
End: 2021-10-08

## 2021-10-11 ENCOUNTER — PES CALL (OUTPATIENT)
Dept: ADMINISTRATIVE | Facility: CLINIC | Age: 71
End: 2021-10-11

## 2021-10-29 ENCOUNTER — PES CALL (OUTPATIENT)
Dept: ADMINISTRATIVE | Facility: CLINIC | Age: 71
End: 2021-10-29
Payer: MEDICARE

## 2021-12-14 ENCOUNTER — PES CALL (OUTPATIENT)
Dept: ADMINISTRATIVE | Facility: CLINIC | Age: 71
End: 2021-12-14
Payer: MEDICARE

## 2021-12-30 ENCOUNTER — PATIENT MESSAGE (OUTPATIENT)
Dept: INTERNAL MEDICINE | Facility: CLINIC | Age: 71
End: 2021-12-30
Payer: MEDICARE

## 2021-12-30 ENCOUNTER — IMMUNIZATION (OUTPATIENT)
Dept: INTERNAL MEDICINE | Facility: CLINIC | Age: 71
End: 2021-12-30
Payer: MEDICARE

## 2021-12-30 DIAGNOSIS — E78.5 HYPERLIPIDEMIA, UNSPECIFIED HYPERLIPIDEMIA TYPE: Primary | ICD-10-CM

## 2021-12-30 DIAGNOSIS — E04.2 NONTOXIC MULTINODULAR GOITER: ICD-10-CM

## 2021-12-30 DIAGNOSIS — Z23 NEED FOR VACCINATION: Primary | ICD-10-CM

## 2021-12-30 PROCEDURE — 0004A COVID-19, MRNA, LNP-S, PF, 30 MCG/0.3 ML DOSE VACCINE: CPT | Mod: CV19,PBBFAC | Performed by: INTERNAL MEDICINE

## 2022-01-05 PROBLEM — R22.1 SUBCUTANEOUS MASS OF NECK: Status: RESOLVED | Noted: 2021-09-20 | Resolved: 2022-01-05

## 2022-01-05 PROBLEM — Z12.11 SCREENING FOR COLON CANCER: Status: RESOLVED | Noted: 2020-01-14 | Resolved: 2022-01-05

## 2022-01-06 ENCOUNTER — OFFICE VISIT (OUTPATIENT)
Dept: INTERNAL MEDICINE | Facility: CLINIC | Age: 72
End: 2022-01-06
Payer: MEDICARE

## 2022-01-06 ENCOUNTER — LAB VISIT (OUTPATIENT)
Dept: LAB | Facility: HOSPITAL | Age: 72
End: 2022-01-06
Attending: INTERNAL MEDICINE
Payer: MEDICARE

## 2022-01-06 VITALS
SYSTOLIC BLOOD PRESSURE: 130 MMHG | DIASTOLIC BLOOD PRESSURE: 78 MMHG | WEIGHT: 140.88 LBS | HEIGHT: 62 IN | OXYGEN SATURATION: 99 % | HEART RATE: 72 BPM | BODY MASS INDEX: 25.92 KG/M2 | RESPIRATION RATE: 14 BRPM

## 2022-01-06 DIAGNOSIS — M85.80 OSTEOPENIA, UNSPECIFIED LOCATION: ICD-10-CM

## 2022-01-06 DIAGNOSIS — H90.3 SENSORINEURAL HEARING LOSS (SNHL) OF BOTH EARS: ICD-10-CM

## 2022-01-06 DIAGNOSIS — E04.2 NONTOXIC MULTINODULAR GOITER: ICD-10-CM

## 2022-01-06 DIAGNOSIS — E55.9 VITAMIN D DEFICIENCY: ICD-10-CM

## 2022-01-06 DIAGNOSIS — M85.89 OTHER SPECIFIED DISORDERS OF BONE DENSITY AND STRUCTURE, MULTIPLE SITES: ICD-10-CM

## 2022-01-06 DIAGNOSIS — M19.049 OSTEOARTHRITIS OF FINGER, UNSPECIFIED LATERALITY: ICD-10-CM

## 2022-01-06 DIAGNOSIS — D70.9 NEUTROPENIA, UNSPECIFIED TYPE: ICD-10-CM

## 2022-01-06 DIAGNOSIS — Z00.00 ENCOUNTER FOR PREVENTIVE HEALTH EXAMINATION: Primary | ICD-10-CM

## 2022-01-06 DIAGNOSIS — E78.5 HYPERLIPIDEMIA, UNSPECIFIED HYPERLIPIDEMIA TYPE: ICD-10-CM

## 2022-01-06 DIAGNOSIS — H26.9 CATARACT, UNSPECIFIED CATARACT TYPE, UNSPECIFIED LATERALITY: ICD-10-CM

## 2022-01-06 DIAGNOSIS — Z85.820 HISTORY OF MELANOMA: ICD-10-CM

## 2022-01-06 DIAGNOSIS — Z86.19 HISTORY OF HEPATITIS C: ICD-10-CM

## 2022-01-06 DIAGNOSIS — E66.3 OVERWEIGHT (BMI 25.0-29.9): ICD-10-CM

## 2022-01-06 LAB
ALBUMIN SERPL BCP-MCNC: 4.2 G/DL (ref 3.5–5.2)
ALP SERPL-CCNC: 60 U/L (ref 55–135)
ALT SERPL W/O P-5'-P-CCNC: 14 U/L (ref 10–44)
ANION GAP SERPL CALC-SCNC: 11 MMOL/L (ref 8–16)
AST SERPL-CCNC: 18 U/L (ref 10–40)
BASOPHILS # BLD AUTO: 0.03 K/UL (ref 0–0.2)
BASOPHILS NFR BLD: 0.8 % (ref 0–1.9)
BILIRUB SERPL-MCNC: 0.6 MG/DL (ref 0.1–1)
BUN SERPL-MCNC: 12 MG/DL (ref 8–23)
CALCIUM SERPL-MCNC: 9.9 MG/DL (ref 8.7–10.5)
CHLORIDE SERPL-SCNC: 101 MMOL/L (ref 95–110)
CHOLEST SERPL-MCNC: 218 MG/DL (ref 120–199)
CHOLEST/HDLC SERPL: 3.6 {RATIO} (ref 2–5)
CO2 SERPL-SCNC: 23 MMOL/L (ref 23–29)
CREAT SERPL-MCNC: 0.7 MG/DL (ref 0.5–1.4)
DIFFERENTIAL METHOD: ABNORMAL
EOSINOPHIL # BLD AUTO: 0.1 K/UL (ref 0–0.5)
EOSINOPHIL NFR BLD: 2.3 % (ref 0–8)
ERYTHROCYTE [DISTWIDTH] IN BLOOD BY AUTOMATED COUNT: 12.4 % (ref 11.5–14.5)
EST. GFR  (AFRICAN AMERICAN): >60 ML/MIN/1.73 M^2
EST. GFR  (NON AFRICAN AMERICAN): >60 ML/MIN/1.73 M^2
GLUCOSE SERPL-MCNC: 79 MG/DL (ref 70–110)
HCT VFR BLD AUTO: 39.6 % (ref 37–48.5)
HDLC SERPL-MCNC: 61 MG/DL (ref 40–75)
HDLC SERPL: 28 % (ref 20–50)
HGB BLD-MCNC: 13.3 G/DL (ref 12–16)
IMM GRANULOCYTES # BLD AUTO: 0 K/UL (ref 0–0.04)
IMM GRANULOCYTES NFR BLD AUTO: 0 % (ref 0–0.5)
LDLC SERPL CALC-MCNC: 143.2 MG/DL (ref 63–159)
LYMPHOCYTES # BLD AUTO: 2.2 K/UL (ref 1–4.8)
LYMPHOCYTES NFR BLD: 55.6 % (ref 18–48)
MCH RBC QN AUTO: 32.5 PG (ref 27–31)
MCHC RBC AUTO-ENTMCNC: 33.6 G/DL (ref 32–36)
MCV RBC AUTO: 97 FL (ref 82–98)
MONOCYTES # BLD AUTO: 0.3 K/UL (ref 0.3–1)
MONOCYTES NFR BLD: 6.7 % (ref 4–15)
NEUTROPHILS # BLD AUTO: 1.4 K/UL (ref 1.8–7.7)
NEUTROPHILS NFR BLD: 34.6 % (ref 38–73)
NONHDLC SERPL-MCNC: 157 MG/DL
NRBC BLD-RTO: 0 /100 WBC
PLATELET # BLD AUTO: 300 K/UL (ref 150–450)
PMV BLD AUTO: 9.3 FL (ref 9.2–12.9)
POTASSIUM SERPL-SCNC: 3.9 MMOL/L (ref 3.5–5.1)
PROT SERPL-MCNC: 7.7 G/DL (ref 6–8.4)
RBC # BLD AUTO: 4.09 M/UL (ref 4–5.4)
SODIUM SERPL-SCNC: 135 MMOL/L (ref 136–145)
TRIGL SERPL-MCNC: 69 MG/DL (ref 30–150)
TSH SERPL DL<=0.005 MIU/L-ACNC: 0.71 UIU/ML (ref 0.4–4)
WBC # BLD AUTO: 3.9 K/UL (ref 3.9–12.7)

## 2022-01-06 PROCEDURE — 36415 COLL VENOUS BLD VENIPUNCTURE: CPT | Mod: PO | Performed by: INTERNAL MEDICINE

## 2022-01-06 PROCEDURE — 3288F PR FALLS RISK ASSESSMENT DOCUMENTED: ICD-10-PCS | Mod: CPTII,S$GLB,, | Performed by: NURSE PRACTITIONER

## 2022-01-06 PROCEDURE — 99999 PR PBB SHADOW E&M-EST. PATIENT-LVL V: CPT | Mod: PBBFAC,,, | Performed by: NURSE PRACTITIONER

## 2022-01-06 PROCEDURE — 1125F AMNT PAIN NOTED PAIN PRSNT: CPT | Mod: CPTII,S$GLB,, | Performed by: NURSE PRACTITIONER

## 2022-01-06 PROCEDURE — 3075F PR MOST RECENT SYSTOLIC BLOOD PRESS GE 130-139MM HG: ICD-10-PCS | Mod: CPTII,S$GLB,, | Performed by: NURSE PRACTITIONER

## 2022-01-06 PROCEDURE — 80053 COMPREHEN METABOLIC PANEL: CPT | Performed by: INTERNAL MEDICINE

## 2022-01-06 PROCEDURE — G0439 PPPS, SUBSEQ VISIT: HCPCS | Mod: S$GLB,,, | Performed by: NURSE PRACTITIONER

## 2022-01-06 PROCEDURE — 1160F PR REVIEW ALL MEDS BY PRESCRIBER/CLIN PHARMACIST DOCUMENTED: ICD-10-PCS | Mod: CPTII,S$GLB,, | Performed by: NURSE PRACTITIONER

## 2022-01-06 PROCEDURE — 3078F PR MOST RECENT DIASTOLIC BLOOD PRESSURE < 80 MM HG: ICD-10-PCS | Mod: CPTII,S$GLB,, | Performed by: NURSE PRACTITIONER

## 2022-01-06 PROCEDURE — 84443 ASSAY THYROID STIM HORMONE: CPT | Performed by: INTERNAL MEDICINE

## 2022-01-06 PROCEDURE — 3078F DIAST BP <80 MM HG: CPT | Mod: CPTII,S$GLB,, | Performed by: NURSE PRACTITIONER

## 2022-01-06 PROCEDURE — 99999 PR PBB SHADOW E&M-EST. PATIENT-LVL V: ICD-10-PCS | Mod: PBBFAC,,, | Performed by: NURSE PRACTITIONER

## 2022-01-06 PROCEDURE — 1159F MED LIST DOCD IN RCRD: CPT | Mod: CPTII,S$GLB,, | Performed by: NURSE PRACTITIONER

## 2022-01-06 PROCEDURE — 1125F PR PAIN SEVERITY QUANTIFIED, PAIN PRESENT: ICD-10-PCS | Mod: CPTII,S$GLB,, | Performed by: NURSE PRACTITIONER

## 2022-01-06 PROCEDURE — 1170F PR FUNCTIONAL STATUS ASSESSED: ICD-10-PCS | Mod: CPTII,S$GLB,, | Performed by: NURSE PRACTITIONER

## 2022-01-06 PROCEDURE — 80061 LIPID PANEL: CPT | Performed by: INTERNAL MEDICINE

## 2022-01-06 PROCEDURE — 3008F PR BODY MASS INDEX (BMI) DOCUMENTED: ICD-10-PCS | Mod: CPTII,S$GLB,, | Performed by: NURSE PRACTITIONER

## 2022-01-06 PROCEDURE — 3288F FALL RISK ASSESSMENT DOCD: CPT | Mod: CPTII,S$GLB,, | Performed by: NURSE PRACTITIONER

## 2022-01-06 PROCEDURE — 3075F SYST BP GE 130 - 139MM HG: CPT | Mod: CPTII,S$GLB,, | Performed by: NURSE PRACTITIONER

## 2022-01-06 PROCEDURE — 3008F BODY MASS INDEX DOCD: CPT | Mod: CPTII,S$GLB,, | Performed by: NURSE PRACTITIONER

## 2022-01-06 PROCEDURE — 1101F PR PT FALLS ASSESS DOC 0-1 FALLS W/OUT INJ PAST YR: ICD-10-PCS | Mod: CPTII,S$GLB,, | Performed by: NURSE PRACTITIONER

## 2022-01-06 PROCEDURE — 1159F PR MEDICATION LIST DOCUMENTED IN MEDICAL RECORD: ICD-10-PCS | Mod: CPTII,S$GLB,, | Performed by: NURSE PRACTITIONER

## 2022-01-06 PROCEDURE — G0439 PR MEDICARE ANNUAL WELLNESS SUBSEQUENT VISIT: ICD-10-PCS | Mod: S$GLB,,, | Performed by: NURSE PRACTITIONER

## 2022-01-06 PROCEDURE — 1101F PT FALLS ASSESS-DOCD LE1/YR: CPT | Mod: CPTII,S$GLB,, | Performed by: NURSE PRACTITIONER

## 2022-01-06 PROCEDURE — 1170F FXNL STATUS ASSESSED: CPT | Mod: CPTII,S$GLB,, | Performed by: NURSE PRACTITIONER

## 2022-01-06 PROCEDURE — 1160F RVW MEDS BY RX/DR IN RCRD: CPT | Mod: CPTII,S$GLB,, | Performed by: NURSE PRACTITIONER

## 2022-01-06 PROCEDURE — 85025 COMPLETE CBC W/AUTO DIFF WBC: CPT | Performed by: INTERNAL MEDICINE

## 2022-01-06 NOTE — PROGRESS NOTES
"Tomasa Reed presented for a  Medicare AWV and comprehensive Health Risk Assessment today. The following components were reviewed and updated:    · Medical history  · Family History  · Social history  · Allergies and Current Medications  · Health Risk Assessment  · Health Maintenance  · Care Team     ** See Completed Assessments for Annual Wellness Visit within the encounter summary.**       The following assessments were completed:  · Living Situation  · CAGE  · Depression Screening  · Timed Get Up and Go  · Whisper Test  · Cognitive Function Screening  · Nutrition Screening  · ADL Screening  · PAQ Screening    Vitals:    01/06/22 0829 01/06/22 0914   BP: (!) 140/80 130/78   BP Location: Left arm    Pulse: 72    Resp: 14    SpO2: 99%    Weight: 63.9 kg (140 lb 14 oz)    Height: 5' 1.5" (1.562 m)      Body mass index is 26.19 kg/m².  Physical Exam  Vitals and nursing note reviewed.   Constitutional:       Appearance: She is well-developed and well-nourished.   HENT:      Head: Normocephalic.      Comments: Wears mask, ear wax     Right Ear: There is no impacted cerumen.      Left Ear: There is no impacted cerumen.   Cardiovascular:      Rate and Rhythm: Normal rate and regular rhythm.      Heart sounds: Normal heart sounds.   Pulmonary:      Effort: Pulmonary effort is normal.      Breath sounds: Normal breath sounds.   Abdominal:      General: Bowel sounds are normal.      Palpations: Abdomen is soft.   Musculoskeletal:         General: No edema. Normal range of motion.   Skin:     General: Skin is warm and dry.   Neurological:      Mental Status: She is alert and oriented to person, place, and time.      Motor: No abnormal muscle tone.   Psychiatric:         Mood and Affect: Mood and affect and mood normal.         Behavior: Behavior normal.         Thought Content: Thought content normal.         Judgment: Judgment normal.           Lab Results   Component Value Date    HGBA1C 4.9 06/23/2004    CHOL 196 " 11/23/2020    HDL 69 11/23/2020    LDLCALC 115.4 11/23/2020    TRIG 58 11/23/2020    CHOLHDL 35.2 11/23/2020      Results for orders placed in visit on 12/12/19    DXA Bone Density Spine And Hip    Narrative  EXAMINATION:  DEXA BONE DENSITY SPINE HIP    CLINICAL HISTORY:  Asymptomatic menopausal state.68 y/o female with no history of fractures.  She had a hysterectomy at 31 y/o and menopausal symptoms at 51 y/o.  Pt's Mother had a hip and Father had a wrist fracture.  Pt is taking Calcium and Vit D supplements.  She exercises once a week and does not smoke.    TECHNIQUE:  DXA Specification: PLAYSTUDIOS [id number]    Bone Mineral Density scanning was performed over the hip and lumbar spine. Review of the images confirms satisfactory positioning and technique.    COMPARISON:  Comparison study done on 05/16/2017.    Lumbar spine:  BMD 0.793 g/cm2 and T-score -2.3.    Total Hip:        BMD 0.796 g/cm2 and T-score -1.2.    FINDINGS:  There is a 14% risk of a major osteoporotic fracture and a 3.1% risk of hip fracture in the next 10 years (FRAX)    Compared with previous DXA, BMD at the lumbar spine has increased by 3%, and the BMD at the total hip has increased by 3.5%.    Lumbar spine (L1-L4):   BMD is 0.817 g/cm2, T-score is -2.1, and Z-score is 0.1.    Total hip:                    BMD is 0.824 g/cm2, T-score is -1.0, and Z-score is 0.6.    Femoral neck:              BMD is 0.731 g/cm2, T-score is -1.1, and Z-score is 0.8.    Impression  1. Osteopenia of the lumbar spine and femoral neck.  FRAX calculations suggest treatment.  RECOMMENDATIONS of [facility]:    1.  Recommended daily calcium intake 8099-1816 mg, dietary sources preferred; Vitamin D 9961-7520 IU daily.    2.  Adequate exercise and fall precautions.    3.  Consider bisphosphonate or denosumab therapy.    4.  Repeat BMD in 2 years    EXPLANATION OF RESULTS:    The T-score compares these results to the  average bone density of a 20 year-old of the same gender. The Z-score compares this result to the average bone density to people of the same age, gender, and race. The amounts indicate the number of standard deviations above or below the mean.    * Osteoporosis is generally defined as having a T-score between less than or equal to -2.5.    * Osteopenia is generally defined as having a T-score between -1.0 and -2.5.    * The normal range is generally defined as having a t-score greater than or equal to -1.0.    * Calculated FRAX scores for fracture risk prediction may not be accurate in the setting of certain clinical factors such as pharmacologic therapy for osteoporosis, prior fragility fractures, high dose glucocorticoid use etc.      Electronically signed by: Liz Santos MD  Date:    12/18/2019  Time:    08:35    Results for orders placed during the hospital encounter of 01/22/14    Mammo Digital Screening Bilat with CAD    Narrative  The present examination has been compared to prior imaging studies.    BILATERAL MAMMOGRAM FINDINGS:  The breast tissue is heterogeneously dense.  This may lower the sensitivity of  mammography.    No significant abnormalities are seen.  There are no significant changes from  the prior study.    These images  were processed to produce digital images analyzed for potential  abnormalities.    IMPRESSION:    There is no mammographic evidence of malignancy.    Mammogram in 1 year is recommended.    ACR BI-RADS Category 1: Negative      CARL VAZQUEZ M.D.  01/22/2014                Diagnoses and health risks identified today and associated recommendations/orders:    1. Overweight (BMI 25.0-29.9)  Chronic . Followed by PCP.   Centers for Disease Control and Prevention (CDC)  weight recommendations for current BMI & ideal BMI range discussed with patient.  Recommended  gradual weight loss,  mediterranean diet , no added salt diet structured, continue regular exercise  every  day.      2. History of hepatitis C; S/p RX with SVR 24 documented 09/2017  Stable- followed by PCP    3. History of melanoma  Stable- followed by PCP,derm    4. Hyperlipidemia, unspecified hyperlipidemia type  Stable- followed by PCP    5. Sensorineural hearing loss (SNHL) of both ears  Stable- followed by PCP,external ENT-uses sound enhancers with TV    6. Vitamin D deficiency  Stable- followed by PCP  - DXA Bone Density Spine And Hip; Future    7. Osteopenia, unspecified location  Stable- followed by PCP  - DXA Bone Density Spine And Hip; Future    8. Nontoxic multinodular goiter  Stable- followed by PCP    9. Neutropenia, unspecified type  Stable- followed by PCP    10. Osteoarthritis of finger, unspecified laterality  Stable- followed by PCP    11. Encounter for preventive health examination  Here for Health Risk Assessment/Annual Wellness Visit.  Health maintenance reviewed and updated. Follow up in one year.       12. Other specified disorders of bone density and structure, multiple sites   Stable- followed by PCP  - DXA Bone Density Spine And Hip; Future    Provided Tomasa with a 5-10 year written screening schedule and personal prevention plan. Recommendations were developed using the USPSTF age appropriate recommendations. Education, counseling, and referrals were provided as needed. After Visit Summary printed and given to patient which includes a list of additional screenings\tests needed. Last cologard neg 2/19/2020.DXA ordered. Osteopenia- DXA ordered- not on fosamax. Current on external eye exam.  Fall prevention, weight bearing & strengthening & balance exercises.     Follow up in about 1 year (around 1/6/2023) for HRA.    Eleonora Montenegro NP I offered to discuss advanced care planning, including how to pick a person who would make decisions for you if you were unable to make them for yourself, called a health care power of , and what kind of decisions you might make such as use of life  sustaining treatments such as ventilators and tube feeding when faced with a life limiting illness recorded on a living will that they will need to know. (How you want to be cared for as you near the end of your natural life)     X  Patient has advanced directives written and agrees to provide copies to the institution.

## 2022-01-06 NOTE — PATIENT INSTRUCTIONS
Counseling and Referral of Other Preventative  (Italic type indicates deductible and co-insurance are waived)    Patient Name: Tomasa Reed  Today's Date: 1/6/2022    Health Maintenance       Date Due Completion Date    DEXA SCAN Due   12/12/2019    Mammogram 08/17/2022 8/17/2021    TETANUS VACCINE 12/03/2022   12/3/2012    Colorectal Cancer Screening 01/14/2023- for cologard   2/19/2020    Lipid Panel    Annual eye exam- done    Annual derm exam done- done    Annual dental exam- done Done today 11/23/2020        No orders of the defined types were placed in this encounter.    The following information is provided to all patients.  This information is to help you find resources for any of the problems found today that may be affecting your health:                Living healthy guide: www.[facility].[place].gov      Understanding Diabetes: www.diabetes.org      Eating healthy: www.cdc.gov/healthyweight      CDC home safety checklist: www.cdc.gov/steadi/patient.html      Agency on Aging: www.goea.[place].[facility]      Alcoholics anonymous (AA): www.aa.org      Physical Activity: www.alisson.nih.gov/zx1xavn      Tobacco use: www.quitwithusla.org
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
## 2022-01-11 ENCOUNTER — OFFICE VISIT (OUTPATIENT)
Dept: INTERNAL MEDICINE | Facility: CLINIC | Age: 72
End: 2022-01-11
Payer: MEDICARE

## 2022-01-11 ENCOUNTER — HOSPITAL ENCOUNTER (OUTPATIENT)
Dept: RADIOLOGY | Facility: HOSPITAL | Age: 72
Discharge: HOME | End: 2022-01-11
Attending: INTERNAL MEDICINE
Payer: MEDICARE

## 2022-01-11 VITALS
TEMPERATURE: 98 F | OXYGEN SATURATION: 99 % | BODY MASS INDEX: 25.97 KG/M2 | HEART RATE: 84 BPM | WEIGHT: 141.13 LBS | HEIGHT: 62 IN | SYSTOLIC BLOOD PRESSURE: 110 MMHG | DIASTOLIC BLOOD PRESSURE: 70 MMHG

## 2022-01-11 DIAGNOSIS — E78.5 HYPERLIPIDEMIA, UNSPECIFIED HYPERLIPIDEMIA TYPE: ICD-10-CM

## 2022-01-11 DIAGNOSIS — M54.2 NECK PAIN: ICD-10-CM

## 2022-01-11 DIAGNOSIS — E55.9 VITAMIN D DEFICIENCY: ICD-10-CM

## 2022-01-11 DIAGNOSIS — M85.80 OSTEOPENIA, UNSPECIFIED LOCATION: ICD-10-CM

## 2022-01-11 DIAGNOSIS — Z00.00 ANNUAL PHYSICAL EXAM: Primary | ICD-10-CM

## 2022-01-11 DIAGNOSIS — D70.9 NEUTROPENIA, UNSPECIFIED TYPE: ICD-10-CM

## 2022-01-11 DIAGNOSIS — E04.2 NONTOXIC MULTINODULAR GOITER: ICD-10-CM

## 2022-01-11 PROCEDURE — 1101F PR PT FALLS ASSESS DOC 0-1 FALLS W/OUT INJ PAST YR: ICD-10-PCS | Mod: CPTII,S$GLB,, | Performed by: INTERNAL MEDICINE

## 2022-01-11 PROCEDURE — 99999 PR PBB SHADOW E&M-EST. PATIENT-LVL III: ICD-10-PCS | Mod: PBBFAC,,, | Performed by: INTERNAL MEDICINE

## 2022-01-11 PROCEDURE — 3008F BODY MASS INDEX DOCD: CPT | Mod: CPTII,S$GLB,, | Performed by: INTERNAL MEDICINE

## 2022-01-11 PROCEDURE — 3078F DIAST BP <80 MM HG: CPT | Mod: CPTII,S$GLB,, | Performed by: INTERNAL MEDICINE

## 2022-01-11 PROCEDURE — 3078F PR MOST RECENT DIASTOLIC BLOOD PRESSURE < 80 MM HG: ICD-10-PCS | Mod: CPTII,S$GLB,, | Performed by: INTERNAL MEDICINE

## 2022-01-11 PROCEDURE — 1101F PT FALLS ASSESS-DOCD LE1/YR: CPT | Mod: CPTII,S$GLB,, | Performed by: INTERNAL MEDICINE

## 2022-01-11 PROCEDURE — 72050 X-RAY EXAM NECK SPINE 4/5VWS: CPT | Mod: 26,,, | Performed by: RADIOLOGY

## 2022-01-11 PROCEDURE — 1125F PR PAIN SEVERITY QUANTIFIED, PAIN PRESENT: ICD-10-PCS | Mod: CPTII,S$GLB,, | Performed by: INTERNAL MEDICINE

## 2022-01-11 PROCEDURE — 99999 PR PBB SHADOW E&M-EST. PATIENT-LVL III: CPT | Mod: PBBFAC,,, | Performed by: INTERNAL MEDICINE

## 2022-01-11 PROCEDURE — 99499 RISK ADDL DX/OHS AUDIT: ICD-10-PCS | Mod: S$GLB,,, | Performed by: INTERNAL MEDICINE

## 2022-01-11 PROCEDURE — 99214 PR OFFICE/OUTPT VISIT, EST, LEVL IV, 30-39 MIN: ICD-10-PCS | Mod: S$GLB,,, | Performed by: INTERNAL MEDICINE

## 2022-01-11 PROCEDURE — 3008F PR BODY MASS INDEX (BMI) DOCUMENTED: ICD-10-PCS | Mod: CPTII,S$GLB,, | Performed by: INTERNAL MEDICINE

## 2022-01-11 PROCEDURE — 99214 OFFICE O/P EST MOD 30 MIN: CPT | Mod: S$GLB,,, | Performed by: INTERNAL MEDICINE

## 2022-01-11 PROCEDURE — 1125F AMNT PAIN NOTED PAIN PRSNT: CPT | Mod: CPTII,S$GLB,, | Performed by: INTERNAL MEDICINE

## 2022-01-11 PROCEDURE — 3074F PR MOST RECENT SYSTOLIC BLOOD PRESSURE < 130 MM HG: ICD-10-PCS | Mod: CPTII,S$GLB,, | Performed by: INTERNAL MEDICINE

## 2022-01-11 PROCEDURE — 72050 X-RAY EXAM NECK SPINE 4/5VWS: CPT | Mod: TC,PO

## 2022-01-11 PROCEDURE — 99499 UNLISTED E&M SERVICE: CPT | Mod: S$GLB,,, | Performed by: INTERNAL MEDICINE

## 2022-01-11 PROCEDURE — 72050 XR CERVICAL SPINE COMPLETE 5 VIEW: ICD-10-PCS | Mod: 26,,, | Performed by: RADIOLOGY

## 2022-01-11 PROCEDURE — 3288F PR FALLS RISK ASSESSMENT DOCUMENTED: ICD-10-PCS | Mod: CPTII,S$GLB,, | Performed by: INTERNAL MEDICINE

## 2022-01-11 PROCEDURE — 3288F FALL RISK ASSESSMENT DOCD: CPT | Mod: CPTII,S$GLB,, | Performed by: INTERNAL MEDICINE

## 2022-01-11 PROCEDURE — 3074F SYST BP LT 130 MM HG: CPT | Mod: CPTII,S$GLB,, | Performed by: INTERNAL MEDICINE

## 2022-01-11 RX ORDER — METHOCARBAMOL 500 MG/1
500 TABLET, FILM COATED ORAL EVERY 8 HOURS PRN
Qty: 30 TABLET | Refills: 1 | Status: SHIPPED | OUTPATIENT
Start: 2022-01-11 | End: 2022-12-09

## 2022-01-13 ENCOUNTER — TELEPHONE (OUTPATIENT)
Dept: INTERNAL MEDICINE | Facility: CLINIC | Age: 72
End: 2022-01-13
Payer: MEDICARE

## 2022-01-13 ENCOUNTER — PATIENT MESSAGE (OUTPATIENT)
Dept: INTERNAL MEDICINE | Facility: CLINIC | Age: 72
End: 2022-01-13
Payer: MEDICARE

## 2022-01-13 DIAGNOSIS — M54.2 NECK PAIN: Primary | ICD-10-CM

## 2022-01-13 DIAGNOSIS — I77.89 OTHER SPECIFIED DISORDERS OF ARTERIES AND ARTERIOLES: ICD-10-CM
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
## 2022-01-14 ENCOUNTER — PATIENT MESSAGE (OUTPATIENT)
Dept: INTERNAL MEDICINE | Facility: CLINIC | Age: 72
End: 2022-01-14
Payer: MEDICARE

## 2022-01-14 PROBLEM — I77.89 OTHER SPECIFIED DISORDERS OF ARTERIES AND ARTERIOLES: Status: ACTIVE | Noted: 2022-01-14

## 2022-01-14 NOTE — TELEPHONE ENCOUNTER
Orders placed, PT will call, hopefully the PT will help improve her sx  If not then a visit w/ PM would be appropriate    Please check Carotid US for Cards order

## 2022-01-20 ENCOUNTER — PATIENT MESSAGE (OUTPATIENT)
Dept: INTERNAL MEDICINE | Facility: CLINIC | Age: 72
End: 2022-01-20
Payer: MEDICARE

## 2022-01-20 ENCOUNTER — TELEPHONE (OUTPATIENT)
Dept: INTERNAL MEDICINE | Facility: CLINIC | Age: 72
End: 2022-01-20
Payer: MEDICARE

## 2022-01-20 ENCOUNTER — HOSPITAL ENCOUNTER (OUTPATIENT)
Dept: CARDIOLOGY | Facility: HOSPITAL | Age: 72
Discharge: HOME | End: 2022-01-20
Attending: INTERNAL MEDICINE
Payer: MEDICARE

## 2022-01-20 DIAGNOSIS — I77.89 OTHER SPECIFIED DISORDERS OF ARTERIES AND ARTERIOLES: ICD-10-CM

## 2022-01-20 LAB
LEFT ARM DIASTOLIC BLOOD PRESSURE: 70 MMHG
LEFT ARM SYSTOLIC BLOOD PRESSURE: 120 MMHG
LEFT CBA DIAS: 25 CM/S
LEFT CBA SYS: 100 CM/S
LEFT CCA DIST DIAS: 20 CM/S
LEFT CCA DIST SYS: 71 CM/S
LEFT CCA MID DIAS: 21 CM/S
LEFT CCA MID SYS: 82 CM/S
LEFT CCA PROX DIAS: 13 CM/S
LEFT CCA PROX SYS: 76 CM/S
LEFT ECA DIAS: 12 CM/S
LEFT ECA SYS: 84 CM/S
LEFT ICA DIST DIAS: 16 CM/S
LEFT ICA DIST SYS: 71 CM/S
LEFT ICA MID DIAS: 15 CM/S
LEFT ICA MID SYS: 45 CM/S
LEFT ICA PROX DIAS: 20 CM/S
LEFT ICA PROX SYS: 78 CM/S
LEFT VERTEBRAL DIAS: 10 CM/S
LEFT VERTEBRAL SYS: 36 CM/S
OHS CV CAROTID RIGHT ICA EDV HIGHEST: 20
OHS CV CAROTID ULTRASOUND LEFT ICA/CCA RATIO: 1.1
OHS CV CAROTID ULTRASOUND RIGHT ICA/CCA RATIO: 1.35
OHS CV PV CAROTID LEFT HIGHEST CCA: 82
OHS CV PV CAROTID LEFT HIGHEST ICA: 78
OHS CV PV CAROTID RIGHT HIGHEST CCA: 91
OHS CV PV CAROTID RIGHT HIGHEST ICA: 84
OHS CV US CAROTID LEFT HIGHEST EDV: 20
RIGHT ARM DIASTOLIC BLOOD PRESSURE: 70 MMHG
RIGHT ARM SYSTOLIC BLOOD PRESSURE: 118 MMHG
RIGHT CBA DIAS: 18 CM/S
RIGHT CBA SYS: 65 CM/S
RIGHT CCA DIST DIAS: 16 CM/S
RIGHT CCA DIST SYS: 62 CM/S
RIGHT CCA MID DIAS: 17 CM/S
RIGHT CCA MID SYS: 91 CM/S
RIGHT CCA PROX DIAS: 15 CM/S
RIGHT CCA PROX SYS: 77 CM/S
RIGHT ECA DIAS: 14 CM/S
RIGHT ECA SYS: 80 CM/S
RIGHT ICA DIST DIAS: 20 CM/S
RIGHT ICA DIST SYS: 84 CM/S
RIGHT ICA MID DIAS: 19 CM/S
RIGHT ICA MID SYS: 57 CM/S
RIGHT ICA PROX DIAS: 18 CM/S
RIGHT ICA PROX SYS: 64 CM/S
RIGHT VERTEBRAL DIAS: 18 CM/S
RIGHT VERTEBRAL SYS: 48 CM/S

## 2022-01-20 PROCEDURE — 93880 EXTRACRANIAL BILAT STUDY: CPT

## 2022-01-20 PROCEDURE — 93880 EXTRACRANIAL BILAT STUDY: CPT | Mod: 26,,, | Performed by: INTERNAL MEDICINE

## 2022-01-20 PROCEDURE — 93880 CV US DOPPLER CAROTID (CUPID ONLY): ICD-10-PCS | Mod: 26,,, | Performed by: INTERNAL MEDICINE

## 2022-01-20 NOTE — TELEPHONE ENCOUNTER
----- Message from Rosanna Huizar MD sent at 1/20/2022  1:30 PM CST -----  Please note that your carotid artery ultrasound result did not reveal significant blockage in the carotid arteries.  The threshold for significant carotid artery disease would be greater than 50%.  The recommendation would be to treat the disease that is present so it does not progress, and includes taking a statin medication to better control your cholesterol.  When your cardiovascular risk is calculated it is 10%.  This implies that you have a 10% risk of a heart attack or stroke within the next 10 years, and this indicator also suggest that you start a statin medication any time your risk is greater than 7.5%.  Please advise if you are agreeable to starting the medication and prescription will be sent to your pharmacy.  Please do not hesitate to call/email if you have any questions or concerns   Rosanna Dominguez

## 2022-01-21 NOTE — TELEPHONE ENCOUNTER
The recommendations are based on your known carotid artery disease and cardiovascular risk, but the decision is yours if you prefer to try a more conservative route

## 2022-01-25 ENCOUNTER — CLINICAL SUPPORT (OUTPATIENT)
Dept: REHABILITATION | Facility: HOSPITAL | Age: 72
End: 2022-01-25
Payer: MEDICARE

## 2022-01-25 DIAGNOSIS — M54.2 NECK PAIN: ICD-10-CM

## 2022-01-25 DIAGNOSIS — M54.2 PAINFUL CERVICAL RANGE OF MOTION: ICD-10-CM

## 2022-01-25 DIAGNOSIS — R29.898 DECREASED RANGE OF MOTION OF NECK: ICD-10-CM

## 2022-01-25 DIAGNOSIS — R29.3 POSTURE ABNORMALITY: ICD-10-CM

## 2022-01-25 PROCEDURE — 97110 THERAPEUTIC EXERCISES: CPT

## 2022-01-25 PROCEDURE — 97161 PT EVAL LOW COMPLEX 20 MIN: CPT

## 2022-01-26 NOTE — PROGRESS NOTES
"[facility]   Physical Therapy Treatment Note     Name: Tomasa Reed  Clinic Number: [id number]    Therapy Diagnosis: No diagnosis found.  Physician: Rosanna Huizar*    Visit Date: 1/27/2022    [copy and paste header from ollie here]    PTA Visit #: *** / 5     Time In: ***  Time Out: ***  Total Treatment Time: *** minutes  Total Billable Time: *** minutes    SUBJECTIVE     Patient reports: ***.  She {Actions; was/was not:77573} compliant with home exercise program.  Response to previous treatment: ***  Functional change: ***    Pain: {0-10:48055::"0"}/10  Location: {RIGHT/LEFT/BILATERAL:26631} {LOCATION ON BODY:23164}     OBJECTIVE     Objective Measures updated at progress report unless specified.     Treatment     Tomasa received the treatments listed below:      {OTW Treatment:89690}     {OTW Treatment:66398}     {OTW Treatment:80460}     {OTW Treatment:73358}     {OTW Treatment:91598}     {OTW Treatment:37600}     {OTW Treatment:94687}     {OTW Treatment:53040}     Patient Education and Home Exercises     Home Exercises Provided and Patient Education Provided     Education provided:   - ***    Written Home Exercises Provided: {Blank single:53276::"yes","Patient instructed to cont prior HEP"}. Exercises were reviewed and Tomaas was able to demonstrate them prior to the end of the session.  Tomasa demonstrated {Desc; good/fair/poor:70485} understanding of the education provided. See EMR under Patient Instructions for exercises provided during therapy sessions    ASSESSMENT     ***    Tomasa {IS/IS NOT:92359} progressing well towards her goals.   Pt prognosis is {REHAB PROGNOSIS OHS:22771}.     Pt will continue to benefit from skilled outpatient physical therapy to address the deficits listed in the problem list box on initial evaluation, provide pt/family education and to maximize pt's level of independence in the home and community environment.     Pt's spiritual, " cultural and educational needs considered and pt agreeable to plan of care and goals.     Anticipated barriers to physical therapy: ***    Goals: ***    PLAN     ***    Victoria Modi, PTA

## 2022-01-27 ENCOUNTER — CLINICAL SUPPORT (OUTPATIENT)
Dept: REHABILITATION | Facility: HOSPITAL | Age: 72
End: 2022-01-27
Payer: MEDICARE

## 2022-01-27 DIAGNOSIS — M54.2 PAINFUL CERVICAL RANGE OF MOTION: ICD-10-CM

## 2022-01-27 DIAGNOSIS — R29.898 DECREASED RANGE OF MOTION OF NECK: ICD-10-CM

## 2022-01-27 DIAGNOSIS — R29.3 POSTURE ABNORMALITY: ICD-10-CM

## 2022-01-27 PROCEDURE — 97112 NEUROMUSCULAR REEDUCATION: CPT

## 2022-01-27 PROCEDURE — 97140 MANUAL THERAPY 1/> REGIONS: CPT

## 2022-01-27 PROCEDURE — 97110 THERAPEUTIC EXERCISES: CPT
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
## 2022-01-27 NOTE — PROGRESS NOTES
[facility]   Physical Therapy Treatment Note     Name: Tomasa Reed  Clinic Number: [id number]    Therapy Diagnosis:   Encounter Diagnoses   Name Primary?    Decreased range of motion of neck     Posture abnormality     Painful cervical range of motion      Physician: Rosanna Huizar    Visit Date: 1/27/2022    Physician Orders: PT Eval and Treat   Medical Diagnosis from Referral: M54.2 (ICD-10-CM) - Neck pain  Evaluation Date: 1/25/2022  Authorization Period Expiration: 02/08/2022  Plan of Care Expiration: 03/08/2022  Visit # / Visits authorized: 1/ 6  PTA Visit #: 0/5     Time In: 0900 am  Time Out: 0955 am  Total Appointment Time (timed & untimed codes): 55 minutes (1 MT, 1 TE, 2 NMR)    Precautions: Standard    SUBJECTIVE     Pt reports: she feels the same as she did on eval, just very tight.  She was compliant with home exercise program.  Response to previous treatment: no change  Functional change: no change    Pain: 5/10  Location: bilateral neck      OBJECTIVE     Objective Measures updated at progress report unless specified.     Treatment     oTmasa received the treatments listed below:      THERAPEUTIC EXERCISES to develop strength, endurance, ROM, flexibility, posture and core stabilization for 10 minutes including:  - seated thoracic extensions over 1/2 foam in chair; 20x  - open books; 10x ea      MANUAL THERAPY TECHNIQUES including Joint mobilizations, Manual traction and Soft tissue Mobilization were applied to cervical/thoracic spine for 15 minutes.   - t/s HVLAT supine  - t/s prone mobs Gr 2-3  - c/s opening mob L C4/5  - c/s C0-1 closing mob Gr 2-3    NEUROMUSCULAR RE-EDUCATION ACTIVITIES to improve Balance, Coordination, Kinesthetic, Sense, Proprioception and Posture for 30 minutes.  The following were included:  - chin tuck and turn 2 x 10 ea- at wall  - wall posture; 1 min x 3  - supine horiz abd w/ head bobble; RTB; 20x   - supine diagonals w/  "head bobble; RTB; 20x   - standing low rows; 20# cable cross machine; 20x w/ 3" hold- cues to avoid anterior tipping of scap  - standing shld ext; 15# cable cross machine; 20x w/ 3" hold-  cues to avoid anterior tipping of scap      Patient Education and Home Exercises     Home Exercises Provided and Patient Education Provided     Education provided:   - postural mechanics    Written Home Exercises Provided: Patient instructed to cont prior HEP. Exercises were reviewed and Tomasa was able to demonstrate them prior to the end of the session.  Tomasa demonstrated good  understanding of the education provided. See EMR under Patient Instructions for exercises provided during therapy sessions    ASSESSMENT   Tomasa presents to therapy with bilateral neck pain. She was unchanged from evaluation and demonstrated similar joint restrictions. She was greatly challenged with motor control for chin tucks with rotation as well as with cervical dissociation exercises. She will benefit continued deep neck flexor endurance training as well as joint mobility in thoracic spine and R upper cervical and L lower cervical joints.     Tomasa Is progressing well towards her goals.   Pt prognosis is Good.     Pt will continue to benefit from skilled outpatient physical therapy to address the deficits listed in the problem list box on initial evaluation, provide pt/family education and to maximize pt's level of independence in the home and community environment.     Pt's spiritual, cultural and educational needs considered and pt agreeable to plan of care and goals.     Anticipated barriers to physical therapy: none  Goals:   Short Term Goals: 4 weeks  1.Report decreased neck pain  <   / =  4 /10  to increase tolerance for adls, work (Progressing, not met)  2. Increase cervical ROM by 5-10 degrees in order to perform ADLs with decreased difficulty. (Progressing, not met)  3. Increase strength in B scapular stabilizers by 1/3 MMT grade to " increase tolerance for ADL and work activities. (Progressing, not met)  4. Pt to tolerate HEP to improve ROM and independence with ADL's  (Progressing, not met)     Long Term Goals: 8 weeks  1.Report decreased neck pain  <   / =  2  /10  to increase tolerance for adls, work  (Progressing, not met)  2. Improve DNF endurance test by 15 seconds to improve ability to maintain appropriate posture   (Progressing, not met)  3.Increased strength in B scapular stabilizers to >/= 4+/5 MMT grade to increase tolerance for ADL and work activities.  (Progressing, not met)  4.  Pt will report at neck level (39% impaired) on FOTO neck score for neck pain disability to demonstrate decrease in disability and improvement in neck pain.  (Progressing, not met)    PLAN   Progress DNF strength and cervical/thoracic mobility     Reyna Buitrago, PT, DPT

## 2022-01-31 NOTE — PROGRESS NOTES
[facility]   Physical Therapy Treatment Note     Name: Tomasa Reed  Clinic Number: [id number]    Therapy Diagnosis:   Encounter Diagnoses   Name Primary?    Decreased range of motion of neck     Posture abnormality     Painful cervical range of motion      Physician: Rosanna Huizar    Visit Date: 2/1/2022    Physician Orders: PT Eval and Treat   Medical Diagnosis from Referral: M54.2 (ICD-10-CM) - Neck pain  Evaluation Date: 1/25/2022  Authorization Period Expiration: 01/25/23  Plan of Care Expiration: 03/08/2022  Visit # / Visits authorized: 2/ 6  PTA Visit #: 1/5     Time In: 2:00 pm  Time Out: 2:55 pm  Total Appointment Time (timed & untimed codes): 60 minutes (1 MT, 1 TE, 2 NMR)    Precautions: Standard    SUBJECTIVE     Pt reports: she isnt in constant pain but its a 6/10 pain when she tries to move or use it. Pt reports its mainly her right side .   She was compliant with home exercise program.  Response to previous treatment: a little sore but no increased discomfort   Functional change: no change    Pain: 0/10-stationary  (6/10 with movement)  Location: bilateral neck      OBJECTIVE     Objective Measures updated at progress report unless specified.     Treatment     Tomasa received the treatments listed below:      THERAPEUTIC EXERCISES to develop strength, endurance, ROM, flexibility, posture and core stabilization for 10 minutes including:  - seated thoracic extensions over 1/2 foam in chair; 20x  - open books; 10x ea      MANUAL THERAPY TECHNIQUES including Joint mobilizations, Manual traction and Soft tissue Mobilization were applied to cervical/thoracic spine for 10 minutes.   - STM to B upper traps , levator scaps, suboccipitals and cervical paraspinals     NEUROMUSCULAR RE-EDUCATION ACTIVITIES to improve Balance, Coordination, Kinesthetic, Sense, Proprioception and Posture for 35 minutes.  The following were included:  - chin tuck and turn 2 x 10  "ea- at wall  - wall posture; 1 min x 3  - supine horiz abd w/ head bobble; RTB; 20x   - supine diagonals w/ head bobble; RTB; 20x   - standing low rows; 20# cable cross machine; 20x w/ 3" hold- cues to avoid anterior tipping of scap  - standing shld ext; 15# cable cross machine; 20x w/ 3" hold-  cues to avoid anterior tipping of scap      Patient Education and Home Exercises     Home Exercises Provided and Patient Education Provided     Education provided:   - postural mechanics    Written Home Exercises Provided: Patient instructed to cont prior HEP. Exercises were reviewed and Tomasa was able to demonstrate them prior to the end of the session.  Tomasa demonstrated good  understanding of the education provided. See EMR under Patient Instructions for exercises provided during therapy sessions    ASSESSMENT     Pt presents with R sided neck pain/stiffness reported . She required moderate cuing with exercises performed to ensure proper exercise execution and with good carryover noted. She exhibited cervical rotational limitations initially which did improve after performance of exercises above.  She will benefit continued deep neck flexor endurance training as well as joint mobility in thoracic spine and R upper cervical and L lower cervical joints.     Tomasa Is progressing well towards her goals.   Pt prognosis is Good.     Pt will continue to benefit from skilled outpatient physical therapy to address the deficits listed in the problem list box on initial evaluation, provide pt/family education and to maximize pt's level of independence in the home and community environment.     Pt's spiritual, cultural and educational needs considered and pt agreeable to plan of care and goals.     Anticipated barriers to physical therapy: none  Goals:   Short Term Goals: 4 weeks  1.Report decreased neck pain  <   / =  4 /10  to increase tolerance for adls, work (Progressing, not met)  2. Increase cervical ROM by 5-10 degrees " in order to perform ADLs with decreased difficulty. (Progressing, not met)  3. Increase strength in B scapular stabilizers by 1/3 MMT grade to increase tolerance for ADL and work activities. (Progressing, not met)  4. Pt to tolerate HEP to improve ROM and independence with ADL's  (Progressing, not met)     Long Term Goals: 8 weeks  1.Report decreased neck pain  <   / =  2  /10  to increase tolerance for adls, work  (Progressing, not met)  2. Improve DNF endurance test by 15 seconds to improve ability to maintain appropriate posture   (Progressing, not met)  3.Increased strength in B scapular stabilizers to >/= 4+/5 MMT grade to increase tolerance for ADL and work activities.  (Progressing, not met)  4.  Pt will report at neck level (39% impaired) on FOTO neck score for neck pain disability to demonstrate decrease in disability and improvement in neck pain.  (Progressing, not met)    PLAN   Progress DNF strength and cervical/thoracic mobility     Candi Dupree, PTA

## 2022-02-01 ENCOUNTER — CLINICAL SUPPORT (OUTPATIENT)
Dept: REHABILITATION | Facility: HOSPITAL | Age: 72
End: 2022-02-01
Payer: MEDICARE

## 2022-02-01 ENCOUNTER — APPOINTMENT (OUTPATIENT)
Dept: RADIOLOGY | Facility: CLINIC | Age: 72
End: 2022-02-01
Attending: NURSE PRACTITIONER
Payer: MEDICARE

## 2022-02-01 DIAGNOSIS — E55.9 VITAMIN D DEFICIENCY: ICD-10-CM

## 2022-02-01 DIAGNOSIS — R29.3 POSTURE ABNORMALITY: ICD-10-CM

## 2022-02-01 DIAGNOSIS — M54.2 PAINFUL CERVICAL RANGE OF MOTION: ICD-10-CM

## 2022-02-01 DIAGNOSIS — M85.89 OTHER SPECIFIED DISORDERS OF BONE DENSITY AND STRUCTURE, MULTIPLE SITES: ICD-10-CM

## 2022-02-01 DIAGNOSIS — M85.80 OSTEOPENIA, UNSPECIFIED LOCATION: ICD-10-CM

## 2022-02-01 DIAGNOSIS — R29.898 DECREASED RANGE OF MOTION OF NECK: ICD-10-CM

## 2022-02-01 PROCEDURE — 77080 DEXA BONE DENSITY SPINE HIP: ICD-10-PCS | Mod: 26,,, | Performed by: INTERNAL MEDICINE

## 2022-02-01 PROCEDURE — 97140 MANUAL THERAPY 1/> REGIONS: CPT | Mod: CQ

## 2022-02-01 PROCEDURE — 97112 NEUROMUSCULAR REEDUCATION: CPT | Mod: CQ

## 2022-02-01 PROCEDURE — 77080 DXA BONE DENSITY AXIAL: CPT | Mod: TC,PO

## 2022-02-01 PROCEDURE — 77080 DXA BONE DENSITY AXIAL: CPT | Mod: 26,,, | Performed by: INTERNAL MEDICINE

## 2022-02-01 PROCEDURE — 97110 THERAPEUTIC EXERCISES: CPT | Mod: CQ

## 2022-02-02 NOTE — PROGRESS NOTES
[facility]   Physical Therapy Treatment Note     Name: Tomasa Reed  Clinic Number: [id number]    Therapy Diagnosis:   Encounter Diagnoses   Name Primary?    Decreased range of motion of neck     Posture abnormality     Painful cervical range of motion      Physician: Rosanna Huizar    Visit Date: 2/3/2022    Physician Orders: PT Eval and Treat   Medical Diagnosis from Referral: M54.2 (ICD-10-CM) - Neck pain  Evaluation Date: 1/25/2022  Authorization Period Expiration: 01/25/2023  Plan of Care Expiration: 03/08/2022  Visit # / Visits authorized: 3 / 5    PTA Visit #: 2 / 5     Time In: 0900  Time Out: 1000  Total Treatment Time: 50 minutes + 10 minutes of heat (unsupervised)  Total Billable Time: 50 minutes (1 MT, 1 TE, 2 NMR)    Precautions: Standard    SUBJECTIVE     Patient reports: that she feels better today. Notes most of her pain is on the Right side of her neck. Reports improvement since initial evaluation.  She was compliant with home exercise program.  Response to previous treatment: good, no adverse reaction   Functional change: none to note today     Pain: 5-6/10 currently  Location: bilateral neck      OBJECTIVE     Objective Measures updated at progress report unless specified.     Treatment     Tomasa received the treatments listed below:      THERAPEUTIC EXERCISES to develop strength, endurance, ROM, flexibility, posture and core stabilization for 10 minutes including:  - Seated thoracic extensions over 1/2 foam in chair: 2 x 10, 3 second hold  - SL open books: 15x ea side  + Thread the needle in standing: 10 x each     MANUAL THERAPY TECHNIQUES including Joint mobilizations, Manual traction and Soft tissue Mobilization were applied to cervical/thoracic spine for 15 minutes.   - Cervical distraction  - Manual B upper trap stretch: 30 seconds, 2x each  - STM to B upper traps, levator scaps, suboccipitals and cervical paraspinals     NEUROMUSCULAR  "RE-EDUCATION ACTIVITIES to improve Balance, Coordination, Kinesthetic, Sense, Proprioception and Posture for 25 minutes.  The following were included:  - chin tuck and turn 2 x 10 ea; performed at wall  - wall posture: 1 min x 3 trials  - supine horiz abd w/ head bobble: Red TheraBand, 2 x 10   + supine D2 with Red TheraBand, 2 x 10 each UE  - supine diagonals w/ head bobble: Red TheraBand, 2 x 10   - standing low rows; 20# cable cross machine; 20x w/ 3" hold - cues to avoid anterior tipping of scap  - standing shld ext; 15# cable cross machine; 2 x 10 w/ 3" hold-  cues to avoid anterior tipping of scap    Moist heat pack applied to cervical spine x 10 minutes post session.    Patient Education and Home Exercises     Home Exercises Provided and Patient Education Provided     Education provided:   - postural mechanics    Written Home Exercises Provided: Patient instructed to cont prior HEP. Exercises were reviewed and Tomasa was able to demonstrate them prior to the end of the session.  Tomasa demonstrated good  understanding of the education provided. See EMR under Patient Instructions for exercises provided during therapy sessions    ASSESSMENT     Increased tone in Right upper trap compared to Left. She demonstrates decreased postural awareness in standing and sitting. Good tolerance to manual care reporting an improvement in subjective symptoms. Visible muscle fasciculations with shoulder extensions on cable machine likely due to decreased strength and neuromuscular control.  Tomasa Is progressing well towards her goals.   Pt prognosis is Good.     Pt will continue to benefit from skilled outpatient physical therapy to address the deficits listed in the problem list box on initial evaluation, provide pt/family education and to maximize pt's level of independence in the home and community environment.     Pt's spiritual, cultural and educational needs considered and pt agreeable to plan of care and goals.   "   Anticipated barriers to physical therapy: none  Goals:   Short Term Goals: 4 weeks  1.Report decreased neck pain  <   / =  4 /10  to increase tolerance for adls, work (Progressing, not met)  2. Increase cervical ROM by 5-10 degrees in order to perform ADLs with decreased difficulty. (Progressing, not met)  3. Increase strength in B scapular stabilizers by 1/3 MMT grade to increase tolerance for ADL and work activities. (Progressing, not met)  4. Pt to tolerate HEP to improve ROM and independence with ADL's  (Progressing, not met)     Long Term Goals: 8 weeks  1.Report decreased neck pain  <   / =  2  /10  to increase tolerance for adls, work  (Progressing, not met)  2. Improve DNF endurance test by 15 seconds to improve ability to maintain appropriate posture   (Progressing, not met)  3.Increased strength in B scapular stabilizers to >/= 4+/5 MMT grade to increase tolerance for ADL and work activities.  (Progressing, not met)  4.  Pt will report at neck level (39% impaired) on FOTO neck score for neck pain disability to demonstrate decrease in disability and improvement in neck pain.  (Progressing, not met)    PLAN     Progress DNF strength and cervical/thoracic mobility.    Victoria Modi, PTA

## 2022-02-03 ENCOUNTER — CLINICAL SUPPORT (OUTPATIENT)
Dept: REHABILITATION | Facility: HOSPITAL | Age: 72
End: 2022-02-03
Payer: MEDICARE

## 2022-02-03 DIAGNOSIS — R29.3 POSTURE ABNORMALITY: ICD-10-CM

## 2022-02-03 DIAGNOSIS — R29.898 DECREASED RANGE OF MOTION OF NECK: ICD-10-CM

## 2022-02-03 DIAGNOSIS — M54.2 PAINFUL CERVICAL RANGE OF MOTION: ICD-10-CM

## 2022-02-03 PROCEDURE — 97112 NEUROMUSCULAR REEDUCATION: CPT | Mod: CQ

## 2022-02-03 PROCEDURE — 97140 MANUAL THERAPY 1/> REGIONS: CPT | Mod: CQ

## 2022-02-03 PROCEDURE — 97110 THERAPEUTIC EXERCISES: CPT | Mod: CQ

## 2022-02-08 ENCOUNTER — CLINICAL SUPPORT (OUTPATIENT)
Dept: REHABILITATION | Facility: HOSPITAL | Age: 72
End: 2022-02-08
Payer: MEDICARE

## 2022-02-08 DIAGNOSIS — R29.898 DECREASED RANGE OF MOTION OF NECK: ICD-10-CM

## 2022-02-08 DIAGNOSIS — R29.3 POSTURE ABNORMALITY: ICD-10-CM

## 2022-02-08 DIAGNOSIS — M54.2 PAINFUL CERVICAL RANGE OF MOTION: ICD-10-CM

## 2022-02-08 PROCEDURE — 97112 NEUROMUSCULAR REEDUCATION: CPT

## 2022-02-08 PROCEDURE — 97140 MANUAL THERAPY 1/> REGIONS: CPT

## 2022-02-08 PROCEDURE — 97110 THERAPEUTIC EXERCISES: CPT

## 2022-02-08 NOTE — PROGRESS NOTES
"[facility]   Physical Therapy Treatment Note     Name: Tomasa Reed  Clinic Number: [id number]    Therapy Diagnosis:   Encounter Diagnoses   Name Primary?    Decreased range of motion of neck     Posture abnormality     Painful cervical range of motion      Physician: Rosanna Huizar    Visit Date: 2/8/2022    Physician Orders: PT Eval and Treat   Medical Diagnosis from Referral: M54.2 (ICD-10-CM) - Neck pain  Evaluation Date: 1/25/2022  Authorization Period Expiration: 01/25/2023  Plan of Care Expiration: 03/08/2022  Visit # / Visits authorized: 4 / 5    PTA Visit #: 0 / 5     Time In: 0950 am  Time Out: 1050 am  Total Treatment Time: 60 minutes   Total Billable Time: 60 minutes (1 MT, 1 TE, 2 NMR)    Precautions: Standard    SUBJECTIVE     Patient reports: she feels that she is same as the last session  She was compliant with home exercise program.  Response to previous treatment: good, no adverse reaction   Functional change: none to note today     Pain: 2/10 currently  Location: bilateral neck      OBJECTIVE     Objective Measures updated at progress report unless specified.     CERVICAL AROM Pain/Dysfunction with Movement   Flexion 43/45 none   Extension 35/90 "pulling"   Right side bending 20/40 "pulling"   Left side bending 20/40 "pulling"   Right rotation 60/90 "pulling"   Left rotation 45/90 "pulling"          Cervical Special Tests:  DNF Endurance Test 9 seconds         Treatment     Tomasa received the treatments listed below:      THERAPEUTIC EXERCISES to develop strength, endurance, ROM, flexibility, posture and core stabilization for 10 minutes including:  - SL open books: 15x ea side  - Doorway pec stretch; 2 x 30"     Not today:   - Seated thoracic extensions over 1/2 foam in chair: 2 x 10, 3 second hold  - Thread the needle in standing: 10 x each     MANUAL THERAPY TECHNIQUES including Joint mobilizations, Manual traction and Soft tissue Mobilization " "were applied to cervical/thoracic spine for 20 minutes.   - CT junction mobilization in prone  - t/s mobilizations Gr 3-4 in prone  - lower cervical  Distraction  - R 1st rib mobilizations     NEUROMUSCULAR RE-EDUCATION ACTIVITIES to improve Balance, Coordination, Kinesthetic, Sense, Proprioception and Posture for 30 minutes.  The following were included:  - chin tuck and turn 2 x 10 ea; performed at wall  - wall posture: 1 min x 3 trials  - scap retractions at wall; 20x w/ 5" hold  - Seated scap retractions w/ chin tucks; 20x w 5" hold  - seated chin tuck and turn at mirror; 10x each way    - supine chin tuck and lift; 10x 2" hold      Unable to perform with appropriate form today:  - standing low rows; 20# cable cross machine; 20x w/ 3" hold - cues to avoid anterior tipping of scap  - standing shld ext; 15# cable cross machine; 2 x 10 w/ 3" hold-  cues to avoid anterior tipping of scap    - supine horiz abd w/ head bobble: Red TheraBand, 2 x 10   -  supine D2 with Red TheraBand, 2 x 10 each UE  - supine diagonals w/ head bobble: Red TheraBand, 2 x 10     Moist heat pack applied to cervical spine x 00 minutes post session.    Patient Education and Home Exercises     Home Exercises Provided and Patient Education Provided     Education provided:   - postural mechanics    Written Home Exercises Provided: Patient instructed to cont prior HEP. Exercises were reviewed and Tomasa was able to demonstrate them prior to the end of the session.  Tomasa demonstrated good  understanding of the education provided. See EMR under Patient Instructions for exercises provided during therapy sessions    ASSESSMENT   Tomasa presents to therapy today with continued complaints of neck tightness. She is slowly improving with cervical range of motion, and with deep neck flexor muscular endurance. She is greatly challenged by scapular pinching exercises and consistently will perform lumbar extension and shoulder extension in place of " scapular retraction regardless of verbal or tactile cues. Extended time was spent on focusing improving neuromuscular control of scapular positioning and deep neck flexors. She will benefit increased focus on postural training and increased body awareness.     Tomasa Is progressing well towards her goals.   Pt prognosis is Good.     Pt will continue to benefit from skilled outpatient physical therapy to address the deficits listed in the problem list box on initial evaluation, provide pt/family education and to maximize pt's level of independence in the home and community environment.     Pt's spiritual, cultural and educational needs considered and pt agreeable to plan of care and goals.     Anticipated barriers to physical therapy: none  Goals:   Short Term Goals: 4 weeks  1.Report decreased neck pain  <   / =  4 /10  to increase tolerance for adls, work (Progressing, not met)  2. Increase cervical ROM by 5-10 degrees in order to perform ADLs with decreased difficulty. (Progressing, not met)  3. Increase strength in B scapular stabilizers by 1/3 MMT grade to increase tolerance for ADL and work activities. (Progressing, not met)  4. Pt to tolerate HEP to improve ROM and independence with ADL's  (Progressing, not met)     Long Term Goals: 8 weeks  1.Report decreased neck pain  <   / =  2  /10  to increase tolerance for adls, work  (Progressing, not met)  2. Improve DNF endurance test by 15 seconds to improve ability to maintain appropriate posture   (Progressing, not met)  3.Increased strength in B scapular stabilizers to >/= 4+/5 MMT grade to increase tolerance for ADL and work activities.  (Progressing, not met)  4.  Pt will report at neck level (39% impaired) on FOTO neck score for neck pain disability to demonstrate decrease in disability and improvement in neck pain.  (Progressing, not met)    PLAN     Progress DNF strength and cervical/thoracic mobility.    Reyna Buitrago, PT, DPT

## 2022-02-10 ENCOUNTER — CLINICAL SUPPORT (OUTPATIENT)
Dept: REHABILITATION | Facility: HOSPITAL | Age: 72
End: 2022-02-10
Payer: MEDICARE

## 2022-02-10 DIAGNOSIS — R29.3 POSTURE ABNORMALITY: ICD-10-CM

## 2022-02-10 DIAGNOSIS — R29.898 DECREASED RANGE OF MOTION OF NECK: ICD-10-CM

## 2022-02-10 DIAGNOSIS — M54.2 PAINFUL CERVICAL RANGE OF MOTION: ICD-10-CM

## 2022-02-10 PROCEDURE — 97112 NEUROMUSCULAR REEDUCATION: CPT

## 2022-02-10 PROCEDURE — 97140 MANUAL THERAPY 1/> REGIONS: CPT

## 2022-02-10 PROCEDURE — 97110 THERAPEUTIC EXERCISES: CPT

## 2022-02-10 NOTE — PROGRESS NOTES
"[facility]   Physical Therapy Treatment Note     Name: Tomasa Reed  Clinic Number: [id number]    Therapy Diagnosis:   Encounter Diagnoses   Name Primary?    Decreased range of motion of neck     Posture abnormality     Painful cervical range of motion      Physician: Rosanna Huizar    Visit Date: 2/10/2022    Physician Orders: PT Eval and Treat   Medical Diagnosis from Referral: M54.2 (ICD-10-CM) - Neck pain  Evaluation Date: 1/25/2022  Authorization Period Expiration: 01/25/2023  Plan of Care Expiration: 03/08/2022  Visit # / Visits authorized: 5 / 5    PTA Visit #: 0 / 5     Time In: 0100 pm  Time Out: 0155 am  Total Treatment Time: 55 minutes   Total Billable Time: 55 minutes (1 MT, 1 TE, 2 NMR)    Precautions: Standard    SUBJECTIVE     Patient reports: she feels significantly better compared to last session  She was compliant with home exercise program.  Response to previous treatment: good, no adverse reaction   Functional change: none to note today     Pain: 0/10 currently  Location: bilateral neck      OBJECTIVE     Objective Measures updated at progress report unless specified.   2/8/2022    CERVICAL AROM Pain/Dysfunction with Movement   Flexion 43/45 none   Extension 35/90 "pulling"   Right side bending 20/40 "pulling"   Left side bending 20/40 "pulling"   Right rotation 60/90 "pulling"   Left rotation 45/90 "pulling"          Cervical Special Tests:  DNF Endurance Test 9 seconds         Treatment     Tomasa received the treatments listed below:      THERAPEUTIC EXERCISES to develop strength, endurance, ROM, flexibility, posture and core stabilization for 10 minutes including:  - SL open books: 15x ea side  - Doorway pec stretch; 2 x 30"     Not today:   - Seated thoracic extensions over 1/2 foam in chair: 2 x 10, 3 second hold  - Thread the needle in standing: 10 x each     MANUAL THERAPY TECHNIQUES including Joint mobilizations, Manual traction and Soft " "tissue Mobilization were applied to cervical/thoracic spine for 20 minutes.   - CT junction mobilization in prone  - t/s mobilizations Gr 3-4 in prone  - lower cervical  Distraction  - R 1st rib mobilizations   - FMP to R middle scalene    NEUROMUSCULAR RE-EDUCATION ACTIVITIES to improve Balance, Coordination, Kinesthetic, Sense, Proprioception and Posture for 30 minutes.  The following were included:  - chin tuck and turn 2 x 10 ea; performed at wall  - wall posture: 1.5 min x 3 trials  - scap retractions at wall; 20x w/ 5" hold  - Seated scap retractions w/ chin tucks; 20x w 5" hold  - supine chin tuck and lift; 20x w/ 3" hold    - standing low rows; GTB; 4 x 10 w/ 3" hold - cues to avoid anterior tipping of scap      Unable to perform with appropriate form today:    - standing shld ext; RTB; 2 x 10 w/ 3" hold-  cues to avoid anterior tipping of scap    - supine horiz abd w/ head bobble: Red TheraBand, 2 x 10   -  supine D2 with Red TheraBand, 2 x 10 each UE  - supine diagonals w/ head bobble: Red TheraBand, 2 x 10     Moist heat pack applied to cervical spine x 00 minutes post session.    Patient Education and Home Exercises     Home Exercises Provided and Patient Education Provided     Education provided:   - postural mechanics    Written Home Exercises Provided: Patient instructed to cont prior HEP. Exercises were reviewed and Tomasa was able to demonstrate them prior to the end of the session.  Tomasa demonstrated good  understanding of the education provided. See EMR under Patient Instructions for exercises provided during therapy sessions    ASSESSMENT   Tomasa presents to therapy today with continued complaints of neck tightness. She is improving in deep neck flexor endurance but will benefit continued progression of this, parascapular strength, and cervical range of motion. She responded well to manual interventions and is progressing well.     Tomasa Is progressing well towards her goals.   Pt " prognosis is Good.     Pt will continue to benefit from skilled outpatient physical therapy to address the deficits listed in the problem list box on initial evaluation, provide pt/family education and to maximize pt's level of independence in the home and community environment.     Pt's spiritual, cultural and educational needs considered and pt agreeable to plan of care and goals.     Anticipated barriers to physical therapy: none  Goals:   Short Term Goals: 4 weeks  1.Report decreased neck pain  <   / =  4 /10  to increase tolerance for adls, work (Progressing, not met)  2. Increase cervical ROM by 5-10 degrees in order to perform ADLs with decreased difficulty. (Progressing, not met)  3. Increase strength in B scapular stabilizers by 1/3 MMT grade to increase tolerance for ADL and work activities. (Progressing, not met)  4. Pt to tolerate HEP to improve ROM and independence with ADL's  (Progressing, not met)     Long Term Goals: 8 weeks  1.Report decreased neck pain  <   / =  2  /10  to increase tolerance for adls, work  (Progressing, not met)  2. Improve DNF endurance test by 15 seconds to improve ability to maintain appropriate posture   (Progressing, not met)  3.Increased strength in B scapular stabilizers to >/= 4+/5 MMT grade to increase tolerance for ADL and work activities.  (Progressing, not met)  4.  Pt will report at neck level (39% impaired) on FOTO neck score for neck pain disability to demonstrate decrease in disability and improvement in neck pain.  (Progressing, not met)    PLAN     Progress DNF strength and cervical/thoracic mobility.    Reyna Buitrago, PT, DPT

## 2022-02-13 ENCOUNTER — PATIENT MESSAGE (OUTPATIENT)
Dept: INTERNAL MEDICINE | Facility: CLINIC | Age: 72
End: 2022-02-13
Payer: MEDICARE

## 2022-02-13 DIAGNOSIS — E78.5 HYPERLIPIDEMIA, UNSPECIFIED HYPERLIPIDEMIA TYPE: Primary | ICD-10-CM

## 2022-02-14 RX ORDER — ALENDRONATE SODIUM 70 MG/1
70 TABLET ORAL
Qty: 12 TABLET | Refills: 3 | Status: SHIPPED | OUTPATIENT
Start: 2022-02-14 | End: 2022-12-09

## 2022-02-14 RX ORDER — ROSUVASTATIN CALCIUM 10 MG/1
10 TABLET, COATED ORAL DAILY
Qty: 90 TABLET | Refills: 3 | Status: SHIPPED | OUTPATIENT
Start: 2022-02-14 | End: 2023-02-22

## 2022-02-14 NOTE — TELEPHONE ENCOUNTER
The recommendation would be for a statin to help control cholesterol   And a bisphosphonate to prevent further bone loss  They are often taken together and would not expect the combination to be a health issue  ( if she wants to come in to review, please set up appt)

## 2022-02-15 ENCOUNTER — CLINICAL SUPPORT (OUTPATIENT)
Dept: REHABILITATION | Facility: HOSPITAL | Age: 72
End: 2022-02-15
Payer: MEDICARE

## 2022-02-15 DIAGNOSIS — R29.3 POSTURE ABNORMALITY: ICD-10-CM

## 2022-02-15 DIAGNOSIS — R29.898 DECREASED RANGE OF MOTION OF NECK: ICD-10-CM

## 2022-02-15 DIAGNOSIS — M54.2 PAINFUL CERVICAL RANGE OF MOTION: ICD-10-CM

## 2022-02-15 PROCEDURE — 97112 NEUROMUSCULAR REEDUCATION: CPT

## 2022-02-15 PROCEDURE — 97140 MANUAL THERAPY 1/> REGIONS: CPT

## 2022-02-15 NOTE — PROGRESS NOTES
"[facility]   Physical Therapy Treatment Note     Name: Tomasa Reed  Clinic Number: [id number]    Therapy Diagnosis:   Encounter Diagnoses   Name Primary?    Decreased range of motion of neck     Posture abnormality     Painful cervical range of motion      Physician: Rosanna Huizar    Visit Date: 2/15/2022    Physician Orders: PT Eval and Treat   Medical Diagnosis from Referral: M54.2 (ICD-10-CM) - Neck pain  Evaluation Date: 1/25/2022  Authorization Period Expiration: 01/25/2023  Plan of Care Expiration: 03/08/2022  Visit # / Visits authorized: 6 / 5    PTA Visit #: 0 / 5     Time In: 1000 am  Time Out: 1057am  Total Treatment Time: 57 minutes   Total Billable Time: 57 minutes (2 MT,  2 NMR)    Precautions: Standard    SUBJECTIVE     Patient reports: was a more muscularly sore after last session   She was compliant with home exercise program.  Response to previous treatment: good, no adverse reaction   Functional change: none to note today     Pain: 0/10 currently  Location: bilateral neck      OBJECTIVE     Objective Measures updated at progress report unless specified.   2/8/2022    CERVICAL AROM Pain/Dysfunction with Movement   Flexion 43/45 none   Extension 35/90 "pulling"   Right side bending 20/40 "pulling"   Left side bending 20/40 "pulling"   Right rotation 60/90 "pulling"   Left rotation 45/90 "pulling"      Cervical Special Tests:  DNF Endurance Test 9 seconds         Treatment     Tomasa received the treatments listed below:      THERAPEUTIC EXERCISES to develop strength, endurance, ROM, flexibility, posture and core stabilization for 5 minutes including:  - SL open books: 15x ea side    Not today:   - Doorway pec stretch; 2 x 30"   - Seated thoracic extensions over 1/2 foam in chair: 2 x 10, 3 second hold  - Thread the needle in standing: 10 x each     MANUAL THERAPY TECHNIQUES including Joint mobilizations, Manual traction and Soft tissue Mobilization " "were applied to cervical/thoracic spine for 32 minutes.   - CT junction mobilization in prone  - t/s mobilizations Gr 3-4 in prone  - upper cervical  Distraction  - R 1st rib mobilizations   - FMP to R middle scalene  - OA opening mob; G 3-4 R    NEUROMUSCULAR RE-EDUCATION ACTIVITIES to improve Balance, Coordination, Kinesthetic, Sense, Proprioception and Posture for 25 minutes.  The following were included:  - chin tuck and turn 2 x 10 ea; performed at wall  - scap retractions at wall; 20x w/ 5" hold  - Seated scap retractions w/ chin tucks; 20x w 5" hold  - supine chin tuck and lift; 20x w/ 3" hold    - standing low rows; GTB; 3x 10 w/ 3" hold - cues to avoid anterior tipping of scap and core activation  - standing shld ext; RTB; 2 x 10 w/ 3" hold-  cues to avoid anterior tipping of scap and core activation     Unable to perform with appropriate form today:  - supine horiz abd w/ head bobble: Red TheraBand, 2 x 10   -  supine D2 with Red TheraBand, 2 x 10 each UE  - supine diagonals w/ head bobble: Red TheraBand, 2 x 10     Moist heat pack applied to cervical spine x 00 minutes post session.    Patient Education and Home Exercises     Home Exercises Provided and Patient Education Provided     Education provided:   - postural mechanics    Written Home Exercises Provided: Patient instructed to cont prior HEP. Exercises were reviewed and Tomasa was able to demonstrate them prior to the end of the session.  Tomasa demonstrated good  understanding of the education provided. See EMR under Patient Instructions for exercises provided during therapy sessions    ASSESSMENT   Tomasa presents to therapy today with continued complaints of neck tightness and today more focal presentation of sharp pain at C2/3 on R. She responded well to manual interventions with resolution of focal pain and decreased in "tightness" especially with added focus to poor anterior slide of R OA in L rotation. She is improving in overall body " awareness and DNF activation. She continues to struggle with maintaining postural changes outside of exercise performance, but is improving in this in small increments each session.     Tomasa Is progressing well towards her goals.   Pt prognosis is Good.     Pt will continue to benefit from skilled outpatient physical therapy to address the deficits listed in the problem list box on initial evaluation, provide pt/family education and to maximize pt's level of independence in the home and community environment.     Pt's spiritual, cultural and educational needs considered and pt agreeable to plan of care and goals.     Anticipated barriers to physical therapy: none  Goals:   Short Term Goals: 4 weeks  1.Report decreased neck pain  <   / =  4 /10  to increase tolerance for adls, work (Progressing, not met)  2. Increase cervical ROM by 5-10 degrees in order to perform ADLs with decreased difficulty. (Progressing, not met)  3. Increase strength in B scapular stabilizers by 1/3 MMT grade to increase tolerance for ADL and work activities. (Progressing, not met)  4. Pt to tolerate HEP to improve ROM and independence with ADL's  (Progressing, not met)     Long Term Goals: 8 weeks  1.Report decreased neck pain  <   / =  2  /10  to increase tolerance for adls, work  (Progressing, not met)  2. Improve DNF endurance test by 15 seconds to improve ability to maintain appropriate posture   (Progressing, not met)  3.Increased strength in B scapular stabilizers to >/= 4+/5 MMT grade to increase tolerance for ADL and work activities.  (Progressing, not met)  4.  Pt will report at neck level (39% impaired) on FOTO neck score for neck pain disability to demonstrate decrease in disability and improvement in neck pain.  (Progressing, not met)    PLAN     Progress DNF strength and cervical/thoracic mobility.    Reyna Buitrago, PT, DPT

## 2022-02-17 ENCOUNTER — CLINICAL SUPPORT (OUTPATIENT)
Dept: REHABILITATION | Facility: HOSPITAL | Age: 72
End: 2022-02-17
Payer: MEDICARE

## 2022-02-17 DIAGNOSIS — R29.3 POSTURE ABNORMALITY: ICD-10-CM

## 2022-02-17 DIAGNOSIS — R29.898 DECREASED RANGE OF MOTION OF NECK: ICD-10-CM

## 2022-02-17 DIAGNOSIS — M54.2 PAINFUL CERVICAL RANGE OF MOTION: ICD-10-CM

## 2022-02-17 PROCEDURE — 97112 NEUROMUSCULAR REEDUCATION: CPT

## 2022-02-17 PROCEDURE — 97140 MANUAL THERAPY 1/> REGIONS: CPT

## 2022-02-17 PROCEDURE — 97110 THERAPEUTIC EXERCISES: CPT

## 2022-02-17 NOTE — PROGRESS NOTES
"[facility]   Physical Therapy Treatment Note     Name: Tomasa Reed  Clinic Number: [id number]    Therapy Diagnosis:   Encounter Diagnoses   Name Primary?    Decreased range of motion of neck     Posture abnormality     Painful cervical range of motion      Physician: Rosanna Huizar    Visit Date: 2/17/2022    Physician Orders: PT Eval and Treat   Medical Diagnosis from Referral: M54.2 (ICD-10-CM) - Neck pain  Evaluation Date: 1/25/2022  Authorization Period Expiration: 01/25/2023  Plan of Care Expiration: 03/08/2022  Visit # / Visits authorized: 7 / 5  PTA Visit #: 0 / 5     Time In: 1000 am  Time Out: 1055am  Total Treatment Time: 55 minutes   Total Billable Time: 55 minutes (1 MT, 1 TE  2 NMR)    Precautions: Standard    SUBJECTIVE     Patient reports: feels better than last session but still has same tightness, but no focal pain  She was compliant with home exercise program.  Response to previous treatment: good, no adverse reaction   Functional change: none to note today     Pain: 0/10 currently, but 5/10 with L SB  Location: bilateral neck      OBJECTIVE     Objective Measures updated at progress report unless specified.   2/8/2022    CERVICAL AROM Pain/Dysfunction with Movement   Flexion 43/45 none   Extension 35/90 "pulling"   Right side bending 20/40 "pulling"   Left side bending 20/40 "pulling"   Right rotation 60/90 "pulling"   Left rotation 45/90 "pulling"      Cervical Special Tests:  DNF Endurance Test 9 seconds         Treatment     Tomasa received the treatments listed below:      THERAPEUTIC EXERCISES to develop strength, endurance, ROM, flexibility, posture and core stabilization for 15 minutes including:  - SL open books: 15x ea side  - Supine chin tuck and lift; 3 x 10 w/ 3" hold  - Unilateral doorway pec stretch; 4 x 30" ea  - Scap pinch with B ER; 2 x 15 w/ 5" hold      Not today:   - Seated thoracic extensions over 1/2 foam in chair: 2 x 10, 3 " "second hold  - Thread the needle in standing: 10 x each     MANUAL THERAPY TECHNIQUES including Joint mobilizations, Manual traction and Soft tissue Mobilization were applied to cervical/thoracic spine for 15 minutes.   - OA opening mob; G 3-4 R  - upper cervical  Distraction  - CT junction mobilization in prone  - t/s HVLAT in supine    Not today:   - R 1st rib mobilizations   - FMP to R middle scalene      NEUROMUSCULAR RE-EDUCATION ACTIVITIES to improve Balance, Coordination, Kinesthetic, Sense, Proprioception and Posture for 25 minutes.  The following were included:  - chin tuck and turn 2 x 10 ea; performed at wall  - Wall posture; 3 x 1  min  - Seated scap retractions w/ chin tucks; 20x w 5" hold    - standing low rows; GTB; 3x 15 w/ 3" hold - cues to avoid anterior tipping of scap and core activation  - standing shld ext; RTB;  3 x 15 w/ 3" hold-  cues to avoid anterior tipping of scap and core activation     Moist heat pack applied to cervical spine x 00 minutes post session.    Patient Education and Home Exercises     Home Exercises Provided and Patient Education Provided     Education provided:   - postural mechanics    Written Home Exercises Provided: Patient instructed to cont prior HEP. Exercises were reviewed and Tomasa was able to demonstrate them prior to the end of the session.  Tomasa demonstrated good  understanding of the education provided. See EMR under Patient Instructions for exercises provided during therapy sessions    ASSESSMENT   Tomasa presents with continued complaints of R sided neck tightness but only illicited with L sidebending and prolonged positioning. She is improving in overall postural endurance and deep neck flexor activation with fewer compensations visible during chin tucks with a lift. She continues to respond well to manual interventions and strengthening. Will continue to progress as appropriate.     Tomasa Is progressing well towards her goals.   Pt prognosis is " Good.     Pt will continue to benefit from skilled outpatient physical therapy to address the deficits listed in the problem list box on initial evaluation, provide pt/family education and to maximize pt's level of independence in the home and community environment.     Pt's spiritual, cultural and educational needs considered and pt agreeable to plan of care and goals.     Anticipated barriers to physical therapy: none  Goals:   Short Term Goals: 4 weeks  1.Report decreased neck pain  <   / =  4 /10  to increase tolerance for adls, work (Progressing, not met)  2. Increase cervical ROM by 5-10 degrees in order to perform ADLs with decreased difficulty. (Progressing, not met)  3. Increase strength in B scapular stabilizers by 1/3 MMT grade to increase tolerance for ADL and work activities. (Progressing, not met)  4. Pt to tolerate HEP to improve ROM and independence with ADL's  (Progressing, not met)     Long Term Goals: 8 weeks  1.Report decreased neck pain  <   / =  2  /10  to increase tolerance for adls, work  (Progressing, not met)  2. Improve DNF endurance test by 15 seconds to improve ability to maintain appropriate posture   (Progressing, not met)  3.Increased strength in B scapular stabilizers to >/= 4+/5 MMT grade to increase tolerance for ADL and work activities.  (Progressing, not met)  4.  Pt will report at neck level (39% impaired) on FOTO neck score for neck pain disability to demonstrate decrease in disability and improvement in neck pain.  (Progressing, not met)    PLAN     Progress DNF strength and cervical/thoracic mobility.    Reyna Buitrago, PT, DPT

## 2022-02-22 ENCOUNTER — CLINICAL SUPPORT (OUTPATIENT)
Dept: REHABILITATION | Facility: HOSPITAL | Age: 72
End: 2022-02-22
Payer: MEDICARE

## 2022-02-22 DIAGNOSIS — M54.2 PAINFUL CERVICAL RANGE OF MOTION: ICD-10-CM

## 2022-02-22 DIAGNOSIS — R29.898 DECREASED RANGE OF MOTION OF NECK: Primary | ICD-10-CM

## 2022-02-22 DIAGNOSIS — R29.3 POSTURE ABNORMALITY: ICD-10-CM

## 2022-02-22 PROCEDURE — 97140 MANUAL THERAPY 1/> REGIONS: CPT | Mod: CQ

## 2022-02-22 PROCEDURE — 97110 THERAPEUTIC EXERCISES: CPT | Mod: CQ

## 2022-02-22 PROCEDURE — 97112 NEUROMUSCULAR REEDUCATION: CPT | Mod: CQ

## 2022-02-22 NOTE — PROGRESS NOTES
"[facility]   Physical Therapy Treatment Note     Name: Tomasa Reed  Clinic Number: [id number]    Therapy Diagnosis:   Encounter Diagnoses   Name Primary?    Decreased range of motion of neck Yes    Posture abnormality     Painful cervical range of motion      Physician: Rosanna Huizar    Visit Date: 2/22/2022    Physician Orders: PT Eval and Treat   Medical Diagnosis from Referral: M54.2 (ICD-10-CM) - Neck pain  Evaluation Date: 1/25/2022  Authorization Period Expiration: 01/25/2023  Plan of Care Expiration: 03/08/2022  Visit # / Visits authorized: 8/17  PTA Visit #: 1 / 5     Time In: 1000 am  Time Out: 1055am  Total Treatment Time: 55 minutes   Total Billable Time: 55 minutes (1 MT, 1 TE  2 NMR)    Precautions: Standard    SUBJECTIVE     Patient reports: still has tightness with movement in her right neck but no pain.    She was compliant with home exercise program.  Response to previous treatment: felt a tired ache   Functional change: none to note today     Pain: 0/10 currently, but 5/10 with L SB  Location: bilateral neck      OBJECTIVE     Objective Measures updated at progress report unless specified.   2/8/2022    CERVICAL AROM Pain/Dysfunction with Movement   Flexion 43/45 none   Extension 35/90 "pulling"   Right side bending 20/40 "pulling"   Left side bending 20/40 "pulling"   Right rotation 60/90 "pulling"   Left rotation 45/90 "pulling"      Cervical Special Tests:  DNF Endurance Test 9 seconds         Treatment     Tomasa received the treatments listed below:      THERAPEUTIC EXERCISES to develop strength, endurance, ROM, flexibility, posture and core stabilization for 15 minutes including:  - SL open books: 15x ea side  - Supine chin tuck and lift; 3 x 10 w/ 3" hold  - Unilateral doorway pec stretch; 4 x 30" ea  - Scap pinch with B ER; GTB, 2 x 15 w/ 5" hold    Not today:   - Seated thoracic extensions over 1/2 foam in chair: 2 x 10, 3 second hold  - " "Thread the needle in standing: 10 x each     MANUAL THERAPY TECHNIQUES including Joint mobilizations, Manual traction and Soft tissue Mobilization were applied to cervical/thoracic spine for 15 minutes.   STM to R levator scapula, UT and SCM   Manual levator scapula stretch   Suboccipital release     Not performed:   - OA opening mob; G 3-4 R  - upper cervical  Distraction  - CT junction mobilization in prone  - t/s HVLAT in supine  - R 1st rib mobilizations   - FMP to R middle scalene      NEUROMUSCULAR RE-EDUCATION ACTIVITIES to improve Balance, Coordination, Kinesthetic, Sense, Proprioception and Posture for 25 minutes.  The following were included:  - chin tuck and turn 2 x 10 ea; performed at wall  - Wall posture; 3 x 1  min  - Seated scap retractions w/ chin tucks; 20x w 5" hold    - standing low rows; GTB; 3x 15 w/ 3" hold - cues to avoid anterior tipping of scap and core activation  - standing shld ext; RTB;  3 x 15 w/ 3" hold-  cues to avoid anterior tipping of scap and core activation     Moist heat pack applied to cervical spine x 00 minutes post session.    Patient Education and Home Exercises     Home Exercises Provided and Patient Education Provided     Education provided:   - postural mechanics    Written Home Exercises Provided: Patient instructed to cont prior HEP. Exercises were reviewed and Tomasa was able to demonstrate them prior to the end of the session.  Tomasa demonstrated good  understanding of the education provided. See EMR under Patient Instructions for exercises provided during therapy sessions    ASSESSMENT     Pt continues to exhibit R sided cervical tightness noted today in her R SCM and levator scapula which responded well to manual interventions performed. Pt continued to require cuing with rows and extensions to avoid excessive thoracic spine extension and preventing UT compensations and anterior scapular tipping. She continues to respond well to manual interventions and " strengthening. Will continue to progress as appropriate.     Tomasa Is progressing well towards her goals.   Pt prognosis is Good.     Pt will continue to benefit from skilled outpatient physical therapy to address the deficits listed in the problem list box on initial evaluation, provide pt/family education and to maximize pt's level of independence in the home and community environment.     Pt's spiritual, cultural and educational needs considered and pt agreeable to plan of care and goals.     Anticipated barriers to physical therapy: none  Goals:   Short Term Goals: 4 weeks  1.Report decreased neck pain  <   / =  4 /10  to increase tolerance for adls, work (Progressing, not met)  2. Increase cervical ROM by 5-10 degrees in order to perform ADLs with decreased difficulty. (Progressing, not met)  3. Increase strength in B scapular stabilizers by 1/3 MMT grade to increase tolerance for ADL and work activities. (Progressing, not met)  4. Pt to tolerate HEP to improve ROM and independence with ADL's  (Progressing, not met)     Long Term Goals: 8 weeks  1.Report decreased neck pain  <   / =  2  /10  to increase tolerance for adls, work  (Progressing, not met)  2. Improve DNF endurance test by 15 seconds to improve ability to maintain appropriate posture   (Progressing, not met)  3.Increased strength in B scapular stabilizers to >/= 4+/5 MMT grade to increase tolerance for ADL and work activities.  (Progressing, not met)  4.  Pt will report at neck level (39% impaired) on FOTO neck score for neck pain disability to demonstrate decrease in disability and improvement in neck pain.  (Progressing, not met)    PLAN     Progress DNF strength and cervical/thoracic mobility.    Candi Dupree, PTA

## 2022-02-23 NOTE — PROGRESS NOTES
"[facility]   Physical Therapy Treatment Note     Name: Tomasa Reed  Clinic Number: [id number]    Therapy Diagnosis:   Encounter Diagnoses   Name Primary?    Decreased range of motion of neck Yes    Posture abnormality     Painful cervical range of motion      Physician: Roasnna Huizar    Visit Date: 2/24/2022    Physician Orders: PT Eval and Treat   Medical Diagnosis from Referral: M54.2 (ICD-10-CM) - Neck pain  Evaluation Date: 1/25/2022  Authorization Period Expiration: 01/25/2023  Plan of Care Expiration: 03/08/2022  Visit # / Visits authorized: 9/17  PTA Visit #: 2 / 5     Time In: 1000 am  Time Out: 1055am  Total Treatment Time: 55 minutes   Total Billable Time: 55 minutes (1 MT, 1 TE  2 NMR)    Precautions: Standard    SUBJECTIVE     Patient reports: still tight on the right but is not aching as much.    She was compliant with home exercise program.  Response to previous treatment: felt a little sore   Functional change: none to note today     Pain: 0/10 currently  Location: bilateral neck      OBJECTIVE     Objective Measures updated at progress report unless specified.   2/8/2022    CERVICAL AROM Pain/Dysfunction with Movement   Flexion 43/45 none   Extension 35/90 "pulling"   Right side bending 20/40 "pulling"   Left side bending 20/40 "pulling"   Right rotation 60/90 "pulling"   Left rotation 45/90 "pulling"      Cervical Special Tests:  DNF Endurance Test 9 seconds         Treatment     Tomasa received the treatments listed below:      THERAPEUTIC EXERCISES to develop strength, endurance, ROM, flexibility, posture and core stabilization for 15 minutes including:  - SL open books: 15x ea side  - Supine chin tuck and lift; 3 x 10 w/ 3" hold  - Unilateral doorway pec stretch; 4 x 30" ea  - Scap pinch with B ER; GTB, 2 x 15 w/ 5" hold    Not today:   - Seated thoracic extensions over 1/2 foam in chair: 2 x 10, 3 second hold  - Thread the needle in standing: " "10 x each     MANUAL THERAPY TECHNIQUES including Joint mobilizations, Manual traction and Soft tissue Mobilization were applied to cervical/thoracic spine for 15 minutes.   STM to R levator scapula, UT and cervical paraspinals   Manual levator scapula stretch   Suboccipital release     Not performed:   - OA opening mob; G 3-4 R  - upper cervical  Distraction  - CT junction mobilization in prone  - t/s HVLAT in supine  - R 1st rib mobilizations   - FMP to R middle scalene      NEUROMUSCULAR RE-EDUCATION ACTIVITIES to improve Balance, Coordination, Kinesthetic, Sense, Proprioception and Posture for 25 minutes.  The following were included:  - chin tuck and turn 2 x 10 ea; performed at wall  - Wall posture; 3 x 1  min  - Seated scap retractions w/ chin tucks; 20x w 5" hold    - standing low rows; GTB; 3x 15 w/ 3" hold - cues to avoid anterior tipping of scap and core activation  - standing shld ext; RTB;  3 x 15 w/ 3" hold-  cues to avoid anterior tipping of scap and core activation     Moist heat pack applied to cervical spine x 00 minutes post session.    Patient Education and Home Exercises     Home Exercises Provided and Patient Education Provided     Education provided:   - postural mechanics    Written Home Exercises Provided: Patient instructed to cont prior HEP. Exercises were reviewed and Tomasa was able to demonstrate them prior to the end of the session.  Tomasa demonstrated good  understanding of the education provided. See EMR under Patient Instructions for exercises provided during therapy sessions    ASSESSMENT     Pt continues to require cuing with rows and extensions for posture and preventing UT/LS recruitment and noted improved carryover today .She continues to respond well to manual interventions and strengthening. Will continue to progress as appropriate.     Tomasa Is progressing well towards her goals.   Pt prognosis is Good.     Pt will continue to benefit from skilled outpatient physical " therapy to address the deficits listed in the problem list box on initial evaluation, provide pt/family education and to maximize pt's level of independence in the home and community environment.     Pt's spiritual, cultural and educational needs considered and pt agreeable to plan of care and goals.     Anticipated barriers to physical therapy: none  Goals:   Short Term Goals: 4 weeks  1.Report decreased neck pain  <   / =  4 /10  to increase tolerance for adls, work (Progressing, not met)  2. Increase cervical ROM by 5-10 degrees in order to perform ADLs with decreased difficulty. (Progressing, not met)  3. Increase strength in B scapular stabilizers by 1/3 MMT grade to increase tolerance for ADL and work activities. (Progressing, not met)  4. Pt to tolerate HEP to improve ROM and independence with ADL's  (Progressing, not met)     Long Term Goals: 8 weeks  1.Report decreased neck pain  <   / =  2  /10  to increase tolerance for adls, work  (Progressing, not met)  2. Improve DNF endurance test by 15 seconds to improve ability to maintain appropriate posture   (Progressing, not met)  3.Increased strength in B scapular stabilizers to >/= 4+/5 MMT grade to increase tolerance for ADL and work activities.  (Progressing, not met)  4.  Pt will report at neck level (39% impaired) on FOTO neck score for neck pain disability to demonstrate decrease in disability and improvement in neck pain.  (Progressing, not met)    PLAN     Progress DNF strength and cervical/thoracic mobility.    Candi Dupree, PTA

## 2022-02-24 ENCOUNTER — CLINICAL SUPPORT (OUTPATIENT)
Dept: REHABILITATION | Facility: HOSPITAL | Age: 72
End: 2022-02-24
Payer: MEDICARE

## 2022-02-24 DIAGNOSIS — M54.2 PAINFUL CERVICAL RANGE OF MOTION: ICD-10-CM

## 2022-02-24 DIAGNOSIS — R29.898 DECREASED RANGE OF MOTION OF NECK: Primary | ICD-10-CM

## 2022-02-24 DIAGNOSIS — R29.3 POSTURE ABNORMALITY: ICD-10-CM

## 2022-02-24 PROCEDURE — 97112 NEUROMUSCULAR REEDUCATION: CPT | Mod: CQ

## 2022-02-24 PROCEDURE — 97110 THERAPEUTIC EXERCISES: CPT | Mod: CQ

## 2022-02-24 PROCEDURE — 97140 MANUAL THERAPY 1/> REGIONS: CPT | Mod: CQ

## 2022-03-01 ENCOUNTER — PATIENT MESSAGE (OUTPATIENT)
Dept: INTERNAL MEDICINE | Facility: CLINIC | Age: 72
End: 2022-03-01
Payer: MEDICARE

## 2022-03-03 ENCOUNTER — CLINICAL SUPPORT (OUTPATIENT)
Dept: REHABILITATION | Facility: HOSPITAL | Age: 72
End: 2022-03-03
Payer: MEDICARE

## 2022-03-03 DIAGNOSIS — R29.898 DECREASED RANGE OF MOTION OF NECK: Primary | ICD-10-CM

## 2022-03-03 DIAGNOSIS — R29.3 POSTURE ABNORMALITY: ICD-10-CM

## 2022-03-03 DIAGNOSIS — M54.2 PAINFUL CERVICAL RANGE OF MOTION: ICD-10-CM

## 2022-03-03 PROCEDURE — 97110 THERAPEUTIC EXERCISES: CPT

## 2022-03-03 PROCEDURE — 97140 MANUAL THERAPY 1/> REGIONS: CPT

## 2022-03-03 NOTE — PROGRESS NOTES
"[facility]   Physical Therapy Treatment Note     Name: Tomasa Reed  Clinic Number: [id number]    Therapy Diagnosis:   No diagnosis found.  Physician: Rosanna Huizar    Visit Date: 3/3/2022    Physician Orders: PT Eval and Treat   Medical Diagnosis from Referral: M54.2 (ICD-10-CM) - Neck pain  Evaluation Date: 1/25/2022  Authorization Period Expiration: 01/25/2023  Plan of Care Expiration: 03/08/2022  Visit # / Visits authorized: 9/17  PTA Visit #: 2 / 5     Time In: 1000 am  Time Out: 1055am  Total Treatment Time: 55 minutes   Total Billable Time: 55 minutes (1 MT, 1 TE  2 NMR)    Precautions: Standard    SUBJECTIVE     Patient reports: still tight on the right but is not aching as much.    She was compliant with home exercise program.  Response to previous treatment: felt a little sore   Functional change: none to note today     Pain: 0/10 currently  Location: bilateral neck      OBJECTIVE     Objective Measures updated at progress report unless specified.   2/8/2022    CERVICAL AROM Pain/Dysfunction with Movement   Flexion 43/45 none   Extension 35/90 "pulling"   Right side bending 20/40 "pulling"   Left side bending 20/40 "pulling"   Right rotation 60/90 "pulling"   Left rotation 45/90 "pulling"      Cervical Special Tests:  DNF Endurance Test 9 seconds         Treatment     Tomasa received the treatments listed below:      THERAPEUTIC EXERCISES to develop strength, endurance, ROM, flexibility, posture and core stabilization for 15 minutes including:  - SL open books: 15x ea side  - Supine chin tuck and lift; 3 x 10 w/ 3" hold  - Unilateral doorway pec stretch; 4 x 30" ea  - Scap pinch with B ER; GTB, 2 x 15 w/ 5" hold    Not today:   - Seated thoracic extensions over 1/2 foam in chair: 2 x 10, 3 second hold  - Thread the needle in standing: 10 x each     MANUAL THERAPY TECHNIQUES including Joint mobilizations, Manual traction and Soft tissue Mobilization were " "applied to cervical/thoracic spine for 15 minutes.   STM to R levator scapula, UT and cervical paraspinals   Manual levator scapula stretch   Suboccipital release     Not performed:   - OA opening mob; G 3-4 R  - upper cervical  Distraction  - CT junction mobilization in prone  - t/s HVLAT in supine  - R 1st rib mobilizations   - FMP to R middle scalene      NEUROMUSCULAR RE-EDUCATION ACTIVITIES to improve Balance, Coordination, Kinesthetic, Sense, Proprioception and Posture for 25 minutes.  The following were included:  - chin tuck and turn 2 x 10 ea; performed at wall  - Wall posture; 3 x 1  min  - Seated scap retractions w/ chin tucks; 20x w 5" hold    - standing low rows; GTB; 3x 15 w/ 3" hold - cues to avoid anterior tipping of scap and core activation  - standing shld ext; RTB;  3 x 15 w/ 3" hold-  cues to avoid anterior tipping of scap and core activation     Moist heat pack applied to cervical spine x 00 minutes post session.    Patient Education and Home Exercises     Home Exercises Provided and Patient Education Provided     Education provided:   - postural mechanics    Written Home Exercises Provided: Patient instructed to cont prior HEP. Exercises were reviewed and Tomasa was able to demonstrate them prior to the end of the session.  Tomasa demonstrated good  understanding of the education provided. See EMR under Patient Instructions for exercises provided during therapy sessions    ASSESSMENT     Pt continues to require cuing with rows and extensions for posture and preventing UT/LS recruitment and noted improved carryover today .She continues to respond well to manual interventions and strengthening. Will continue to progress as appropriate.     Tomasa Is progressing well towards her goals.   Pt prognosis is Good.     Pt will continue to benefit from skilled outpatient physical therapy to address the deficits listed in the problem list box on initial evaluation, provide pt/family education and to " maximize pt's level of independence in the home and community environment.     Pt's spiritual, cultural and educational needs considered and pt agreeable to plan of care and goals.     Anticipated barriers to physical therapy: none  Goals:   Short Term Goals: 4 weeks  1.Report decreased neck pain  <   / =  4 /10  to increase tolerance for adls, work (Progressing, not met)  2. Increase cervical ROM by 5-10 degrees in order to perform ADLs with decreased difficulty. (Progressing, not met)  3. Increase strength in B scapular stabilizers by 1/3 MMT grade to increase tolerance for ADL and work activities. (Progressing, not met)  4. Pt to tolerate HEP to improve ROM and independence with ADL's  (Progressing, not met)     Long Term Goals: 8 weeks  1.Report decreased neck pain  <   / =  2  /10  to increase tolerance for adls, work  (Progressing, not met)  2. Improve DNF endurance test by 15 seconds to improve ability to maintain appropriate posture   (Progressing, not met)  3.Increased strength in B scapular stabilizers to >/= 4+/5 MMT grade to increase tolerance for ADL and work activities.  (Progressing, not met)  4.  Pt will report at neck level (39% impaired) on FOTO neck score for neck pain disability to demonstrate decrease in disability and improvement in neck pain.  (Progressing, not met)    PLAN     Progress DNF strength and cervical/thoracic mobility.    Candi Dupree, PTA

## 2022-03-03 NOTE — PROGRESS NOTES
"[facility]   Physical Therapy Treatment Note     Name: Tomasa Reed  Clinic Number: [id number]    Therapy Diagnosis:   Encounter Diagnoses   Name Primary?    Decreased range of motion of neck Yes    Posture abnormality     Painful cervical range of motion      Physician: Rosanna Huizar    Visit Date: 3/3/2022    Physician Orders: PT Eval and Treat   Medical Diagnosis from Referral: M54.2 (ICD-10-CM) - Neck pain  Evaluation Date: 1/25/2022  Authorization Period Expiration: 01/25/2023  Plan of Care Expiration: 03/08/2022  Visit # / Visits authorized: 10/17  PTA Visit #: 0 / 5     Time In: 0900 am  Time Out: 1000 am  Total Treatment Time: 60 minutes   Total Billable Time: 30 minutes (1 MT, 1 TE)    Precautions: Standard    SUBJECTIVE     Patient reports: feels a little stiff but also feels like it's overall improving  She was compliant with home exercise program.  Response to previous treatment: felt a little sore   Functional change: none to note today     Pain: 0/10 currently  Location: bilateral neck      OBJECTIVE     Objective Measures updated at progress report unless specified.   2/8/2022    CERVICAL AROM Pain/Dysfunction with Movement   Flexion 43/45 none   Extension 35/90 "pulling"   Right side bending 20/40 "pulling"   Left side bending 20/40 "pulling"   Right rotation 60/90 "pulling"   Left rotation 45/90 "pulling"      Cervical Special Tests:  DNF Endurance Test 9 seconds         Treatment     Tomasa received the treatments listed below:      THERAPEUTIC EXERCISES to develop strength, endurance, ROM, flexibility, posture and core stabilization for 15 minutes of 1:1 and 15 minutes under supervision including:  - SL open books: 15x ea side  - Supine chin tuck and lift; 3 x 15 w/ 3" hold  - Unilateral doorway pec stretch; 4 x 30" ea  - Scap pinch with B ER; GTB, 3 x 15 w/ 5" hold    - supine thoracic extensions over 1/2 foam: 2 x 10, 3 second hold  - " "diaphragmatic breathing in 1/2 foam roll; 2 min       MANUAL THERAPY TECHNIQUES including Joint mobilizations, Manual traction and Soft tissue Mobilization were applied to cervical/thoracic spine for 10 minutes of 1:1.   - OA opening mob; G 3-4 R  - upper cervical  Distraction    Not Today:  - CT junction mobilization in prone  - t/s HVLAT in supine  - R 1st rib mobilizations   - FMP to R middle scalene      NEUROMUSCULAR RE-EDUCATION ACTIVITIES to improve Balance, Coordination, Kinesthetic, Sense, Proprioception and Posture for 15 minutes under supervision.  The following were included:  - chin tuck and turn 2 x 10 ea; performed at wall  - Wall posture; 3 x 1  min  - Seated scap retractions w/ chin tucks; 20x w 5" hold    - standing low rows; GTB; 20x w/ 5" hold - cues to avoid anterior tipping of scap and core activation  - standing shld ext; GTB;  20x w/ 5" hold-  cues to avoid anterior tipping of scap and core activation     Moist heat pack applied to cervical spine x 00 minutes post session.    Patient Education and Home Exercises     Home Exercises Provided and Patient Education Provided     Education provided:   - postural mechanics    Written Home Exercises Provided: Patient instructed to cont prior HEP. Exercises were reviewed and Tomasa was able to demonstrate them prior to the end of the session.  Tomasa demonstrated good  understanding of the education provided. See EMR under Patient Instructions for exercises provided during therapy sessions    ASSESSMENT   Tomasa is improving greatly in postural control and ability to self-monitor form during exercise. She feels she's improving overall and has decreased "tightness" in her neck. Will continue to progress as tolerated.     Tomasa Is progressing well towards her goals.   Pt prognosis is Good.     Pt will continue to benefit from skilled outpatient physical therapy to address the deficits listed in the problem list box on initial evaluation, provide " pt/family education and to maximize pt's level of independence in the home and community environment.     Pt's spiritual, cultural and educational needs considered and pt agreeable to plan of care and goals.     Anticipated barriers to physical therapy: none  Goals:   Short Term Goals: 4 weeks  1.Report decreased neck pain  <   / =  4 /10  to increase tolerance for adls, work (Progressing, not met)  2. Increase cervical ROM by 5-10 degrees in order to perform ADLs with decreased difficulty. (Progressing, not met)  3. Increase strength in B scapular stabilizers by 1/3 MMT grade to increase tolerance for ADL and work activities. (Progressing, not met)  4. Pt to tolerate HEP to improve ROM and independence with ADL's  (Progressing, not met)     Long Term Goals: 8 weeks  1.Report decreased neck pain  <   / =  2  /10  to increase tolerance for adls, work  (Progressing, not met)  2. Improve DNF endurance test by 15 seconds to improve ability to maintain appropriate posture   (Progressing, not met)  3.Increased strength in B scapular stabilizers to >/= 4+/5 MMT grade to increase tolerance for ADL and work activities.  (Progressing, not met)  4.  Pt will report at neck level (39% impaired) on FOTO neck score for neck pain disability to demonstrate decrease in disability and improvement in neck pain.  (Progressing, not met)    PLAN     Progress DNF strength and cervical/thoracic mobility.    Reyna Buitrago, PT, DPT

## 2022-03-08 ENCOUNTER — CLINICAL SUPPORT (OUTPATIENT)
Dept: REHABILITATION | Facility: HOSPITAL | Age: 72
End: 2022-03-08
Payer: MEDICARE

## 2022-03-08 DIAGNOSIS — M54.2 PAINFUL CERVICAL RANGE OF MOTION: ICD-10-CM

## 2022-03-08 DIAGNOSIS — R29.898 DECREASED RANGE OF MOTION OF NECK: Primary | ICD-10-CM

## 2022-03-08 DIAGNOSIS — R29.3 POSTURE ABNORMALITY: ICD-10-CM

## 2022-03-08 PROCEDURE — 97140 MANUAL THERAPY 1/> REGIONS: CPT

## 2022-03-08 PROCEDURE — 97112 NEUROMUSCULAR REEDUCATION: CPT

## 2022-03-08 PROCEDURE — 97110 THERAPEUTIC EXERCISES: CPT

## 2022-03-08 PROCEDURE — 97164 PT RE-EVAL EST PLAN CARE: CPT

## 2022-03-08 NOTE — PLAN OF CARE
"  Outpatient Therapy Updated Plan of Care     Visit Date: 3/8/2022  Name: Tomasa Reed  Clinic Number: [id number]    Therapy Diagnosis:   Encounter Diagnoses   Name Primary?    Decreased range of motion of neck Yes    Posture abnormality     Painful cervical range of motion      Physician: Rosanna Huizar    Physician Orders: PT Eval and Treat   Medical Diagnosis from Referral: M54.2 (ICD-10-CM) - Neck pain  Evaluation Date: 1/25/2022  Authorization Period Expiration: 01/25/2023  Plan of Care Expiration: 03/08/2022 to 04/05/2022  Visit # / Visits authorized: 11/17  PTA Visit #: 0 / 5      Time In: 0100 pm  Time Out: 0200 pm  Total Treatment Time: 60 minutes   Total Billable Time: 60 minutes      Precautions: Standard    Subjective     Update: See daily treatment note.    Objective     Update:    CERVICAL AROM Pain/Dysfunction with Movement   Flexion 45/45 none   Extension 50/90 tightness   Right side bending 20/40 "pulling"   Left side bending 20/40 "pulling"   Right rotation 62/90 "pulling"   Left rotation 50/90 "pulling"      Cervical Special Tests:  DNF Endurance Test 20 seconds       Upper Extremity Strength    (L) UE (R) UE   Shoulder flexion: 4+/5 4+/5   Shoulder Abduction: 4+/5 4+/5   Shoulder ER 4+/5 4+/5   Shoulder IR 4+/5 4+/5   Lower Trap 4-/5 4-/5   Middle Trap 4/5 4/5   Serratus anterior 4-/5 4-/5   Rhomboids 4/5 4/5        Assessment     Update: Tomasa presents with continued, but resolving, R sided neck stiffness. She is improving in cervical range of motion, DNF endurance, and UE strength, but continues to lack joint mobility in upper cervical spine. She has decreased symptoms when she maintains improved posture, but has difficulty avoiding improper mechanics/compensations when not activity performing an exercise. She was educated on need to emphasis this practice in ADLs as well as potential for functional dry needling to decreased muscle tone and improve results of manual " interventions. Will continue to progress as able.     Goals:   Short Term Goals: 4 weeks  1.Increased strength in B scapular stabilizers to >/= 4+/5 MMT grade to increase tolerance for ADL and work activities.  (Progressing, not met)  2.  Pt will report at neck level (39% impaired) on FOTO neck score for neck pain disability to demonstrate decrease in disability and improvement in neck pain.  (Progressing, not met)    Reasons for Recertification of Therapy:   Updated POC    Plan     Updated Certification Period: 3/8/2022 to 04/05/2022  Recommended Treatment Plan: 2 times per week for 4 weeks: Electrical Stimulation NMES, Manual Therapy, Moist Heat/ Ice, Neuromuscular Re-ed, Patient Education, Therapeutic Activities, Therapeutic Exercise and FDN    Reyna Buitrago, PT, DPT  3/8/2022      I CERTIFY THE NEED FOR THESE SERVICES FURNISHED UNDER THIS PLAN OF TREATMENT AND WHILE UNDER MY CARE    Physician's comments:        Physician's Signature: ___________________________________________________

## 2022-03-08 NOTE — PROGRESS NOTES
"[facility]   Physical Therapy Treatment Note     Name: Tomasa Reed  Clinic Number: [id number]    Therapy Diagnosis:   Encounter Diagnoses   Name Primary?    Decreased range of motion of neck Yes    Posture abnormality     Painful cervical range of motion      Physician: Rosanna Huizar    Visit Date: 3/8/2022    Physician Orders: PT Eval and Treat   Medical Diagnosis from Referral: M54.2 (ICD-10-CM) - Neck pain  Evaluation Date: 1/25/2022  Authorization Period Expiration: 01/25/2023  Plan of Care Expiration: 03/08/2022  Visit # / Visits authorized: 11/17  PTA Visit #: 0 / 5     Time In: 0100 pm  Time Out: 0200 pm  Total Treatment Time: 60 minutes   Total Billable Time: 60 minutes (2 TE, 1 MT, 1 NMR, 1 Re-eval)    Precautions: Standard    SUBJECTIVE     Patient reports: she's had a bit of stiffness in her neck since yesterday but is overall improving  She was compliant with home exercise program.  Response to previous treatment: felt a little sore   Functional change: none to note today     Pain: 0/10 currently  Location: bilateral neck      OBJECTIVE     Objective Measures updated at progress report unless specified.- See POC in treatment section     Treatment     Tomasa received the treatments listed below:      THERAPEUTIC EXERCISES to develop strength, endurance, ROM, flexibility, posture and core stabilization for 30 minutes including reassessment and:  - SL open books: 15x ea side  - Pec stretch on 1/2 foam roll; 2 min   - Supine thoracic extensions over 1/2 foam: 2 x 10, 3 second hold  - Supine chin tuck; 20x w/ 3" hold  - Supine chin tuck and lift; 3 x 15 w/ 3" hold  - Scap pinch with B ER; GTB, 3 x 15 w/ 5" hold      MANUAL THERAPY TECHNIQUES including Joint mobilizations, Manual traction and Soft tissue Mobilization were applied to cervical/thoracic spine for 10 minutes.   - OA, C1-2 Closing mob; G 3-4 R  - upper cervical  Distraction    Not Today:  - CT " "junction mobilization in prone  - t/s HVLAT in supine  - R 1st rib mobilizations   - FMP to R middle scalene      NEUROMUSCULAR RE-EDUCATION ACTIVITIES to improve Balance, Coordination, Kinesthetic, Sense, Proprioception and Posture for 20 minutes.  The following were included:  - chin tuck and turn 3 x 10 ea; performed at wall  - Wall posture; 3 x 1  min  - Seated scap retractions w/ chin tucks; 20x w 5" hold    - standing low rows; pink theraband tube; 10x w/ 10" hold - cues to avoid anterior tipping of scap and core activation  - standing shld ext; green theraband tube; 10x w/ 10" hold-  cues to avoid anterior tipping of scap and core activation    Moist heat pack applied to cervical spine x 00 minutes post session.    Patient Education and Home Exercises     Home Exercises Provided and Patient Education Provided     Education provided:   - postural mechanics    Written Home Exercises Provided: Patient instructed to cont prior HEP. Exercises were reviewed and Tomasa was able to demonstrate them prior to the end of the session.  Tomasa demonstrated good  understanding of the education provided. See EMR under Patient Instructions for exercises provided during therapy sessions    ASSESSMENT   See updated POC in treatment section.      Tomasa Is progressing well towards her goals.   Pt prognosis is Good.     Pt will continue to benefit from skilled outpatient physical therapy to address the deficits listed in the problem list box on initial evaluation, provide pt/family education and to maximize pt's level of independence in the home and community environment.     Pt's spiritual, cultural and educational needs considered and pt agreeable to plan of care and goals.     Anticipated barriers to physical therapy: none  Goals:   Short Term Goals: 4 weeks  1.Report decreased neck pain  <   / =  4 /10  to increase tolerance for adls, work MET 3/8/22  2. Increase cervical ROM by 5-10 degrees in order to perform ADLs with " decreased difficulty. MET 3/8/22  3. Increase strength in B scapular stabilizers by 1/3 MMT grade to increase tolerance for ADL and work activities. MET 3/8/22  4. Pt to tolerate HEP to improve ROM and independence with ADL's MET 3/8/22     Long Term Goals: 8 weeks  1.Report decreased neck pain  <   / =  2  /10  to increase tolerance for adls, work  MET 3/8/22  2. Improve DNF endurance test by 15 seconds to improve ability to maintain appropriate posture   MET 3/8/22  3.Increased strength in B scapular stabilizers to >/= 4+/5 MMT grade to increase tolerance for ADL and work activities.  (Progressing, not met)  4.  Pt will report at neck level (39% impaired) on FOTO neck score for neck pain disability to demonstrate decrease in disability and improvement in neck pain.  (Progressing, not met)    PLAN     Progress DNF strength and cervical/thoracic mobility.    Reyna Buitrago, PT, DPT

## 2022-03-09 NOTE — PROGRESS NOTES
"[facility]   Physical Therapy Treatment Note     Name: Tomasa Reed  Clinic Number: [id number]    Therapy Diagnosis:   Encounter Diagnoses   Name Primary?    Decreased range of motion of neck Yes    Posture abnormality     Painful cervical range of motion      Physician: Rosanna Huizar    Visit Date: 3/10/2022    Physician Orders: PT Eval and Treat   Medical Diagnosis from Referral: M54.2 (ICD-10-CM) - Neck pain  Evaluation Date: 1/25/2022  Authorization Period Expiration: 01/25/2023  Plan of Care Expiration: 03/08/2022  Visit # / Visits authorized: 12/17  PTA Visit #: 1 / 5     Time In: 9:00 am  Time Out: 10:00 am  Total Treatment Time: 60 minutes   Total Billable Time: 45 minutes (2 TE, 1 MT,     Precautions: Standard    SUBJECTIVE     Patient reports: past two nights she has woken up in the middle of the night with left sided neck pain and is upset because she felt she was doing so much better.   She was compliant with home exercise program.  Response to previous treatment: felt usual muscular fatigue   Functional change: none to note today     Pain: 7/10 currently  Location: bilateral neck      OBJECTIVE     Objective Measures updated at progress report unless specified.- See POC in treatment section     Treatment     Tomasa received the treatments listed below:      THERAPEUTIC EXERCISES to develop strength, endurance, ROM, flexibility, posture and core stabilization for 30 minutes including reassessment and:  - SL open books: 15x ea side  - Pec stretch on 1/2 foam roll; 2 min   - Supine thoracic extensions over 1/2 foam: 2 x 10, 3 second hold  - Supine chin tuck; 20x w/ 3" hold  - Supine chin tuck and lift; 3 x 15 w/ 3" hold  - Scap pinch with B ER; GTB, 3 x 15 w/ 5" hold      MANUAL THERAPY TECHNIQUES including Joint mobilizations, Manual traction and Soft tissue Mobilization were applied to cervical/thoracic spine for 15 minutes.   - Soft tissue massage to B " "upper traps , levator scap, cervical paraspinals and suboccipitals   - Suboccipital release.     Not Today:  - CT junction mobilization in prone  - t/s HVLAT in supine  - R 1st rib mobilizations   - FMP to R middle scalene      NEUROMUSCULAR RE-EDUCATION ACTIVITIES to improve Balance, Coordination, Kinesthetic, Sense, Proprioception and Posture for 0 minutes.  The following were included:     Not Performed today :   - chin tuck and turn 3 x 10 ea; performed at wall  - Wall posture; 3 x 1  min  - Seated scap retractions w/ chin tucks; 20x w 5" hold  - standing low rows; pink theraband tube; 10x w/ 10" hold - cues to avoid anterior tipping of scap and core activation  - standing shld ext; green theraband tube; 10x w/ 10" hold-  cues to avoid anterior tipping of scap and core activation    Moist heat pack applied to cervical spine x 10 minutes post session.    Patient Education and Home Exercises     Home Exercises Provided and Patient Education Provided     Education provided:   - postural mechanics    Written Home Exercises Provided: Patient instructed to cont prior HEP. Exercises were reviewed and Tomasa was able to demonstrate them prior to the end of the session.  Tomasa demonstrated good  understanding of the education provided. See EMR under Patient Instructions for exercises provided during therapy sessions    ASSESSMENT   Pt presents with significant increase in pain today so session focused on manual interventions and pain reduction . Pt responded well to manual interventions ( STM and manipulations preformed by Reyna Buitrago, PT)  and was able to tolerate performance of there-ex after with no increased pain. Will continue to progress next as tolerated.      Tomasa Is progressing well towards her goals.   Pt prognosis is Good.     Pt will continue to benefit from skilled outpatient physical therapy to address the deficits listed in the problem list box on initial evaluation, provide pt/family education and " to maximize pt's level of independence in the home and community environment.   Pt's spiritual, cultural and educational needs considered and pt agreeable to plan of care and goals.     Anticipated barriers to physical therapy: none  Goals:   Short Term Goals: 4 weeks  1.Report decreased neck pain  <   / =  4 /10  to increase tolerance for adls, work MET 3/8/22  2. Increase cervical ROM by 5-10 degrees in order to perform ADLs with decreased difficulty. MET 3/8/22  3. Increase strength in B scapular stabilizers by 1/3 MMT grade to increase tolerance for ADL and work activities. MET 3/8/22  4. Pt to tolerate HEP to improve ROM and independence with ADL's MET 3/8/22     Long Term Goals: 8 weeks  1.Report decreased neck pain  <   / =  2  /10  to increase tolerance for adls, work  MET 3/8/22  2. Improve DNF endurance test by 15 seconds to improve ability to maintain appropriate posture   MET 3/8/22  3.Increased strength in B scapular stabilizers to >/= 4+/5 MMT grade to increase tolerance for ADL and work activities.  (Progressing, not met)  4.  Pt will report at neck level (39% impaired) on FOTO neck score for neck pain disability to demonstrate decrease in disability and improvement in neck pain.  (Progressing, not met)    PLAN     Progress DNF strength and cervical/thoracic mobility.    Candi Dupree, PTA

## 2022-03-10 ENCOUNTER — CLINICAL SUPPORT (OUTPATIENT)
Dept: REHABILITATION | Facility: HOSPITAL | Age: 72
End: 2022-03-10
Payer: MEDICARE

## 2022-03-10 DIAGNOSIS — R29.898 DECREASED RANGE OF MOTION OF NECK: Primary | ICD-10-CM

## 2022-03-10 DIAGNOSIS — M54.2 PAINFUL CERVICAL RANGE OF MOTION: ICD-10-CM

## 2022-03-10 DIAGNOSIS — R29.3 POSTURE ABNORMALITY: ICD-10-CM

## 2022-03-10 PROCEDURE — 97140 MANUAL THERAPY 1/> REGIONS: CPT | Mod: CQ

## 2022-03-10 PROCEDURE — 97110 THERAPEUTIC EXERCISES: CPT | Mod: CQ

## 2022-03-10 NOTE — PROGRESS NOTES
"[facility]   Physical Therapy Treatment Note     Name: Tomasa Reed  Clinic Number: [id number]    Therapy Diagnosis:   Encounter Diagnoses   Name Primary?    Decreased range of motion of neck Yes    Posture abnormality     Painful cervical range of motion      Physician: Rosanna Huizar    Visit Date: 3/10/2022    Physician Orders: PT Eval and Treat   Medical Diagnosis from Referral: M54.2 (ICD-10-CM) - Neck pain  Evaluation Date: 1/25/2022  Authorization Period Expiration: 01/25/2023  Plan of Care Expiration: 03/08/2022  Visit # / Visits authorized: 12/17  PTA Visit #: 0 / 5     Time In: 0900 am  Time Out: 0915 am  Total Treatment Time: 15 minutes   Total Billable Time: 15 minutes (1 MT)    Precautions: Standard    SUBJECTIVE     Patient reports: intense neck pain that last 2 nights which has kept her from sleeping well  She was compliant with home exercise program.  Response to previous treatment: felt a little sore   Functional change: none to note today     Pain: 7/10 (pre-treatment); 2/10 (post-treatment)  Location: bilateral neck      OBJECTIVE     Objective Measures updated at progress report unless specified.- See POC in treatment section     Treatment     Tomasa received the treatments listed below:      THERAPEUTIC EXERCISES to develop strength, endurance, ROM, flexibility, posture and core stabilization for 00 minutes including reassessment and:  - SL open books: 15x ea side  - Pec stretch on 1/2 foam roll; 2 min   - Supine thoracic extensions over 1/2 foam: 2 x 10, 3 second hold  - Supine chin tuck; 20x w/ 3" hold  - Supine chin tuck and lift; 3 x 15 w/ 3" hold  - Scap pinch with B ER; GTB, 3 x 15 w/ 5" hold      MANUAL THERAPY TECHNIQUES including Joint mobilizations, Manual traction and Soft tissue Mobilization were applied to cervical/thoracic spine for 15 minutes.   - C2-3 opening mob with movement; Gr 2-3 R  - STM cervical paraspinals  - Suboccipital " "release  - OA, C1-2 Closing mob; G 3-4 R  - upper cervical  Distraction    Not Today:  - CT junction mobilization in prone  - t/s HVLAT in supine  - R 1st rib mobilizations   - FMP to R middle scalene      NEUROMUSCULAR RE-EDUCATION ACTIVITIES to improve Balance, Coordination, Kinesthetic, Sense, Proprioception and Posture for  00 minutes.  The following were included:  - chin tuck and turn 3 x 10 ea; performed at wall  - Wall posture; 3 x 1  min  - Seated scap retractions w/ chin tucks; 20x w 5" hold    - standing low rows; pink theraband tube; 10x w/ 10" hold - cues to avoid anterior tipping of scap and core activation  - standing shld ext; green theraband tube; 10x w/ 10" hold-  cues to avoid anterior tipping of scap and core activation    Moist heat pack applied to cervical spine x 00 minutes post session.    Patient Education and Home Exercises     Home Exercises Provided and Patient Education Provided     Education provided:   - postural mechanics    Written Home Exercises Provided: Patient instructed to cont prior HEP. Exercises were reviewed and Tomasa was able to demonstrate them prior to the end of the session.  Tomasa demonstrated good  understanding of the education provided. See EMR under Patient Instructions for exercises provided during therapy sessions    ASSESSMENT   Tomasa presented with increased neck pain today 2/2 acute facet dysfunction. PT performed manual interventions which improved joint mobility and greatly decreased patient's subjective pain report. Session was continued after this as stevenudled with Candi Dupree PTA.   Tomasa Is progressing well towards her goals.   Pt prognosis is Good.     Pt will continue to benefit from skilled outpatient physical therapy to address the deficits listed in the problem list box on initial evaluation, provide pt/family education and to maximize pt's level of independence in the home and community environment.     Pt's spiritual, cultural and " educational needs considered and pt agreeable to plan of care and goals.     Anticipated barriers to physical therapy: none  Goals:   Short Term Goals: 4 weeks  1.Report decreased neck pain  <   / =  4 /10  to increase tolerance for adls, work MET 3/8/22  2. Increase cervical ROM by 5-10 degrees in order to perform ADLs with decreased difficulty. MET 3/8/22  3. Increase strength in B scapular stabilizers by 1/3 MMT grade to increase tolerance for ADL and work activities. MET 3/8/22  4. Pt to tolerate HEP to improve ROM and independence with ADL's MET 3/8/22     Long Term Goals: 8 weeks  1.Report decreased neck pain  <   / =  2  /10  to increase tolerance for adls, work  MET 3/8/22  2. Improve DNF endurance test by 15 seconds to improve ability to maintain appropriate posture   MET 3/8/22  3.Increased strength in B scapular stabilizers to >/= 4+/5 MMT grade to increase tolerance for ADL and work activities.  (Progressing, not met)  4.  Pt will report at neck level (39% impaired) on FOTO neck score for neck pain disability to demonstrate decrease in disability and improvement in neck pain.  (Progressing, not met)    PLAN     Progress DNF strength and cervical/thoracic mobility.    Reyna Buitrago, PT, DPT

## 2022-03-15 ENCOUNTER — CLINICAL SUPPORT (OUTPATIENT)
Dept: REHABILITATION | Facility: HOSPITAL | Age: 72
End: 2022-03-15
Payer: MEDICARE

## 2022-03-15 DIAGNOSIS — R29.898 DECREASED RANGE OF MOTION OF NECK: Primary | ICD-10-CM

## 2022-03-15 DIAGNOSIS — M54.2 PAINFUL CERVICAL RANGE OF MOTION: ICD-10-CM

## 2022-03-15 DIAGNOSIS — R29.3 POSTURE ABNORMALITY: ICD-10-CM

## 2022-03-15 PROCEDURE — 97110 THERAPEUTIC EXERCISES: CPT

## 2022-03-15 PROCEDURE — 97140 MANUAL THERAPY 1/> REGIONS: CPT

## 2022-03-15 NOTE — PROGRESS NOTES
"[facility]   Physical Therapy Treatment Note     Name: Tomasa Reed  Clinic Number: [id number]    Therapy Diagnosis:   Encounter Diagnoses   Name Primary?    Decreased range of motion of neck Yes    Posture abnormality     Painful cervical range of motion      Physician: Rosanna Huizar    Visit Date: 3/15/2022    Physician Orders: PT Eval and Treat   Medical Diagnosis from Referral: M54.2 (ICD-10-CM) - Neck pain  Evaluation Date: 1/25/2022  Authorization Period Expiration: 01/25/2023  Plan of Care Expiration: 03/08/2022  Visit # / Visits authorized: 12/17  PTA Visit #: 0 / 5     Time In: 1000 am  Time Out: 1100 am  Total Treatment Time: 60 minutes   Total Billable Time: 30 minutes (1 MT, 1 TE)    Precautions: Standard    SUBJECTIVE     Patient reports: intense neck pain that last 2 nights which has kept her from sleeping well  She was compliant with home exercise program.  Response to previous treatment: felt a little sore   Functional change: none to note today     Pain: 7/10 (pre-treatment); 2/10 (post-treatment)  Location: bilateral neck      OBJECTIVE     Objective Measures updated at progress report unless specified.- See POC in treatment section     Treatment     Tomasa received the treatments listed below:      THERAPEUTIC EXERCISES to develop strength, endurance, ROM, flexibility, posture and core stabilization for 15 minutes of 1:1 including reassessment and:  - UBE; 3min fwd/bkwd; Lv 2.5  - SL open books: 15x ea side- NP  - Pec stretch on 1/2 foam roll; 2 min   - Supine thoracic extensions over 1/2 foam: 2 x 10, 3 second hold- NP  - Supine chin tuck; 20x w/ 3" hold  - Supine chin tuck and lift; 2 x 20 w/ 5" hold  - Scap pinch with B ER; GTB, 3 x 15 w/ 5" hold      MANUAL THERAPY TECHNIQUES including Joint mobilizations, Manual traction and Soft tissue Mobilization were applied to cervical/thoracic spine for 15 minutes of 1:1.   - C2-3 opening mob with " "movement; Gr 2-3 R  - OA, C1-2 Closing mob; G 3-4 R  - upper cervical  Distraction    Not Today:  - CT junction mobilization in prone  - t/s HVLAT in supine  - R 1st rib mobilizations   - FMP to R middle scalene  - STM cervical paraspinals  - Suboccipital release      NEUROMUSCULAR RE-EDUCATION ACTIVITIES to improve Balance, Coordination, Kinesthetic, Sense, Proprioception and Posture for  30 minutes under supervision.  The following were included:  - chin tuck and turn 3 x 10 ea; performed at wall  - Wall posture; 3 x 1  min  - Seated scap retractions w/ chin tucks; 20x w 5" hold    - standing low rows; pink theraband tube; 10x w/ 10" hold - cues to avoid anterior tipping of scap and core activation--> today w/ BTB 2 x 15 w/ 5" hold  - standing shld ext; green theraband tube; 10x w/ 10" hold-  cues to avoid anterior tipping of scap and core activation--> today w/ GTB 2 x 15 w/ 5" hold    Next Session:   - Prone scap retractions/rows--cues to avoid UT, perform unilaterally  - prone horiz abd w/ scap adduction- cues to avoid UT, perform unilaterally    Moist heat pack applied to cervical spine x 00 minutes post session.    Patient Education and Home Exercises     Home Exercises Provided and Patient Education Provided     Education provided:   - postural mechanics    Written Home Exercises Provided: Patient instructed to cont prior HEP. Exercises were reviewed and Tomasa was able to demonstrate them prior to the end of the session.  Tomasa demonstrated good  understanding of the education provided. See EMR under Patient Instructions for exercises provided during therapy sessions    ASSESSMENT   Tomasa presents with decreased focal pain today compared to last session, however, still felt notable improvement after manual interventions. She will benefit added scapular strengthening to help prevent reoccurrence. Will continue to progress as able.     Tomasa Is progressing well towards her goals.   Pt prognosis is " Good.     Pt will continue to benefit from skilled outpatient physical therapy to address the deficits listed in the problem list box on initial evaluation, provide pt/family education and to maximize pt's level of independence in the home and community environment.     Pt's spiritual, cultural and educational needs considered and pt agreeable to plan of care and goals.     Anticipated barriers to physical therapy: none  Goals:   Short Term Goals: 4 weeks  1.Report decreased neck pain  <   / =  4 /10  to increase tolerance for adls, work MET 3/8/22  2. Increase cervical ROM by 5-10 degrees in order to perform ADLs with decreased difficulty. MET 3/8/22  3. Increase strength in B scapular stabilizers by 1/3 MMT grade to increase tolerance for ADL and work activities. MET 3/8/22  4. Pt to tolerate HEP to improve ROM and independence with ADL's MET 3/8/22     Long Term Goals: 8 weeks  1.Report decreased neck pain  <   / =  2  /10  to increase tolerance for adls, work  MET 3/8/22  2. Improve DNF endurance test by 15 seconds to improve ability to maintain appropriate posture   MET 3/8/22  3.Increased strength in B scapular stabilizers to >/= 4+/5 MMT grade to increase tolerance for ADL and work activities.  (Progressing, not met)  4.  Pt will report at neck level (39% impaired) on FOTO neck score for neck pain disability to demonstrate decrease in disability and improvement in neck pain.  (Progressing, not met)    PLAN     Progress DNF strength and cervical/thoracic mobility.    Reyna Buitrago, PT, DPT

## 2022-03-16 NOTE — PROGRESS NOTES
"[facility]   Physical Therapy Treatment Note     Name: Tomasa Reed  Clinic Number: [id number]    Therapy Diagnosis:   Encounter Diagnoses   Name Primary?    Decreased range of motion of neck Yes    Posture abnormality     Painful cervical range of motion      Physician: Rosanna Huizar    Visit Date: 3/17/2022    Physician Orders: PT Eval and Treat   Medical Diagnosis from Referral: M54.2 (ICD-10-CM) - Neck pain  Evaluation Date: 1/25/2022  Authorization Period Expiration: 01/25/2023  Plan of Care Expiration: 03/08/2022  Visit # / Visits authorized: 14/17  PTA Visit #: 1 / 5     Time In: 1000 am  Time Out: 1100 am  Total Treatment Time: 60 minutes   Total Billable Time: 30 minutes (1 MT, 1 TE)    Precautions: Standard    SUBJECTIVE     Patient reports: that spot is still there but its definitely improved.   She was compliant with home exercise program.  Response to previous treatment: felt a little sore after   Functional change: none to note today     Pain: 7/10 (pre-treatment); 2/10 (post-treatment)  Location: bilateral neck      OBJECTIVE     Objective Measures updated at progress report unless specified.- See POC in treatment section     Treatment     Tomasa received the treatments listed below:      THERAPEUTIC EXERCISES to develop strength, endurance, ROM, flexibility, posture and core stabilization for 20 minutes of 1:1 including:  - UBE; 3min fwd/bkwd; Lv 2.5  - SL open books: 15x ea side- NP  - Pec stretch on 1/2 foam roll; 2 min   - Supine thoracic extensions over 1/2 foam: 2 x 10, 3 second hold- NP  - Supine chin tuck; 20x w/ 3" hold  - Supine chin tuck and lift; 2 x 20 w/ 5" hold  - Scap pinch with B ER; GTB, 3 x 15 w/ 5" hold      MANUAL THERAPY TECHNIQUES including Joint mobilizations, Manual traction and Soft tissue Mobilization were applied to cervical/thoracic spine for 10 minutes of 1:1.   - STM cervical paraspinals  - Suboccipital release    Not " "Today:  - CT junction mobilization in prone  - t/s HVLAT in supine  - R 1st rib mobilizations   - FMP to R middle scalene  - C2-3 opening mob with movement; Gr 2-3 R  - OA, C1-2 Closing mob; G 3-4 R  - upper cervical  Distraction      NEUROMUSCULAR RE-EDUCATION ACTIVITIES to improve Balance, Coordination, Kinesthetic, Sense, Proprioception and Posture for  30 minutes under supervision.  The following were included:  - chin tuck and turn 3 x 10 ea; performed at wall  - Wall posture; 3 x 1  min  - Seated scap retractions w/ chin tucks; 20x w 5" hold    - standing low rows; pink theraband tube; 10x w/ 10" hold - cues to avoid anterior tipping of scap and core activation--> today w/ BTB 2 x 15 w/ 5" hold  - standing shld ext; green theraband tube; 10x w/ 10" hold-  cues to avoid anterior tipping of scap and core activation--> today w/ GTB 2 x 15 w/ 5" hold    Next Session:   - Prone scap retractions/rows--cues to avoid UT, perform unilaterally  - prone horiz abd w/ scap adduction- cues to avoid UT, perform unilaterally    Moist heat pack applied to cervical spine x 00 minutes post session.    Patient Education and Home Exercises     Home Exercises Provided and Patient Education Provided     Education provided:   - postural mechanics    Written Home Exercises Provided: Patient instructed to cont prior HEP. Exercises were reviewed and Tomasa was able to demonstrate them prior to the end of the session.  Tomasa demonstrated good  understanding of the education provided. See EMR under Patient Instructions for exercises provided during therapy sessions    ASSESSMENT   Pt presents with improved pain reported although tightness remains. She continues to require cues for upright posture and preventing anterior scapular tipping with rows and extensions. She tolerated there-ex well and noted decreased tension following manual interventions.      Tomasa Is progressing well towards her goals.   Pt prognosis is Good.     Pt will " continue to benefit from skilled outpatient physical therapy to address the deficits listed in the problem list box on initial evaluation, provide pt/family education and to maximize pt's level of independence in the home and community environment.     Pt's spiritual, cultural and educational needs considered and pt agreeable to plan of care and goals.     Anticipated barriers to physical therapy: none  Goals:   Short Term Goals: 4 weeks  1.Report decreased neck pain  <   / =  4 /10  to increase tolerance for adls, work MET 3/8/22  2. Increase cervical ROM by 5-10 degrees in order to perform ADLs with decreased difficulty. MET 3/8/22  3. Increase strength in B scapular stabilizers by 1/3 MMT grade to increase tolerance for ADL and work activities. MET 3/8/22  4. Pt to tolerate HEP to improve ROM and independence with ADL's MET 3/8/22     Long Term Goals: 8 weeks  1.Report decreased neck pain  <   / =  2  /10  to increase tolerance for adls, work  MET 3/8/22  2. Improve DNF endurance test by 15 seconds to improve ability to maintain appropriate posture   MET 3/8/22  3.Increased strength in B scapular stabilizers to >/= 4+/5 MMT grade to increase tolerance for ADL and work activities.  (Progressing, not met)  4.  Pt will report at neck level (39% impaired) on FOTO neck score for neck pain disability to demonstrate decrease in disability and improvement in neck pain.  (Progressing, not met)    PLAN     Progress DNF strength and cervical/thoracic mobility.    Candi Dupree, PTA

## 2022-03-17 ENCOUNTER — CLINICAL SUPPORT (OUTPATIENT)
Dept: REHABILITATION | Facility: HOSPITAL | Age: 72
End: 2022-03-17
Payer: MEDICARE

## 2022-03-17 DIAGNOSIS — R29.3 POSTURE ABNORMALITY: ICD-10-CM

## 2022-03-17 DIAGNOSIS — M54.2 PAINFUL CERVICAL RANGE OF MOTION: ICD-10-CM

## 2022-03-17 DIAGNOSIS — R29.898 DECREASED RANGE OF MOTION OF NECK: Primary | ICD-10-CM

## 2022-03-17 PROCEDURE — 97110 THERAPEUTIC EXERCISES: CPT | Mod: CQ

## 2022-03-17 PROCEDURE — 97140 MANUAL THERAPY 1/> REGIONS: CPT | Mod: CQ

## 2022-03-22 ENCOUNTER — CLINICAL SUPPORT (OUTPATIENT)
Dept: REHABILITATION | Facility: HOSPITAL | Age: 72
End: 2022-03-22
Payer: MEDICARE

## 2022-03-22 DIAGNOSIS — R29.3 POSTURE ABNORMALITY: ICD-10-CM

## 2022-03-22 DIAGNOSIS — M54.2 PAINFUL CERVICAL RANGE OF MOTION: ICD-10-CM

## 2022-03-22 DIAGNOSIS — R29.898 DECREASED RANGE OF MOTION OF NECK: Primary | ICD-10-CM

## 2022-03-22 PROCEDURE — 97112 NEUROMUSCULAR REEDUCATION: CPT

## 2022-03-22 NOTE — PROGRESS NOTES
"[facility]   Physical Therapy Treatment Note     Name: Tomasa Reed  Clinic Number: [id number]    Therapy Diagnosis:   Encounter Diagnoses   Name Primary?    Decreased range of motion of neck Yes    Posture abnormality     Painful cervical range of motion      Physician: Rosanna Huizar    Visit Date: 3/22/2022    Physician Orders: PT Eval and Treat   Medical Diagnosis from Referral: M54.2 (ICD-10-CM) - Neck pain  Evaluation Date: 1/25/2022  Authorization Period Expiration: 01/25/2023  Plan of Care Expiration: 03/08/2022  Visit # / Visits authorized: 15/17  PTA Visit #: 0 / 5     Time In: 1000 am  Time Out: 1100 am  Total Treatment Time: 60 minutes   Total Billable Time: 35 minutes (2 NMR)    Precautions: Standard    SUBJECTIVE     Patient reports: that spot is still there but its definitely improved.   She was compliant with home exercise program.  Response to previous treatment: felt a little sore after   Functional change: none to note today     Pain: 7/10 (pre-treatment); 2/10 (post-treatment)  Location: bilateral neck      OBJECTIVE     Objective Measures updated at progress report unless specified.- See POC in treatment section     Treatment     Tomasa received the treatments listed below:      THERAPEUTIC EXERCISES to develop strength, endurance, ROM, flexibility, posture and core stabilization for 25 minutes of under supervision  including:  - UBE; 3min fwd/bkwd; Lv 2.5  - R rotation SNAG; 3 min   - SL open books: 15x ea side  - Pec stretch on 1/2 foam roll; 2 min   - Supine thoracic extensions over 1/2 foam: 2 x 10, 3 second hold  - Supine chin tuck and lift; 2 x 20 w/ 5" hold  - Scap pinch with B ER; GTB, 3 x 15 w/ 5" hold- cues for core activation      MANUAL THERAPY TECHNIQUES including Joint mobilizations, Manual traction and Soft tissue Mobilization were applied to cervical/thoracic spine for 00 minutes.  - STM cervical paraspinals  - Suboccipital " "release    Not Today:  - CT junction mobilization in prone  - t/s HVLAT in supine  - R 1st rib mobilizations   - FMP to R middle scalene  - C2-3 opening mob with movement; Gr 2-3 R  - OA, C1-2 Closing mob; G 3-4 R  - upper cervical  Distraction      NEUROMUSCULAR RE-EDUCATION ACTIVITIES to improve Balance, Coordination, Kinesthetic, Sense, Proprioception and Posture for  35 minutes of 1:1.  The following were included:  - chin tuck and turn 3 x 10 ea; performed at wall  - Wall posture; 3 x 1  min  - Seated scap retractions w/ chin tucks; 20x w 5" hold    - standing low rows; pink theraband tube; 10x w/ 10" hold - cues to avoid anterior tipping of scap and core activation--> today w/ BTB 2 x 15 w/ 5" hold  - standing shld ext; green theraband tube; 10x w/ 10" hold-  cues to avoid anterior tipping of scap and core activation--> today w/ GTB 2 x 15 w/ 5" hold  - Prone scap retractions/rows--cues to avoid UT, perform unilaterally; 2 x 15 w/ 5" hol    Add next session:   - prone horiz abd w/ scap adduction- cues to avoid UT, perform unilaterally    Moist heat pack applied to cervical spine x 00 minutes post session.    Patient Education and Home Exercises     Home Exercises Provided and Patient Education Provided     Education provided:   - postural mechanics    Written Home Exercises Provided: Patient instructed to cont prior HEP. Exercises were reviewed and Tomasa was able to demonstrate them prior to the end of the session.  Tomasa demonstrated good  understanding of the education provided. See EMR under Patient Instructions for exercises provided during therapy sessions. Updated 3/22/22    ASSESSMENT   Pt presents with residual neck pain at upper R cervical during closing positions. She responded well to addition of SNAGs and these were added to her HEP to monitor self-management of this for increased carry over between sessions. She required increased cues for scapular position during prone rows, but will likely " need increase facilitation for horizontal abduction with UT compensation.       Tomasa Is progressing well towards her goals.   Pt prognosis is Good.     Pt will continue to benefit from skilled outpatient physical therapy to address the deficits listed in the problem list box on initial evaluation, provide pt/family education and to maximize pt's level of independence in the home and community environment.     Pt's spiritual, cultural and educational needs considered and pt agreeable to plan of care and goals.     Anticipated barriers to physical therapy: none  Goals:   Short Term Goals: 4 weeks  1.Report decreased neck pain  <   / =  4 /10  to increase tolerance for adls, work MET 3/8/22  2. Increase cervical ROM by 5-10 degrees in order to perform ADLs with decreased difficulty. MET 3/8/22  3. Increase strength in B scapular stabilizers by 1/3 MMT grade to increase tolerance for ADL and work activities. MET 3/8/22  4. Pt to tolerate HEP to improve ROM and independence with ADL's MET 3/8/22     Long Term Goals: 8 weeks  1.Report decreased neck pain  <   / =  2  /10  to increase tolerance for adls, work  MET 3/8/22  2. Improve DNF endurance test by 15 seconds to improve ability to maintain appropriate posture   MET 3/8/22  3.Increased strength in B scapular stabilizers to >/= 4+/5 MMT grade to increase tolerance for ADL and work activities.  (Progressing, not met)  4.  Pt will report at neck level (39% impaired) on FOTO neck score for neck pain disability to demonstrate decrease in disability and improvement in neck pain.  (Progressing, not met)    PLAN     Progress DNF strength and cervical/thoracic mobility.    Reyna Buitrago, PT, DPT

## 2022-03-24 ENCOUNTER — CLINICAL SUPPORT (OUTPATIENT)
Dept: REHABILITATION | Facility: HOSPITAL | Age: 72
End: 2022-03-24
Payer: MEDICARE

## 2022-03-24 DIAGNOSIS — R29.3 POSTURE ABNORMALITY: ICD-10-CM

## 2022-03-24 DIAGNOSIS — M54.2 PAINFUL CERVICAL RANGE OF MOTION: ICD-10-CM

## 2022-03-24 DIAGNOSIS — R29.898 DECREASED RANGE OF MOTION OF NECK: Primary | ICD-10-CM

## 2022-03-24 PROCEDURE — 97140 MANUAL THERAPY 1/> REGIONS: CPT

## 2022-03-24 NOTE — PROGRESS NOTES
"[facility]   Physical Therapy Treatment Note     Name: Tomasa Reed  Clinic Number: [id number]    Therapy Diagnosis:   Encounter Diagnoses   Name Primary?    Decreased range of motion of neck Yes    Posture abnormality     Painful cervical range of motion      Physician: Rosanna Huizar    Visit Date: 3/24/2022    Physician Orders: PT Eval and Treat   Medical Diagnosis from Referral: M54.2 (ICD-10-CM) - Neck pain  Evaluation Date: 1/25/2022  Authorization Period Expiration: 01/25/2023  Plan of Care Expiration: 03/08/2022  Visit # / Visits authorized: 15/17  PTA Visit #: 0 / 5     Time In: 1000 am  Time Out: 1100 am  Total Treatment Time: 60 minutes   Total Billable Time: 35 minutes (2 MT)    Precautions: Standard    SUBJECTIVE     Patient reports: she has had a reoccurrence of focal neck pain and did not notice any improvement with SNAG   She was compliant with home exercise program.  Response to previous treatment: felt a little sore after   Functional change: none to note today     Pain: 8/10   Location: bilateral neck      OBJECTIVE     Objective Measures updated at progress report unless specified.- See POC in treatment section     Treatment     Tomasa received the treatments listed below:      THERAPEUTIC EXERCISES to develop strength, endurance, ROM, flexibility, posture and core stabilization for 15 minutes of under supervision  including:  - UBE; 3min fwd/bkwd; Lv 3.0  - R rotation SNAG; 3 min, 5" hold  - SL open books: 15x ea side- NP  - Pec stretch on 1/2 foam roll; 2 min   - Supine thoracic extensions over 1/2 foam: 2 x 10, 3 second hold  - Supine chin tuck and lift; 2 x 20 w/ 5" hold  - Scap pinch with B ER; GTB, 3 x 15 w/ 5" hold- cues for core activation      MANUAL THERAPY TECHNIQUES including Joint mobilizations, Manual traction and Soft tissue Mobilization were applied to cervical/thoracic spine for 35 minutes of 1:1.  - STM cervical paraspinals  - " "Suboccipital release  - C2-3 opening mob with movement; Gr 2-3 R  - OA, C1-2 Closing mob; G 3-4 R  - upper cervical  Distraction  - R scalene stretch      Not Today:  - CT junction mobilization in prone  - R 1st rib mobilizations   - FMP to R middle scalene      NEUROMUSCULAR RE-EDUCATION ACTIVITIES to improve Balance, Coordination, Kinesthetic, Sense, Proprioception and Posture for  10 minutes under supervision.  The following were included:  - chin tuck and turn 2 x 15 ea; performed at wall  - Wall posture; 3 x 1  Min    Not Today:  - Seated scap retractions w/ chin tucks; 20x w 5" hold    - standing low rows; pink theraband tube; 10x w/ 10" hold - cues to avoid anterior tipping of scap and core activation--> today w/ BTB 2 x 15 w/ 5" hold  - standing shld ext; green theraband tube; 10x w/ 10" hold-  cues to avoid anterior tipping of scap and core activation--> today w/ GTB 2 x 15 w/ 5" hold  - Prone scap retractions/rows--cues to avoid UT, perform unilaterally; 2 x 15 w/ 5" hol    Add next session:   - prone horiz abd w/ scap adduction- cues to avoid UT, perform unilaterally    Moist heat pack applied to cervical spine x 00 minutes post session.    Patient Education and Home Exercises     Home Exercises Provided and Patient Education Provided     Education provided:   - postural mechanics    Written Home Exercises Provided: Patient instructed to cont prior HEP. Exercises were reviewed and Tomasa was able to demonstrate them prior to the end of the session.  Tomasa demonstrated good  understanding of the education provided. See EMR under Patient Instructions for exercises provided during therapy sessions. Updated 3/22/22    ASSESSMENT   Pt presents with residual neck pain at upper R cervical during closing positions. She had increased tenderness today with pain with palpation starting at 8/10 and decreasing to a "stretch" after extensive manual interventions. She was given increased instruction on performance of " SNAGs at home. Will continue to monitor symptom change with this intervention.     Tomasa Is progressing well towards her goals.   Pt prognosis is Good.     Pt will continue to benefit from skilled outpatient physical therapy to address the deficits listed in the problem list box on initial evaluation, provide pt/family education and to maximize pt's level of independence in the home and community environment.     Pt's spiritual, cultural and educational needs considered and pt agreeable to plan of care and goals.     Anticipated barriers to physical therapy: none  Goals:   Short Term Goals: 4 weeks  1.Report decreased neck pain  <   / =  4 /10  to increase tolerance for adls, work MET 3/8/22  2. Increase cervical ROM by 5-10 degrees in order to perform ADLs with decreased difficulty. MET 3/8/22  3. Increase strength in B scapular stabilizers by 1/3 MMT grade to increase tolerance for ADL and work activities. MET 3/8/22  4. Pt to tolerate HEP to improve ROM and independence with ADL's MET 3/8/22     Long Term Goals: 8 weeks  1.Report decreased neck pain  <   / =  2  /10  to increase tolerance for adls, work  MET 3/8/22  2. Improve DNF endurance test by 15 seconds to improve ability to maintain appropriate posture   MET 3/8/22  3.Increased strength in B scapular stabilizers to >/= 4+/5 MMT grade to increase tolerance for ADL and work activities.  (Progressing, not met)  4.  Pt will report at neck level (39% impaired) on FOTO neck score for neck pain disability to demonstrate decrease in disability and improvement in neck pain.  (Progressing, not met)    PLAN     Progress DNF strength and cervical/thoracic mobility.    Reyna Buitrago, PT, DPT

## 2022-03-29 ENCOUNTER — CLINICAL SUPPORT (OUTPATIENT)
Dept: REHABILITATION | Facility: HOSPITAL | Age: 72
End: 2022-03-29
Payer: MEDICARE

## 2022-03-29 DIAGNOSIS — M54.2 PAINFUL CERVICAL RANGE OF MOTION: ICD-10-CM

## 2022-03-29 DIAGNOSIS — R29.3 POSTURE ABNORMALITY: ICD-10-CM

## 2022-03-29 DIAGNOSIS — R29.898 DECREASED RANGE OF MOTION OF NECK: Primary | ICD-10-CM

## 2022-03-29 PROCEDURE — 97110 THERAPEUTIC EXERCISES: CPT

## 2022-03-29 PROCEDURE — 97140 MANUAL THERAPY 1/> REGIONS: CPT

## 2022-03-29 NOTE — PROGRESS NOTES
"[facility]   Physical Therapy Treatment Note     Name: Tomasa Reed  Clinic Number: [id number]    Therapy Diagnosis:   Encounter Diagnoses   Name Primary?    Decreased range of motion of neck Yes    Posture abnormality     Painful cervical range of motion      Physician: Rosanna Huizar    Visit Date: 3/29/2022    Physician Orders: PT Eval and Treat   Medical Diagnosis from Referral: M54.2 (ICD-10-CM) - Neck pain  Evaluation Date: 1/25/2022  Authorization Period Expiration: 01/25/2023  Plan of Care Expiration: 03/08/2022  Visit # / Visits authorized: 18/29  PTA Visit #: 0 / 5     Time In: 1000 am  Time Out: 1100 am  Total Treatment Time: 60 minutes   Total Billable Time: 35 minutes (1 MT, 1 TE)    Precautions: Standard    SUBJECTIVE     Patient reports: improvement in the size of the knot in her right upper trap, but still experiencing a grabbing pain when she rotates to the right; is excited about trying dry needling today   She was compliant with home exercise program.  Response to previous treatment: felt a little sore after   Functional change: none to note today     Pain: 4/10   Location: bilateral neck      OBJECTIVE     Objective Measures updated at progress report unless specified.- See POC in treatment section     Treatment     Tomasa received the treatments listed below:      THERAPEUTIC EXERCISES to develop strength, endurance, ROM, flexibility, posture and core stabilization for 18 minutes of under supervision  Including:  right Upper trap stretch; 10 sec x 10 times  right Levator stretch; 10 sec x 10 times   Supine Chin tucks; 30 times; 5 sec hold   Shoulder shrugs; 3 x 10 reps      Not today:  - UBE; 3min fwd/bkwd; Lv 3.0  - R rotation SNAG; 3 min, 5" hold  - SL open books: 15x ea side- NP  - Pec stretch on 1/2 foam roll; 2 min   - Supine thoracic extensions over 1/2 foam: 2 x 10, 3 second hold  - Supine chin tuck and lift; 2 x 20 w/ 5" hold  - Scap " "pinch with B ER; GTB, 3 x 15 w/ 5" hold- cues for core activation      MANUAL THERAPY TECHNIQUES including Joint mobilizations, Manual traction and Soft tissue Mobilization were applied to cervical/thoracic spine for 35 minutes of 1:1.   -Posterior Anterior cervical joint mobilizations   -Functional Dry Needling to right C2-4 paraspinals, multifidi, levator scap and upper trap     What To Expect After Functional Dry Needling ® Treatments           How will I feel after a session of FDN?     You may feel some soreness immediately after treatment in the area of the body you were treated. This does not always occur but should be expected and is considered normal. It can also take up to a few hours, or even until the next day, to feel an onset of soreness. The soreness may vary from person to person and based on the area of the body that was treated, but it typically feels like you had an intense workout at the gym. Soreness typically lasts 24-48 hours. Make sure to indicate to your provider at a follow-up appointment how long the soreness lasted.   Bruising from the treatment is possible, somehow uncommon, but it is not of concern. Some areas are more likely to bruise than others, including the shoulders, chest, face, and portions of the extremities. Large bruising rarely occurs, but is possible. Use ice to help decrease the bruising and if you feel concern, please call your provider.    It is common to feel tired/fatigued, energized, emotional, giggly, or out of it after treatment. This is a normal response that can last up to an hour or two after treatment. If this lasts beyond a day, contact your provider as a precaution.   There are times when treatment may actually exacerbate your symptoms. This is normal and may indicate that you need to follow up sooner with your practitioner to continue treatment. If this continues past the 24-48 hour window, keep note of it, as this can help your provider adjust your " "treatment plan if needed based on your report. This does not mean that FDN cannot help your condition.    What should I do after my treatment and what is recommended?    We highly recommend increasing your water intake for the next 24 hours after treatment to help avoid or reduce soreness. We also recommend soaking in a hot bath or hot tub to help relieve post treatment soreness and to soften the symptoms associated with the treatment you received. After dry needling treatment, you may do the following based on your comfort level. Please note that if it hurts or exacerbates your symptoms, then discontinuing the activity is best.     Work out and/or stretch.   Participate in normal physical activity.   Massage the area.   Use heat or ice as preferred for post treatment soreness.   Stay hydrated.    If you have prescription medicines, continue to take them as prescribed.    What should I avoid after treatment?     Unfamiliar physical activities or sports.   Doing more than you normally do.   Excessive alcohol intake.     If you are feeling light headed, or experience difficulty breathing, chest pain, or any other concerning symptoms after treatment, call us immediately. If you are unable to get a hold of us, please call your physician.    Not today:   - STM cervical paraspinals  - Suboccipital release  - C2-3 opening mob with movement; Gr 2-3 R  - OA, C1-2 Closing mob; G 3-4 R  - upper cervical  Distraction  - R scalene stretch      Not Today:  - CT junction mobilization in prone  - R 1st rib mobilizations   - FMP to R middle scalene      NEUROMUSCULAR RE-EDUCATION ACTIVITIES to improve Balance, Coordination, Kinesthetic, Sense, Proprioception and Posture for  00 minutes under supervision.  The following were included:  - chin tuck and turn 2 x 15 ea; performed at wall  - Wall posture; 3 x 1  Min    Not Today:  - Seated scap retractions w/ chin tucks; 20x w 5" hold    - standing low rows; pink theraband " "tube; 10x w/ 10" hold - cues to avoid anterior tipping of scap and core activation--> today w/ BTB 2 x 15 w/ 5" hold  - standing shld ext; green theraband tube; 10x w/ 10" hold-  cues to avoid anterior tipping of scap and core activation--> today w/ GTB 2 x 15 w/ 5" hold  - Prone scap retractions/rows--cues to avoid UT, perform unilaterally; 2 x 15 w/ 5" hol    Add next session:   - prone horiz abd w/ scap adduction- cues to avoid UT, perform unilaterally    Moist heat pack applied to cervical spine x 10 minutes post session.    Patient Education and Home Exercises     Home Exercises Provided and Patient Education Provided     Education provided:   - postural mechanics    Written Home Exercises Provided: Patient instructed to cont prior HEP. Exercises were reviewed and Tomasa was able to demonstrate them prior to the end of the session.  Tomasa demonstrated good  understanding of the education provided. See EMR under Patient Instructions for exercises provided during therapy sessions. Updated 3/22/22    ASSESSMENT   Pt presents with residual neck pain at upper R cervical during closing positions. Patient demonstrated appropriate response to Functional Dry Needling. Good trigger point contractions observed to treated muscle groups. Pt experiencing minimal soreness post treatment. Will continue to monitor symptom change with this intervention.      Tomasa Is progressing well towards her goals.   Pt prognosis is Good.     Pt will continue to benefit from skilled outpatient physical therapy to address the deficits listed in the problem list box on initial evaluation, provide pt/family education and to maximize pt's level of independence in the home and community environment.     Pt's spiritual, cultural and educational needs considered and pt agreeable to plan of care and goals.     Anticipated barriers to physical therapy: none  Goals:   Short Term Goals: 4 weeks  1.Report decreased neck pain  <   / =  4 /10  to " increase tolerance for adls, work MET 3/8/22  2. Increase cervical ROM by 5-10 degrees in order to perform ADLs with decreased difficulty. MET 3/8/22  3. Increase strength in B scapular stabilizers by 1/3 MMT grade to increase tolerance for ADL and work activities. MET 3/8/22  4. Pt to tolerate HEP to improve ROM and independence with ADL's MET 3/8/22     Long Term Goals: 8 weeks  1.Report decreased neck pain  <   / =  2  /10  to increase tolerance for adls, work  MET 3/8/22  2. Improve DNF endurance test by 15 seconds to improve ability to maintain appropriate posture   MET 3/8/22  3.Increased strength in B scapular stabilizers to >/= 4+/5 MMT grade to increase tolerance for ADL and work activities.  (Progressing, not met)  4.  Pt will report at neck level (39% impaired) on FOTO neck score for neck pain disability to demonstrate decrease in disability and improvement in neck pain.  (Progressing, not met)    PLAN   Progress DNF strength and cervical/thoracic mobility. Monitor response to Functional Dry Needling. Continue with Functional Dry Needling in POC as appropriate.         Charity Rodriguez, PT, DPT

## 2022-03-31 ENCOUNTER — CLINICAL SUPPORT (OUTPATIENT)
Dept: REHABILITATION | Facility: HOSPITAL | Age: 72
End: 2022-03-31
Payer: MEDICARE

## 2022-03-31 DIAGNOSIS — M54.2 PAINFUL CERVICAL RANGE OF MOTION: ICD-10-CM

## 2022-03-31 DIAGNOSIS — R29.898 DECREASED RANGE OF MOTION OF NECK: Primary | ICD-10-CM

## 2022-03-31 DIAGNOSIS — R29.3 POSTURE ABNORMALITY: ICD-10-CM

## 2022-03-31 PROCEDURE — 97140 MANUAL THERAPY 1/> REGIONS: CPT

## 2022-03-31 NOTE — PROGRESS NOTES
"[facility]   Physical Therapy Treatment Note     Name: Tomasa Reed  Clinic Number: [id number]    Therapy Diagnosis:   Encounter Diagnoses   Name Primary?    Decreased range of motion of neck Yes    Posture abnormality     Painful cervical range of motion      Physician: Rosanna Huizar    Visit Date: 3/31/2022    Physician Orders: PT Eval and Treat   Medical Diagnosis from Referral: M54.2 (ICD-10-CM) - Neck pain  Evaluation Date: 1/25/2022  Authorization Period Expiration: 01/25/2023  Plan of Care Expiration: 03/08/2022  Visit # / Visits authorized: 18/29 (+eval)  PTA Visit #: 0 / 5     Time In: 0900 am  Time Out: 1000 am  Total Treatment Time: 60 minutes   Total Billable Time: 30 minutes (2 MT)    Precautions: Standard    SUBJECTIVE     Patient reports: "feeling about the same, I felt a release with the needles but today I'm back to where I was before"  She was compliant with home exercise program.  Response to previous treatment: felt a little sore after   Functional change: none to note today     Pain: 4/10   Location: bilateral neck      OBJECTIVE     Objective Measures updated at progress report unless specified.- See POC in treatment section     Treatment     Tomasa received the treatments listed below:      THERAPEUTIC EXERCISES to develop strength, endurance, ROM, flexibility, posture and core stabilization for 20 minutes of under supervision  including:  - UBE; 3 min fwd/bkwd; Lv 3.0  - R rotation SNAG; 3 min, 5" hold  - SL open books: 15x ea side- NP  - Pec stretch on 1/2 foam roll; 3 min   - Supine thoracic extensions over 1/2 foam: 2 x 10, 3 second hold -NP  - Supine chin tuck and lift; 2 x 20 w/ 5" hold  - Scap pinch with B ER; GTB, 3 x 15 w/ 5" hold- cues for core activation- NP      MANUAL THERAPY TECHNIQUES including Joint mobilizations, Manual traction and Soft tissue Mobilization were applied to cervical/thoracic spine for 30 minutes of 1:1.  - STM " "UT- cupping  - Suboccipital release  - C2-3 opening mob with movement; Gr 2-3 R    Not Today:  - OA, C1-2 Closing mob; G 3-4 R  - upper cervical  Distraction  - R scalene stretch  - CT junction mobilization in prone  - R 1st rib mobilizations   - FMP to R middle scalene      NEUROMUSCULAR RE-EDUCATION ACTIVITIES to improve Balance, Coordination, Kinesthetic, Sense, Proprioception and Posture for  10 minutes under supervision.  The following were included:  - chin tuck and turn 2 x 15 ea; performed at wall  - Wall posture; 3 x 1  Min    Not Today:  - Seated scap retractions w/ chin tucks; 20x w 5" hold    - standing low rows; pink theraband tube; 10x w/ 10" hold - cues to avoid anterior tipping of scap and core activation--> today w/ BTB 2 x 15 w/ 5" hold  - standing shld ext; green theraband tube; 10x w/ 10" hold-  cues to avoid anterior tipping of scap and core activation--> today w/ GTB 2 x 15 w/ 5" hold  - Prone scap retractions/rows--cues to avoid UT, perform unilaterally; 2 x 15 w/ 5" hol    Add next session:   - prone horiz abd w/ scap adduction- cues to avoid UT, perform unilaterally    Moist heat pack applied to cervical spine x 00 minutes post session.    Patient Education and Home Exercises     Home Exercises Provided and Patient Education Provided     Education provided:   - postural mechanics    Written Home Exercises Provided: Patient instructed to cont prior HEP. Exercises were reviewed and Tomasa was able to demonstrate them prior to the end of the session.  Tomasa demonstrated good  understanding of the education provided. See EMR under Patient Instructions for exercises provided during therapy sessions. Updated 3/22/22    ASSESSMENT   Pt presents with residual neck pain at upper R cervical during closing positions. She demonstrated the SNAGs today that were added to her HEP several visits ago after stating that she had increased pain after these. She was then instructed for the cervical rotation " to be passive and not active. She continues to present similarly session to session, and it was discussed with patient that if progress continues to wane she may want to make follow up appointment with MD. Pt was in agreement.      Tomasa Is progressing well towards her goals.   Pt prognosis is Good.     Pt will continue to benefit from skilled outpatient physical therapy to address the deficits listed in the problem list box on initial evaluation, provide pt/family education and to maximize pt's level of independence in the home and community environment.     Pt's spiritual, cultural and educational needs considered and pt agreeable to plan of care and goals.     Anticipated barriers to physical therapy: none  Goals:   Short Term Goals: 4 weeks  1.Report decreased neck pain  <   / =  4 /10  to increase tolerance for adls, work MET 3/8/22  2. Increase cervical ROM by 5-10 degrees in order to perform ADLs with decreased difficulty. MET 3/8/22  3. Increase strength in B scapular stabilizers by 1/3 MMT grade to increase tolerance for ADL and work activities. MET 3/8/22  4. Pt to tolerate HEP to improve ROM and independence with ADL's MET 3/8/22     Long Term Goals: 8 weeks  1.Report decreased neck pain  <   / =  2  /10  to increase tolerance for adls, work  MET 3/8/22  2. Improve DNF endurance test by 15 seconds to improve ability to maintain appropriate posture   MET 3/8/22  3.Increased strength in B scapular stabilizers to >/= 4+/5 MMT grade to increase tolerance for ADL and work activities.  (Progressing, not met)  4.  Pt will report at neck level (39% impaired) on FOTO neck score for neck pain disability to demonstrate decrease in disability and improvement in neck pain.  (Progressing, not met)    PLAN     Progress DNF strength and cervical/thoracic mobility.    Reyna Buitrago, PT, DPT

## 2022-04-05 ENCOUNTER — CLINICAL SUPPORT (OUTPATIENT)
Dept: REHABILITATION | Facility: HOSPITAL | Age: 72
End: 2022-04-05
Payer: MEDICARE

## 2022-04-05 DIAGNOSIS — R29.3 POSTURE ABNORMALITY: ICD-10-CM

## 2022-04-05 DIAGNOSIS — M54.2 PAINFUL CERVICAL RANGE OF MOTION: ICD-10-CM

## 2022-04-05 DIAGNOSIS — R29.898 DECREASED RANGE OF MOTION OF NECK: Primary | ICD-10-CM

## 2022-04-05 PROCEDURE — 97140 MANUAL THERAPY 1/> REGIONS: CPT | Mod: CQ

## 2022-04-05 PROCEDURE — 97110 THERAPEUTIC EXERCISES: CPT | Mod: CQ

## 2022-04-05 PROCEDURE — 97112 NEUROMUSCULAR REEDUCATION: CPT | Mod: CQ

## 2022-04-05 NOTE — PROGRESS NOTES
"[facility]   Physical Therapy Treatment Note     Name: Tomasa Reed  Clinic Number: [id number]    Therapy Diagnosis:   No diagnosis found.  Physician: Rosanna Huizar*    Visit Date: 4/5/2022    Physician Orders: PT Eval and Treat   Medical Diagnosis from Referral: M54.2 (ICD-10-CM) - Neck pain  Evaluation Date: 1/25/2022  Authorization Period Expiration: 01/25/2023  Plan of Care Expiration:04/05/2022***  Visit # / Visits authorized: 18/29 (+eval)  PTA Visit #: 0 / 5     Time In: 0900 am  Time Out: 1000 am  Total Treatment Time: 60 minutes   Total Billable Time: 30 minutes (2 MT)    Precautions: Standard    SUBJECTIVE     Patient reports: "feeling about the same, I felt a release with the needles but today I'm back to where I was before"  She was compliant with home exercise program.  Response to previous treatment: felt a little sore after   Functional change: none to note today     Pain: 4/10   Location: bilateral neck      OBJECTIVE     Objective Measures updated at progress report unless specified.- See POC in treatment section     Treatment     Tomasa received the treatments listed below:      THERAPEUTIC EXERCISES to develop strength, endurance, ROM, flexibility, posture and core stabilization for 20 minutes of under supervision  including:  - UBE; 3 min fwd/bkwd; Lv 3.0  - R rotation SNAG; 3 min, 5" hold  - SL open books: 15x ea side- NP  - Pec stretch on 1/2 foam roll; 3 min   - Supine thoracic extensions over 1/2 foam: 2 x 10, 3 second hold -NP  - Supine chin tuck and lift; 2 x 20 w/ 5" hold  - Scap pinch with B ER; GTB, 3 x 15 w/ 5" hold- cues for core activation- NP      MANUAL THERAPY TECHNIQUES including Joint mobilizations, Manual traction and Soft tissue Mobilization were applied to cervical/thoracic spine for 30 minutes of 1:1.  - STM UT- cupping  - Suboccipital release  - C2-3 opening mob with movement; Gr 2-3 R    Not Today:  - OA, C1-2 Closing mob; G " "3-4 R  - upper cervical  Distraction  - R scalene stretch  - CT junction mobilization in prone  - R 1st rib mobilizations   - FMP to R middle scalene      NEUROMUSCULAR RE-EDUCATION ACTIVITIES to improve Balance, Coordination, Kinesthetic, Sense, Proprioception and Posture for  10 minutes under supervision.  The following were included:  - chin tuck and turn 2 x 15 ea; performed at wall  - Wall posture; 3 x 1  Min    Not Today:  - Seated scap retractions w/ chin tucks; 20x w 5" hold    - standing low rows; pink theraband tube; 10x w/ 10" hold - cues to avoid anterior tipping of scap and core activation--> today w/ BTB 2 x 15 w/ 5" hold  - standing shld ext; green theraband tube; 10x w/ 10" hold-  cues to avoid anterior tipping of scap and core activation--> today w/ GTB 2 x 15 w/ 5" hold  - Prone scap retractions/rows--cues to avoid UT, perform unilaterally; 2 x 15 w/ 5" hol    Add next session:   - prone horiz abd w/ scap adduction- cues to avoid UT, perform unilaterally    Moist heat pack applied to cervical spine x 00 minutes post session.    Patient Education and Home Exercises     Home Exercises Provided and Patient Education Provided     Education provided:   - postural mechanics    Written Home Exercises Provided: Patient instructed to cont prior HEP. Exercises were reviewed and Tomasa was able to demonstrate them prior to the end of the session.  Tomasa demonstrated good  understanding of the education provided. See EMR under Patient Instructions for exercises provided during therapy sessions. Updated 3/22/22    ASSESSMENT   Pt presents with residual neck pain at upper R cervical during closing positions. She demonstrated the SNAGs today that were added to her HEP several visits ago after stating that she had increased pain after these. She was then instructed for the cervical rotation to be passive and not active. She continues to present similarly session to session, and it was discussed with patient " that if progress continues to wane she may want to make follow up appointment with MD. Pt was in agreement.      Tomasa Is progressing well towards her goals.   Pt prognosis is Good.     Pt will continue to benefit from skilled outpatient physical therapy to address the deficits listed in the problem list box on initial evaluation, provide pt/family education and to maximize pt's level of independence in the home and community environment.     Pt's spiritual, cultural and educational needs considered and pt agreeable to plan of care and goals.     Anticipated barriers to physical therapy: none  Goals:   Short Term Goals: 4 weeks  1.Report decreased neck pain  <   / =  4 /10  to increase tolerance for adls, work MET 3/8/22  2. Increase cervical ROM by 5-10 degrees in order to perform ADLs with decreased difficulty. MET 3/8/22  3. Increase strength in B scapular stabilizers by 1/3 MMT grade to increase tolerance for ADL and work activities. MET 3/8/22  4. Pt to tolerate HEP to improve ROM and independence with ADL's MET 3/8/22     Long Term Goals: 8 weeks  1.Report decreased neck pain  <   / =  2  /10  to increase tolerance for adls, work  MET 3/8/22  2. Improve DNF endurance test by 15 seconds to improve ability to maintain appropriate posture   MET 3/8/22  3.Increased strength in B scapular stabilizers to >/= 4+/5 MMT grade to increase tolerance for ADL and work activities.  (Progressing, not met)  4.  Pt will report at neck level (39% impaired) on FOTO neck score for neck pain disability to demonstrate decrease in disability and improvement in neck pain.  (Progressing, not met)    PLAN     Progress DNF strength and cervical/thoracic mobility.    Reyna Buitrago, PT, DPT

## 2022-04-05 NOTE — PROGRESS NOTES
[facility]   Physical Therapy Treatment Note     Name: Tomasa Reed  Clinic Number: [id number]    Therapy Diagnosis:   Encounter Diagnoses   Name Primary?    Decreased range of motion of neck Yes    Posture abnormality     Painful cervical range of motion      Physician: Rosanna Huizar    Visit Date: 4/5/2022    Physician Orders: PT Eval and Treat   Medical Diagnosis from Referral: M54.2 (ICD-10-CM) - Neck pain  Evaluation Date: 1/25/2022  Authorization Period Expiration: 01/25/2023  Plan of Care Expiration: 03/08/2022  Visit # / Visits authorized: 19/29 (+eval)  PTA Visit #: 1 / 5     Time In: 10:00  am  Time Out: 11:00 am  Total Treatment Time: 60 minutes   Total Billable Time: 60  minutes (2 MT , 1 TE , 1 NMR)    Precautions: Standard    SUBJECTIVE     Patient reports: she was feeling great Saturday and Sunday so she decided to spread mulch in her garden on Sunday and now her pain in back and she has been having trouble sleeping again  She was compliant with home exercise program.  Response to previous treatment: felt a little sore after   Functional change: none to note today     Pain: 8/10   Location: bilateral neck      OBJECTIVE     Objective Measures updated at progress report unless specified.- See POC in treatment section     Treatment     Tomasa received the treatments listed below:      Moist heat pack applied to cervical spine x 7 minutes pre session for pain reduction and muscle relaxation . - While collecting subjective , discussing therapy POC and HEP review .    MANUAL THERAPY TECHNIQUES including Joint mobilizations, Manual traction and Soft tissue Mobilization were applied to cervical/thoracic spine for 25 minutes of 1:1.  - STM UT, LS , cervical paraspinals  - Suboccipital release  - Cupping to UT and LS    Not Today:  - OA, C1-2 Closing mob; G 3-4 R  - upper cervical  Distraction  - R scalene stretch  - CT junction mobilization in prone  - R 1st  "rib mobilizations   - FMP to R middle scalene  - C2-3 opening mob with movement; Gr 2-3 R    THERAPEUTIC EXERCISES to develop strength, endurance, ROM, flexibility, posture and core stabilization for 18 minutes of under supervision  including:  - UBE; 3 min fwd/bkwd; Lv 3.0 - NP   - R rotation SNAG; 3 min, 5" hold - performed supine   - Pec stretch on 1/2 foam roll; 3 min   - Supine chin tuck and lift; 2 x 20 w/ 5" hold      NEUROMUSCULAR RE-EDUCATION ACTIVITIES to improve Balance, Coordination, Kinesthetic, Sense, Proprioception and Posture for  10 minutes under supervision.  The following were included:  - chin tuck and turn 2 x 15 ea; performed at wall  - Wall posture; 3 x 1  Min    Not Today:  - SL open books: 15x ea side- NP  - Supine thoracic extensions over 1/2 foam: 2 x 10, 3 second hold -NP  - Scap pinch with B ER; GTB, 3 x 15 w/ 5" hold- cues for core activation- NP  - Seated scap retractions w/ chin tucks; 20x w 5" hold  - standing low rows; pink theraband tube; 10x w/ 10" hold - cues to avoid anterior tipping of scap and core activation--> today w/ BTB 2 x 15 w/ 5" hold  - standing shld ext; green theraband tube; 10x w/ 10" hold-  cues to avoid anterior tipping of scap and core activation--> today w/ GTB 2 x 15 w/ 5" hold  - Prone scap retractions/rows--cues to avoid UT, perform unilaterally; 2 x 15 w/ 5" hol    Add next session:   - prone horiz abd w/ scap adduction- cues to avoid UT, perform unilaterally      Patient Education and Home Exercises     Home Exercises Provided and Patient Education Provided     Education provided:   - postural mechanics    Written Home Exercises Provided: Patient instructed to cont prior HEP. Exercises were reviewed and Tomasa was able to demonstrate them prior to the end of the session.  Tomasa demonstrated good  understanding of the education provided. See EMR under Patient Instructions for exercises provided during therapy sessions. Updated 3/22/22    ASSESSMENT   Pt " presents with increased pain today due to gardening this weekend in her R cervical spine and extending down into shoulder. Session initiated with MHP and manual interventions to help with pain relief prior to there-ex which pt responded well too . Pt with improved tolerance and form with performance of SNAG performed supine VS seated. Pt reports decreased shoulder pain/tightness upon completion and improved cervical pain from 8/10 to 4/10 although still prominent .      Tomasa Is progressing well towards her goals.   Pt prognosis is Good.     Pt will continue to benefit from skilled outpatient physical therapy to address the deficits listed in the problem list box on initial evaluation, provide pt/family education and to maximize pt's level of independence in the home and community environment.   Pt's spiritual, cultural and educational needs considered and pt agreeable to plan of care and goals.     Anticipated barriers to physical therapy: none  Goals:   Short Term Goals: 4 weeks  1.Report decreased neck pain  <   / =  4 /10  to increase tolerance for adls, work MET 3/8/22  2. Increase cervical ROM by 5-10 degrees in order to perform ADLs with decreased difficulty. MET 3/8/22  3. Increase strength in B scapular stabilizers by 1/3 MMT grade to increase tolerance for ADL and work activities. MET 3/8/22  4. Pt to tolerate HEP to improve ROM and independence with ADL's MET 3/8/22     Long Term Goals: 8 weeks  1.Report decreased neck pain  <   / =  2  /10  to increase tolerance for adls, work  MET 3/8/22  2. Improve DNF endurance test by 15 seconds to improve ability to maintain appropriate posture   MET 3/8/22  3.Increased strength in B scapular stabilizers to >/= 4+/5 MMT grade to increase tolerance for ADL and work activities.  (Progressing, not met)  4.  Pt will report at neck level (39% impaired) on FOTO neck score for neck pain disability to demonstrate decrease in disability and improvement in neck pain.   (Progressing, not met)    PLAN     Progress DNF strength and cervical/thoracic mobility.    Candi Dupree, PTA

## 2022-04-07 ENCOUNTER — CLINICAL SUPPORT (OUTPATIENT)
Dept: REHABILITATION | Facility: HOSPITAL | Age: 72
End: 2022-04-07
Payer: MEDICARE

## 2022-04-07 DIAGNOSIS — R29.898 DECREASED RANGE OF MOTION OF NECK: Primary | ICD-10-CM

## 2022-04-07 DIAGNOSIS — R29.3 POSTURE ABNORMALITY: ICD-10-CM

## 2022-04-07 DIAGNOSIS — M54.2 PAINFUL CERVICAL RANGE OF MOTION: ICD-10-CM

## 2022-04-07 PROCEDURE — 97112 NEUROMUSCULAR REEDUCATION: CPT

## 2022-04-07 PROCEDURE — 97110 THERAPEUTIC EXERCISES: CPT

## 2022-04-07 NOTE — PROGRESS NOTES
"[facility]   Physical Therapy Treatment Note     Name: Tomasa Reed  Clinic Number: [id number]    Therapy Diagnosis:   No diagnosis found.  Physician: Rosanna Huizar    Visit Date: 4/7/2022    Physician Orders: PT Eval and Treat   Medical Diagnosis from Referral: M54.2 (ICD-10-CM) - Neck pain  Evaluation Date: 1/25/2022  Authorization Period Expiration: 01/25/2023  Plan of Care Expiration: 03/08/2022  Visit # / Visits authorized: 20/29 (+eval)  PTA Visit #: 0 / 5     Time In: 10:00  am  Time Out: 11:00 am  Total Treatment Time: 60 minutes   Total Billable Time: 60  minutes ( 2 TE , 2 NMR)    Precautions: Standard    SUBJECTIVE     Patient reports: she is able to sleep better, and intense pain is gone, but her original focal stiffness is still there  She was compliant with home exercise program.  Response to previous treatment: felt a little sore after   Functional change: none to note today     Pain: 0/10 (at rest)  Location: bilateral neck      OBJECTIVE     Objective Measures updated at progress report unless specified.      CERVICAL AROM Pain/Dysfunction with Movement   Flexion 48/45 none   Extension 60/90 none   Right side bending 28/40 R pain   Left side bending 25/40 Tightness, dull pain on R   Right rotation 60/90 none   Left rotation 55/90 Sharp pain on R      Upper Extremity Strength    (L) UE (R) UE   Shoulder flexion: 4+/5 4+/5   Shoulder Abduction: 4+/5 4+/5   Shoulder ER 4+/5 4+/5   Shoulder IR 5/5 5/5   Lower Trap 4+/5 4/5   Middle Trap 5/5 5/5   Serratus anterior 4+/5 4/5   Rhomboids 4+/5 4+/5          Treatment     Tomasa received the treatments listed below:        THERAPEUTIC EXERCISES to develop strength, endurance, ROM, flexibility, posture and core stabilization for 30 minutes  including:  - UBE; 3 min fwd/bkwd; Lv 3.0 - NP   - R rotation SNAG; 3 min, 5" hold - performed supine   - Supine chin tuck and lift; 2 x 20 w/ 5" hold    - pt education on " updated HEP    NEUROMUSCULAR RE-EDUCATION ACTIVITIES to improve Balance, Coordination, Kinesthetic, Sense, Proprioception and Posture for  30 minutes.  The following were included:  - chin tuck and turn 2 x 15 ea; performed at wall  - Wall posture; 3 x 1  Min with step away  - cervical rotation with chin tucks at end range; 5 min       Patient Education and Home Exercises     Home Exercises Provided and Patient Education Provided     Education provided:   - postural mechanics    Written Home Exercises Provided: Patient instructed to cont prior HEP. Exercises were reviewed and Tomasa was able to demonstrate them prior to the end of the session.  Tomasa demonstrated good  understanding of the education provided. See EMR under Patient Instructions for exercises provided during therapy sessions. Updated 3/22/22, 4/7/22    ASSESSMENT   Tomasa presents with exacerbation prior to last session resolved. She has progressed well in self-management of neck pain and is able to continue to build upon this at home. She has improved in all objective measures and has met all goals at this time apart from her FOTO score. Pt's primary complaint at this time is recurrences of focal neck pain. She continued to present with a close restriction in her upper-mid R c-spine which causes pain with R rotation, however, this pain is greatly reduced when patient performs this after retracting forward head posture to more upright position. At this time, pt will benefit from continued practice at postural training at home and was instructed that if progress is note main in this in the next 6-8 weeks to return to therapy for re-evaluation as well as returning to [facility] for possible ortho consult.      Tomasa Is progressing well towards her goals.   Pt prognosis is Good.     Pt will continue to benefit from skilled outpatient physical therapy to address the deficits listed in the problem list box on initial evaluation, provide pt/family education  and to maximize pt's level of independence in the home and community environment.   Pt's spiritual, cultural and educational needs considered and pt agreeable to plan of care and goals.     Anticipated barriers to physical therapy: none  Goals:   Short Term Goals: 4 weeks  1.Report decreased neck pain  <   / =  4 /10  to increase tolerance for adls, work MET 3/8/22  2. Increase cervical ROM by 5-10 degrees in order to perform ADLs with decreased difficulty. MET 3/8/22  3. Increase strength in B scapular stabilizers by 1/3 MMT grade to increase tolerance for ADL and work activities. MET 3/8/22  4. Pt to tolerate HEP to improve ROM and independence with ADL's MET 3/8/22     Long Term Goals: 8 weeks  1.Report decreased neck pain  <   / =  2  /10  to increase tolerance for adls, work  MET 3/8/22  2. Improve DNF endurance test by 15 seconds to improve ability to maintain appropriate posture   MET 3/8/22  3.Increased strength in B scapular stabilizers to >/= 4+/5 MMT grade to increase tolerance for ADL and work activities.  MET 4/7/22  4.  Pt will report at neck level (39% impaired) on FOTO neck score for neck pain disability to demonstrate decrease in disability and improvement in neck pain.  NOT MET    PLAN     Place pt on hold for now while she attempts self-managment    Reyna Buitrago, PT, DPT

## 2022-05-06 ENCOUNTER — PATIENT MESSAGE (OUTPATIENT)
Dept: INTERNAL MEDICINE | Facility: CLINIC | Age: 72
End: 2022-05-06
Payer: MEDICARE

## 2022-05-26 ENCOUNTER — LAB VISIT (OUTPATIENT)
Dept: LAB | Facility: HOSPITAL | Age: 72
End: 2022-05-26
Attending: INTERNAL MEDICINE
Payer: MEDICARE

## 2022-05-26 DIAGNOSIS — E78.5 HYPERLIPIDEMIA, UNSPECIFIED HYPERLIPIDEMIA TYPE: ICD-10-CM

## 2022-05-26 LAB
ALT SERPL W/O P-5'-P-CCNC: 16 U/L (ref 10–44)
CHOLEST SERPL-MCNC: 155 MG/DL (ref 120–199)
CHOLEST/HDLC SERPL: 2.2 {RATIO} (ref 2–5)
HDLC SERPL-MCNC: 72 MG/DL (ref 40–75)
HDLC SERPL: 46.5 % (ref 20–50)
LDLC SERPL CALC-MCNC: 74 MG/DL (ref 63–159)
NONHDLC SERPL-MCNC: 83 MG/DL
TRIGL SERPL-MCNC: 45 MG/DL (ref 30–150)

## 2022-05-26 PROCEDURE — 36415 COLL VENOUS BLD VENIPUNCTURE: CPT | Mod: PO | Performed by: INTERNAL MEDICINE

## 2022-05-26 PROCEDURE — 80061 LIPID PANEL: CPT | Performed by: INTERNAL MEDICINE

## 2022-05-26 PROCEDURE — 84460 ALANINE AMINO (ALT) (SGPT): CPT | Performed by: INTERNAL MEDICINE

## 2022-05-27 ENCOUNTER — PATIENT MESSAGE (OUTPATIENT)
Dept: INTERNAL MEDICINE | Facility: CLINIC | Age: 72
End: 2022-05-27
Payer: MEDICARE

## 2022-05-27 NOTE — TELEPHONE ENCOUNTER
Those are not common side effects, but adding a daily miralax dose should help and is not addictive  ++ water and ++ exercise ( think she walks regularly but please clarify as a decrease in activity might also be contributing)    Would try to continue statin as it decreases cardiovascular risk of stroke and heart attack

## 2022-05-30 ENCOUNTER — TELEPHONE (OUTPATIENT)
Dept: INTERNAL MEDICINE | Facility: CLINIC | Age: 72
End: 2022-05-30
Payer: MEDICARE

## 2022-05-30 NOTE — TELEPHONE ENCOUNTER
----- Message from Rosanna Huizar MD sent at 5/27/2022  8:52 AM CDT -----  Your blood work has resulted and your total cholesterol has improved significantly.  Your level was 218 and is now 155, and your liver test is normal.  Please continue your daily rosuvastatin to help control your cardiovascular risk.  Please do not hesitate to call/email if you have any questions or concerns   Rosanna Dominguez

## 2022-06-04 ENCOUNTER — OFFICE VISIT (OUTPATIENT)
Dept: URGENT CARE | Facility: CLINIC | Age: 72
End: 2022-06-04
Payer: MEDICARE

## 2022-06-04 VITALS
SYSTOLIC BLOOD PRESSURE: 155 MMHG | TEMPERATURE: 98 F | HEART RATE: 88 BPM | RESPIRATION RATE: 18 BRPM | HEIGHT: 62 IN | OXYGEN SATURATION: 96 % | DIASTOLIC BLOOD PRESSURE: 75 MMHG | BODY MASS INDEX: 25.4 KG/M2 | WEIGHT: 138 LBS

## 2022-06-04 DIAGNOSIS — J30.1 SEASONAL ALLERGIC RHINITIS DUE TO POLLEN: Primary | ICD-10-CM

## 2022-06-04 DIAGNOSIS — R09.81 NASAL CONGESTION: ICD-10-CM

## 2022-06-04 LAB
CTP QC/QA: YES
SARS-COV-2 RDRP RESP QL NAA+PROBE: NEGATIVE

## 2022-06-04 PROCEDURE — 3077F SYST BP >= 140 MM HG: CPT | Mod: CPTII,S$GLB,, | Performed by: FAMILY MEDICINE

## 2022-06-04 PROCEDURE — 1160F RVW MEDS BY RX/DR IN RCRD: CPT | Mod: CPTII,S$GLB,, | Performed by: FAMILY MEDICINE

## 2022-06-04 PROCEDURE — 1159F MED LIST DOCD IN RCRD: CPT | Mod: CPTII,S$GLB,, | Performed by: FAMILY MEDICINE

## 2022-06-04 PROCEDURE — U0002: ICD-10-PCS | Mod: QW,S$GLB,, | Performed by: FAMILY MEDICINE

## 2022-06-04 PROCEDURE — U0002 COVID-19 LAB TEST NON-CDC: HCPCS | Mod: QW,S$GLB,, | Performed by: FAMILY MEDICINE

## 2022-06-04 PROCEDURE — 99203 PR OFFICE/OUTPT VISIT, NEW, LEVL III, 30-44 MIN: ICD-10-PCS | Mod: S$GLB,,, | Performed by: FAMILY MEDICINE

## 2022-06-04 PROCEDURE — 99203 OFFICE O/P NEW LOW 30 MIN: CPT | Mod: S$GLB,,, | Performed by: FAMILY MEDICINE

## 2022-06-04 PROCEDURE — 1125F PR PAIN SEVERITY QUANTIFIED, PAIN PRESENT: ICD-10-PCS | Mod: CPTII,S$GLB,, | Performed by: FAMILY MEDICINE

## 2022-06-04 PROCEDURE — 3008F BODY MASS INDEX DOCD: CPT | Mod: CPTII,S$GLB,, | Performed by: FAMILY MEDICINE

## 2022-06-04 PROCEDURE — 1160F PR REVIEW ALL MEDS BY PRESCRIBER/CLIN PHARMACIST DOCUMENTED: ICD-10-PCS | Mod: CPTII,S$GLB,, | Performed by: FAMILY MEDICINE

## 2022-06-04 PROCEDURE — 3078F PR MOST RECENT DIASTOLIC BLOOD PRESSURE < 80 MM HG: ICD-10-PCS | Mod: CPTII,S$GLB,, | Performed by: FAMILY MEDICINE

## 2022-06-04 PROCEDURE — 3077F PR MOST RECENT SYSTOLIC BLOOD PRESSURE >= 140 MM HG: ICD-10-PCS | Mod: CPTII,S$GLB,, | Performed by: FAMILY MEDICINE

## 2022-06-04 PROCEDURE — 3008F PR BODY MASS INDEX (BMI) DOCUMENTED: ICD-10-PCS | Mod: CPTII,S$GLB,, | Performed by: FAMILY MEDICINE

## 2022-06-04 PROCEDURE — 1125F AMNT PAIN NOTED PAIN PRSNT: CPT | Mod: CPTII,S$GLB,, | Performed by: FAMILY MEDICINE

## 2022-06-04 PROCEDURE — 3078F DIAST BP <80 MM HG: CPT | Mod: CPTII,S$GLB,, | Performed by: FAMILY MEDICINE

## 2022-06-04 PROCEDURE — 1159F PR MEDICATION LIST DOCUMENTED IN MEDICAL RECORD: ICD-10-PCS | Mod: CPTII,S$GLB,, | Performed by: FAMILY MEDICINE

## 2022-06-04 RX ORDER — FLUTICASONE PROPIONATE 50 MCG
1 SPRAY, SUSPENSION (ML) NASAL DAILY
Qty: 16 G | Refills: 0 | Status: SHIPPED | OUTPATIENT
Start: 2022-06-04

## 2022-06-04 RX ORDER — CETIRIZINE HYDROCHLORIDE 10 MG/1
10 TABLET ORAL DAILY
Qty: 30 TABLET | Refills: 0 | Status: SHIPPED | OUTPATIENT
Start: 2022-06-04 | End: 2022-07-04

## 2022-06-04 RX ORDER — AZELASTINE 1 MG/ML
1 SPRAY, METERED NASAL 2 TIMES DAILY
Qty: 30 ML | Refills: 0 | Status: SHIPPED | OUTPATIENT
Start: 2022-06-04 | End: 2023-03-06

## 2022-06-04 NOTE — PATIENT INSTRUCTIONS
General Discharge Instructions   PLEASE READ YOUR DISCHARGE INSTRUCTIONS ENTIRELY AS IT CONTAINS IMPORTANT INFORMATION.  If you were prescribed a narcotic or controlled medication, do not drive or operate heavy equipment or machinery while taking these medications.  If you were prescribed antibiotics, please take them to completion.  You must understand that you've received an Urgent Care treatment only and that you may be released before all your medical problems are known or treated. You, the patient, will arrange for follow up care as instructed.    OVER THE COUNTER RECOMMENDATIONS/SUGGESTIONS.    Make sure to stay well hydrated.    Use Nasal Saline to mechanically move any post nasal drip from your eustachian tube or from the back of your throat.    Use warm salt water gargles to ease your throat pain. Warm salt water gargles as needed for sore throat- 1/2 tsp salt to 1 cup warm water, gargle as desired.    Use an antihistamine such as Claritin, Zyrtec or Allegra to dry you out.    Use pseudoephedrine (behind the counter) to decongest. Pseudoephedrine 30 mg up to 240 mg /day. It can raise your blood pressure and give you palpitations.    Use mucinex (guaifenesin) to break up mucous up to 2400mg/day to loosen any mucous.    The mucinex DM pill has a cough suppressant that can be sedating. It can be used at night to stop the tickle at the back of your throat.    You can use Mucinex D (it has guaifenesin and a high dose of pseudoephedrine) in the mornings to help decongest.    Use Afrin in each nare for no longer than 3 days, as it is addictive. It can also dry out your mucous membranes and cause elevated blood pressure. This is especially useful if you are flying.    Use Flonase 1-2 sprays/nostril per day. It is a local acting steroid nasal spray, if you develop a bloody nose, stop using the medication immediately.    Sometimes Nyquil at night is beneficial to help you get some rest, however it is sedating and it  does have an antihistamine, and tylenol.    Honey is a natural cough suppressant that can be used.    Tylenol up to 4,000 mg a day is safe for short periods and can be used for body aches, pain, and fever. However in high doses and prolonged use it can cause liver irritation.    Ibuprofen is a non-steroidal anti-inflammatory that can be used for body aches, pain, and fever.However it can also cause stomach irritation if over used.     Follow up with your PCP or specialty clinic as instructed in the next 2-3 days if not improved or as needed. You can call [phone] to schedule an appointment with appropriate provider.      If you condition worsens, we recommend that you receive another evaluation at the emergency room immediately or contact your primary medical clinic's after hours call service to discuss your concerns.      Please return here or go to the Emergency Department for any concerns or worsening condition.   You can also call [phone] to schedule an appointment with the appropriate provider.    Please return here or go to the Emergency Department for any concerns or worsening of condition.    Thank you for choosing [facility]!    Our goal in the Urgent Care is to always provide outstanding medical care. You may receive a survey by mail or e-mail in the next week regarding your experience today. We would greatly appreciate you completing and returning the survey. Your feedback provides us with a way to recognize our staff who provide very good care, and it helps us learn how to improve when your experience was below our aspiration of excellence.      We appreciate you trusting us with your medical care. We hope you feel better soon. We will be happy to take care of you for all of your future medical needs.    Sincerely,    SKYLER Powell  Allergic Rhinnitis  If your condition worsens or fails to improve we recommend that you receive another evaluation at the ER immediately or  "contact your PCP to discuss your concerns or return here. You must understand that you've received an urgent care treatment only and that you may be released before all your medical problems are known or treated. You the patient will arrange for followup care as instructed.   If we discussed that I think your illness is viral, it will not respond to antibiotics and will last 5-7 days. If we discussed "wait and see" antibiotics and if over the next few days the symptoms worsen start the antibiotics I have given you.   -  Flonase (fluticasone) is a nasal spray which is available over the counter and may help with your symptoms.   -  Zyrtec D, Claritin D or Allegra D can also help with symptoms of congestion and drainage.   -  If you have hypertension avoid using the "D" which is the decongestant.  Instead you can use Coricidin HBP for cold and cough symptoms.    -  If you just have drainage you can take plain Zyrtec, Claritin or Allegra   -  If you just have a congested feeling you can take pseudoephedrine (unless you have high blood pressure) which you have to sign for behind the counter. Do not buy the phenylephrine which is on the shelf as it is not effective   -  Rest and fluids are also important.   -  Tylenol or ibuprofen can also be used as directed for pain unless you have an allergy to them or medical condition such as stomach ulcers, kidney or liver disease or blood thinners etc for which you should not be taking these type of medications.   -  If you are flying in the next few days Afrin nose drops for the airplane flight upon take off and landing may help. Other than at those times refrain from using afrin.   - If you were prescribed a narcotic do not drive or operate heavy machinery while taking these medications.       Please return here or go to the Emergency Department for any concerns or worsening of condition.   If you were given wait & see antibiotics, please wait 3-5 days before taking them, and " only take them if your symptoms have worsened or not improved. If you do begin taking the antibiotics, please take them to completion.   Use an antihistamine such as Claritin, Zyrtec or Allegra to dry you out.   If you do not have Hypertension or any history of palpitations, it is ok to take over the counter Sudafed or Mucinex D or Allegra-D or Claritin-D or Zyrtec-D.   Use mucinex (guaifenisin) to break up mucous up to 2400mg/day to loosen any mucous. The mucinex DM pill has a cough suppressant that can be used at night to stop the tickle at the back of your throat.  If you do have Hypertension or palpitations, it is safe to take Coricidin HBP for relief of sinus symptoms.   We recommend you take over the counter Flonase (Fluticasone) or another nasally inhaled steroid unless you are already taking one.   Nasal irrigation with a saline spray or Netti Pot like device per their directions is also recommended.   If not allergic, please take over the counter Tylenol (Acetaminophen) and/or Motrin (Ibuprofen) as directed for control of pain and/or fever.

## 2022-06-04 NOTE — PROGRESS NOTES
"Subjective:       Patient ID: Tomasa Reed is a 72 y.o. female.    Vitals:  height is 5' 2" (1.575 m) and weight is 62.6 kg (138 lb). Her temperature is 97.5 °F (36.4 °C). Her blood pressure is 155/75 (abnormal) and her pulse is 88. Her respiration is 18 and oxygen saturation is 96%.     Chief Complaint: Nasal Congestion    Pt complains of x4 days of nasal congestion, post nasal drip.  Provider note begins below:  She c/o runny nose, itchy nose, watery eyes, sinus congestion and head pressure, symptoms started Tuesday night. She says these symptoms are similar to her allergies. She has been taking dayquil, nyquil, nasonex, benadryl, vit c. She took home cv test and was neg. She takes crestor daily for her hld. No fever or chills. No cough, cp or SOB. No GI related symptoms, including, N/v/D or constipation. No anosmia or ageusia.  Past Medical History:  No date: Cataract      Comment:  OU  No date: Chronic interstitial cystitis  6/22/2017: History of hepatitis C; S/p RX with SVR 12 documented 06/ 2017  No date: HLD (hyperlipidemia)  No date: Malignant melanoma of skin of trunk, except scrotum  No date: Migraine, unspecified, without mention of intractable   migraine without mention of status migrainosus  No date: Nonspecific abnormal results of liver function study  No date: Nontoxic multinodular goiter  No date: Osteopenia     Sinus Problem  This is a new problem. The current episode started in the past 7 days. The problem is unchanged. There has been no fever. The fever has been present for less than 1 day. Her pain is at a severity of 8/10. The pain is mild. Associated symptoms include congestion, sinus pressure and sneezing. Pertinent negatives include no chills, coughing, diaphoresis, ear pain, headaches, hoarse voice, neck pain, shortness of breath, sore throat or swollen glands.       Constitution: Negative for activity change, appetite change, chills, sweating, fatigue, fever, unexpected weight " change and generalized weakness.   HENT: Positive for congestion, sinus pain and sinus pressure. Negative for ear pain, ear discharge, nosebleeds, foreign body in nose, postnasal drip, sore throat, trouble swallowing and voice change.    Neck: Negative for neck pain and neck stiffness.   Cardiovascular: Negative for chest pain, leg swelling, palpitations, sob on exertion and passing out.   Eyes: Positive for eye discharge and eye itching. Negative for eye trauma, foreign body in eye, eye pain, eye redness, photophobia, vision loss, double vision, blurred vision and eyelid swelling.        Clear watery eyes, itchy   Respiratory: Negative for chest tightness, cough, sputum production, bloody sputum, COPD, shortness of breath, stridor, wheezing and asthma.    Allergic/Immunologic: Positive for environmental allergies, seasonal allergies and sneezing. Negative for asthma.   Neurological: Negative for headaches.       Objective:      Physical Exam   Constitutional: She is oriented to person, place, and time. She appears well-developed.  Non-toxic appearance. She does not appear ill. No distress.      Comments:There is no erythema or edema over the involved cheekbone or periorbital area, there is no cheek tenderness or tenderness with percussion of the upper teeth, and purulent drainage within the nose or in the posterior pharynx.   There is no ocular pain or eyelid swelling and erythema.  There is no  pain with eye movements, proptosis, or diplopia       HENT:   Head: Normocephalic and atraumatic.   Ears:   Right Ear: External ear normal.   Left Ear: External ear normal.   Nose: Nose normal.   Mouth/Throat: Uvula is midline, oropharynx is clear and moist and mucous membranes are normal. No trismus in the jaw. No uvula swelling. No oropharyngeal exudate, posterior oropharyngeal edema, posterior oropharyngeal erythema, tonsillar abscesses or cobblestoning. No tonsillar exudate.   Eyes: Conjunctivae, EOM and lids are  normal. Pupils are equal, round, and reactive to light. Right eye exhibits normal extraocular motion. Left eye exhibits normal extraocular motion. Extraocular movement intact      Comments: Denies pain with EOM.    Neck: Trachea normal and phonation normal. Neck supple.   Musculoskeletal: Normal range of motion.         General: Normal range of motion.   Neurological: She is alert and oriented to person, place, and time.   Skin: Skin is warm, dry, intact and not diaphoretic.   Psychiatric: Her speech is normal and behavior is normal. Judgment and thought content normal.   Nursing note and vitals reviewed.        Assessment:       1. Seasonal allergic rhinitis due to pollen    2. Nasal congestion        Results for orders placed or performed in visit on 06/04/22   POCT COVID-19 Rapid Screening   Result Value Ref Range    POC Rapid COVID Negative Negative     Acceptable Yes       Plan:       Negative COVID, symptoms similar to allergy flare up, with itchy watery eyes, congestion.  Advised to try sudafed.  She asked for steroid injection, discussed side effects of steroid injection detail with her she also has history of osteopenia advised risks do not outweigh benefits.      Discussed results/diagnosis/plan with patient in clinic. Strict precautions given to patient to monitor for worsening signs and symptoms. Advised to follow up with PCP or specialist.    Explained side effects of medications prescribed with patient and informed him/her to discontinue use if he/she has any side effects and to inform UC or PCP if this occurs. All questions answered. Strict ED verses clinic return precautions stressed and given in depth. Advised if symptoms worsens of fail to improve he/she should go to the Emergency Room. Discharge and follow-up instructions given verbally/printed with the patient who expressed understanding and willingness to comply with my recommendations. Patient voiced understanding and in  agreement with current treatment plan. Patient exits the exam room in no acute distress. Conversant and engaged during discharge discussion, verbalized understanding.      Seasonal allergic rhinitis due to pollen  -     fluticasone propionate (FLONASE) 50 mcg/actuation nasal spray; 1 spray (50 mcg total) by Each Nostril route once daily.  Dispense: 16 g; Refill: 0  -     azelastine (ASTELIN) 137 mcg (0.1 %) nasal spray; 1 spray (137 mcg total) by Nasal route 2 (two) times daily.  Dispense: 30 mL; Refill: 0  -     cetirizine (ZYRTEC) 10 MG tablet; Take 1 tablet (10 mg total) by mouth once daily.  Dispense: 30 tablet; Refill: 0    Nasal congestion  -     POCT COVID-19 Rapid Screening              Additional MDM:     Heart Failure Score:   COPD = No    Patient Instructions   General Discharge Instructions   PLEASE READ YOUR DISCHARGE INSTRUCTIONS ENTIRELY AS IT CONTAINS IMPORTANT INFORMATION.  If you were prescribed a narcotic or controlled medication, do not drive or operate heavy equipment or machinery while taking these medications.  If you were prescribed antibiotics, please take them to completion.  You must understand that you've received an Urgent Care treatment only and that you may be released before all your medical problems are known or treated. You, the patient, will arrange for follow up care as instructed.    OVER THE COUNTER RECOMMENDATIONS/SUGGESTIONS.    Make sure to stay well hydrated.    Use Nasal Saline to mechanically move any post nasal drip from your eustachian tube or from the back of your throat.    Use warm salt water gargles to ease your throat pain. Warm salt water gargles as needed for sore throat- 1/2 tsp salt to 1 cup warm water, gargle as desired.    Use an antihistamine such as Claritin, Zyrtec or Allegra to dry you out.    Use pseudoephedrine (behind the counter) to decongest. Pseudoephedrine 30 mg up to 240 mg /day. It can raise your blood pressure and give you palpitations.    Use  mucinex (guaifenesin) to break up mucous up to 2400mg/day to loosen any mucous.    The mucinex DM pill has a cough suppressant that can be sedating. It can be used at night to stop the tickle at the back of your throat.    You can use Mucinex D (it has guaifenesin and a high dose of pseudoephedrine) in the mornings to help decongest.    Use Afrin in each nare for no longer than 3 days, as it is addictive. It can also dry out your mucous membranes and cause elevated blood pressure. This is especially useful if you are flying.    Use Flonase 1-2 sprays/nostril per day. It is a local acting steroid nasal spray, if you develop a bloody nose, stop using the medication immediately.    Sometimes Nyquil at night is beneficial to help you get some rest, however it is sedating and it does have an antihistamine, and tylenol.    Honey is a natural cough suppressant that can be used.    Tylenol up to 4,000 mg a day is safe for short periods and can be used for body aches, pain, and fever. However in high doses and prolonged use it can cause liver irritation.    Ibuprofen is a non-steroidal anti-inflammatory that can be used for body aches, pain, and fever.However it can also cause stomach irritation if over used.     Follow up with your PCP or specialty clinic as instructed in the next 2-3 days if not improved or as needed. You can call [phone] to schedule an appointment with appropriate provider.      If you condition worsens, we recommend that you receive another evaluation at the emergency room immediately or contact your primary medical clinic's after hours call service to discuss your concerns.      Please return here or go to the Emergency Department for any concerns or worsening condition.   You can also call [phone] to schedule an appointment with the appropriate provider.    Please return here or go to the Emergency Department for any concerns or worsening of condition.    Thank you for choosing [facility]!    Our goal in the Urgent Care is to always provide outstanding medical care. You may receive a survey by mail or e-mail in the next week regarding your experience today. We would greatly appreciate you completing and returning the survey. Your feedback provides us with a way to recognize our staff who provide very good care, and it helps us learn how to improve when your experience was below our aspiration of excellence.      We appreciate you trusting us with your medical care. We hope you feel better soon. We will be happy to take care of you for all of your future medical needs.    Sincerely,    SKYLER Powell  Allergic Rhinnitis  If your condition worsens or fails to improve we recommend that you receive another evaluation at the ER immediately or contact your PCP to discuss your concerns or return here. You must understand that you've received an urgent care treatment only and that you may be released before all your medical problems are known or treated. You the patient will arrange for followup care as instructed.   If we discussed that I think your illness is viral, it will not respond to antibiotics and will last 5-7 days. If we discussed "wait and see" antibiotics and if over the next few days the symptoms worsen start the antibiotics I have given you.   -  Flonase (fluticasone) is a nasal spray which is available over the counter and may help with your symptoms.   -  Zyrtec D, Claritin D or Allegra D can also help with symptoms of congestion and drainage.   -  If you have hypertension avoid using the "D" which is the decongestant.  Instead you can use Coricidin HBP for cold and cough symptoms.    -  If you just have drainage you can take plain Zyrtec, Claritin or Allegra   -  If you just have a congested feeling you can take pseudoephedrine (unless you have high blood pressure) which you have to sign for behind the counter. Do not buy the phenylephrine which is on the shelf as it is " not effective   -  Rest and fluids are also important.   -  Tylenol or ibuprofen can also be used as directed for pain unless you have an allergy to them or medical condition such as stomach ulcers, kidney or liver disease or blood thinners etc for which you should not be taking these type of medications.   -  If you are flying in the next few days Afrin nose drops for the airplane flight upon take off and landing may help. Other than at those times refrain from using afrin.   - If you were prescribed a narcotic do not drive or operate heavy machinery while taking these medications.       Please return here or go to the Emergency Department for any concerns or worsening of condition.   If you were given wait & see antibiotics, please wait 3-5 days before taking them, and only take them if your symptoms have worsened or not improved. If you do begin taking the antibiotics, please take them to completion.   Use an antihistamine such as Claritin, Zyrtec or Allegra to dry you out.   If you do not have Hypertension or any history of palpitations, it is ok to take over the counter Sudafed or Mucinex D or Allegra-D or Claritin-D or Zyrtec-D.   Use mucinex (guaifenisin) to break up mucous up to 2400mg/day to loosen any mucous. The mucinex DM pill has a cough suppressant that can be used at night to stop the tickle at the back of your throat.  If you do have Hypertension or palpitations, it is safe to take Coricidin HBP for relief of sinus symptoms.   We recommend you take over the counter Flonase (Fluticasone) or another nasally inhaled steroid unless you are already taking one.   Nasal irrigation with a saline spray or Netti Pot like device per their directions is also recommended.   If not allergic, please take over the counter Tylenol (Acetaminophen) and/or Motrin (Ibuprofen) as directed for control of pain and/or fever.

## 2022-07-18 ENCOUNTER — OFFICE VISIT (OUTPATIENT)
Dept: URGENT CARE | Facility: CLINIC | Age: 72
End: 2022-07-18
Payer: MEDICARE

## 2022-07-18 VITALS
WEIGHT: 138 LBS | TEMPERATURE: 98 F | HEIGHT: 62 IN | RESPIRATION RATE: 20 BRPM | HEART RATE: 97 BPM | BODY MASS INDEX: 25.4 KG/M2 | SYSTOLIC BLOOD PRESSURE: 127 MMHG | DIASTOLIC BLOOD PRESSURE: 75 MMHG | OXYGEN SATURATION: 95 %

## 2022-07-18 DIAGNOSIS — R05.9 COUGH: ICD-10-CM

## 2022-07-18 DIAGNOSIS — U07.1 COVID-19 VIRUS INFECTION: Primary | ICD-10-CM

## 2022-07-18 DIAGNOSIS — U07.1 COVID-19 VIRUS DETECTED: ICD-10-CM

## 2022-07-18 LAB
CTP QC/QA: YES
SARS-COV-2 RDRP RESP QL NAA+PROBE: POSITIVE

## 2022-07-18 PROCEDURE — 1160F PR REVIEW ALL MEDS BY PRESCRIBER/CLIN PHARMACIST DOCUMENTED: ICD-10-PCS | Mod: CPTII,S$GLB,, | Performed by: NURSE PRACTITIONER

## 2022-07-18 PROCEDURE — 3078F PR MOST RECENT DIASTOLIC BLOOD PRESSURE < 80 MM HG: ICD-10-PCS | Mod: CPTII,S$GLB,, | Performed by: NURSE PRACTITIONER

## 2022-07-18 PROCEDURE — U0002: ICD-10-PCS | Mod: QW,S$GLB,, | Performed by: NURSE PRACTITIONER

## 2022-07-18 PROCEDURE — U0002 COVID-19 LAB TEST NON-CDC: HCPCS | Mod: QW,S$GLB,, | Performed by: NURSE PRACTITIONER

## 2022-07-18 PROCEDURE — 99213 OFFICE O/P EST LOW 20 MIN: CPT | Mod: S$GLB,,, | Performed by: NURSE PRACTITIONER

## 2022-07-18 PROCEDURE — 3074F SYST BP LT 130 MM HG: CPT | Mod: CPTII,S$GLB,, | Performed by: NURSE PRACTITIONER

## 2022-07-18 PROCEDURE — 3078F DIAST BP <80 MM HG: CPT | Mod: CPTII,S$GLB,, | Performed by: NURSE PRACTITIONER

## 2022-07-18 PROCEDURE — 1160F RVW MEDS BY RX/DR IN RCRD: CPT | Mod: CPTII,S$GLB,, | Performed by: NURSE PRACTITIONER

## 2022-07-18 PROCEDURE — 1125F PR PAIN SEVERITY QUANTIFIED, PAIN PRESENT: ICD-10-PCS | Mod: CPTII,S$GLB,, | Performed by: NURSE PRACTITIONER

## 2022-07-18 PROCEDURE — 1125F AMNT PAIN NOTED PAIN PRSNT: CPT | Mod: CPTII,S$GLB,, | Performed by: NURSE PRACTITIONER

## 2022-07-18 PROCEDURE — 99213 PR OFFICE/OUTPT VISIT, EST, LEVL III, 20-29 MIN: ICD-10-PCS | Mod: S$GLB,,, | Performed by: NURSE PRACTITIONER

## 2022-07-18 PROCEDURE — 1159F PR MEDICATION LIST DOCUMENTED IN MEDICAL RECORD: ICD-10-PCS | Mod: CPTII,S$GLB,, | Performed by: NURSE PRACTITIONER

## 2022-07-18 PROCEDURE — 3074F PR MOST RECENT SYSTOLIC BLOOD PRESSURE < 130 MM HG: ICD-10-PCS | Mod: CPTII,S$GLB,, | Performed by: NURSE PRACTITIONER

## 2022-07-18 PROCEDURE — 1159F MED LIST DOCD IN RCRD: CPT | Mod: CPTII,S$GLB,, | Performed by: NURSE PRACTITIONER

## 2022-07-18 PROCEDURE — 3008F PR BODY MASS INDEX (BMI) DOCUMENTED: ICD-10-PCS | Mod: CPTII,S$GLB,, | Performed by: NURSE PRACTITIONER

## 2022-07-18 PROCEDURE — 3008F BODY MASS INDEX DOCD: CPT | Mod: CPTII,S$GLB,, | Performed by: NURSE PRACTITIONER

## 2022-07-18 NOTE — PROGRESS NOTES
"Subjective:       Patient ID: Tmoasa Reed is a 72 y.o. female.    Vitals:  height is 5' 2" (1.575 m) and weight is 62.6 kg (138 lb). Her temperature is 98.3 °F (36.8 °C). Her blood pressure is 127/75 and her pulse is 97. Her respiration is 20 and oxygen saturation is 95%.     Chief Complaint: Sore Throat    This is a 72 y.o. female who presents today with a chief complaint of sore throat and dry cough x yesterday. Pt was exposed to Covid and tested Positive for Covid with a home test.       Sore Throat   This is a new problem. The current episode started yesterday. The problem has been unchanged. Neither side of throat is experiencing more pain than the other. There has been no fever. The pain is at a severity of 4/10. The pain is mild. Associated symptoms include coughing. Pertinent negatives include no abdominal pain, congestion, diarrhea, ear discharge, ear pain, headaches, hoarse voice, plugged ear sensation, neck pain, shortness of breath, trouble swallowing or vomiting. She has had no exposure to strep or mono. She has tried nothing for the symptoms.       Constitution: Negative for chills, sweating, fatigue and fever.   HENT: Positive for sore throat. Negative for ear pain, ear discharge, tinnitus, hearing loss, congestion, sinus pain, sinus pressure, trouble swallowing and voice change.    Neck: Negative for neck pain and painful lymph nodes.   Cardiovascular: Negative for chest pain, palpitations and sob on exertion.   Respiratory: Positive for cough. Negative for sputum production, shortness of breath and wheezing.    Gastrointestinal: Negative for abdominal pain, nausea, vomiting and diarrhea.   Musculoskeletal: Negative for muscle ache.   Skin: Negative for color change, pale and rash.   Allergic/Immunologic: Negative for sneezing.   Neurological: Negative for headaches, numbness and tingling.   Hematologic/Lymphatic: Negative for swollen lymph nodes.       Objective:      Physical Exam "   Constitutional: She is oriented to person, place, and time. She appears well-developed.  Non-toxic appearance. She does not appear ill. No distress.   HENT:   Head: Normocephalic and atraumatic.   Ears:   Right Ear: Hearing and external ear normal.   Left Ear: Hearing and external ear normal.   Nose: Nose normal. No mucosal edema, rhinorrhea, nasal deformity or congestion. No epistaxis. Right sinus exhibits no maxillary sinus tenderness and no frontal sinus tenderness. Left sinus exhibits no maxillary sinus tenderness and no frontal sinus tenderness.   Mouth/Throat: Uvula is midline, oropharynx is clear and moist and mucous membranes are normal. No trismus in the jaw. Normal dentition. No uvula swelling. No posterior oropharyngeal edema.   Eyes: Conjunctivae and lids are normal. No scleral icterus.   Neck: Trachea normal and phonation normal. Neck supple. No edema present. No erythema present. No neck rigidity present.   Cardiovascular: Normal rate.   Pulmonary/Chest: Effort normal. No respiratory distress. She has no decreased breath sounds.   Abdominal: Normal appearance.   Musculoskeletal: Normal range of motion.         General: No deformity. Normal range of motion.   Neurological: She is alert and oriented to person, place, and time. She exhibits normal muscle tone. Coordination normal.   Skin: Skin is warm, dry, intact, not diaphoretic and not pale.   Nursing note and vitals reviewed.        Results for orders placed or performed in visit on 07/18/22   POCT COVID-19 Rapid Screening   Result Value Ref Range    POC Rapid COVID Positive (A) Negative     Acceptable Yes        Covid risk score:   2    Assessment:       1. COVID-19 virus infection    2. Cough          Plan:         COVID-19 virus infection  -     nirmatrelvir-ritonavir 300 mg (150 mg x 2)-100 mg copackaged tablets (EUA); Take 3 tablets by mouth 2 (two) times daily for 5 days. Each dose contains 2 nirmatrelvir (pink tablets) and 1  ritonavir (white tablet). Take all 3 tablets together  Dispense: 30 tablet; Refill: 0    - patient states the only medication she is taking daily is Crestor.  Instructed to hold medication for 10 days.  Verbalized understanding    Cough  -     POCT COVID-19 Rapid Screening                 Patient Instructions     See additional patient Instructions provided    - Rest.    - Drink plenty of fluids.  - Viral upper respiratory infections typically run their course in 10-14 days.     - Tylenol or Ibuprofen as directed as needed for fever/pain. Avoid tylenol if you have a history of liver disease. Do not take ibuprofen if you have a history of GI bleeding, kidney disease, or if you take blood thinners.     - You can take over-the-counter claritin, zyrtec, allegra, or xyzal as directed. These are antihistamines that can help with runny nose, nasal congestion, sneezing, and helps to dry up post-nasal drip, which usually causes sore throat and cough.   - If you do NOT have high blood pressure, you may use a decongestant form (D)  of this medication (ie. Claritin- D, zyrtec-D, allegra-D) or if you do not take the D form, you can take sudafed (pseudoephedrine) over the counter, which is a decongestant. Do NOT take two decongestant (D) medications at the same time (such as mucinex-D and claritin-D or plain sudafed and claritin D)    - You can use Flonase (fluticasone) nasal spray as directed for sinus congestion and postnasal drip. This is a steroid nasal spray that works locally over time to decrease the inflammation in your nose/sinuses and help with allergic symptoms. This is not an quick- relief spray like afrin, but it works well if used daily.  Discontinue if you develop nose bleed  - use nasal saline prior to Flonase.  - Use Ocean Spray Nasal Saline 1-3 puffs each nostril every 2-3 hours then blow out onto tissue. This is to irrigate the nasal passage way to clear the sinus openings. Use until sinus problem  resolved.    - you can take plain Mucinex (guaifenesin) 1200 mg twice a day to help loosen mucous.     -warm salt water gargles can help with sore throat    - warm tea with honey can help with cough. Honey is a natural cough suppressant.    - Dextromethorphan (DM) is a cough suppressant over the counter (ie. mucinex DM, robitussin, delsym; dayquil/nyquil has DM as well.)    - Follow up with your PCP or specialty clinic as directed in the next 1-2 weeks if not improved or as needed.  You can call [phone] to schedule an appointment with the appropriate provider.      - Go to the ER if you develop new or worsening symptoms.     - You must understand that you have received an Urgent Care treatment only and that you may be released before all of your medical problems are known or treated.   - You, the patient, will arrange for follow up care as instructed.   - If your condition worsens or fails to improve we recommend that you receive another evaluation at the ER immediately or contact your PCP to discuss your concerns or return here.     You have tested positive for COVID-19 today.           ISOLATION    If you tested positive and do not have symptoms, you must isolate for 5 days starting on the day of the positive test.          If you tested positive and have symptoms, you must isolate for 5 days starting on the day of the first symptoms,  not the day of the positive test.       Both the CDC and the [facility] emphasize that you do not test out of isolation.         After 5 days, if your symptoms have improved and you have not had fever on day 5, you can return to the community on day 6- NO TESTING REQUIRED!          In fact, we do not retest if you were positive in the last 90 days.         After your 5 days of isolation are completed, the CDC recommends strict mask use for the first 5 days that you come out of isolation.    Patient Instructions   - You must understand that you have received an Urgent Care treatment  only and that you may be released before all of your medical problems are known or treated.   - You, the patient, will arrange for follow up care as instructed.   - If your condition worsens or fails to improve we recommend that you receive another evaluation at the ER immediately or contact your PCP to discuss your concerns or return here.     Advised on return/follow-up precautions. Advised on ER precautions. Answered all patient questions. Patient verbalized understanding and voiced agreement with current treatment plan.

## 2022-07-18 NOTE — PATIENT INSTRUCTIONS
See additional patient Instructions provided    - Rest.    - Drink plenty of fluids.  - Viral upper respiratory infections typically run their course in 10-14 days.     - Tylenol or Ibuprofen as directed as needed for fever/pain. Avoid tylenol if you have a history of liver disease. Do not take ibuprofen if you have a history of GI bleeding, kidney disease, or if you take blood thinners.     - You can take over-the-counter claritin, zyrtec, allegra, or xyzal as directed. These are antihistamines that can help with runny nose, nasal congestion, sneezing, and helps to dry up post-nasal drip, which usually causes sore throat and cough.   - If you do NOT have high blood pressure, you may use a decongestant form (D)  of this medication (ie. Claritin- D, zyrtec-D, allegra-D) or if you do not take the D form, you can take sudafed (pseudoephedrine) over the counter, which is a decongestant. Do NOT take two decongestant (D) medications at the same time (such as mucinex-D and claritin-D or plain sudafed and claritin D)    - You can use Flonase (fluticasone) nasal spray as directed for sinus congestion and postnasal drip. This is a steroid nasal spray that works locally over time to decrease the inflammation in your nose/sinuses and help with allergic symptoms. This is not an quick- relief spray like afrin, but it works well if used daily.  Discontinue if you develop nose bleed  - use nasal saline prior to Flonase.  - Use Ocean Spray Nasal Saline 1-3 puffs each nostril every 2-3 hours then blow out onto tissue. This is to irrigate the nasal passage way to clear the sinus openings. Use until sinus problem resolved.    - you can take plain Mucinex (guaifenesin) 1200 mg twice a day to help loosen mucous.     -warm salt water gargles can help with sore throat    - warm tea with honey can help with cough. Honey is a natural cough suppressant.    - Dextromethorphan (DM) is a cough suppressant over the counter (ie. mucinex DM,  robitussin, delsym; dayquil/nyquil has DM as well.)    - Follow up with your PCP or specialty clinic as directed in the next 1-2 weeks if not improved or as needed.  You can call [phone] to schedule an appointment with the appropriate provider.      - Go to the ER if you develop new or worsening symptoms.     - You must understand that you have received an Urgent Care treatment only and that you may be released before all of your medical problems are known or treated.   - You, the patient, will arrange for follow up care as instructed.   - If your condition worsens or fails to improve we recommend that you receive another evaluation at the ER immediately or contact your PCP to discuss your concerns or return here.     You have tested positive for COVID-19 today.           ISOLATION    If you tested positive and do not have symptoms, you must isolate for 5 days starting on the day of the positive test.          If you tested positive and have symptoms, you must isolate for 5 days starting on the day of the first symptoms,  not the day of the positive test.       Both the CDC and the [facility] emphasize that you do not test out of isolation.         After 5 days, if your symptoms have improved and you have not had fever on day 5, you can return to the community on day 6- NO TESTING REQUIRED!          In fact, we do not retest if you were positive in the last 90 days.         After your 5 days of isolation are completed, the CDC recommends strict mask use for the first 5 days that you come out of isolation.    Patient Instructions   - You must understand that you have received an Urgent Care treatment only and that you may be released before all of your medical problems are known or treated.   - You, the patient, will arrange for follow up care as instructed.   - If your condition worsens or fails to improve we recommend that you receive another evaluation at the ER immediately or contact your PCP to discuss your  concerns or return here.     Advised on return/follow-up precautions. Advised on ER precautions. Answered all patient questions. Patient verbalized understanding and voiced agreement with current treatment plan.

## 2022-07-21 ENCOUNTER — NURSE TRIAGE (OUTPATIENT)
Dept: ADMINISTRATIVE | Facility: CLINIC | Age: 72
End: 2022-07-21
Payer: MEDICARE

## 2022-07-21 NOTE — TELEPHONE ENCOUNTER
Pt called in response to [facility] escalation and she said that she is feeling much better and thinks its the paxlovid that has helped her and she is c/o of sore throat. Pt triaged for this and care advice to be seen for possible strept from protocol used. . Pt said that she does have a drip in her throat and told to try some warm salt water gargles and maybe something to dry up nasal drip. I will message provider to see if they would recommend anything else in treatment and pt to call back as needed. Pt told care advice to be seen today.    Reason for Disposition   Sore throat is main symptom and persists > 48 hours    Additional Information   Negative: SEVERE difficulty breathing (e.g., struggling for each breath, speaks in single words)   Negative: Sounds like a life-threatening emergency to the triager   Negative: Drooling or spitting out saliva (because can't swallow)   Negative: Unable to open mouth completely   Negative: Drinking very little and has signs of dehydration (e.g., no urine > 12 hours, very dry mouth, very lightheaded)   Negative: Patient sounds very sick or weak to the triager   Negative: Difficulty breathing (per caller) but not severe   Negative: Fever > 103 F (39.4 C)   Negative: Refuses to drink anything for > 12 hours   Negative: SEVERE sore throat pain   Negative: Pus on tonsils (back of throat) and swollen neck lymph nodes ('glands')   Negative: Earache also present   Negative: Widespread rash (especially chest and abdomen)   Negative: Diabetes mellitus or weak immune system (e.g., HIV positive, cancer chemo, splenectomy, organ transplant, chronic steroids)   Negative: History of rheumatic fever   Negative: Patient wants to be seen   Negative: Fever present > 3 days (72 hours)   Negative: Patient requesting a strep throat test   Negative: Strep exposure within last 10 days    Protocols used: SORE THROAT-A-OH

## 2022-08-03 ENCOUNTER — TELEPHONE (OUTPATIENT)
Dept: SPORTS MEDICINE | Facility: CLINIC | Age: 72
End: 2022-08-03
Payer: MEDICARE

## 2022-08-03 ENCOUNTER — HOSPITAL ENCOUNTER (OUTPATIENT)
Dept: RADIOLOGY | Facility: HOSPITAL | Age: 72
Discharge: HOME | End: 2022-08-03
Attending: ORTHOPAEDIC SURGERY
Payer: MEDICARE

## 2022-08-03 ENCOUNTER — PATIENT MESSAGE (OUTPATIENT)
Dept: SPORTS MEDICINE | Facility: CLINIC | Age: 72
End: 2022-08-03

## 2022-08-03 ENCOUNTER — OFFICE VISIT (OUTPATIENT)
Dept: SPORTS MEDICINE | Facility: CLINIC | Age: 72
End: 2022-08-03
Payer: MEDICARE

## 2022-08-03 VITALS
HEIGHT: 62 IN | SYSTOLIC BLOOD PRESSURE: 137 MMHG | WEIGHT: 143.19 LBS | BODY MASS INDEX: 26.35 KG/M2 | DIASTOLIC BLOOD PRESSURE: 84 MMHG | HEART RATE: 82 BPM

## 2022-08-03 DIAGNOSIS — M25.562 LEFT KNEE PAIN, UNSPECIFIED CHRONICITY: ICD-10-CM

## 2022-08-03 DIAGNOSIS — G89.29 CHRONIC PAIN OF LEFT KNEE: ICD-10-CM

## 2022-08-03 DIAGNOSIS — M17.12 PRIMARY OSTEOARTHRITIS OF LEFT KNEE: Primary | ICD-10-CM

## 2022-08-03 DIAGNOSIS — M25.562 CHRONIC PAIN OF LEFT KNEE: ICD-10-CM

## 2022-08-03 PROCEDURE — 3075F PR MOST RECENT SYSTOLIC BLOOD PRESS GE 130-139MM HG: ICD-10-PCS | Mod: CPTII,S$GLB,, | Performed by: ORTHOPAEDIC SURGERY

## 2022-08-03 PROCEDURE — 20610 LARGE JOINT ASPIRATION/INJECTION: L KNEE: ICD-10-PCS | Mod: LT,S$GLB,, | Performed by: ORTHOPAEDIC SURGERY

## 2022-08-03 PROCEDURE — 1125F PR PAIN SEVERITY QUANTIFIED, PAIN PRESENT: ICD-10-PCS | Mod: CPTII,S$GLB,, | Performed by: ORTHOPAEDIC SURGERY

## 2022-08-03 PROCEDURE — 99204 PR OFFICE/OUTPT VISIT, NEW, LEVL IV, 45-59 MIN: ICD-10-PCS | Mod: 25,S$GLB,, | Performed by: ORTHOPAEDIC SURGERY

## 2022-08-03 PROCEDURE — 3288F PR FALLS RISK ASSESSMENT DOCUMENTED: ICD-10-PCS | Mod: CPTII,S$GLB,, | Performed by: ORTHOPAEDIC SURGERY

## 2022-08-03 PROCEDURE — 3079F DIAST BP 80-89 MM HG: CPT | Mod: CPTII,S$GLB,, | Performed by: ORTHOPAEDIC SURGERY

## 2022-08-03 PROCEDURE — 99204 OFFICE O/P NEW MOD 45 MIN: CPT | Mod: 25,S$GLB,, | Performed by: ORTHOPAEDIC SURGERY

## 2022-08-03 PROCEDURE — 73564 X-RAY EXAM KNEE 4 OR MORE: CPT | Mod: 26,LT,, | Performed by: RADIOLOGY

## 2022-08-03 PROCEDURE — 73562 XR KNEE ORTHO LEFT WITH FLEXION: ICD-10-PCS | Mod: 26,RT,, | Performed by: RADIOLOGY

## 2022-08-03 PROCEDURE — 73564 XR KNEE ORTHO LEFT WITH FLEXION: ICD-10-PCS | Mod: 26,LT,, | Performed by: RADIOLOGY

## 2022-08-03 PROCEDURE — 3079F PR MOST RECENT DIASTOLIC BLOOD PRESSURE 80-89 MM HG: ICD-10-PCS | Mod: CPTII,S$GLB,, | Performed by: ORTHOPAEDIC SURGERY

## 2022-08-03 PROCEDURE — 99999 PR PBB SHADOW E&M-EST. PATIENT-LVL III: CPT | Mod: PBBFAC,,, | Performed by: ORTHOPAEDIC SURGERY

## 2022-08-03 PROCEDURE — 20610 DRAIN/INJ JOINT/BURSA W/O US: CPT | Mod: LT,S$GLB,, | Performed by: ORTHOPAEDIC SURGERY

## 2022-08-03 PROCEDURE — 73562 X-RAY EXAM OF KNEE 3: CPT | Mod: TC,RT

## 2022-08-03 PROCEDURE — 1159F PR MEDICATION LIST DOCUMENTED IN MEDICAL RECORD: ICD-10-PCS | Mod: CPTII,S$GLB,, | Performed by: ORTHOPAEDIC SURGERY

## 2022-08-03 PROCEDURE — 1159F MED LIST DOCD IN RCRD: CPT | Mod: CPTII,S$GLB,, | Performed by: ORTHOPAEDIC SURGERY

## 2022-08-03 PROCEDURE — 3288F FALL RISK ASSESSMENT DOCD: CPT | Mod: CPTII,S$GLB,, | Performed by: ORTHOPAEDIC SURGERY

## 2022-08-03 PROCEDURE — 73562 X-RAY EXAM OF KNEE 3: CPT | Mod: 26,RT,, | Performed by: RADIOLOGY

## 2022-08-03 PROCEDURE — 3075F SYST BP GE 130 - 139MM HG: CPT | Mod: CPTII,S$GLB,, | Performed by: ORTHOPAEDIC SURGERY

## 2022-08-03 PROCEDURE — 1125F AMNT PAIN NOTED PAIN PRSNT: CPT | Mod: CPTII,S$GLB,, | Performed by: ORTHOPAEDIC SURGERY

## 2022-08-03 PROCEDURE — 1101F PR PT FALLS ASSESS DOC 0-1 FALLS W/OUT INJ PAST YR: ICD-10-PCS | Mod: CPTII,S$GLB,, | Performed by: ORTHOPAEDIC SURGERY

## 2022-08-03 PROCEDURE — 3008F BODY MASS INDEX DOCD: CPT | Mod: CPTII,S$GLB,, | Performed by: ORTHOPAEDIC SURGERY

## 2022-08-03 PROCEDURE — 1101F PT FALLS ASSESS-DOCD LE1/YR: CPT | Mod: CPTII,S$GLB,, | Performed by: ORTHOPAEDIC SURGERY

## 2022-08-03 PROCEDURE — 99999 PR PBB SHADOW E&M-EST. PATIENT-LVL III: ICD-10-PCS | Mod: PBBFAC,,, | Performed by: ORTHOPAEDIC SURGERY

## 2022-08-03 PROCEDURE — 3008F PR BODY MASS INDEX (BMI) DOCUMENTED: ICD-10-PCS | Mod: CPTII,S$GLB,, | Performed by: ORTHOPAEDIC SURGERY

## 2022-08-03 RX ADMIN — TRIAMCINOLONE ACETONIDE 40 MG: 40 INJECTION, SUSPENSION INTRA-ARTICULAR; INTRAMUSCULAR at 02:08

## 2022-08-03 NOTE — PROGRESS NOTES
CC: Left knee pain    72 y.o. Female who presents as a new patient to me.  She complains of left knee pain and subjective stiffness with worsening after prolonged periods of walking/standing.  All some subjectively stiff when she first gets up in the morning.  Pain has been present for several months.  Denies prior treatment of significance.  Sometimes upwards of 9/10.  Treatment has included rest, activity modifications, and oral medication.  Denies ipsilateral groin or hip pain.  No back or radicular symptoms.    Pain Score:   9    REVIEW OF SYSTEMS:   Constitution: Negative. Negative for chills, fever and night sweats.    Hematologic/Lymphatic: Negative for bleeding problem. Does not bruise/bleed easily.   Skin: Negative for dry skin, itching and rash.   Musculoskeletal: Negative for falls. Positive for left knee pain and muscle weakness.     All other review of symptoms were reviewed and found to be noncontributory.     PAST MEDICAL HISTORY:   Past Medical History:   Diagnosis Date    Cataract     OU    Chronic interstitial cystitis     History of hepatitis C; S/p RX with SVR 12 documented 06/2017 6/22/2017    HLD (hyperlipidemia)     Malignant melanoma of skin of trunk, except scrotum     Migraine, unspecified, without mention of intractable migraine without mention of status migrainosus     Nonspecific abnormal results of liver function study     Nontoxic multinodular goiter     Osteopenia        PAST SURGICAL HISTORY:   Past Surgical History:   Procedure Laterality Date    BREAST BIOPSY      CATARACT EXTRACTION Right 03/02/2020    cataracts      B/L [facility]    COLONOSCOPY N/A 12/14/2016    Procedure: COLONOSCOPY;  Surgeon: Wicho Painting MD;  Location: [address]);  Service: Endoscopy;  Laterality: N/A;    COLONOSCOPY N/A 1/14/2020    Procedure: COLONOSCOPY;  Surgeon: NANCY Maher MD;  Location: [facility] ([address] [place] [address]);  Service: Endoscopy;  Laterality: N/A;  1/8/20 left  vm to confirm appt-rb    FINGER SURGERY Right 2010    index finger fused    MOLE REMOVAL      TONSILLECTOMY, ADENOIDECTOMY      TOTAL ABDOMINAL HYSTERECTOMY         FAMILY HISTORY:   Family History   Problem Relation Age of Onset    Osteoarthritis Mother         s/p hip fx    Diabetes Mother     Cancer Mother         colon    Thyroid disease Mother     Kidney failure Mother         d.93 s/p PAD procedure    Cataracts Mother     Macular degeneration Mother     Heart failure Father     Stroke Father         d.97 complications    Hypertension Father     Cataracts Father     Fibromyalgia Sister     Glaucoma Sister         dry eyes, s/p duct surgery    Other Sister         acoustic tumor,s/p removal & vertigo resolved, +cochlear implant    Other Daughter         inflammation, better off red meat    Chronic back pain Daughter     GI problems Daughter         liver w/up and on cholestyramine    Obesity Daughter     Melanoma Neg Hx     Amblyopia Neg Hx     Blindness Neg Hx     Retinal detachment Neg Hx     Strabismus Neg Hx        SOCIAL HISTORY:   Social History     Socioeconomic History    Marital status:    Occupational History    Occupation: retired     Employer: [employer]   Tobacco Use    Smoking status: Never Smoker    Smokeless tobacco: Never Used   Substance and Sexual Activity    Alcohol use: Not Currently     Comment: socially    Sexual activity: Yes     Partners: Male   Social History Narrative        Retired  of an elementary school    Has 2 daughters, no thyroid problems    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 She has severe DJD and is diabetic.     Dad d. 97 status post stroke, CHF. Dependent, bed to w/c.      Significant memory decline, usually doesn't recognize family        Two sisters in good health.          SCREENING TESTS:      Social Determinants of Health     Financial Resource Strain: Low Risk     Difficulty of Paying Living Expenses: Not hard at all   Food Insecurity: No Food Insecurity    Worried About Running Out of Food in the Last Year: Never true    Ran Out of Food in the Last Year: Never true   Transportation Needs: No Transportation Needs    Lack of Transportation (Medical): No    Lack of Transportation (Non-Medical): No   Physical Activity: Insufficiently Active    Days of Exercise per Week: 3 days    Minutes of Exercise per Session: 30 min   Stress: Stress Concern Present    Feeling of Stress : To some extent   Social Connections: Unknown    Frequency of Communication with Friends and Family: More than three times a week    Frequency of Social Gatherings with Friends and Family: Twice a week    Active Member of Clubs or Organizations: No    Attends Club or Organization Meetings: Never    Marital Status:    Housing Stability: Low Risk     Unable to Pay for Housing in the Last Year: No    Number of Places Lived in the Last Year: 1    Unstable Housing in the Last Year: No       MEDICATIONS:     Current Outpatient Medications:     cetirizine 10 mg Cap, PRN, Disp: , Rfl:     dietary supplement Pack, Take 1 tablet by mouth 2 (two) times daily., Disp: , Rfl:     glycerin adult suppository, as needed. , Disp: , Rfl:     MISCELLANEOUS MEDICAL SUPPLY MIS, as needed. EYE ITCH RELIEF 0.25 EYE DROPS, Disp: , Rfl:     mometasone (NASONEX) 50 mcg/actuation nasal spray, 2 sprays by Nasal route once daily., Disp: , Rfl:     naproxen sodium 220 mg Cap, PRN, Disp: , Rfl:     OCEAN NASAL 0.65 % nasal spray, as needed. , Disp: , Rfl:     rosuvastatin (CRESTOR) 10 MG tablet, Take 1 tablet (10 mg total) by mouth once daily., Disp: 90 tablet, Rfl: 3    TUMS 200 mg calcium (500 mg) chewable tablet, 1 tablet as needed. , Disp: , Rfl:     alendronate (FOSAMAX) 70 MG tablet, Take 1 tablet (70  "mg total) by mouth every 7 days. (Patient not taking: No sig reported), Disp: 12 tablet, Rfl: 3    azelastine (ASTELIN) 137 mcg (0.1 %) nasal spray, 1 spray (137 mcg total) by Nasal route 2 (two) times daily., Disp: 30 mL, Rfl: 0    betamethasone valerate 0.1% (VALISONE) 0.1 % Crea, Apply topically 2 (two) times daily. x 1-2 wks then prn flares only (Patient not taking: No sig reported), Disp: 45 g, Rfl: 1    fluticasone propionate (FLONASE) 50 mcg/actuation nasal spray, 1 spray (50 mcg total) by Each Nostril route once daily. (Patient not taking: No sig reported), Disp: 16 g, Rfl: 0    ketoconazole (NIZORAL) 2 % shampoo, Wash hair with medicated shampoo at least 2x/week - let sit on scalp at least 5 minutes prior to rinsing (Patient not taking: No sig reported), Disp: 120 mL, Rfl: 5    methocarbamoL (ROBAXIN) 500 MG Tab, Take 1 tablet (500 mg total) by mouth every 8 (eight) hours as needed (muscle tightness). (Patient not taking: No sig reported), Disp: 30 tablet, Rfl: 1    ALLERGIES:   Review of patient's allergies indicates:   Allergen Reactions    Iodine and iodide containing products Nausea And Vomiting     Other reaction(s): SEVERE    Sulfa (sulfonamide antibiotics)      Other reaction(s): unknown  Other reaction(s): RASH        PHYSICAL EXAMINATION:  /84   Pulse 82   Ht 5' 2" (1.575 m)   Wt 65 kg (143 lb 3.2 oz)   BMI 26.19 kg/m²   General: Well-developed well-nourished 72 y.o. femalein no acute distress   Cardiovascular: Regular rhythm by palpation of distal pulse, normal color and temperature, no concerning varicosities on symptomatic side   Lungs: No labored breathing or wheezing appreciated   Neuro: Alert and oriented ×3   Psychiatric: well oriented to person, place and time, demonstrates normal mood and affect   Skin: No rashes, lesions or ulcers, normal temperature, turgor, and texture on involved extremity    Ortho/SPM Exam  Examination of the left knee demonstrates intact extensor " mechanism.  Mild chronic soft tissue swelling.  No effusion.  Pain over the medial joint line.  Positive patellar crepitus and grind.  Pain medially with varus load.  Ligamentously stable.  No peripheral vascular disease.  2+ DP.    IMAGING:  X-rays including standing, weight bearing AP and flexion bilateral knees, LEFT knee lateral and sunrise views ordered and images reviewed by me show:    Moderate to advanced DJD.  Near bone-on-bone in full extension medially.      ASSESSMENT:      ICD-10-CM ICD-9-CM   1. Primary osteoarthritis of left knee  M17.12 715.16   2. Chronic pain of left knee  M25.562 719.46    G89.29 338.29       PLAN:     Findings discussed with the patient.  No prior corticosteroid injection.  The patient would like to pursue non operative treatment.  I think that is very reasonable.  Steroid injection given.  Discussed low-impact cardio exercise.  Quad and functional strengthening.  Knee arthroplasty is an option in the future should she fail to respond to conservative measures.  Return to clinic as needed.      Large Joint Aspiration/Injection: L knee    Date/Time: 8/3/2022 2:00 PM  Performed by: REMY Zuñiga MD  Authorized by: REMY Zuñiga MD     Consent Done?:  Yes (Verbal)  Indications:  Pain  Site marked: the procedure site was marked    Timeout: prior to procedure the correct patient, procedure, and site was verified    Prep: patient was prepped and draped in usual sterile fashion      Local anesthesia used?: Yes    Local anesthetic:  Co-phenylcaine spray (0.2% Naropin)  Anesthetic total (ml):  4      Details:  Needle Size:  22 G  Ultrasonic Guidance for needle placement?: No    Approach:  Lateral  Location:  Knee  Site:  L knee  Medications:  40 mg triamcinolone acetonide 40 mg/mL  Patient tolerance:  Patient tolerated the procedure well with no immediate complications

## 2022-08-03 NOTE — TELEPHONE ENCOUNTER
----- Message from Jeniffer Carroll sent at 8/3/2022  8:50 AM CDT -----  Contact: self @ [phone]  Pt says she is returning Caroline's call concerning a sooner appt with Dr Zuñiga.  Pls call.

## 2022-08-07 RX ORDER — TRIAMCINOLONE ACETONIDE 40 MG/ML
40 INJECTION, SUSPENSION INTRA-ARTICULAR; INTRAMUSCULAR
Status: DISCONTINUED | OUTPATIENT
Start: 2022-08-03 | End: 2022-08-07 | Stop reason: HOSPADM

## 2022-08-08 ENCOUNTER — PATIENT MESSAGE (OUTPATIENT)
Dept: INTERNAL MEDICINE | Facility: CLINIC | Age: 72
End: 2022-08-08
Payer: MEDICARE

## 2022-08-08 DIAGNOSIS — Z12.31 VISIT FOR SCREENING MAMMOGRAM: Primary | ICD-10-CM

## 2022-08-24 ENCOUNTER — IMMUNIZATION (OUTPATIENT)
Dept: INTERNAL MEDICINE | Facility: CLINIC | Age: 72
End: 2022-08-24
Payer: MEDICARE

## 2022-08-24 DIAGNOSIS — Z23 NEED FOR VACCINATION: Primary | ICD-10-CM

## 2022-08-24 PROCEDURE — 0054A COVID-19, MRNA, LNP-S, PF, 30 MCG/0.3 ML DOSE VACCINE (PFIZER): CPT | Mod: CV19,PBBFAC | Performed by: INTERNAL MEDICINE

## 2022-09-01 ENCOUNTER — HOSPITAL ENCOUNTER (OUTPATIENT)
Dept: RADIOLOGY | Facility: HOSPITAL | Age: 72
Discharge: HOME | End: 2022-09-01
Attending: INTERNAL MEDICINE
Payer: MEDICARE

## 2022-09-01 DIAGNOSIS — Z12.31 VISIT FOR SCREENING MAMMOGRAM: ICD-10-CM

## 2022-09-01 PROCEDURE — 77063 BREAST TOMOSYNTHESIS BI: CPT | Mod: TC

## 2022-09-01 PROCEDURE — 77063 MAMMO DIGITAL SCREENING BILAT WITH TOMO: ICD-10-PCS | Mod: 26,,, | Performed by: RADIOLOGY

## 2022-09-01 PROCEDURE — 77067 SCR MAMMO BI INCL CAD: CPT | Mod: TC

## 2022-09-01 PROCEDURE — 77063 BREAST TOMOSYNTHESIS BI: CPT | Mod: 26,,, | Performed by: RADIOLOGY

## 2022-09-01 PROCEDURE — 77067 SCR MAMMO BI INCL CAD: CPT | Mod: 26,,, | Performed by: RADIOLOGY

## 2022-09-01 PROCEDURE — 77067 MAMMO DIGITAL SCREENING BILAT WITH TOMO: ICD-10-PCS | Mod: 26,,, | Performed by: RADIOLOGY

## 2022-09-06 ENCOUNTER — TELEPHONE (OUTPATIENT)
Dept: INTERNAL MEDICINE | Facility: CLINIC | Age: 72
End: 2022-09-06
Payer: MEDICARE

## 2022-09-06 NOTE — TELEPHONE ENCOUNTER
----- Message from Rosanna Huizar MD sent at 9/4/2022  1:36 PM CDT -----  Please note that your mammogram was read as follows  Impression:  There is no mammographic evidence of malignancy.   verna

## 2022-09-26 ENCOUNTER — IMMUNIZATION (OUTPATIENT)
Dept: INTERNAL MEDICINE | Facility: CLINIC | Age: 72
End: 2022-09-26
Payer: MEDICARE

## 2022-09-26 PROCEDURE — G0008 FLU VACCINE - QUADRIVALENT - ADJUVANTED: ICD-10-PCS | Mod: S$GLB,,, | Performed by: INTERNAL MEDICINE

## 2022-09-26 PROCEDURE — G0008 ADMIN INFLUENZA VIRUS VAC: HCPCS | Mod: S$GLB,,, | Performed by: INTERNAL MEDICINE

## 2022-09-26 PROCEDURE — 90694 VACC AIIV4 NO PRSRV 0.5ML IM: CPT | Mod: S$GLB,,, | Performed by: INTERNAL MEDICINE

## 2022-09-26 PROCEDURE — 90694 FLU VACCINE - QUADRIVALENT - ADJUVANTED: ICD-10-PCS | Mod: S$GLB,,, | Performed by: INTERNAL MEDICINE

## 2022-09-27 ENCOUNTER — PATIENT MESSAGE (OUTPATIENT)
Dept: DERMATOLOGY | Facility: CLINIC | Age: 72
End: 2022-09-27
Payer: MEDICARE

## 2022-09-27 DIAGNOSIS — L21.9 ACUTE SEBORRHEIC DERMATITIS: ICD-10-CM

## 2022-09-28 RX ORDER — KETOCONAZOLE 20 MG/ML
SHAMPOO, SUSPENSION TOPICAL
Qty: 120 ML | Refills: 5 | Status: SHIPPED | OUTPATIENT
Start: 2022-09-28 | End: 2022-09-29 | Stop reason: SDUPTHER

## 2022-09-29 DIAGNOSIS — L21.9 ACUTE SEBORRHEIC DERMATITIS: ICD-10-CM

## 2022-09-30 RX ORDER — KETOCONAZOLE 20 MG/ML
SHAMPOO, SUSPENSION TOPICAL
Qty: 120 ML | Refills: 5 | Status: SHIPPED | OUTPATIENT
Start: 2022-09-30 | End: 2023-03-06

## 2022-10-06 ENCOUNTER — PATIENT MESSAGE (OUTPATIENT)
Dept: RESEARCH | Facility: HOSPITAL | Age: 72
End: 2022-10-06
Payer: MEDICARE

## 2022-11-29 ENCOUNTER — TELEPHONE (OUTPATIENT)
Dept: SPORTS MEDICINE | Facility: CLINIC | Age: 72
End: 2022-11-29
Payer: MEDICARE

## 2022-11-29 ENCOUNTER — OFFICE VISIT (OUTPATIENT)
Dept: SPORTS MEDICINE | Facility: CLINIC | Age: 72
End: 2022-11-29
Payer: MEDICARE

## 2022-11-29 VITALS
HEIGHT: 62 IN | DIASTOLIC BLOOD PRESSURE: 79 MMHG | HEART RATE: 76 BPM | WEIGHT: 144 LBS | SYSTOLIC BLOOD PRESSURE: 139 MMHG | BODY MASS INDEX: 26.5 KG/M2

## 2022-11-29 DIAGNOSIS — M17.12 PRIMARY OSTEOARTHRITIS OF LEFT KNEE: Primary | ICD-10-CM

## 2022-11-29 PROCEDURE — 3075F SYST BP GE 130 - 139MM HG: CPT | Mod: CPTII,S$GLB,, | Performed by: ORTHOPAEDIC SURGERY

## 2022-11-29 PROCEDURE — 99214 PR OFFICE/OUTPT VISIT, EST, LEVL IV, 30-39 MIN: ICD-10-PCS | Mod: S$GLB,,, | Performed by: ORTHOPAEDIC SURGERY

## 2022-11-29 PROCEDURE — 3008F PR BODY MASS INDEX (BMI) DOCUMENTED: ICD-10-PCS | Mod: CPTII,S$GLB,, | Performed by: ORTHOPAEDIC SURGERY

## 2022-11-29 PROCEDURE — 1101F PT FALLS ASSESS-DOCD LE1/YR: CPT | Mod: CPTII,S$GLB,, | Performed by: ORTHOPAEDIC SURGERY

## 2022-11-29 PROCEDURE — 3008F BODY MASS INDEX DOCD: CPT | Mod: CPTII,S$GLB,, | Performed by: ORTHOPAEDIC SURGERY

## 2022-11-29 PROCEDURE — 3288F PR FALLS RISK ASSESSMENT DOCUMENTED: ICD-10-PCS | Mod: CPTII,S$GLB,, | Performed by: ORTHOPAEDIC SURGERY

## 2022-11-29 PROCEDURE — 3288F FALL RISK ASSESSMENT DOCD: CPT | Mod: CPTII,S$GLB,, | Performed by: ORTHOPAEDIC SURGERY

## 2022-11-29 PROCEDURE — 99214 OFFICE O/P EST MOD 30 MIN: CPT | Mod: S$GLB,,, | Performed by: ORTHOPAEDIC SURGERY

## 2022-11-29 PROCEDURE — 99999 PR PBB SHADOW E&M-EST. PATIENT-LVL III: ICD-10-PCS | Mod: PBBFAC,,, | Performed by: ORTHOPAEDIC SURGERY

## 2022-11-29 PROCEDURE — 1159F MED LIST DOCD IN RCRD: CPT | Mod: CPTII,S$GLB,, | Performed by: ORTHOPAEDIC SURGERY

## 2022-11-29 PROCEDURE — 3075F PR MOST RECENT SYSTOLIC BLOOD PRESS GE 130-139MM HG: ICD-10-PCS | Mod: CPTII,S$GLB,, | Performed by: ORTHOPAEDIC SURGERY

## 2022-11-29 PROCEDURE — 3078F PR MOST RECENT DIASTOLIC BLOOD PRESSURE < 80 MM HG: ICD-10-PCS | Mod: CPTII,S$GLB,, | Performed by: ORTHOPAEDIC SURGERY

## 2022-11-29 PROCEDURE — 1159F PR MEDICATION LIST DOCUMENTED IN MEDICAL RECORD: ICD-10-PCS | Mod: CPTII,S$GLB,, | Performed by: ORTHOPAEDIC SURGERY

## 2022-11-29 PROCEDURE — 1125F AMNT PAIN NOTED PAIN PRSNT: CPT | Mod: CPTII,S$GLB,, | Performed by: ORTHOPAEDIC SURGERY

## 2022-11-29 PROCEDURE — 99999 PR PBB SHADOW E&M-EST. PATIENT-LVL III: CPT | Mod: PBBFAC,,, | Performed by: ORTHOPAEDIC SURGERY

## 2022-11-29 PROCEDURE — 1125F PR PAIN SEVERITY QUANTIFIED, PAIN PRESENT: ICD-10-PCS | Mod: CPTII,S$GLB,, | Performed by: ORTHOPAEDIC SURGERY

## 2022-11-29 PROCEDURE — 1101F PR PT FALLS ASSESS DOC 0-1 FALLS W/OUT INJ PAST YR: ICD-10-PCS | Mod: CPTII,S$GLB,, | Performed by: ORTHOPAEDIC SURGERY

## 2022-11-29 PROCEDURE — 3078F DIAST BP <80 MM HG: CPT | Mod: CPTII,S$GLB,, | Performed by: ORTHOPAEDIC SURGERY

## 2022-11-29 NOTE — TELEPHONE ENCOUNTER
LV for patient to call back to help her schedule Euflexxa injection.  Please contact the office at [phone]      ----- Message from Caroline Lambert MA sent at 11/29/2022 10:19 AM [facility] -----  Regarding: Euflexxa Inj Appts  Please call patient to schedule Euflexxa Inj appts with Balbir

## 2022-11-29 NOTE — TELEPHONE ENCOUNTER
LVM for patient to let her know that I was return her call to help her schedule an apt for Euflexxa inj.    ----- Message from Caroline Lambert MA sent at 11/29/2022 10:54 AM CST -----  Contact: pt    ----- Message -----  From: Radha Gates  Sent: 11/29/2022  10:53 AM CST  To: Zara Downey Staff    Pt returning call to office       Confirmed patient's contact info below:  Contact Name: Tomasa Reed  Phone Number: [phone]

## 2022-11-29 NOTE — PROGRESS NOTES
CC: Left knee pain  72 y.o. female returns to clinic with left knee pain. She had  steroid injection 3 months ago that provided 100% relief for 2 weeks. Her pain has returned.  Medially based.  Bothers her on a daily basis.  Here to discuss additional treatment options.    Prior Hx:  72 y.o. Female who presents as a new patient to me.  She complains of left knee pain and subjective stiffness with worsening after prolonged periods of walking/standing.  All some subjectively stiff when she first gets up in the morning.  Pain has been present for several months.  Denies prior treatment of significance.  Sometimes upwards of 9/10.  Treatment has included rest, activity modifications, and oral medication.  Denies ipsilateral groin or hip pain.  No back or radicular symptoms.    Pain Score:   7    REVIEW OF SYSTEMS:   Constitution: Negative. Negative for chills, fever and night sweats.    Hematologic/Lymphatic: Negative for bleeding problem. Does not bruise/bleed easily.   Skin: Negative for dry skin, itching and rash.   Musculoskeletal: Negative for falls. Positive for left knee pain and muscle weakness.     All other review of symptoms were reviewed and found to be noncontributory.     PAST MEDICAL HISTORY:   Past Medical History:   Diagnosis Date    Cataract     OU    Chronic interstitial cystitis     History of hepatitis C; S/p RX with SVR 12 documented 06/2017 6/22/2017    HLD (hyperlipidemia)     Malignant melanoma of skin of trunk, except scrotum     Migraine, unspecified, without mention of intractable migraine without mention of status migrainosus     Nonspecific abnormal results of liver function study     Nontoxic multinodular goiter     Osteopenia        PAST SURGICAL HISTORY:   Past Surgical History:   Procedure Laterality Date    BREAST BIOPSY      CATARACT EXTRACTION Right 03/02/2020    cataracts      B/L at [facility]    COLONOSCOPY N/A 12/14/2016    Procedure: COLONOSCOPY;  Surgeon: Wicho BROOKS  MD Garima;  Location: [facility] ENDO (4TH FLR);  Service: Endoscopy;  Laterality: N/A;    COLONOSCOPY N/A 1/14/2020    Procedure: COLONOSCOPY;  Surgeon: NANCY Maher MD;  Location: [facility] ENDO (4TH FLR);  Service: Endoscopy;  Laterality: N/A;  1/8/20 left vm to confirm appt-rb    FINGER SURGERY Right 2010    index finger fused    MOLE REMOVAL      TONSILLECTOMY, ADENOIDECTOMY      TOTAL ABDOMINAL HYSTERECTOMY         FAMILY HISTORY:   Family History   Problem Relation Age of Onset    Osteoarthritis Mother         s/p hip fx    Diabetes Mother     Cancer Mother         colon    Thyroid disease Mother     Kidney failure Mother         d.93 s/p PAD procedure    Cataracts Mother     Macular degeneration Mother     Heart failure Father     Stroke Father         d.97 complications    Hypertension Father     Cataracts Father     Fibromyalgia Sister     Glaucoma Sister         dry eyes, s/p duct surgery    Other Sister         acoustic tumor,s/p removal & vertigo resolved, +cochlear implant    Other Daughter         inflammation, better off red meat    Chronic back pain Daughter     GI problems Daughter         liver w/up and on cholestyramine    Obesity Daughter     Melanoma Neg Hx     Amblyopia Neg Hx     Blindness Neg Hx     Retinal detachment Neg Hx     Strabismus Neg Hx        SOCIAL HISTORY:   Social History     Socioeconomic History    Marital status:    Occupational History    Occupation: retired     Employer: [employer]   Tobacco Use    Smoking status: Never    Smokeless tobacco: Never   Substance and Sexual Activity    Alcohol use: Not Currently     Comment: socially    Sexual activity: Yes     Partners: Male   Social History Narrative        Retired  of an elementary school    Has 2 daughters, no thyroid problems    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 She has severe DJD and is diabetic.     Dad d. 97 status post stroke, CHF. Dependent, bed to w/c.      Significant memory decline, usually doesn't recognize family        Two sisters in good health.         SCREENING TESTS:      Social Determinants of Health     Financial Resource Strain: Low Risk     Difficulty of Paying Living Expenses: Not hard at all   Food Insecurity: No Food Insecurity    Worried About Running Out of Food in the Last Year: Never true    Ran Out of Food in the Last Year: Never true   Transportation Needs: No Transportation Needs    Lack of Transportation (Medical): No    Lack of Transportation (Non-Medical): No   Physical Activity: Insufficiently Active    Days of Exercise per Week: 3 days    Minutes of Exercise per Session: 30 min   Stress: Stress Concern Present    Feeling of Stress : To some extent   Social Connections: Unknown    Frequency of Communication with Friends and Family: More than three times a week    Frequency of Social Gatherings with Friends and Family: Twice a week    Active Member of Clubs or Organizations: No    Attends Club or Organization Meetings: Never    Marital Status:    Housing Stability: Low Risk     Unable to Pay for Housing in the Last Year: No    Number of Places Lived in the Last Year: 1    Unstable Housing in the Last Year: No       MEDICATIONS:     Current Outpatient Medications:     cetirizine 10 mg Cap, PRN, Disp: , Rfl:     dietary supplement Pack, Take 1 tablet by mouth 2 (two) times daily., Disp: , Rfl:     fluticasone propionate (FLONASE) 50 mcg/actuation nasal spray, 1 spray (50 mcg total) by Each Nostril route once daily., Disp: 16 g, Rfl: 0    glycerin adult suppository, as needed. , Disp: , Rfl:     ketoconazole (NIZORAL) 2 % shampoo, Wash hair with medicated shampoo at least 2x/week - let sit on scalp at least 5 minutes prior to rinsing, Disp: 120 mL, Rfl: 5    MISCELLANEOUS MEDICAL SUPPLY MISC, as needed. EYE ITCH RELIEF 0.25 EYE  "DROPS, Disp: , Rfl:     mometasone (NASONEX) 50 mcg/actuation nasal spray, 2 sprays by Nasal route once daily., Disp: , Rfl:     naproxen sodium 220 mg Cap, PRN, Disp: , Rfl:     OCEAN NASAL 0.65 % nasal spray, as needed. , Disp: , Rfl:     rosuvastatin (CRESTOR) 10 MG tablet, Take 1 tablet (10 mg total) by mouth once daily., Disp: 90 tablet, Rfl: 3    TUMS 200 mg calcium (500 mg) chewable tablet, 1 tablet as needed. , Disp: , Rfl:     alendronate (FOSAMAX) 70 MG tablet, Take 1 tablet (70 mg total) by mouth every 7 days. (Patient not taking: No sig reported), Disp: 12 tablet, Rfl: 3    azelastine (ASTELIN) 137 mcg (0.1 %) nasal spray, 1 spray (137 mcg total) by Nasal route 2 (two) times daily., Disp: 30 mL, Rfl: 0    betamethasone valerate 0.1% (VALISONE) 0.1 % Crea, Apply topically 2 (two) times daily. x 1-2 wks then prn flares only (Patient not taking: No sig reported), Disp: 45 g, Rfl: 1    methocarbamoL (ROBAXIN) 500 MG Tab, Take 1 tablet (500 mg total) by mouth every 8 (eight) hours as needed (muscle tightness). (Patient not taking: No sig reported), Disp: 30 tablet, Rfl: 1    ALLERGIES:   Review of patient's allergies indicates:   Allergen Reactions    Iodine and iodide containing products Nausea And Vomiting     Other reaction(s): SEVERE    Sulfa (sulfonamide antibiotics)      Other reaction(s): unknown  Other reaction(s): RASH        PHYSICAL EXAMINATION:  /79   Pulse 76   Ht 5' 2" (1.575 m)   Wt 65.3 kg (144 lb)   BMI 26.34 kg/m²   General: Well-developed well-nourished 72 y.o. femalein no acute distress   Cardiovascular: Regular rhythm by palpation of distal pulse, normal color and temperature, no concerning varicosities on symptomatic side   Lungs: No labored breathing or wheezing appreciated   Neuro: Alert and oriented ×3   Psychiatric: well oriented to person, place and time, demonstrates normal mood and affect   Skin: No rashes, lesions or ulcers, normal temperature, turgor, and texture on " involved extremity    Ortho/SPM Exam  Examination of the left knee again demonstrates intact extensor mechanism.  Mild chronic soft tissue swelling.  No effusion.  Pain over the medial joint line.  Positive patellar crepitus and grind.  Pain medially with varus load.  Ligamentously stable.  No peripheral vascular disease.  2+ DP.    IMAGING:  X-rays including standing, weight bearing AP and flexion bilateral knees, LEFT knee lateral and sunrise views ordered and images reviewed by me show:    Moderate to advanced DJD.  Near bone-on-bone in full extension medially. Kellgren-Nadeem stage 3.    ASSESSMENT:      ICD-10-CM ICD-9-CM   1. Primary osteoarthritis of left knee  M17.12 715.16       PLAN:     Findings discussed with the patient.  Discussed the details of a total knee replacement in addition to other non operative measures.  I gather that the knee bothers her on a daily basis but she is unsure whether she wants to proceed with the replacement.  Alternatively we discussed potential benefits of viscosupplementation.  She will want to proceed with that.  -Plan for Euflexxa injections. Will return next week after authorization  -Continue to wear brace as she wants.  -PT referral  -If she continues to have significant pain and problems despite these additional conservative treatment measures, next step would be the knee replacement surgery as discussed.

## 2022-11-30 ENCOUNTER — TELEPHONE (OUTPATIENT)
Dept: SPORTS MEDICINE | Facility: CLINIC | Age: 72
End: 2022-11-30
Payer: MEDICARE

## 2022-11-30 DIAGNOSIS — M25.562 CHRONIC PAIN OF LEFT KNEE: ICD-10-CM

## 2022-11-30 DIAGNOSIS — M17.12 PRIMARY OSTEOARTHRITIS OF LEFT KNEE: Primary | ICD-10-CM

## 2022-11-30 DIAGNOSIS — G89.29 CHRONIC PAIN OF LEFT KNEE: ICD-10-CM

## 2022-11-30 NOTE — TELEPHONE ENCOUNTER
LVM for patient to call the office to help her schedule Synvisc injection for her left knee.  A new order was put in from Euflexxa to Synvisc due to insurance not the Euflexxa injection.

## 2022-12-07 ENCOUNTER — PES CALL (OUTPATIENT)
Dept: ADMINISTRATIVE | Facility: CLINIC | Age: 72
End: 2022-12-07
Payer: MEDICARE

## 2022-12-08 ENCOUNTER — CLINICAL SUPPORT (OUTPATIENT)
Dept: REHABILITATION | Facility: HOSPITAL | Age: 72
End: 2022-12-08
Attending: ORTHOPAEDIC SURGERY
Payer: MEDICARE

## 2022-12-08 DIAGNOSIS — M25.562 CHRONIC PAIN OF LEFT KNEE: ICD-10-CM

## 2022-12-08 DIAGNOSIS — M17.12 PRIMARY OSTEOARTHRITIS OF LEFT KNEE: ICD-10-CM

## 2022-12-08 DIAGNOSIS — G89.29 CHRONIC PAIN OF LEFT KNEE: ICD-10-CM

## 2022-12-08 PROCEDURE — 97161 PT EVAL LOW COMPLEX 20 MIN: CPT | Mod: KX

## 2022-12-08 PROCEDURE — 97110 THERAPEUTIC EXERCISES: CPT | Mod: KX

## 2022-12-08 NOTE — PLAN OF CARE
[facility]  Physical Therapy Initial Evaluation    Date: 12/8/2022   Name: Tomasa Reed  Clinic Number: [id number]    Therapy Diagnosis: No diagnosis found.  Physician: REMY Zuñiga MD    Physician Orders: PT Eval and Treat   Medical Diagnosis from Referral: M17.12 (ICD-10-CM) - Primary osteoarthritis of left knee  Evaluation Date: 12/8/2022  Authorization Period Expiration: 01/05/2023  Plan of Care Expiration: 2/8/2023  Visit # / Visits authorized: 1/ 1    Time In: 1400  Time Out: 1500  Total Appointment Time (timed & untimed codes): 60 minutes    Precautions: Standard    Subjective   Date of onset: >6 months  History of current condition - Tomasa reports:     Patient reports worsening L knee pain about 6 months ago. She sought medical care and was given a CSI, which helped with knee symptoms. After about 2 weeks the pain returned. Imaging outlined below. She will be getting viscosupplementation injections starting next week and has been prescribed PT.    Pain is medially in the knee. It is aggravated with prolonged standing and walking. Pain also occurs with kneeling. She wears a brace to help prevent sharp pains that occasionally occur when she takes a step, but denies sensation of instability. Ice and NSAIDs help with pain.    She was previously very active walking and biking at gym x2 a week, but has halted due to pain.     PMH - L sided sciatica (2011), High cholesterol    Goals - ADLs without complaint      PER MD NOTE 11/29/2022:    CC: Left knee pain  72 y.o. female returns to clinic with left knee pain. She had  steroid injection 3 months ago that provided 100% relief for 2 weeks. Her pain has returned.  Medially based.  Bothers her on a daily basis.  Here to discuss additional treatment options.     Prior Hx:  72 y.o. Female who presents as a new patient to me.  She complains of left knee pain and subjective stiffness with worsening after prolonged periods of  walking/standing.  All some subjectively stiff when she first gets up in the morning.  Pain has been present for several months.  Denies prior treatment of significance.  Sometimes upwards of 9/10.  Treatment has included rest, activity modifications, and oral medication.  Denies ipsilateral groin or hip pain.  No back or radicular symptoms.     Pain Score:   7    PLAN:     Findings discussed with the patient.  Discussed the details of a total knee replacement in addition to other non operative measures.  I gather that the knee bothers her on a daily basis but she is unsure whether she wants to proceed with the replacement.  Alternatively we discussed potential benefits of viscosupplementation.  She will want to proceed with that.  -Plan for Euflexxa injections. Will return next week after authorization  -Continue to wear brace as she wants.  -PT referral  -If she continues to have significant pain and problems despite these additional conservative treatment measures, next step would be the knee replacement surgery as discussed.     Medical History:   Past Medical History:   Diagnosis Date    Cataract     OU    Chronic interstitial cystitis     History of hepatitis C; S/p RX with SVR 12 documented 06/2017 6/22/2017    HLD (hyperlipidemia)     Malignant melanoma of skin of trunk, except scrotum     Migraine, unspecified, without mention of intractable migraine without mention of status migrainosus     Nonspecific abnormal results of liver function study     Nontoxic multinodular goiter     Osteopenia        Surgical History:   Tomasa Reed  has a past surgical history that includes Total abdominal hysterectomy; TONSILLECTOMY, ADENOIDECTOMY; Mole removal; Finger surgery (Right, 2010); Colonoscopy (N/A, 12/14/2016); Breast biopsy; Colonoscopy (N/A, 1/14/2020); Cataract extraction (Right, 03/02/2020); and cataracts.    Medications:   Tomasa has a current medication list which includes the following  prescription(s): alendronate, azelastine, betamethasone valerate 0.1%, cetirizine, dietary supplement, fluticasone propionate, glycerin adult, ketoconazole, methocarbamol, medical supply, miscellaneous, mometasone, naproxen sodium, ocean nasal, rosuvastatin, and tums.    Allergies:   Review of patient's allergies indicates:   Allergen Reactions    Iodine and iodide containing products Nausea And Vomiting     Other reaction(s): SEVERE    Sulfa (sulfonamide antibiotics)      Other reaction(s): unknown  Other reaction(s): RASH        Imaging, X-ray:     FINDINGS:  Degenerative change left greater than right medial femoral tibial compartment.  Chondrocalcinosis noted.  Bones are demineralized.  Patellofemoral joints are fairly well maintained.  No effusion on the left.     Impression:     Degenerative change left slightly greater than right.    Prior Therapy: none for knee  Social History:  lives with their family  Occupation: retired  Prior Level of Function: ADLs, walking and biking for exercise  Current Level of Function: pain with walking and weight bearing along with kneeling    Pain:  Current 3/10, worst 7/10, best 1/10   Location: { left knee(s)   Description: Aching, Dull, and Sharp  Aggravating Factors: Standing, Walking, Night Time, Lifting, Getting out of bed/chair, and kneeling  Easing Factors: ice, rest, and NSAID    Pts goals: ADLs without complaint    Objective     Observation / Gait: excessive lateral sway in stance    Posture: WFL    Palpation: TTP medial joint line and pes anserine    Sensation: WNL    Edema: mild swelling    Range of Motion (Passive):   Knee Right  Left    Flexion 145 145   Extension +5 +5     Range of Motion (Active):   Knee Right  Left    Flexion 145 145   Extension +5 +5     Lower Extremity Strength:  Right LE  Left LE    Quadriceps: 5/5 Quadriceps: 5-/5   Hamstrings: 5/5 Hamstrings: 4+/5   Hip Flexion: 4/5 Hip Flexion: 4/5   Hip Extension:  4/5 Hip Extension: 4-/5   Hip ABD: 4-/5  Hip ADD: 3/5     Joint Mobility: WNL     Flexibility:    90/90 Hamstrings: R = - ; L = -    Richa's test: R = - ; L = -   Jose Luis test: R = - ; L = -   Prone knee bend (RF): R = -; L = -    Special Tests:   Right Left   Valgus Stress Test - -   Varus Stress Test - -   Anterior Drawer - -   Posterior Drawer at 30 deg (PLC) / 90 deg (PCL) - -   Dial Test (PLC) - -   Lachman's Test - -   Posterior Sag Test - -   Bucky's Test np np   Thessaly's Test np np   Patellar Grind Test - -     Functional tests:   Sit to stand: quad dominant movement pattern  30 sec sit to stand: x9 reps      Limitation/Restriction for FOTO Knee Survey    Therapist reviewed FOTO scores for Tomasa Reed on 12/8/2022.   FOTO documents entered into [employer] - see Media section.    Limitation Score: see media%         TREATMENT   Treatment Time In: 1435  Treatment Time Out: 1500  Total Treatment time (time-based codes) separate from Evaluation: 25 minutes    Tomasa received therapeutic exercises to develop strength, endurance, ROM, flexibility, posture, and core stabilization for 25 minutes including:    Patient education:  - diagnosis, prognosis, HEP   - bike at gym    SLR supine 3x10  Bridge adduction 3x10  Clam 3x10 ea    NEXT VISITS - Bike, knee extension > knee flexion machine, shuttle      Home Exercises and Patient Education Provided    Education provided:   - see above    Written Home Exercises Provided: yes.  Exercises were reviewed and patient was able to demonstrate them prior to the end of the session.  Patient demonstrated good  understanding of the education provided.     See EMR under Patient Instructions for exercisesprovided 12/8/2022.    Assessment   Tomasa is a 72 y.o. female referred to outpatient Physical Therapy with a medical diagnosis of M17.12 (ICD-10-CM) - Primary osteoarthritis of left knee. Pt presents with pain, mild swelling, degenerative changes per imaging, LE strength deficits, and excessive anterior chain  dominance. Impairments contributory to pain affecting ADL performance and gym activities.    Pt prognosis is Good.   Pt will benefit from skilled outpatient Physical Therapy to address the deficits stated above and in the chart below, provide pt/family education, and to maximize pt's level of independence.     Plan of care discussed with patient: Yes  Pt's spiritual, cultural and educational needs considered and patient is agreeable to the plan of care and goals as stated below:     Anticipated Barriers for therapy: age    Medical Necessity is demonstrated by the following  History  Co-morbidities and personal factors that may impact the plan of care Co-morbidities:   advanced age    Personal Factors:   no deficits     moderate   Examination  Body Structures and Functions, activity limitations and participation restrictions that may impact the plan of care Body Regions:   lower extremities    Body Systems:    strength  balance  gait  transfers  transitions  motor control  motor learning  edema  scar formation    Participation Restrictions:   covid-19    Activity limitations:   Learning and applying knowledge  no deficits    General Tasks and Commands  no deficits    Communication  no deficits    Mobility  lifting and carrying objects  walking    Self care  no deficits    Domestic Life  no deficits    Interactions/Relationships  no deficits    Life Areas  no deficits    Community and Social Life  no deficits         high   Clinical Presentation stable and uncomplicated low   Decision Making/ Complexity Score: low     GOALS: Short Term Goals:  0-3 weeks  1.Report decreased L knee pain  < / =  3/10  to increase tolerance for ADLs  2. 30 sec sit to stand >10 reps  3. Increase strength by 1/3 MMT grade in quad and hips  to increase tolerance for ADL and work activities.  4. Pt to tolerate HEP to improve ROM and independence with ADL's    Long Term Goals: 4-6 weeks  1.Report decreased L knee pain < / = 1/10  to increase  tolerance for ADLs  2.Patient goal: ADLs without complaint  3.Increase strength to >/= 4+/5 in quad and hips  to increase tolerance for ADL and work activities.  4. Pt will report at CJ level (20-40% impaired) on FOTO knee to demonstrate increase in LE function with every day tasks.     Plan   Plan of care Certification: 12/8/2022 to 2/8/2023.    Outpatient Physical Therapy 2 times weekly for 6 weeks to include the following interventions: Gait Training, Manual Therapy, Moist Heat/ Ice, Neuromuscular Re-ed, Patient Education, Self Care, Therapeutic Activities, and Therapeutic Exercise.     KEILA NIX, PT, DPT, OCS

## 2022-12-09 ENCOUNTER — OFFICE VISIT (OUTPATIENT)
Dept: DERMATOLOGY | Facility: CLINIC | Age: 72
End: 2022-12-09
Payer: MEDICARE

## 2022-12-09 DIAGNOSIS — D22.9 MULTIPLE BENIGN NEVI: ICD-10-CM

## 2022-12-09 DIAGNOSIS — L81.4 LENTIGINES: ICD-10-CM

## 2022-12-09 DIAGNOSIS — D18.01 CHERRY ANGIOMA: ICD-10-CM

## 2022-12-09 DIAGNOSIS — L82.1 SK (SEBORRHEIC KERATOSIS): ICD-10-CM

## 2022-12-09 DIAGNOSIS — Z12.83 SKIN CANCER SCREENING: Primary | ICD-10-CM

## 2022-12-09 DIAGNOSIS — Z86.006 HISTORY OF MELANOMA IN SITU: ICD-10-CM

## 2022-12-09 PROCEDURE — 3288F PR FALLS RISK ASSESSMENT DOCUMENTED: ICD-10-PCS | Mod: CPTII,S$GLB,, | Performed by: DERMATOLOGY

## 2022-12-09 PROCEDURE — 1126F AMNT PAIN NOTED NONE PRSNT: CPT | Mod: CPTII,S$GLB,, | Performed by: DERMATOLOGY

## 2022-12-09 PROCEDURE — 1159F MED LIST DOCD IN RCRD: CPT | Mod: CPTII,S$GLB,, | Performed by: DERMATOLOGY

## 2022-12-09 PROCEDURE — 99999 PR PBB SHADOW E&M-EST. PATIENT-LVL III: CPT | Mod: PBBFAC,,, | Performed by: DERMATOLOGY

## 2022-12-09 PROCEDURE — 1160F PR REVIEW ALL MEDS BY PRESCRIBER/CLIN PHARMACIST DOCUMENTED: ICD-10-PCS | Mod: CPTII,S$GLB,, | Performed by: DERMATOLOGY

## 2022-12-09 PROCEDURE — 1101F PT FALLS ASSESS-DOCD LE1/YR: CPT | Mod: CPTII,S$GLB,, | Performed by: DERMATOLOGY

## 2022-12-09 PROCEDURE — 99213 PR OFFICE/OUTPT VISIT, EST, LEVL III, 20-29 MIN: ICD-10-PCS | Mod: S$GLB,,, | Performed by: DERMATOLOGY

## 2022-12-09 PROCEDURE — 99999 PR PBB SHADOW E&M-EST. PATIENT-LVL III: ICD-10-PCS | Mod: PBBFAC,,, | Performed by: DERMATOLOGY

## 2022-12-09 PROCEDURE — 1126F PR PAIN SEVERITY QUANTIFIED, NO PAIN PRESENT: ICD-10-PCS | Mod: CPTII,S$GLB,, | Performed by: DERMATOLOGY

## 2022-12-09 PROCEDURE — 1101F PR PT FALLS ASSESS DOC 0-1 FALLS W/OUT INJ PAST YR: ICD-10-PCS | Mod: CPTII,S$GLB,, | Performed by: DERMATOLOGY

## 2022-12-09 PROCEDURE — 1160F RVW MEDS BY RX/DR IN RCRD: CPT | Mod: CPTII,S$GLB,, | Performed by: DERMATOLOGY

## 2022-12-09 PROCEDURE — 99213 OFFICE O/P EST LOW 20 MIN: CPT | Mod: S$GLB,,, | Performed by: DERMATOLOGY

## 2022-12-09 PROCEDURE — 3288F FALL RISK ASSESSMENT DOCD: CPT | Mod: CPTII,S$GLB,, | Performed by: DERMATOLOGY

## 2022-12-09 PROCEDURE — 1159F PR MEDICATION LIST DOCUMENTED IN MEDICAL RECORD: ICD-10-PCS | Mod: CPTII,S$GLB,, | Performed by: DERMATOLOGY

## 2022-12-09 NOTE — PROGRESS NOTES
Subjective:       Patient ID:  Tomasa Reed is a 72 y.o. female who presents for   Chief Complaint   Patient presents with    Skin Check     TBSE     HPI  Interested in total body skin check today.  Pt had a melanoma in situ removed from her r chest in 10/2011 by Dr. Shaw. Also had a mildly atypical nevus biopsied from L chest in 2013.     Today, pt c/o a raised spot on L cheek x 2-3 months. States that spot is flaky. Tried moisturizing with no improvement. Also has a spot on the R eyebrow that is flaky.     Review of Systems   Constitutional:  Negative for fever and chills.   Skin:  Negative for itching and rash.      Objective:    Physical Exam   Constitutional: She appears well-developed and well-nourished. No distress.   Lymphadenopathy: No supraclavicular adenopathy is present.     She has no cervical adenopathy.     She has no axillary adenopathy.     She has no inguinal adenopathy.   Neurological: She is alert and oriented to person, place, and time. She is not disoriented.   Psychiatric: She has a normal mood and affect.   Skin:   Areas Examined (abnormalities noted in diagram):   Scalp / Hair Palpated and Inspected  Head / Face Inspection Performed  Neck Inspection Performed  Chest / Axilla Inspection Performed  Abdomen Inspection Performed  Genitals / Buttocks / Groin Inspection Performed  Back Inspection Performed  RUE Inspected  LUE Inspection Performed  RLE Inspected  LLE Inspection Performed  Nails and Digits Inspection Performed                 Diagram Legend     Erythematous scaling macule/papule c/w actinic keratosis       Vascular papule c/w angioma      Pigmented verrucoid papule/plaque c/w seborrheic keratosis      Yellow umbilicated papule c/w sebaceous hyperplasia      Irregularly shaped tan macule c/w lentigo     1-2 mm smooth white papules consistent with Milia      Movable subcutaneous cyst with punctum c/w epidermal inclusion cyst      Subcutaneous movable cyst c/w pilar  cyst      Firm pink to brown papule c/w dermatofibroma      Pedunculated fleshy papule(s) c/w skin tag(s)      Evenly pigmented macule c/w junctional nevus     Mildly variegated pigmented, slightly irregular-bordered macule c/w mildly atypical nevus      Flesh colored to evenly pigmented papule c/w intradermal nevus       Pink pearly papule/plaque c/w basal cell carcinoma      Erythematous hyperkeratotic cursted plaque c/w SCC      Surgical scar with no sign of skin cancer recurrence      Open and closed comedones      Inflammatory papules and pustules      Verrucoid papule consistent consistent with wart     Erythematous eczematous patches and plaques     Dystrophic onycholytic nail with subungual debris c/w onychomycosis     Umbilicated papule    Erythematous-base heme-crusted tan verrucoid plaque consistent with inflamed seborrheic keratosis     Erythematous Silvery Scaling Plaque c/w Psoriasis     See annotation      Assessment / Plan:        Skin cancer screening  History of melanoma in situ  Total body skin examination performed today including at least 12 points as noted in physical examination. No lesions suspicious for malignancy noted.  Patient instructed in importance of daily broad spectrum sunscreen use with spf at least 30. Sun avoidance and topical protection/protective clothing discussed.    Hilton angioma  This is a benign vascular lesion. Reassurance given. No treatment required. Treatment of benign, asymptomatic lesions may be considered cosmetic.    Multiple benign nevi  Benign-appearing nevi present on exam today. Reassurance provided. Periodically examine moles and return to clinic if any moles change or become symptomatic (bleeding, itching, pain, etc).    Lentigines  These are benign sun spots which should be monitored for changes. Patient instructed in importance of daily broad spectrum sunscreen use with spf at least 30. Sun avoidance and topical protection/protective clothing  discussed.    SK (seborrheic keratosis)  These are benign inherited growths without a malignant potential. Reassurance given to patient. No treatment is necessary.   Treatment of benign, asymptomatic lesions may be considered cosmetic.  Warned about risk of hypo- or hyperpigmentation with treatment and risk of recurrence.    Follow up in about 1 year (around 12/9/2023) for skin check or sooner for any concerns.

## 2022-12-12 ENCOUNTER — CLINICAL SUPPORT (OUTPATIENT)
Dept: REHABILITATION | Facility: HOSPITAL | Age: 72
End: 2022-12-12
Payer: MEDICARE

## 2022-12-12 DIAGNOSIS — G89.29 CHRONIC PAIN OF LEFT KNEE: Primary | ICD-10-CM

## 2022-12-12 DIAGNOSIS — M25.562 CHRONIC PAIN OF LEFT KNEE: Primary | ICD-10-CM

## 2022-12-12 PROCEDURE — 97110 THERAPEUTIC EXERCISES: CPT | Mod: CQ

## 2022-12-12 NOTE — PROGRESS NOTES
[facility]   Physical Therapy Treatment Note     Name: Tomasa Reed  Clinic Number: [id number]    Therapy Diagnosis:   Encounter Diagnosis   Name Primary?    Chronic pain of left knee Yes     Physician: REMY Zuñiga MD    Visit Date: 12/12/2022    Physician Orders: PT Eval and Treat   Medical Diagnosis from Referral: M17.12 (ICD-10-CM) - Primary osteoarthritis of left knee  Evaluation Date: 12/8/2022  Authorization Period Expiration: 01/05/2023  Plan of Care Expiration: 2/8/2023  Visit # / Visits authorized: 1/ 11     PTA Visit #: 1/5     Time In: 2:00 pm  Time Out: 2:48 pm  Total Billable Time: 30 minutes    Precautions: Standard    SUBJECTIVE     Pt reports: doing somewhat better , just has been careful not to turn when standing to avoid pulling in the knee. She gets her gel injection tomorrow .   She was compliant with home exercise program.  Response to previous treatment: eval - no adverse effects  Functional change: none    Pain: 0/10 - seated . 4/10 with walking.   Location: L knee    OBJECTIVE     Objective Measures updated at progress report unless specified.     Treatment     Tomasa received the treatments listed below:      therapeutic exercises to develop strength, endurance, and ROM for 48 minutes including:    Aerobic activity and endurance training for reciprocal motion of lower limbs on Recumbent Bike for 6 minutes at Level 3.0 at > or equal to 50 spm w/ rest to increase mobility, blood flow and improve tissue tolerance    Quad sets half bolster under knee : 2 x 10 reps , 5'' hold   SLR c/ emphasis on quad set prior to lift : 2 x 10 reps   Bridges c/ ball adduction: 2 x 10 reps , 5'' hold   SL clamshells RTB : 2 x 12 reps , 3'' hold   SL abduction: next  Matrix knee extension 25# : 4 x 10 reps   Matric HS curls : next  Shuttle DL press 38lb: 3 x 10 reps   STS: next      Patient Education and Home Exercises     Home Exercises Provided and Patient Education  Provided     Education provided:   - Continue HEP provided at [facility] and addition of new HEP provided today     Written Home Exercises Provided: yes. Exercises were reviewed and Tomasa was able to demonstrate them prior to the end of the session.  Tomasa demonstrated good  understanding of the education provided. See EMR under Patient Instructions for exercises provided during therapy sessions    ASSESSMENT     Pt presents for first session following initial evaluation. Improvement in pain reported overall and good compliance with HEP and focusing on proper movement patterns to avoid further knee strain. Pt was challenged with performance of quad set with difficulty isolating quad without co-contraction of glutes . Improvements after practicing and with cues . Good tolerance to all activities above with no symptom exacerbation and appropriate muscle fatigue noted following session completion. Will continue to progress next.     Tomasa Is progressing well towards her goals.   Pt prognosis is Good.     Pt will continue to benefit from skilled outpatient physical therapy to address the deficits listed in the problem list box on initial evaluation, provide pt/family education and to maximize pt's level of independence in the home and community environment.   Pt's spiritual, cultural and educational needs considered and pt agreeable to plan of care and goals.     Anticipated barriers to physical therapy: age    GOALS: Short Term Goals:  0-3 weeks  1.Report decreased L knee pain  < / =  3/10  to increase tolerance for ADLs  2. 30 sec sit to stand >10 reps  3. Increase strength by 1/3 MMT grade in quad and hips  to increase tolerance for ADL and work activities.  4. Pt to tolerate HEP to improve ROM and independence with ADL's     Long Term Goals: 4-6 weeks  1.Report decreased L knee pain < / = 1/10  to increase tolerance for ADLs  2.Patient goal: ADLs without complaint  3.Increase strength to >/= 4+/5 in quad and hips   to increase tolerance for ADL and work activities.  4. Pt will report at CJ level (20-40% impaired) on FOTO knee to demonstrate increase in LE function with every day tasks.     PLAN     Continue to progress LE functional strengthening and quad strengthening .     Candi Dupree, PTA

## 2022-12-13 ENCOUNTER — OFFICE VISIT (OUTPATIENT)
Dept: SPORTS MEDICINE | Facility: CLINIC | Age: 72
End: 2022-12-13
Payer: MEDICARE

## 2022-12-13 VITALS
WEIGHT: 144 LBS | HEIGHT: 62 IN | BODY MASS INDEX: 26.5 KG/M2 | HEART RATE: 71 BPM | SYSTOLIC BLOOD PRESSURE: 151 MMHG | DIASTOLIC BLOOD PRESSURE: 74 MMHG

## 2022-12-13 DIAGNOSIS — M25.562 CHRONIC PAIN OF LEFT KNEE: ICD-10-CM

## 2022-12-13 DIAGNOSIS — M17.12 PRIMARY OSTEOARTHRITIS OF LEFT KNEE: Primary | ICD-10-CM

## 2022-12-13 DIAGNOSIS — G89.29 CHRONIC PAIN OF LEFT KNEE: ICD-10-CM

## 2022-12-13 PROCEDURE — 3077F SYST BP >= 140 MM HG: CPT | Mod: CPTII,S$GLB,, | Performed by: PHYSICIAN ASSISTANT

## 2022-12-13 PROCEDURE — 99499 NO LOS: ICD-10-PCS | Mod: S$GLB,,, | Performed by: PHYSICIAN ASSISTANT

## 2022-12-13 PROCEDURE — 3008F BODY MASS INDEX DOCD: CPT | Mod: CPTII,S$GLB,, | Performed by: PHYSICIAN ASSISTANT

## 2022-12-13 PROCEDURE — 99999 PR PBB SHADOW E&M-EST. PATIENT-LVL III: ICD-10-PCS | Mod: PBBFAC,,, | Performed by: PHYSICIAN ASSISTANT

## 2022-12-13 PROCEDURE — 1125F PR PAIN SEVERITY QUANTIFIED, PAIN PRESENT: ICD-10-PCS | Mod: CPTII,S$GLB,, | Performed by: PHYSICIAN ASSISTANT

## 2022-12-13 PROCEDURE — 3078F DIAST BP <80 MM HG: CPT | Mod: CPTII,S$GLB,, | Performed by: PHYSICIAN ASSISTANT

## 2022-12-13 PROCEDURE — 3288F PR FALLS RISK ASSESSMENT DOCUMENTED: ICD-10-PCS | Mod: CPTII,S$GLB,, | Performed by: PHYSICIAN ASSISTANT

## 2022-12-13 PROCEDURE — 1160F PR REVIEW ALL MEDS BY PRESCRIBER/CLIN PHARMACIST DOCUMENTED: ICD-10-PCS | Mod: CPTII,S$GLB,, | Performed by: PHYSICIAN ASSISTANT

## 2022-12-13 PROCEDURE — 1101F PR PT FALLS ASSESS DOC 0-1 FALLS W/OUT INJ PAST YR: ICD-10-PCS | Mod: CPTII,S$GLB,, | Performed by: PHYSICIAN ASSISTANT

## 2022-12-13 PROCEDURE — 99999 PR PBB SHADOW E&M-EST. PATIENT-LVL III: CPT | Mod: PBBFAC,,, | Performed by: PHYSICIAN ASSISTANT

## 2022-12-13 PROCEDURE — 3008F PR BODY MASS INDEX (BMI) DOCUMENTED: ICD-10-PCS | Mod: CPTII,S$GLB,, | Performed by: PHYSICIAN ASSISTANT

## 2022-12-13 PROCEDURE — 3288F FALL RISK ASSESSMENT DOCD: CPT | Mod: CPTII,S$GLB,, | Performed by: PHYSICIAN ASSISTANT

## 2022-12-13 PROCEDURE — 1101F PT FALLS ASSESS-DOCD LE1/YR: CPT | Mod: CPTII,S$GLB,, | Performed by: PHYSICIAN ASSISTANT

## 2022-12-13 PROCEDURE — 1159F PR MEDICATION LIST DOCUMENTED IN MEDICAL RECORD: ICD-10-PCS | Mod: CPTII,S$GLB,, | Performed by: PHYSICIAN ASSISTANT

## 2022-12-13 PROCEDURE — 1160F RVW MEDS BY RX/DR IN RCRD: CPT | Mod: CPTII,S$GLB,, | Performed by: PHYSICIAN ASSISTANT

## 2022-12-13 PROCEDURE — 1159F MED LIST DOCD IN RCRD: CPT | Mod: CPTII,S$GLB,, | Performed by: PHYSICIAN ASSISTANT

## 2022-12-13 PROCEDURE — 3078F PR MOST RECENT DIASTOLIC BLOOD PRESSURE < 80 MM HG: ICD-10-PCS | Mod: CPTII,S$GLB,, | Performed by: PHYSICIAN ASSISTANT

## 2022-12-13 PROCEDURE — 3077F PR MOST RECENT SYSTOLIC BLOOD PRESSURE >= 140 MM HG: ICD-10-PCS | Mod: CPTII,S$GLB,, | Performed by: PHYSICIAN ASSISTANT

## 2022-12-13 PROCEDURE — 20610 DRAIN/INJ JOINT/BURSA W/O US: CPT | Mod: LT,S$GLB,, | Performed by: PHYSICIAN ASSISTANT

## 2022-12-13 PROCEDURE — 1125F AMNT PAIN NOTED PAIN PRSNT: CPT | Mod: CPTII,S$GLB,, | Performed by: PHYSICIAN ASSISTANT

## 2022-12-13 PROCEDURE — 20610 PR DRAIN/INJECT LARGE JOINT/BURSA: ICD-10-PCS | Mod: LT,S$GLB,, | Performed by: PHYSICIAN ASSISTANT

## 2022-12-13 PROCEDURE — 99499 UNLISTED E&M SERVICE: CPT | Mod: S$GLB,,, | Performed by: PHYSICIAN ASSISTANT

## 2022-12-13 NOTE — PROGRESS NOTES
Synvisc Injection Procedure #1    A time out was performed, including verification of patient ID, procedure, site and side, availability of information and equipment, review of safety issues, and agreement with consent, the procedure site was marked.    The patient was prepped aseptically with povidone-iodine swabsticks. A diagnostic and therapeutic injection of 2cc Synvisc was given under sterile technique using a 21.5g x 1.5 needle from the anterolateral aspect of the left knee Joint in the supine position.   Tomasa Reed had no adverse reactions to the medication. Pain decreased. She was instructed to apply ice to the joint for 20 minutes and avoid strenuous activities for 24-36 hours following the injection. She was warned of possible blood pressure changes during that time. Following that time, She can resume activities as prior to the injection.    She was reminded to call the clinic immediately for any adverse side effects as explained in clinic today.

## 2022-12-14 NOTE — PROGRESS NOTES
[facility]   Physical Therapy Treatment Note     Name: Tomasa Reed  Clinic Number: [id number]    Therapy Diagnosis:   Encounter Diagnosis   Name Primary?    Chronic pain of left knee Yes       Physician: REMY Zuñiga MD    Visit Date: 12/15/2022    Physician Orders: PT Eval and Treat   Medical Diagnosis from Referral: M17.12 (ICD-10-CM) - Primary osteoarthritis of left knee  Evaluation Date: 12/8/2022  Authorization Period Expiration: 01/05/2023  Plan of Care Expiration: 2/8/2023  Visit # / Visits authorized: 2/ 11     PTA Visit #: 2/5     Time In: 2:00 pm  Time Out: 2:55 pm  Total Billable Time: 30 minutes    Precautions: Standard    SUBJECTIVE     Pt reports: she is having some pain in her knee today with walking ( pt pointing to inferior patella) although no pain if stationary  .   She was compliant with home exercise program.  Response to previous treatment: no adverse effects   Functional change: none    Pain: 0/10 - seated . 6/10 with walking.   Location: L knee    OBJECTIVE     Objective Measures updated at progress report unless specified.     Treatment     Tomasa received the treatments listed below:      therapeutic exercises to develop strength, endurance, and ROM for 45 minutes including:    Aerobic activity and endurance training for reciprocal motion of lower limbs on Recumbent Bike for 0 minutes at Level 3.0 at > or equal to 50 spm w/ rest to increase mobility, blood flow and improve tissue tolerance - NP today     Quad sets half bolster under knee : 2 x 10 reps , 5'' hold   SLR c/ emphasis on quad set prior to lift : 2 x 12 reps   Bridges c/ ball adduction: 2 x 12 reps , 5'' hold   SL clamshells RTB : 3 x 10 reps , 3'' hold   SL abduction: 2 x 12 reps   Matrix knee extension 25# : 4 x 10 reps   Matric HS curls 35# : 2 x 10 reps   Shuttle DL press 38lb: 3 x 10 reps   STS: next      manual therapy techniques: Joint mobilizations were applied to the: L knee  for 10 minutes, including:  Patellar mobilizations grade II-III  Seated tibiofemoral distraction c/ passive flexion and extension         Patient Education and Home Exercises     Home Exercises Provided and Patient Education Provided     Education provided:   - Continue HEP provided at eval and addition of new HEP provided today     Written Home Exercises Provided: yes. Exercises were reviewed and Tomasa was able to demonstrate them prior to the end of the session.  Tomasa demonstrated good  understanding of the education provided. See EMR under Patient Instructions for exercises provided during therapy sessions    ASSESSMENT     Pt presents with pain reported along inferior patella with ambulation. Session initiated with manual interventions which pt tolerated well and noted no pain after . Pt was able to complete session today with good response and able to tolerate min progressions with reps performed .     Tomasa Is progressing well towards her goals.   Pt prognosis is Good.     Pt will continue to benefit from skilled outpatient physical therapy to address the deficits listed in the problem list box on initial evaluation, provide pt/family education and to maximize pt's level of independence in the home and community environment.   Pt's spiritual, cultural and educational needs considered and pt agreeable to plan of care and goals.     Anticipated barriers to physical therapy: age    GOALS: Short Term Goals:  0-3 weeks  1.Report decreased L knee pain  < / =  3/10  to increase tolerance for ADLs  2. 30 sec sit to stand >10 reps  3. Increase strength by 1/3 MMT grade in quad and hips  to increase tolerance for ADL and work activities.  4. Pt to tolerate HEP to improve ROM and independence with ADL's     Long Term Goals: 4-6 weeks  1.Report decreased L knee pain < / = 1/10  to increase tolerance for ADLs  2.Patient goal: ADLs without complaint  3.Increase strength to >/= 4+/5 in quad and hips  to increase  tolerance for ADL and work activities.  4. Pt will report at CJ level (20-40% impaired) on FOTO knee to demonstrate increase in LE function with every day tasks.     PLAN     Continue to progress LE functional strengthening and quad strengthening .     Candi Dupree, PTA

## 2022-12-15 ENCOUNTER — CLINICAL SUPPORT (OUTPATIENT)
Dept: REHABILITATION | Facility: HOSPITAL | Age: 72
End: 2022-12-15
Payer: MEDICARE

## 2022-12-15 DIAGNOSIS — M25.562 CHRONIC PAIN OF LEFT KNEE: Primary | ICD-10-CM

## 2022-12-15 DIAGNOSIS — G89.29 CHRONIC PAIN OF LEFT KNEE: Primary | ICD-10-CM

## 2022-12-15 PROCEDURE — 97110 THERAPEUTIC EXERCISES: CPT | Mod: CQ

## 2022-12-19 ENCOUNTER — CLINICAL SUPPORT (OUTPATIENT)
Dept: REHABILITATION | Facility: HOSPITAL | Age: 72
End: 2022-12-19
Payer: MEDICARE

## 2022-12-19 DIAGNOSIS — G89.29 CHRONIC PAIN OF LEFT KNEE: Primary | ICD-10-CM

## 2022-12-19 DIAGNOSIS — M25.562 CHRONIC PAIN OF LEFT KNEE: Primary | ICD-10-CM

## 2022-12-19 PROCEDURE — 97140 MANUAL THERAPY 1/> REGIONS: CPT | Mod: CQ

## 2022-12-19 PROCEDURE — 97110 THERAPEUTIC EXERCISES: CPT | Mod: CQ

## 2022-12-19 NOTE — PROGRESS NOTES
[facility]   Physical Therapy Treatment Note     Name: Tomasa Reed  Clinic Number: [id number]    Therapy Diagnosis:   Encounter Diagnosis   Name Primary?    Chronic pain of left knee Yes         Physician: REMY Zuñiga MD    Visit Date: 12/19/2022    Physician Orders: PT Eval and Treat   Medical Diagnosis from Referral: M17.12 (ICD-10-CM) - Primary osteoarthritis of left knee  Evaluation Date: 12/8/2022  Authorization Period Expiration: 01/05/2023  Plan of Care Expiration: 2/8/2023  Visit # / Visits authorized: 3/ 11     PTA Visit #: 3/5     Time In: 2:00 pm  Time Out: 2:58 pm   Total Billable Time: 30 minutes    Precautions: Standard    SUBJECTIVE     Pt reports: she started having some pain yesterday on the inside of her knee below her knee cap. The pain isnt bothering her when she is still or sitting and mainly hurts when she is walking.   She was compliant with home exercise program.  Response to previous treatment: felt good after with min soreness and no pain  Functional change: none    Pain: 0/10 - seated . 3/10 with walking.   Location: L knee    OBJECTIVE     Objective Measures updated at progress report unless specified.     Treatment     Tomasa received the treatments listed below:      therapeutic exercises to develop strength, endurance, and ROM for 45 minutes including:    Aerobic activity and endurance training for reciprocal motion of lower limbs on Recumbent Bike for 0 minutes at Level 3.0 at > or equal to 50 spm w/ rest to increase mobility, blood flow and improve tissue tolerance - NP today     Quad sets half bolster under knee : 2 x 10 reps , 5'' hold   SLR c/ emphasis on quad set prior to lift : 2 x 12 reps   Bridges c/ ball adduction: 2 x 12 reps , 5'' hold   SL clamshells RTB : 3 x 10 reps , 3'' hold   SL abduction: 2 x 12 reps   Matrix knee extension 25# : 4 x 10 reps   Matric HS curls 35# : 2 x 10 reps   Shuttle DL press 38lb RTB: 3 x 10 reps    STS in standard chair c/ airex : x 10 reps       manual therapy techniques: Joint mobilizations were applied to the: L knee for 13 minutes, including:  Patellar mobilizations grade II-III  Seated tibiofemoral distraction c/ passive flexion and extension   Superior patellar mobilizations with inferior pole tilting      Patient Education and Home Exercises     Home Exercises Provided and Patient Education Provided     Education provided:   - Continue HEP provided at [facility] and addition of new HEP provided today     Written Home Exercises Provided: yes. Exercises were reviewed and Tomasa was able to demonstrate them prior to the end of the session.  Tomasa demonstrated good  understanding of the education provided. See EMR under Patient Instructions for exercises provided during therapy sessions    ASSESSMENT     Pt presents with inferio-medial knee pain which was relieved following manual interventions performed . She demonstrates min difficulty preventing knee valgus with closed chain activities and a good response to addition of thera-band to promote increased glute and lateral hip activation She would benefit from continued progressions especially continued focus on hip abductor/ER and quad strengthening to decreased medial knee pain.     Tomasa Is progressing well towards her goals.   Pt prognosis is Good.     Pt will continue to benefit from skilled outpatient physical therapy to address the deficits listed in the problem list box on initial evaluation, provide pt/family education and to maximize pt's level of independence in the home and community environment.   Pt's spiritual, cultural and educational needs considered and pt agreeable to plan of care and goals.     Anticipated barriers to physical therapy: age    GOALS: Short Term Goals:  0-3 weeks  1.Report decreased L knee pain  < / =  3/10  to increase tolerance for ADLs  2. 30 sec sit to stand >10 reps  3. Increase strength by 1/3 MMT grade in quad and  hips  to increase tolerance for ADL and work activities.  4. Pt to tolerate HEP to improve ROM and independence with ADL's     Long Term Goals: 4-6 weeks  1.Report decreased L knee pain < / = 1/10  to increase tolerance for ADLs  2.Patient goal: ADLs without complaint  3.Increase strength to >/= 4+/5 in quad and hips  to increase tolerance for ADL and work activities.  4. Pt will report at CJ level (20-40% impaired) on FOTO knee to demonstrate increase in LE function with every day tasks.     PLAN     Continue to progress LE functional strengthening and quad strengthening .     Candi Dupree, PTA

## 2022-12-20 ENCOUNTER — OFFICE VISIT (OUTPATIENT)
Dept: SPORTS MEDICINE | Facility: CLINIC | Age: 72
End: 2022-12-20
Payer: MEDICARE

## 2022-12-20 VITALS
SYSTOLIC BLOOD PRESSURE: 134 MMHG | BODY MASS INDEX: 26.5 KG/M2 | DIASTOLIC BLOOD PRESSURE: 78 MMHG | HEIGHT: 62 IN | WEIGHT: 144 LBS | HEART RATE: 77 BPM

## 2022-12-20 DIAGNOSIS — M17.12 PRIMARY OSTEOARTHRITIS OF LEFT KNEE: Primary | ICD-10-CM

## 2022-12-20 PROCEDURE — 99499 NO LOS: ICD-10-PCS | Mod: S$GLB,,, | Performed by: PHYSICIAN ASSISTANT

## 2022-12-20 PROCEDURE — 1159F MED LIST DOCD IN RCRD: CPT | Mod: CPTII,S$GLB,, | Performed by: PHYSICIAN ASSISTANT

## 2022-12-20 PROCEDURE — 1159F PR MEDICATION LIST DOCUMENTED IN MEDICAL RECORD: ICD-10-PCS | Mod: CPTII,S$GLB,, | Performed by: PHYSICIAN ASSISTANT

## 2022-12-20 PROCEDURE — 1125F PR PAIN SEVERITY QUANTIFIED, PAIN PRESENT: ICD-10-PCS | Mod: CPTII,S$GLB,, | Performed by: PHYSICIAN ASSISTANT

## 2022-12-20 PROCEDURE — 1125F AMNT PAIN NOTED PAIN PRSNT: CPT | Mod: CPTII,S$GLB,, | Performed by: PHYSICIAN ASSISTANT

## 2022-12-20 PROCEDURE — 3078F DIAST BP <80 MM HG: CPT | Mod: CPTII,S$GLB,, | Performed by: PHYSICIAN ASSISTANT

## 2022-12-20 PROCEDURE — 1160F RVW MEDS BY RX/DR IN RCRD: CPT | Mod: CPTII,S$GLB,, | Performed by: PHYSICIAN ASSISTANT

## 2022-12-20 PROCEDURE — 3288F PR FALLS RISK ASSESSMENT DOCUMENTED: ICD-10-PCS | Mod: CPTII,S$GLB,, | Performed by: PHYSICIAN ASSISTANT

## 2022-12-20 PROCEDURE — 3288F FALL RISK ASSESSMENT DOCD: CPT | Mod: CPTII,S$GLB,, | Performed by: PHYSICIAN ASSISTANT

## 2022-12-20 PROCEDURE — 3078F PR MOST RECENT DIASTOLIC BLOOD PRESSURE < 80 MM HG: ICD-10-PCS | Mod: CPTII,S$GLB,, | Performed by: PHYSICIAN ASSISTANT

## 2022-12-20 PROCEDURE — 99499 UNLISTED E&M SERVICE: CPT | Mod: S$GLB,,, | Performed by: PHYSICIAN ASSISTANT

## 2022-12-20 PROCEDURE — 3075F PR MOST RECENT SYSTOLIC BLOOD PRESS GE 130-139MM HG: ICD-10-PCS | Mod: CPTII,S$GLB,, | Performed by: PHYSICIAN ASSISTANT

## 2022-12-20 PROCEDURE — 3008F PR BODY MASS INDEX (BMI) DOCUMENTED: ICD-10-PCS | Mod: CPTII,S$GLB,, | Performed by: PHYSICIAN ASSISTANT

## 2022-12-20 PROCEDURE — 1101F PR PT FALLS ASSESS DOC 0-1 FALLS W/OUT INJ PAST YR: ICD-10-PCS | Mod: CPTII,S$GLB,, | Performed by: PHYSICIAN ASSISTANT

## 2022-12-20 PROCEDURE — 1160F PR REVIEW ALL MEDS BY PRESCRIBER/CLIN PHARMACIST DOCUMENTED: ICD-10-PCS | Mod: CPTII,S$GLB,, | Performed by: PHYSICIAN ASSISTANT

## 2022-12-20 PROCEDURE — 99999 PR PBB SHADOW E&M-EST. PATIENT-LVL III: CPT | Mod: PBBFAC,,, | Performed by: PHYSICIAN ASSISTANT

## 2022-12-20 PROCEDURE — 99999 PR PBB SHADOW E&M-EST. PATIENT-LVL III: ICD-10-PCS | Mod: PBBFAC,,, | Performed by: PHYSICIAN ASSISTANT

## 2022-12-20 PROCEDURE — 3075F SYST BP GE 130 - 139MM HG: CPT | Mod: CPTII,S$GLB,, | Performed by: PHYSICIAN ASSISTANT

## 2022-12-20 PROCEDURE — 20610 DRAIN/INJ JOINT/BURSA W/O US: CPT | Mod: LT,S$GLB,, | Performed by: PHYSICIAN ASSISTANT

## 2022-12-20 PROCEDURE — 1101F PT FALLS ASSESS-DOCD LE1/YR: CPT | Mod: CPTII,S$GLB,, | Performed by: PHYSICIAN ASSISTANT

## 2022-12-20 PROCEDURE — 3008F BODY MASS INDEX DOCD: CPT | Mod: CPTII,S$GLB,, | Performed by: PHYSICIAN ASSISTANT

## 2022-12-20 PROCEDURE — 20610 PR DRAIN/INJECT LARGE JOINT/BURSA: ICD-10-PCS | Mod: LT,S$GLB,, | Performed by: PHYSICIAN ASSISTANT

## 2022-12-20 NOTE — PROGRESS NOTES
Synvisc Injection Procedure #2    A time out was performed, including verification of patient ID, procedure, site and side, availability of information and equipment, review of safety issues, and agreement with consent, the procedure site was marked.    The patient was prepped aseptically with povidone-iodine swabsticks. A diagnostic and therapeutic injection of 2cc Synvisc was given under sterile technique using a 21.5g x 1.5 needle from the anterolateral aspect of the left knee Joint in the supine position.   Tomasa Reed had no adverse reactions to the medication. Pain decreased. She was instructed to apply ice to the joint for 20 minutes and avoid strenuous activities for 24-36 hours following the injection. She was warned of possible blood pressure changes during that time. Following that time, She can resume activities as prior to the injection.    She was reminded to call the clinic immediately for any adverse side effects as explained in clinic today.

## 2022-12-22 ENCOUNTER — CLINICAL SUPPORT (OUTPATIENT)
Dept: REHABILITATION | Facility: HOSPITAL | Age: 72
End: 2022-12-22
Payer: MEDICARE

## 2022-12-22 DIAGNOSIS — M25.562 CHRONIC PAIN OF LEFT KNEE: Primary | ICD-10-CM

## 2022-12-22 DIAGNOSIS — G89.29 CHRONIC PAIN OF LEFT KNEE: Primary | ICD-10-CM

## 2022-12-22 PROCEDURE — 97110 THERAPEUTIC EXERCISES: CPT | Mod: KX

## 2022-12-22 NOTE — PROGRESS NOTES
[facility]   Physical Therapy Treatment Note     Name: Tomasa Reed  Clinic Number: [id number]    Therapy Diagnosis:   No diagnosis found.        Physician: REMY Zuñiga MD    Visit Date: 12/22/2022    Physician Orders: PT Eval and Treat   Medical Diagnosis from Referral: M17.12 (ICD-10-CM) - Primary osteoarthritis of left knee  Evaluation Date: 12/8/2022  Authorization Period Expiration: 01/05/2023  Plan of Care Expiration: 2/8/2023  Visit # / Visits authorized: 4/ 11      PTA Visit #: 3/5     Time In: 1400  Time Out: 1500  Total Billable Time: 60 minutes    Precautions: Standard    SUBJECTIVE     Pt reports: Improving medial knee symptoms anabel after injection this week. Reports consistency with HEP, but has yet to return to gym activities in part due to holidays.    She was compliant with home exercise program.  Response to previous treatment: felt good after with min soreness and no pain  Functional change: none    Pain: 0/10 - seated . 3/10 with walking.   Location: L knee    OBJECTIVE     Objective Measures updated at progress report unless specified.     Treatment     Tomasa received the treatments listed below:      therapeutic exercises to develop strength, endurance, and ROM for 60 minutes including:    Aerobic activity and endurance training for reciprocal motion of lower limbs on Recumbent Bike for 8 minutes at Level 4.0  SLR 2x10 ea - with cuing needed for quad activation  Bridge adduction 2x10  Bridge ER 2x10  Clam 2x10 ea  Matrix knee extension 15# DL: 3x10 - cue eccentric  Matrix knee extension 15# SL: 2x10 ea - cue eccentric  Knee extension isometrics 65-75 deg 5x fail  Quad set isometrics 5x fail    Patient education:  - importance of quad strength; focus on SLR for quad  - return to gym / biking    Held:  Quad sets half bolster under knee : 2 x 10 reps , 5'' hold   SL abduction: 2 x 12 reps   Matric HS curls 35# : 2 x 10 reps   Shuttle DL press 38lb RTB: 3  x 10 reps   STS in standard chair c/ airex : x 10 reps     manual therapy techniques: Joint mobilizations were applied to the: L knee for 00 minutes, including:  Patellar mobilizations grade II-III  Seated tibiofemoral distraction c/ passive flexion and extension   Superior patellar mobilizations with inferior pole tilting      Patient Education and Home Exercises     Home Exercises Provided and Patient Education Provided     Education provided:   - Continue HEP provided at eval and addition of new HEP provided today     Written Home Exercises Provided: yes. Exercises were reviewed and Tomasa was able to demonstrate them prior to the end of the session.  Tomasa demonstrated good  understanding of the education provided. See EMR under Patient Instructions for exercises provided during therapy sessions    ASSESSMENT     Improving symptoms with viscosupplementation injection series. Demonstrated difficulty with quad recruitment, but improved within visit anabel with emphasis on form / control with SLR.    Cont focus on quad activation and strength along with glute strength into functional activities. Progress as maxine and able.    Tomasa Is progressing well towards her goals.   Pt prognosis is Good.     Pt will continue to benefit from skilled outpatient physical therapy to address the deficits listed in the problem list box on initial evaluation, provide pt/family education and to maximize pt's level of independence in the home and community environment.   Pt's spiritual, cultural and educational needs considered and pt agreeable to plan of care and goals.     Anticipated barriers to physical therapy: age    GOALS: Short Term Goals:  0-3 weeks  1.Report decreased L knee pain  < / =  3/10  to increase tolerance for ADLs  2. 30 sec sit to stand >10 reps  3. Increase strength by 1/3 MMT grade in quad and hips  to increase tolerance for ADL and work activities.  4. Pt to tolerate HEP to improve ROM and independence with  ADL's     Long Term Goals: 4-6 weeks  1.Report decreased L knee pain < / = 1/10  to increase tolerance for ADLs  2.Patient goal: ADLs without complaint  3.Increase strength to >/= 4+/5 in quad and hips  to increase tolerance for ADL and work activities.  4. Pt will report at CJ level (20-40% impaired) on FOTO knee to demonstrate increase in LE function with every day tasks.     PLAN     Continue to progress LE functional strengthening and quad strengthening .     KEILA NIX, PT, DPT, OCS

## 2022-12-28 ENCOUNTER — OFFICE VISIT (OUTPATIENT)
Dept: SPORTS MEDICINE | Facility: CLINIC | Age: 72
End: 2022-12-28
Payer: MEDICARE

## 2022-12-28 VITALS
HEIGHT: 62 IN | WEIGHT: 144 LBS | HEART RATE: 75 BPM | DIASTOLIC BLOOD PRESSURE: 69 MMHG | BODY MASS INDEX: 26.5 KG/M2 | SYSTOLIC BLOOD PRESSURE: 127 MMHG

## 2022-12-28 DIAGNOSIS — M17.12 PRIMARY OSTEOARTHRITIS OF LEFT KNEE: Primary | ICD-10-CM

## 2022-12-28 PROCEDURE — 1101F PR PT FALLS ASSESS DOC 0-1 FALLS W/OUT INJ PAST YR: ICD-10-PCS | Mod: CPTII,S$GLB,, | Performed by: PHYSICIAN ASSISTANT

## 2022-12-28 PROCEDURE — 1101F PT FALLS ASSESS-DOCD LE1/YR: CPT | Mod: CPTII,S$GLB,, | Performed by: PHYSICIAN ASSISTANT

## 2022-12-28 PROCEDURE — 3078F DIAST BP <80 MM HG: CPT | Mod: CPTII,S$GLB,, | Performed by: PHYSICIAN ASSISTANT

## 2022-12-28 PROCEDURE — 3074F SYST BP LT 130 MM HG: CPT | Mod: CPTII,S$GLB,, | Performed by: PHYSICIAN ASSISTANT

## 2022-12-28 PROCEDURE — 99499 NO LOS: ICD-10-PCS | Mod: S$GLB,,, | Performed by: PHYSICIAN ASSISTANT

## 2022-12-28 PROCEDURE — 3288F FALL RISK ASSESSMENT DOCD: CPT | Mod: CPTII,S$GLB,, | Performed by: PHYSICIAN ASSISTANT

## 2022-12-28 PROCEDURE — 3008F BODY MASS INDEX DOCD: CPT | Mod: CPTII,S$GLB,, | Performed by: PHYSICIAN ASSISTANT

## 2022-12-28 PROCEDURE — 20610 PR DRAIN/INJECT LARGE JOINT/BURSA: ICD-10-PCS | Mod: LT,S$GLB,, | Performed by: PHYSICIAN ASSISTANT

## 2022-12-28 PROCEDURE — 1159F PR MEDICATION LIST DOCUMENTED IN MEDICAL RECORD: ICD-10-PCS | Mod: CPTII,S$GLB,, | Performed by: PHYSICIAN ASSISTANT

## 2022-12-28 PROCEDURE — 3078F PR MOST RECENT DIASTOLIC BLOOD PRESSURE < 80 MM HG: ICD-10-PCS | Mod: CPTII,S$GLB,, | Performed by: PHYSICIAN ASSISTANT

## 2022-12-28 PROCEDURE — 99999 PR PBB SHADOW E&M-EST. PATIENT-LVL III: ICD-10-PCS | Mod: PBBFAC,,, | Performed by: PHYSICIAN ASSISTANT

## 2022-12-28 PROCEDURE — 3008F PR BODY MASS INDEX (BMI) DOCUMENTED: ICD-10-PCS | Mod: CPTII,S$GLB,, | Performed by: PHYSICIAN ASSISTANT

## 2022-12-28 PROCEDURE — 1125F AMNT PAIN NOTED PAIN PRSNT: CPT | Mod: CPTII,S$GLB,, | Performed by: PHYSICIAN ASSISTANT

## 2022-12-28 PROCEDURE — 99999 PR PBB SHADOW E&M-EST. PATIENT-LVL III: CPT | Mod: PBBFAC,,, | Performed by: PHYSICIAN ASSISTANT

## 2022-12-28 PROCEDURE — 1159F MED LIST DOCD IN RCRD: CPT | Mod: CPTII,S$GLB,, | Performed by: PHYSICIAN ASSISTANT

## 2022-12-28 PROCEDURE — 3074F PR MOST RECENT SYSTOLIC BLOOD PRESSURE < 130 MM HG: ICD-10-PCS | Mod: CPTII,S$GLB,, | Performed by: PHYSICIAN ASSISTANT

## 2022-12-28 PROCEDURE — 20610 DRAIN/INJ JOINT/BURSA W/O US: CPT | Mod: LT,S$GLB,, | Performed by: PHYSICIAN ASSISTANT

## 2022-12-28 PROCEDURE — 1125F PR PAIN SEVERITY QUANTIFIED, PAIN PRESENT: ICD-10-PCS | Mod: CPTII,S$GLB,, | Performed by: PHYSICIAN ASSISTANT

## 2022-12-28 PROCEDURE — 3288F PR FALLS RISK ASSESSMENT DOCUMENTED: ICD-10-PCS | Mod: CPTII,S$GLB,, | Performed by: PHYSICIAN ASSISTANT

## 2022-12-28 PROCEDURE — 1160F RVW MEDS BY RX/DR IN RCRD: CPT | Mod: CPTII,S$GLB,, | Performed by: PHYSICIAN ASSISTANT

## 2022-12-28 PROCEDURE — 1160F PR REVIEW ALL MEDS BY PRESCRIBER/CLIN PHARMACIST DOCUMENTED: ICD-10-PCS | Mod: CPTII,S$GLB,, | Performed by: PHYSICIAN ASSISTANT

## 2022-12-28 PROCEDURE — 99499 UNLISTED E&M SERVICE: CPT | Mod: S$GLB,,, | Performed by: PHYSICIAN ASSISTANT

## 2022-12-28 NOTE — PROGRESS NOTES
Patient is here for follow up of  left knee arthritis. Pt is requesting left knee synvisc injection #3.  [facility] reviewed per encounter record. Has failed other conservative modalities including NSAIDS, activity modification, weight loss.    The prior shot was tolerated well.    PHYSICAL EXAMINATION:     General: The patient is alert and oriented x 3. Mood is pleasant.   Observation of ears, eyes and nose reveals no gross abnormalities. No   labored breathing observed.     No signs of infection or adverse reaction to knee.    PROCEDURE NOTE:  Injection Procedure left knee #3  A time out was performed, including verification of patient ID, procedure, site and side, availability of information and equipment, review of safety issues, and agreement with consent, the procedure site was marked.    After time out was performed, the patient was prepped aseptically with povidone-iodine swabsticks. A diagnostic and therapeutic injection of 2cc Synvisc (3rd) was given under sterile technique using a 22g x 1.5 needle from the anterolateral  aspect of the left Knee Joint in the sitting position.      Tomasa Reed had no adverse reactions to the medication. Pain decreased. She was instructed to apply ice to the joint for 20 minutes and avoid strenuous activities for 24-36 hours following the injection. She was warned of possible blood sugar and/or blood pressure changes during that time. Following that time, she can resume regular activities.    She was reminded to call the clinic immediately for any adverse side effects as explained in clinic today.      RTC in 8 weeks with myself or Dr. Zuñiga for follow up.  All questions were answered, pt will contact us for questions or concerns in the interim.

## 2023-01-05 ENCOUNTER — CLINICAL SUPPORT (OUTPATIENT)
Dept: REHABILITATION | Facility: HOSPITAL | Age: 73
End: 2023-01-05
Payer: MEDICARE

## 2023-01-05 DIAGNOSIS — M25.562 CHRONIC PAIN OF LEFT KNEE: Primary | ICD-10-CM

## 2023-01-05 DIAGNOSIS — G89.29 CHRONIC PAIN OF LEFT KNEE: Primary | ICD-10-CM

## 2023-01-05 PROCEDURE — 97110 THERAPEUTIC EXERCISES: CPT | Mod: CQ

## 2023-01-05 NOTE — PROGRESS NOTES
[facility]   Physical Therapy Treatment Note     Name: Tomasa Reed  Clinic Number: [id number]    Therapy Diagnosis:   Encounter Diagnosis   Name Primary?    Chronic pain of left knee Yes           Physician: REMY Zuñiga MD    Visit Date: 1/5/2023    Physician Orders: PT Eval and Treat   Medical Diagnosis from Referral: M17.12 (ICD-10-CM) - Primary osteoarthritis of left knee  Evaluation Date: 12/8/2022  Authorization Period Expiration: 01/02/2024  Plan of Care Expiration: 2/8/2023  Visit # / Visits authorized: 1/20 ( 5 total visits )     PTA Visit #: 1/5     Time In: 2:00 pm  Time Out: 3:00 pm  Total Treatment Time : 60 minutes   Total Billable Time: 30 minutes    Precautions: Standard    SUBJECTIVE     Pt reports: she had a lot of pain after her last injection which is why she canceled her last appointment. It did begin to feel better a few days later so she went for a 20 minute walk on Sunday and afterwards her pain returned. She also feels tightness behind her knee.     She was compliant with home exercise program.  Response to previous treatment: felt good after with min soreness and no pain  Functional change: none    Pain: 0/10 - seated . 6/10 with walking.   Location: L knee medial and posterior     OBJECTIVE     Objective Measures updated at progress report unless specified.     Treatment     Tomasa received the treatments listed below:      therapeutic exercises to develop strength, endurance, and ROM for 60 minutes including:    Aerobic activity and endurance training for reciprocal motion of lower limbs on Recumbent Bike for 8 minutes at Level 4.0  SLR 2x10 ea - with cuing needed for quad activation  Bridge adduction 2x10  Bridge ER 2x10  Clam 2x10 ea  Matrix knee extension 15# DL: 3x10 - cue eccentric  Matrix knee extension 15# SL: 2x10 ea - cue eccentric  Knee extension isometrics 65-75 deg 5x fail  Quad set isometrics 5x fail    Patient education:  -  importance of quad strength; focus on SLR for quad  - return to gym / biking    Held:  Quad sets half bolster under knee : 2 x 10 reps , 5'' hold   SL abduction: 2 x 12 reps   Matric HS curls 35# : 2 x 10 reps   Shuttle DL press 38lb RTB: 3 x 10 reps   STS in standard chair c/ airex : x 10 reps     manual therapy techniques: Joint mobilizations were applied to the: L knee for 00 minutes, including:  Patellar mobilizations grade II-III  Seated tibiofemoral distraction c/ passive flexion and extension   Superior patellar mobilizations with inferior pole tilting      Patient Education and Home Exercises     Home Exercises Provided and Patient Education Provided     Education provided:   - Continue HEP provided at [facility] and addition of new HEP provided today     Written Home Exercises Provided: yes. Exercises were reviewed and Tomasa was able to demonstrate them prior to the end of the session.  Tomasa demonstrated good  understanding of the education provided. See EMR under Patient Instructions for exercises provided during therapy sessions    ASSESSMENT     Pt presents with increased pain following her last knee injection in her medial knee and posterior knee noted mainly while ambulating . She denied any pain with above exercises performed today and also denoted decreased pain overall upon session completion. Noted improved quad activation today. Still requires min cues with SLR for maintaining knee extension and slow performance .     Tomasa Is progressing well towards her goals.   Pt prognosis is Good.     Pt will continue to benefit from skilled outpatient physical therapy to address the deficits listed in the problem list box on initial evaluation, provide pt/family education and to maximize pt's level of independence in the home and community environment.   Pt's spiritual, cultural and educational needs considered and pt agreeable to plan of care and goals.     Anticipated barriers to physical therapy:  age    GOALS: Short Term Goals:  0-3 weeks  1.Report decreased L knee pain  < / =  3/10  to increase tolerance for ADLs  2. 30 sec sit to stand >10 reps  3. Increase strength by 1/3 MMT grade in quad and hips  to increase tolerance for ADL and work activities.  4. Pt to tolerate HEP to improve ROM and independence with ADL's     Long Term Goals: 4-6 weeks  1.Report decreased L knee pain < / = 1/10  to increase tolerance for ADLs  2.Patient goal: ADLs without complaint  3.Increase strength to >/= 4+/5 in quad and hips  to increase tolerance for ADL and work activities.  4. Pt will report at CJ level (20-40% impaired) on FOTO knee to demonstrate increase in LE function with every day tasks.     PLAN     Continue to progress LE functional strengthening and quad strengthening .     Candi Dupree, PTA

## 2023-01-09 ENCOUNTER — CLINICAL SUPPORT (OUTPATIENT)
Dept: REHABILITATION | Facility: HOSPITAL | Age: 73
End: 2023-01-09
Payer: MEDICARE

## 2023-01-09 DIAGNOSIS — G89.29 CHRONIC PAIN OF LEFT KNEE: Primary | ICD-10-CM

## 2023-01-09 DIAGNOSIS — M25.562 CHRONIC PAIN OF LEFT KNEE: Primary | ICD-10-CM

## 2023-01-09 PROCEDURE — 97110 THERAPEUTIC EXERCISES: CPT

## 2023-01-09 NOTE — PROGRESS NOTES
[facility]   Physical Therapy Treatment Note     Name: Tomasa Reed  Clinic Number: [id number]    Therapy Diagnosis:   No diagnosis found.    Physician: REMY Zuñiga MD    Visit Date: 1/9/2023    Physician Orders: PT Eval and Treat   Medical Diagnosis from Referral: M17.12 (ICD-10-CM) - Primary osteoarthritis of left knee  Evaluation Date: 12/8/2022  Authorization Period Expiration: 01/02/2024  Plan of Care Expiration: 2/8/2023  Visit # / Visits authorized: 2/20 ( 6 total visits )      PTA Visit #: 1/5     Time In: 1400  Time Out: 1500  Total Treatment Time : 60 minutes   Total Billable Time: 60 minutes    Precautions: Standard    SUBJECTIVE     Pt reports: she cont to have a lot of pain after her last injection. She describes the pain down the outside of the leg around calf region to the bottom of the foot along with medial knee pain. Patient complains of pain greatest after walking.    She was compliant with home exercise program.  Response to previous treatment: felt good after with min soreness and no pain  Functional change: none    Pain: 0/10 - seated 6/10 with walking.   Location: L knee medial and posterior     OBJECTIVE     Objective Measures updated at progress report unless specified.   - SLR  - ankle mobility deficits  - knee PROM deficit   Mild + neural tension of saphenous  + TTP at pes anserine    Treatment     Tomasa received the treatments listed below:      therapeutic exercises to develop strength, endurance, and ROM for 60 minutes including:    Assessment  Aerobic activity and endurance training for reciprocal motion of lower limbs on Recumbent Bike for 8 minutes at Level 5.0  Supine Sciatic nerve glides supine 2x30   Prone Saphenous nerve glide x30  Standing Saphenous nerve glide x30  Bridge ER 3x12  Shuttle DL 2B 2x10  Sit to stand YTB 2x10  TKE blue ball 20x5 sec  Bwd walking treadmill x5 min - not maxine  Lateral walk hurdles (medium) x5  laps    Patient education:  - importance of quad strength  - return to gym / biking  - New HEP with nerve glides for sciatic / saphenous nerves + lateral walks    Held:  SLR 2x10 ea - with cuing needed for quad activation  Bridge adduction 2x10  Clam 2x10 ea  Matrix knee extension 15# DL: 3x10 - cue eccentric  Matrix knee extension 15# SL: 2x10 ea - cue eccentric  Knee extension isometrics 65-75 deg 5x fail  Quad set isometrics 5x fail  Quad sets half bolster under knee : 2 x 10 reps , 5'' hold   SL abduction: 2 x 12 reps   Matric HS curls 35# : 2 x 10 reps   Shuttle DL press 38lb RTB: 3 x 10 reps   STS in standard chair c/ airex : x 10 reps     manual therapy techniques: Joint mobilizations were applied to the: L knee for 00 minutes, including:  Patellar mobilizations grade II-III  Seated tibiofemoral distraction c/ passive flexion and extension   Superior patellar mobilizations with inferior pole tilting      Patient Education and Home Exercises     Home Exercises Provided and Patient Education Provided     Education provided:   - Continue HEP provided at [facility] and addition of new HEP provided today     Written Home Exercises Provided: yes. Exercises were reviewed and Tomasa was able to demonstrate them prior to the end of the session.  Tomasa demonstrated good  understanding of the education provided. See EMR under Patient Instructions for exercises provided during therapy sessions    ASSESSMENT     Pt presents with increased pain following her last knee injection mainly in her medial knee with associated c/o of lateral leg pain noted mainly while ambulating. Demonstrated + neural tension along with + pes anserine testing both contributing to patient's medial knee symptoms. Patient demonstrated decreased pain at the end of session following nerve gliding and quad / hip abduction activation activities.    Plan to continue to progress neural mobility, quad activation and CKC knee stability to address knee pain and  gait deficits.    Tomasa Is progressing well towards her goals.   Pt prognosis is Good.     Pt will continue to benefit from skilled outpatient physical therapy to address the deficits listed in the problem list box on initial evaluation, provide pt/family education and to maximize pt's level of independence in the home and community environment.   Pt's spiritual, cultural and educational needs considered and pt agreeable to plan of care and goals.     Anticipated barriers to physical therapy: age    GOALS: Short Term Goals:  0-3 weeks  1.Report decreased L knee pain  < / =  3/10  to increase tolerance for ADLs  2. 30 sec sit to stand >10 reps  3. Increase strength by 1/3 MMT grade in quad and hips  to increase tolerance for ADL and work activities.  4. Pt to tolerate HEP to improve ROM and independence with ADL's     Long Term Goals: 4-6 weeks  1.Report decreased L knee pain < / = 1/10  to increase tolerance for ADLs  2.Patient goal: ADLs without complaint  3.Increase strength to >/= 4+/5 in quad and hips  to increase tolerance for ADL and work activities.  4. Pt will report at CJ level (20-40% impaired) on FOTO knee to demonstrate increase in LE function with every day tasks.     PLAN     Continue to progress LE functional strengthening and quad strengthening .     KEILA NIX, PT, DPT, OCS

## 2023-01-12 ENCOUNTER — CLINICAL SUPPORT (OUTPATIENT)
Dept: REHABILITATION | Facility: HOSPITAL | Age: 73
End: 2023-01-12
Payer: MEDICARE

## 2023-01-12 DIAGNOSIS — M25.562 CHRONIC PAIN OF LEFT KNEE: Primary | ICD-10-CM

## 2023-01-12 DIAGNOSIS — G89.29 CHRONIC PAIN OF LEFT KNEE: Primary | ICD-10-CM

## 2023-01-12 PROCEDURE — 97110 THERAPEUTIC EXERCISES: CPT

## 2023-01-12 NOTE — PROGRESS NOTES
[facility]   Physical Therapy Treatment Note     Name: Tomasa Reed  Clinic Number: [id number]    Therapy Diagnosis:   No diagnosis found.    Physician: REMY Zuñiga MD    Visit Date: 1/12/2023    Physician Orders: PT Eval and Treat   Medical Diagnosis from Referral: M17.12 (ICD-10-CM) - Primary osteoarthritis of left knee  Evaluation Date: 12/8/2022  Authorization Period Expiration: 01/02/2024  Plan of Care Expiration: 2/8/2023  Visit # / Visits authorized: 3/7 (7 total visits + Eval)     PTA Visit #: 1/5     Time In: 1400  Time Out: 1505  Total Treatment Time : 65 minutes   Total Billable Time: 55 minutes    Precautions: Standard    SUBJECTIVE     Pt reports: chiropractor adjustment helped further reduce lateral leg pain. Pt reports that she is able to feel her quad working a lot better with walking and has been mindful of her knee alignment when doing sit to stand. Pt reports that she still has c/o medial knee pain that is worst after walking. Saphenous nerve glide on wall helping reduce this discomfort along with use of TENS unit / Ice.    She was compliant with home exercise program.  Response to previous treatment: felt good after with min soreness and no pain  Functional change: none    Pain: 0/10 - seated 4/10 with walking.   Location: L knee medial    OBJECTIVE     Objective Measures updated at progress report unless specified.   Swelling on medial knee  Mild + neural tension of saphenous  + TTP at pes anserine    Treatment     Tomasa received the treatments listed below:      therapeutic exercises to develop strength, endurance, and ROM for 55 minutes including:    Recumbent Bike for 8 minutes at Level 5.0 for LE strength  Standing Saphenous Nerve Glide x30  Bridge GTB 3x10  Bridge adduction - np  STS GTB 2x10  St. Lateral Step Down 2 inch 3x10 b/l  Shuttle DL press 38lb 3x10  TKE Black TB 30x b/l  St. Hip Abd. 3x10 b/l  Lateral Walking Hurdles (medium) x3  laps  Toe Yoga with spreading 2 minutes    Patient education:  - importance of quad strength  - return to gym / biking  - importance of hip strength and correlation to knee alignment in daily activities  - toe yoga to be performed at home as needed    Held:  SLR 2x10 ea - with cuing needed for quad activation  Clam 2x10 ea  Matrix knee extension 15# DL: 3x10 - cue eccentric  Matrix knee extension 15# SL: 2x10 ea - cue eccentric  Knee extension isometrics 65-75 deg 5x fail  Quad set isometrics 5x fail  Quad sets half bolster under knee : 2 x 10 reps , 5'' hold   SL abduction: 2 x 12 reps   Matric HS curls 35# : 2 x 10 reps   Shuttle DL press 38lb RTB: 3 x 10 reps   STS in standard chair c/ airex : x 10 reps   Prone Saphenous nerve glide x30    manual therapy techniques: Joint mobilizations were applied to the: L knee for 00 minutes, including:  Patellar mobilizations grade II-III  Seated tibiofemoral distraction c/ passive flexion and extension   Superior patellar mobilizations with inferior pole tilting    Ice x10 min post PT interventions    Patient Education and Home Exercises     Home Exercises Provided and Patient Education Provided     Education provided:   - Continue HEP provided at [facility] and addition of new HEP provided today     Written Home Exercises Provided: yes. Exercises were reviewed and Tomasa was able to demonstrate them prior to the end of the session.  Tomasa demonstrated good  understanding of the education provided. See EMR under Patient Instructions for exercises provided during therapy sessions    ASSESSMENT     Pt reports her lateral knee pain has resolved following her chiropractor visit; lateral knee pain likely nerve related as it improved following manipulation.     Patient still complains of her medial knee pain with associated neural tension, medial knee OA, and pes anserine TTP. Patient progressed with double leg and single leg CKC activities today and responded well without  aggravation of symptoms. Patients medial knee pain in part stem from reduced quad active loading in CKC activities and over activation of other musculature around the knee occurs to substitute contributing to patients symptoms around pes anserine and saphenous nerve.    Plan to continue to progress neural mobility, quad/hip activation and CKC knee stability to address knee pain and gait deficits.    Tomasa Is progressing well towards her goals.   Pt prognosis is Good.     Pt will continue to benefit from skilled outpatient physical therapy to address the deficits listed in the problem list box on initial evaluation, provide pt/family education and to maximize pt's level of independence in the home and community environment.   Pt's spiritual, cultural and educational needs considered and pt agreeable to plan of care and goals.     Anticipated barriers to physical therapy: age    GOALS: Short Term Goals:  0-3 weeks  1.Report decreased L knee pain  < / =  3/10  to increase tolerance for ADLs  2. 30 sec sit to stand >10 reps  3. Increase strength by 1/3 MMT grade in quad and hips  to increase tolerance for ADL and work activities.  4. Pt to tolerate HEP to improve ROM and independence with ADL's     Long Term Goals: 4-6 weeks  1.Report decreased L knee pain < / = 1/10  to increase tolerance for ADLs  2.Patient goal: ADLs without complaint  3.Increase strength to >/= 4+/5 in quad and hips  to increase tolerance for ADL and work activities.  4. Pt will report at CJ level (20-40% impaired) on FOTO knee to demonstrate increase in LE function with every day tasks.     PLAN     Continue to progress LE functional strengthening and quad strengthening .     KEILA NIX, PT, DPT, OCS

## 2023-01-17 PROBLEM — R29.898 DECREASED RANGE OF MOTION OF NECK: Status: RESOLVED | Noted: 2022-01-25 | Resolved: 2023-01-17

## 2023-01-17 PROBLEM — M25.562 PAIN IN LEFT KNEE: Status: RESOLVED | Noted: 2022-12-08 | Resolved: 2023-01-17

## 2023-01-17 PROBLEM — I77.89 OTHER SPECIFIED DISORDERS OF ARTERIES AND ARTERIOLES: Status: RESOLVED | Noted: 2022-01-14 | Resolved: 2023-01-17

## 2023-01-17 PROBLEM — R29.3 POSTURE ABNORMALITY: Status: RESOLVED | Noted: 2022-01-25 | Resolved: 2023-01-17

## 2023-01-17 PROBLEM — M54.2 PAINFUL CERVICAL RANGE OF MOTION: Status: RESOLVED | Noted: 2022-01-25 | Resolved: 2023-01-17

## 2023-01-19 ENCOUNTER — CLINICAL SUPPORT (OUTPATIENT)
Dept: REHABILITATION | Facility: HOSPITAL | Age: 73
End: 2023-01-19
Payer: MEDICARE

## 2023-01-19 DIAGNOSIS — G89.29 CHRONIC PAIN OF LEFT KNEE: Primary | ICD-10-CM

## 2023-01-19 DIAGNOSIS — M25.562 CHRONIC PAIN OF LEFT KNEE: Primary | ICD-10-CM

## 2023-01-19 PROCEDURE — 97140 MANUAL THERAPY 1/> REGIONS: CPT

## 2023-01-19 PROCEDURE — 97110 THERAPEUTIC EXERCISES: CPT

## 2023-01-19 NOTE — PROGRESS NOTES
[facility]   Physical Therapy Treatment Note     Name: Tomasa Reed  Clinic Number: [id number]    Therapy Diagnosis:   No diagnosis found.    Physician: REMY Zuñiga MD    Visit Date: 1/19/2023    Physician Orders: PT Eval and Treat   Medical Diagnosis from Referral: M17.12 (ICD-10-CM) - Primary osteoarthritis of left knee  Evaluation Date: 12/8/2022  Authorization Period Expiration: 01/02/2024  Plan of Care Expiration: 2/8/2023  Visit # / Visits authorized: 4/7 (7 total visits + Eval)     PTA Visit #: 1/5     Time In: 1:55  Time Out: 2:55   Total Treatment Time : 60 minutes   Total Billable Time: 30 minutes one on one    Precautions: Standard    SUBJECTIVE     Pt reports: that her medial knee pain is still present today but was absent yesterday. Pt reports that she is feeling her quad is working better with walking. Pt reports that she went to the gym over the weekend and did the bike for 20 minutes, but did not have any pain after.    NEXT VISIT - FOTO    She was compliant with home exercise program.  Response to previous treatment: felt good after with min soreness and no pain  Functional change: none    Pain: 0/10 - seated 4/10 with walking.   Location: L knee medial    OBJECTIVE     Objective Measures updated at progress report unless specified.   Swelling on medial knee  Mild + neural tension of saphenous  + TTP at pes anserine    Treatment     Tomasa received the treatments listed below:      therapeutic exercises to develop strength, endurance, and ROM for 50 minutes (20 min one on one) including:    SLR 3x10   STS 3x10  TKE ball 20x 10 sec  DL Shuttle press 50lb 3x10  SL Shuttle press 25lbs 3x10  St. Hip Abd 3x10 b/l  Matrix knee extension 15# DL 3x10 - cue for slow eccentric  Matrix hamstring curl 15# DL 2x10 - cue for slow eccentric  Matrix knee extension 15# DL up and SL down: 2x10 ea - cue eccentric  SLS 3x30s both sides    Patient education:  - importance  of quad strength  - continue doing the bike at the gym and to add knee extension and hamstring curl machine  - importance of hip strength and correlation to knee alignment in daily activities    Held:  Clam 2x10 ea  Standing Saphenous Nerve Glide x30  Bridge GTB 3x10  Bridge adduction - np  STS GTB 2x10  St. Lateral Step Down 2 inch 3x10 b/l NT  Knee extension isometrics 65-75 deg 5x fail  Quad set isometrics 5x fail  Quad sets half bolster under knee : 2 x 10 reps , 5'' hold   SL abduction: 2 x 12 reps   Matric HS curls 35# : 2 x 10 reps   Shuttle DL press 38lb RTB: 3 x 10 reps   STS in standard chair c/ airex : x 10 reps   Prone Saphenous nerve glide x30  TKE Black TB 30x b/l  Toe Yoga with spreading 2 minutes    manual therapy techniques: Joint mobilizations were applied to the: L knee for 10 minutes, including:    Sustained Lateral glide @ 30 degrees to TKE with manual and then with active quad contraction  Grade III lateral glide at TKE    Ice x10 min post PT interventions NT    Patient Education and Home Exercises     Home Exercises Provided and Patient Education Provided     Education provided:   - Continue HEP provided at eval and addition of new HEP provided today     Written Home Exercises Provided: yes. Exercises were reviewed and Tomasa was able to demonstrate them prior to the end of the session.  Tomasa demonstrated good  understanding of the education provided. See EMR under Patient Instructions for exercises provided during therapy sessions    ASSESSMENT     Pt still presents with subjective c/o medial knee pain when walking. Pt responded well today to manual performed and reported no c/o medial knee pain when walking after manual therapy was provided. Pt reduction of medial knee pain maintained throughout session after performing all therex. Pt presenting with better quad control during gait and her strength is increasing as well. Pt still requires therex to address quad and hip abduction  activation to address patients knee stability medial knee symptoms.     Plan to continue to progress quad/hip activation and CKC knee stability to address knee pain and gait deficits.    Tomasa Is progressing well towards her goals.   Pt prognosis is Good.     Pt will continue to benefit from skilled outpatient physical therapy to address the deficits listed in the problem list box on initial evaluation, provide pt/family education and to maximize pt's level of independence in the home and community environment.   Pt's spiritual, cultural and educational needs considered and pt agreeable to plan of care and goals.     Anticipated barriers to physical therapy: age    GOALS: Short Term Goals:  0-3 weeks  1.Report decreased L knee pain  < / =  3/10  to increase tolerance for ADLs  2. 30 sec sit to stand >10 reps  3. Increase strength by 1/3 MMT grade in quad and hips  to increase tolerance for ADL and work activities.  4. Pt to tolerate HEP to improve ROM and independence with ADL's     Long Term Goals: 4-6 weeks  1.Report decreased L knee pain < / = 1/10  to increase tolerance for ADLs  2.Patient goal: ADLs without complaint  3.Increase strength to >/= 4+/5 in quad and hips  to increase tolerance for ADL and work activities.  4. Pt will report at CJ level (20-40% impaired) on FOTO knee to demonstrate increase in LE function with every day tasks.     PLAN     Continue to progress LE functional strengthening and quad strengthening .     KEILA NIX, PT, DPT, OCS

## 2023-01-23 ENCOUNTER — OFFICE VISIT (OUTPATIENT)
Dept: INTERNAL MEDICINE | Facility: CLINIC | Age: 73
End: 2023-01-23
Payer: MEDICARE

## 2023-01-23 VITALS
SYSTOLIC BLOOD PRESSURE: 130 MMHG | OXYGEN SATURATION: 99 % | DIASTOLIC BLOOD PRESSURE: 70 MMHG | HEIGHT: 62 IN | WEIGHT: 144.5 LBS | RESPIRATION RATE: 14 BRPM | HEART RATE: 75 BPM | BODY MASS INDEX: 26.59 KG/M2

## 2023-01-23 DIAGNOSIS — E55.9 VITAMIN D DEFICIENCY: ICD-10-CM

## 2023-01-23 DIAGNOSIS — M25.562 CHRONIC PAIN OF LEFT KNEE: ICD-10-CM

## 2023-01-23 DIAGNOSIS — Z85.820 HISTORY OF MELANOMA: ICD-10-CM

## 2023-01-23 DIAGNOSIS — E04.2 NONTOXIC MULTINODULAR GOITER: ICD-10-CM

## 2023-01-23 DIAGNOSIS — H61.23 BILATERAL IMPACTED CERUMEN: ICD-10-CM

## 2023-01-23 DIAGNOSIS — E78.5 HYPERLIPIDEMIA, UNSPECIFIED HYPERLIPIDEMIA TYPE: ICD-10-CM

## 2023-01-23 DIAGNOSIS — M85.80 OSTEOPENIA, UNSPECIFIED LOCATION: ICD-10-CM

## 2023-01-23 DIAGNOSIS — E66.3 OVERWEIGHT (BMI 25.0-29.9): ICD-10-CM

## 2023-01-23 DIAGNOSIS — Z12.11 SCREEN FOR COLON CANCER: ICD-10-CM

## 2023-01-23 DIAGNOSIS — Z00.00 ENCOUNTER FOR PREVENTIVE HEALTH EXAMINATION: Primary | ICD-10-CM

## 2023-01-23 DIAGNOSIS — G89.29 CHRONIC PAIN OF LEFT KNEE: ICD-10-CM

## 2023-01-23 PROCEDURE — 1170F FXNL STATUS ASSESSED: CPT | Mod: CPTII,S$GLB,, | Performed by: NURSE PRACTITIONER

## 2023-01-23 PROCEDURE — G0439 PPPS, SUBSEQ VISIT: HCPCS | Mod: S$GLB,,, | Performed by: NURSE PRACTITIONER

## 2023-01-23 PROCEDURE — 1160F PR REVIEW ALL MEDS BY PRESCRIBER/CLIN PHARMACIST DOCUMENTED: ICD-10-PCS | Mod: CPTII,S$GLB,, | Performed by: NURSE PRACTITIONER

## 2023-01-23 PROCEDURE — 3078F PR MOST RECENT DIASTOLIC BLOOD PRESSURE < 80 MM HG: ICD-10-PCS | Mod: CPTII,S$GLB,, | Performed by: NURSE PRACTITIONER

## 2023-01-23 PROCEDURE — 1160F RVW MEDS BY RX/DR IN RCRD: CPT | Mod: CPTII,S$GLB,, | Performed by: NURSE PRACTITIONER

## 2023-01-23 PROCEDURE — 3075F PR MOST RECENT SYSTOLIC BLOOD PRESS GE 130-139MM HG: ICD-10-PCS | Mod: CPTII,S$GLB,, | Performed by: NURSE PRACTITIONER

## 2023-01-23 PROCEDURE — 1125F PR PAIN SEVERITY QUANTIFIED, PAIN PRESENT: ICD-10-PCS | Mod: CPTII,S$GLB,, | Performed by: NURSE PRACTITIONER

## 2023-01-23 PROCEDURE — 1125F AMNT PAIN NOTED PAIN PRSNT: CPT | Mod: CPTII,S$GLB,, | Performed by: NURSE PRACTITIONER

## 2023-01-23 PROCEDURE — 99999 PR PBB SHADOW E&M-EST. PATIENT-LVL V: CPT | Mod: PBBFAC,,, | Performed by: NURSE PRACTITIONER

## 2023-01-23 PROCEDURE — 1170F PR FUNCTIONAL STATUS ASSESSED: ICD-10-PCS | Mod: CPTII,S$GLB,, | Performed by: NURSE PRACTITIONER

## 2023-01-23 PROCEDURE — 3075F SYST BP GE 130 - 139MM HG: CPT | Mod: CPTII,S$GLB,, | Performed by: NURSE PRACTITIONER

## 2023-01-23 PROCEDURE — 3288F PR FALLS RISK ASSESSMENT DOCUMENTED: ICD-10-PCS | Mod: CPTII,S$GLB,, | Performed by: NURSE PRACTITIONER

## 2023-01-23 PROCEDURE — 1159F MED LIST DOCD IN RCRD: CPT | Mod: CPTII,S$GLB,, | Performed by: NURSE PRACTITIONER

## 2023-01-23 PROCEDURE — 3078F DIAST BP <80 MM HG: CPT | Mod: CPTII,S$GLB,, | Performed by: NURSE PRACTITIONER

## 2023-01-23 PROCEDURE — 99999 PR PBB SHADOW E&M-EST. PATIENT-LVL V: ICD-10-PCS | Mod: PBBFAC,,, | Performed by: NURSE PRACTITIONER

## 2023-01-23 PROCEDURE — 1159F PR MEDICATION LIST DOCUMENTED IN MEDICAL RECORD: ICD-10-PCS | Mod: CPTII,S$GLB,, | Performed by: NURSE PRACTITIONER

## 2023-01-23 PROCEDURE — 3288F FALL RISK ASSESSMENT DOCD: CPT | Mod: CPTII,S$GLB,, | Performed by: NURSE PRACTITIONER

## 2023-01-23 PROCEDURE — G0439 PR MEDICARE ANNUAL WELLNESS SUBSEQUENT VISIT: ICD-10-PCS | Mod: S$GLB,,, | Performed by: NURSE PRACTITIONER

## 2023-01-23 PROCEDURE — 1101F PT FALLS ASSESS-DOCD LE1/YR: CPT | Mod: CPTII,S$GLB,, | Performed by: NURSE PRACTITIONER

## 2023-01-23 PROCEDURE — 1101F PR PT FALLS ASSESS DOC 0-1 FALLS W/OUT INJ PAST YR: ICD-10-PCS | Mod: CPTII,S$GLB,, | Performed by: NURSE PRACTITIONER

## 2023-01-23 PROCEDURE — 3008F PR BODY MASS INDEX (BMI) DOCUMENTED: ICD-10-PCS | Mod: CPTII,S$GLB,, | Performed by: NURSE PRACTITIONER

## 2023-01-23 PROCEDURE — 3008F BODY MASS INDEX DOCD: CPT | Mod: CPTII,S$GLB,, | Performed by: NURSE PRACTITIONER

## 2023-01-23 RX ORDER — LANOLIN ALCOHOL/MO/W.PET/CERES
400 CREAM (GRAM) TOPICAL DAILY
COMMUNITY

## 2023-01-23 NOTE — PROGRESS NOTES
"Tomasa Reed presented for a  Medicare AWV and comprehensive Health Risk Assessment today. The following components were reviewed and updated:    Medical history  Family History  Social history  Allergies and Current Medications  Health Risk Assessment  Health Maintenance  Care Team     ** See Completed Assessments for Annual Wellness Visit within the encounter summary.**       The following assessments were completed:  Living Situation  CAGE  Depression Screening  Timed Get Up and Go  Whisper Test- hearing aides  Cognitive Function Screening  Nutrition Screening  ADL Screening  PAQ Screening    Vitals:    01/23/23 0901   BP: 130/70   BP Location: Left arm   Patient Position: Sitting   Pulse: 75   Resp: 14   SpO2: 99%   Weight: 65.5 kg (144 lb 8.2 oz)   Height: 5' 2" (1.575 m)     Body mass index is 26.43 kg/m².  Physical Exam  Constitutional:       Comments: Younger in appearance than age   HENT:      Right Ear: There is no impacted cerumen.      Left Ear: There is no impacted cerumen.   Eyes:      General: No scleral icterus.  Cardiovascular:      Rate and Rhythm: Normal rate and regular rhythm.   Pulmonary:      Effort: Pulmonary effort is normal.      Breath sounds: Normal breath sounds.   Abdominal:      Palpations: Abdomen is soft.   Musculoskeletal:         General: No swelling. Normal range of motion.   Skin:     General: Skin is warm and dry.   Neurological:      Mental Status: She is alert and oriented to person, place, and time.   Psychiatric:         Mood and Affect: Mood normal.         Behavior: Behavior normal.         Thought Content: Thought content normal.         Judgment: Judgment normal.          Opioid screening has been done- patient denies taking opioid medication.  Review for substance use disorder screen-  patient denies using substances.      Diagnoses and health risks identified today and associated recommendations/orders:    1. Hyperlipidemia, unspecified hyperlipidemia type  Stable " on crestor as prescribed & followed by PCP    2. Nontoxic multinodular goiter  Stable followed by PCP    3. History of melanoma  Stable followed by dermatology-external    4. Osteopenia, unspecified location  Stable followed by PCP-DXA 2/1/2022    5. Vitamin D deficiency  Stable followed by PCP    6. Encounter for preventive health examination  Here for Health Risk Assessment/Annual Wellness Visit.  Health maintenance reviewed and updated. Follow up in one year.      7. Screen for colon cancer  - Cologuard Screening (Multitarget Stool DNA); Future      8. Bilateral impacted cerumen  Stable followed by PCP  - Ambulatory referral/consult to ENT; Future    9. Chronic pain of left knee  Stable followed by ortho, PT    10. Overweight (BMI 25.0-29.9)  Chronic problem - no recent weight loss. Followed by PCP.   Centers for Disease Control and Prevention (CDC)  weight recommendations for current BMI & ideal BMI range discussed with patient.  Recommended  gradual weight loss,  mediterranean diet , structured regular exercise every day- currently under care for knee pain -receiving PT      Provided Tomasa with a 5-10 year written screening schedule and personal prevention plan. Recommendations were developed using the USPSTF age appropriate recommendations. Education, counseling, and referrals were provided as needed. After Visit Summary printed and given to patient which includes a list of additional screenings\tests needed. Cognitive function clock drawing was labeled with the patient's identification & forwarded to medical records to be scanned into the patient's epic chart.  Participates in the BAC ON TRAC caryl- 1200-1300cal /day. Under care of ortho & outpt PT for knee.   Follow up in about 1 year (around 1/23/2024) for HRA.    Eleonora Montenegro NP I offered to discuss advanced care planning, including how to pick a person who would make decisions for you if you were unable to make them for yourself, called a health care  power of , and what kind of decisions you might make such as use of life sustaining treatments such as ventilators and tube feeding when faced with a life limiting illness recorded on a living will that they will need to know. (How you want to be cared for as you near the end of your natural life)     X Patient has completed & will bring to PCP in future.

## 2023-01-23 NOTE — PATIENT INSTRUCTIONS
Counseling and Referral of Other Preventative  (Italic type indicates deductible and co-insurance are waived)    Patient Name: Tomasa Reed  Today's Date: 1/23/2023    Health Maintenance         Date Due Completion Date    COVID-19 Vaccine (5 - Booster for Pfizer series) declined 8/24/2022    TETANUS VACCINE In the future for injury   12/3/2012    Mammogram 09/01/2023 9/1/2022    DEXA Scan 02/01/2024 2/1/2022    Colorectal Cancer Screening 01/14/2025- cologard ordered to get done   After 2/20/2023 2/19/2020    Lipid Panel    Contact PCP for annual labs    Gradual weight loss    ENT to remove ear wax     05/26/2023 5/26/2022          No orders of the defined types were placed in this encounter.    The following information is provided to all patients.  This information is to help you find resources for any of the problems found today that may be affecting your health:                Living healthy guide: www.[facility].[place]      Understanding Diabetes: www.diabetes.org      Eating healthy: www.cdc.gov/healthyweight      CDC home safety checklist: www.cdc.gov/steadi/patient.html      Agency on Aging: www.goea.[place]      Alcoholics anonymous (AA): www.aa.org      Physical Activity: www.alisson.nih.gov/hv3hhie      Tobacco use: www.quitwithusla.org

## 2023-01-27 ENCOUNTER — OFFICE VISIT (OUTPATIENT)
Dept: OTOLARYNGOLOGY | Facility: CLINIC | Age: 73
End: 2023-01-27
Payer: MEDICARE

## 2023-01-27 VITALS
BODY MASS INDEX: 26.98 KG/M2 | WEIGHT: 146.63 LBS | DIASTOLIC BLOOD PRESSURE: 76 MMHG | SYSTOLIC BLOOD PRESSURE: 157 MMHG | HEIGHT: 62 IN | HEART RATE: 77 BPM

## 2023-01-27 DIAGNOSIS — H61.23 BILATERAL IMPACTED CERUMEN: ICD-10-CM

## 2023-01-27 DIAGNOSIS — J30.9 CHRONIC ALLERGIC RHINITIS: Chronic | ICD-10-CM

## 2023-01-27 DIAGNOSIS — H91.93 BILATERAL HEARING LOSS, UNSPECIFIED HEARING LOSS TYPE: Primary | Chronic | ICD-10-CM

## 2023-01-27 PROCEDURE — 1160F PR REVIEW ALL MEDS BY PRESCRIBER/CLIN PHARMACIST DOCUMENTED: ICD-10-PCS | Mod: CPTII,S$GLB,, | Performed by: OTOLARYNGOLOGY

## 2023-01-27 PROCEDURE — 3077F SYST BP >= 140 MM HG: CPT | Mod: CPTII,S$GLB,, | Performed by: OTOLARYNGOLOGY

## 2023-01-27 PROCEDURE — 3288F PR FALLS RISK ASSESSMENT DOCUMENTED: ICD-10-PCS | Mod: CPTII,S$GLB,, | Performed by: OTOLARYNGOLOGY

## 2023-01-27 PROCEDURE — 1126F PR PAIN SEVERITY QUANTIFIED, NO PAIN PRESENT: ICD-10-PCS | Mod: CPTII,S$GLB,, | Performed by: OTOLARYNGOLOGY

## 2023-01-27 PROCEDURE — 99213 PR OFFICE/OUTPT VISIT, EST, LEVL III, 20-29 MIN: ICD-10-PCS | Mod: S$GLB,,, | Performed by: OTOLARYNGOLOGY

## 2023-01-27 PROCEDURE — 3008F BODY MASS INDEX DOCD: CPT | Mod: CPTII,S$GLB,, | Performed by: OTOLARYNGOLOGY

## 2023-01-27 PROCEDURE — 1101F PT FALLS ASSESS-DOCD LE1/YR: CPT | Mod: CPTII,S$GLB,, | Performed by: OTOLARYNGOLOGY

## 2023-01-27 PROCEDURE — 99999 PR PBB SHADOW E&M-EST. PATIENT-LVL IV: CPT | Mod: PBBFAC,,, | Performed by: OTOLARYNGOLOGY

## 2023-01-27 PROCEDURE — 3078F DIAST BP <80 MM HG: CPT | Mod: CPTII,S$GLB,, | Performed by: OTOLARYNGOLOGY

## 2023-01-27 PROCEDURE — 3078F PR MOST RECENT DIASTOLIC BLOOD PRESSURE < 80 MM HG: ICD-10-PCS | Mod: CPTII,S$GLB,, | Performed by: OTOLARYNGOLOGY

## 2023-01-27 PROCEDURE — 1159F MED LIST DOCD IN RCRD: CPT | Mod: CPTII,S$GLB,, | Performed by: OTOLARYNGOLOGY

## 2023-01-27 PROCEDURE — 99999 PR PBB SHADOW E&M-EST. PATIENT-LVL IV: ICD-10-PCS | Mod: PBBFAC,,, | Performed by: OTOLARYNGOLOGY

## 2023-01-27 PROCEDURE — 3008F PR BODY MASS INDEX (BMI) DOCUMENTED: ICD-10-PCS | Mod: CPTII,S$GLB,, | Performed by: OTOLARYNGOLOGY

## 2023-01-27 PROCEDURE — 3288F FALL RISK ASSESSMENT DOCD: CPT | Mod: CPTII,S$GLB,, | Performed by: OTOLARYNGOLOGY

## 2023-01-27 PROCEDURE — 1126F AMNT PAIN NOTED NONE PRSNT: CPT | Mod: CPTII,S$GLB,, | Performed by: OTOLARYNGOLOGY

## 2023-01-27 PROCEDURE — 1159F PR MEDICATION LIST DOCUMENTED IN MEDICAL RECORD: ICD-10-PCS | Mod: CPTII,S$GLB,, | Performed by: OTOLARYNGOLOGY

## 2023-01-27 PROCEDURE — 1160F RVW MEDS BY RX/DR IN RCRD: CPT | Mod: CPTII,S$GLB,, | Performed by: OTOLARYNGOLOGY

## 2023-01-27 PROCEDURE — 3077F PR MOST RECENT SYSTOLIC BLOOD PRESSURE >= 140 MM HG: ICD-10-PCS | Mod: CPTII,S$GLB,, | Performed by: OTOLARYNGOLOGY

## 2023-01-27 PROCEDURE — 99213 OFFICE O/P EST LOW 20 MIN: CPT | Mod: S$GLB,,, | Performed by: OTOLARYNGOLOGY

## 2023-01-27 PROCEDURE — 1101F PR PT FALLS ASSESS DOC 0-1 FALLS W/OUT INJ PAST YR: ICD-10-PCS | Mod: CPTII,S$GLB,, | Performed by: OTOLARYNGOLOGY

## 2023-01-27 NOTE — PROCEDURES
Procedures    Procedure Note  Procedure performed by Jeanette Anderson NP under my supervision.     CHIEF COMPLAINT:  Bilateral Cerumen Impaction    Description:  The patient was seated in an exam chair.  An ear speculum was placed in the right EAC and was examined under the microscope.  Suction and/or loop curettes were used to remove a large cerumen impaction.  The tympanic membrane was visualized and was normal in appearance.  The procedure was repeated on the left side in a similar fashion.  The TM was intact and normal on this side as well.  The patient tolerated the procedure well.

## 2023-01-27 NOTE — PROGRESS NOTES
Patient ID: Tomasa Reed is a 72 y.o. y.o. female    Chief Complaint:   Chief Complaint   Patient presents with    Ear Fullness     Bilateral ear wax removal       Reyna Reed is here to see me today for evaluation of a possible wax impaction in bilateral ears.  she has complaints of hearing loss in the affected ears, but denies pain or drainage.  This has been an issue in the past.  The patient has not been using any sort of ear drop to soften the wax. She does wear hearing aids and [facility].  She has an audiology evaluation for her yearly check.  She also relates that she has a history of allergic rhinitis.  This tends to be worse seasonally and notices that o'clock tree pollen seems to trigger symptoms.  She notes nasal congestion, rhinorrhea and sneezing that will occur intermittently.  She does take Claritin or Zyrtec as needed for symptoms.      Review of Systems   Constitutional: Negative for fever, chills, fatigue and unexpected weight change.   HENT: Positive for ear blockage. Negative for hearing loss, nosebleeds, congestion, sore throat, facial swelling, rhinorrhea, sneezing, trouble swallowing, dental problem, voice change, postnasal drip, sinus pressure, tinnitus and ear discharge.    Eyes: Negative for redness, itching and visual disturbance.   Respiratory: Negative for cough, choking and wheezing.    Cardiovascular: Negative for chest pain and palpitations.   Gastrointestinal: Negative for abdominal pain.        No reflux.   Musculoskeletal: Negative for gait problem.   Skin: Negative for rash.   Neurological: Negative for dizziness, light-headedness and headaches.     Past Medical History:   Diagnosis Date    Cataract     OU    Chronic interstitial cystitis     History of hepatitis C; S/p RX with SVR 12 documented 06/2017 6/22/2017    HLD (hyperlipidemia)     Malignant melanoma of skin of trunk, except scrotum     Migraine, unspecified, without mention  of intractable migraine without mention of status migrainosus     Nonspecific abnormal results of liver function study     Nontoxic multinodular goiter     Osteopenia      Past Surgical History:   Procedure Laterality Date    BREAST BIOPSY      CATARACT EXTRACTION Right 03/02/2020    cataracts      [facility]    COLONOSCOPY N/A 12/14/2016    Procedure: COLONOSCOPY;  Surgeon: Wicho Painitng MD;  Location: [address]);  Service: Endoscopy;  Laterality: N/A;    COLONOSCOPY N/A 1/14/2020    Procedure: COLONOSCOPY;  Surgeon: NANCY Maher MD;  Location: [address]);  Service: Endoscopy;  Laterality: N/A;  1/8/20 left vm to confirm appt-rb    FINGER SURGERY Right 2010    index finger fused    MOLE REMOVAL      TONSILLECTOMY, ADENOIDECTOMY      TOTAL ABDOMINAL HYSTERECTOMY       Social History     Socioeconomic History    Marital status:    Occupational History    Occupation: retired     Employer: [employer]   Tobacco Use    Smoking status: Never    Smokeless tobacco: Never   Substance and Sexual Activity    Alcohol use: Yes     Comment: socially    Sexual activity: Yes     Partners: Male   Social History Narrative        Retired  of an elementary school    Has 2 daughters, no thyroid problems    Helping out w/ 17yo grandson now @ [employer]    Exercising @ [employer] 2 days/wk            FAMILY HISTORY:     Mom d.93 status post colon cancer, status post hip     fracture. She has severe DJD and is diabetic.     Dad d. 97 status post stroke, CHF. Dependent, bed to w/c.      Significant memory decline, usually doesn't recognize family        Two sisters in good health.         SCREENING TESTS:      Social Determinants of Health     Financial Resource Strain: Low Risk     Difficulty of Paying Living Expenses: Not very hard   Food Insecurity: No Food Insecurity    Worried About Running Out of Food in the Last Year: Never true    Ran Out of Food in the Last  Year: Never true   Transportation Needs: No Transportation Needs    Lack of Transportation (Medical): No    Lack of Transportation (Non-Medical): No   Stress: No Stress Concern Present    Feeling of Stress : Not at all   Social Connections: Unknown    Marital Status:    Housing Stability: Unknown    Unable to Pay for Housing in the Last Year: No    Unstable Housing in the Last Year: No     Family History   Problem Relation Age of Onset    Osteoarthritis Mother         s/p hip fx    Diabetes Mother     Cancer Mother         colon    Thyroid disease Mother     Kidney failure Mother         d.93 s/p PAD procedure    Cataracts Mother     Macular degeneration Mother     Heart failure Father     Stroke Father         d.97 complications    Hypertension Father     Cataracts Father     Fibromyalgia Sister     Glaucoma Sister         dry eyes, s/p duct surgery    Other Sister         acoustic tumor,s/p removal & vertigo resolved, +cochlear implant    Other Daughter         inflammation, better off red meat    Chronic back pain Daughter     GI problems Daughter         liver w/up and on cholestyramine    Obesity Daughter     Melanoma Neg Hx     Amblyopia Neg Hx     Blindness Neg Hx     Retinal detachment Neg Hx     Strabismus Neg Hx        Objective:      Vitals:    01/27/23 1311   BP: (!) 157/76   Pulse: 77       Physical Exam   HENT:   NC: Septum midline, turbinates 3+,+ clear mucus stranding;  no lesions, mucosa normal.  OC/OP: Mucosa normal and without lesions.  Tongue and FOM soft, normal, no lesions.  Uvula midline. Soft palate and tonsillar area normal.    Neck: no adenopathy or masses.   Right Ear: Tympanic membrane, external ear and ear canal normal.  EAC has impacted cerumen. Decreased hearing is noted secondary to CI.   Left Ear: Tympanic membrane, external ear and ear canal normal.  EAC has impacted cerumen. Decreased hearing is noted secondary to CI.        See separate procedure note for binocular  microscopy and cerumen removal.          Assessment:         ICD-10-CM ICD-9-CM    1. Bilateral hearing loss, unspecified hearing loss type  H91.93 389.9       2. Chronic allergic rhinitis  J30.9 477.9       3. Bilateral impacted cerumen  H61.23 380.4 Ambulatory referral/consult to ENT           Plan:       Hearing loss: keep scheduled audiology appt. Continue binaural hearing aids. Recommend hearing protection when in noise.   Recommend nasal saline rinses BID  Continue Claritin 10 mg PO daily.   Cerumen impaction:  Removed today without difficulty.  I would recommend the use of a wax softening drop, either over the counter Debrox or mineral oil, on a weekly basis.  I also instructed the patient to avoid Qtips.    Kandis Farrell MD

## 2023-02-01 ENCOUNTER — PES CALL (OUTPATIENT)
Dept: ADMINISTRATIVE | Facility: CLINIC | Age: 73
End: 2023-02-01
Payer: MEDICARE

## 2023-02-02 ENCOUNTER — CLINICAL SUPPORT (OUTPATIENT)
Dept: REHABILITATION | Facility: HOSPITAL | Age: 73
End: 2023-02-02
Payer: MEDICARE

## 2023-02-02 DIAGNOSIS — G89.29 CHRONIC PAIN OF LEFT KNEE: Primary | ICD-10-CM

## 2023-02-02 DIAGNOSIS — M25.562 CHRONIC PAIN OF LEFT KNEE: Primary | ICD-10-CM

## 2023-02-02 PROCEDURE — 97110 THERAPEUTIC EXERCISES: CPT

## 2023-02-02 PROCEDURE — 97140 MANUAL THERAPY 1/> REGIONS: CPT

## 2023-02-02 NOTE — PROGRESS NOTES
[facility]   Physical Therapy Treatment Note     Name: Tomasa Reed  Clinic Number: [id number]    Therapy Diagnosis:   No diagnosis found.    Physician: REMY Zuñiga MD    Visit Date: 2/2/2023    Physician Orders: PT Eval and Treat   Medical Diagnosis from Referral: M17.12 (ICD-10-CM) - Primary osteoarthritis of left knee  Evaluation Date: 12/8/2022  Authorization Period Expiration: 01/02/2024  Plan of Care Expiration: 2/8/2023  Visit # / Visits authorized: 5/7 (8 total visits + Eval)     PTA Visit #: 1/5     Time In: 2:00  Time Out: 2:50  Total Treatment Time : 50 minutes   Total Billable Time: 50 minutes one on one    Precautions: Standard    SUBJECTIVE     Pt reports: that her medial knee pain is still present today and feels worse than previous session. Pt reports the pain is 6/10 when walking.     NEXT VISIT - FOTO    She was compliant with home exercise program.  Response to previous treatment: felt good after with min soreness and no pain  Functional change: none    Pain: 0/10 - seated 6/10 with walking. 2/10 walking following manual  Location: L knee medial    OBJECTIVE     Objective Measures updated at progress report unless specified.   Swelling on medial knee  Mild + neural tension of saphenous  + TTP at pes anserine    Lower Extremity Strength:  Right LE   Left LE     Quadriceps: 5/5 Quadriceps: 5-/5   Hamstrings: 5/5 Hamstrings: 4-/5   Hip Flexion: 4/5 Hip Flexion: 4/5   Hip Extension:  4/5 Hip Extension: 4-/5   Invertors 4/5 Invertors 4-/5       Treatment     Tomasa received the treatments listed below:      therapeutic exercises to develop strength, endurance, and ROM for 35 minutes (35 min one on one) including:    Bridge 1x10  Single leg bridge 1x3 not tolerated due to weakness  Prone Hip extension 2x10 5 sec hold  Seated Hamstring Curl 25# 3x10 with slow eccentric  St. Hip extension 3x10 3 sec hold YTB both sides  Inversion with YTB 3x20 3 sec  hold    Patient education:  - importance of quad strength  - continue doing the bike at the gym and to add knee extension and hamstring curl machine  - importance of hip strength and correlation to knee alignment in daily activities    Held:  Clam 2x10 ea  Standing Saphenous Nerve Glide x30  Bridge GTB 3x10  Bridge adduction - np  STS GTB 2x10  St. Lateral Step Down 2 inch 3x10 b/l NT  Knee extension isometrics 65-75 deg 5x fail  Quad set isometrics 5x fail  Quad sets half bolster under knee : 2 x 10 reps , 5'' hold   SL abduction: 2 x 12 reps   Matric HS curls 35# : 2 x 10 reps   Shuttle DL press 38lb RTB: 3 x 10 reps   STS in standard chair c/ airex : x 10 reps   Prone Saphenous nerve glide x30  TKE Black TB 30x b/l  Toe Yoga with spreading 2 minutes  SLR 3x10   STS 3x10  TKE ball 20x 10 sec  DL Shuttle press 50lb 3x10  SL Shuttle press 25lbs 3x10  St. Hip Abd 3x10 b/l  Matrix knee extension 15# DL 3x10 - cue for slow eccentric  Matrix hamstring curl 15# DL 2x10 - cue for slow eccentric  Matrix knee extension 15# DL up and SL down: 2x10 ea - cue eccentric  SLS 3x30s both sides    manual therapy techniques: Joint mobilizations were applied to the: L knee for 15 minutes, including:    Sustained Lateral glide @ 30 degrees to TKE with manual and then with active quad contraction  Grade III lateral glide at TKE  Knee Assessment  Kinesio taping of left knee longitudinal around patella      Patient Education and Home Exercises     Home Exercises Provided and Patient Education Provided     Education provided:   - Continue HEP provided at [facility] and addition of new HEP provided today     Written Home Exercises Provided: yes. Exercises were reviewed and Tomasa was able to demonstrate them prior to the end of the session.  Tomasa demonstrated good  understanding of the education provided. See EMR under Patient Instructions for exercises provided during therapy sessions    ASSESSMENT     Pt presents with increased subjective  c/o medial knee pain when walking that was worse than previous session. Pt responded well to manual with decrease to 1/10 pain walking but returned quickly. PT focused more on hamstring, glute, and posterior tibialis activation due to deficits assessed. Pt demonstrated improvements in knee pain following prone hip extension and posterior tibialis strengthening. Pt responded well to K-tape to increase feelings of stability in knee.  Pt still requires therex to address quad activation to address patients knee stability medial knee symptoms but may require increased focus of hamstring/glute/posterior tibialis activation.    Plan to continue to progress quad/hip activation and CKC knee stability to address knee pain and gait deficits.    Tomasa Is progressing well towards her goals.   Pt prognosis is Good.     Pt will continue to benefit from skilled outpatient physical therapy to address the deficits listed in the problem list box on initial evaluation, provide pt/family education and to maximize pt's level of independence in the home and community environment.   Pt's spiritual, cultural and educational needs considered and pt agreeable to plan of care and goals.     Anticipated barriers to physical therapy: age    GOALS: Short Term Goals:  0-3 weeks  1.Report decreased L knee pain  < / =  3/10  to increase tolerance for ADLs  2. 30 sec sit to stand >10 reps  3. Increase strength by 1/3 MMT grade in quad and hips  to increase tolerance for ADL and work activities.  4. Pt to tolerate HEP to improve ROM and independence with ADL's     Long Term Goals: 4-6 weeks  1.Report decreased L knee pain < / = 1/10  to increase tolerance for ADLs  2.Patient goal: ADLs without complaint  3.Increase strength to >/= 4+/5 in quad and hips  to increase tolerance for ADL and work activities.  4. Pt will report at CJ level (20-40% impaired) on FOTO knee to demonstrate increase in LE function with every day tasks.     PLAN     Continue to  progress LE functional strengthening and quad strengthening .     KEILA NIX, PT, DPT, OCS

## 2023-02-09 ENCOUNTER — CLINICAL SUPPORT (OUTPATIENT)
Dept: REHABILITATION | Facility: HOSPITAL | Age: 73
End: 2023-02-09
Payer: MEDICARE

## 2023-02-09 DIAGNOSIS — G89.29 CHRONIC PAIN OF LEFT KNEE: Primary | ICD-10-CM

## 2023-02-09 DIAGNOSIS — M25.562 CHRONIC PAIN OF LEFT KNEE: Primary | ICD-10-CM

## 2023-02-09 PROCEDURE — 97110 THERAPEUTIC EXERCISES: CPT

## 2023-02-20 ENCOUNTER — CLINICAL SUPPORT (OUTPATIENT)
Dept: REHABILITATION | Facility: HOSPITAL | Age: 73
End: 2023-02-20
Payer: MEDICARE

## 2023-02-20 DIAGNOSIS — G89.29 CHRONIC PAIN OF LEFT KNEE: Primary | ICD-10-CM

## 2023-02-20 DIAGNOSIS — M25.562 CHRONIC PAIN OF LEFT KNEE: Primary | ICD-10-CM

## 2023-02-20 PROCEDURE — 97110 THERAPEUTIC EXERCISES: CPT

## 2023-02-20 NOTE — PROGRESS NOTES
[facility]   Physical Therapy Treatment Note     Name: Tomasa Reed  Clinic Number: [id number]    Therapy Diagnosis:   Encounter Diagnosis   Name Primary?    Chronic pain of left knee Yes       Physician: REMY Zuñiga MD    Visit Date: 2/20/2023    Physician Orders: PT Eval and Treat   Medical Diagnosis from Referral: M17.12 (ICD-10-CM) - Primary osteoarthritis of left knee  Evaluation Date: 12/8/2022  Authorization Period Expiration: 01/02/2024  Plan of Care Expiration: 2/8/2023  Visit # / Visits authorized: 7/7      PTA Visit #: 1/5     Time In: 1400   Time Out: 1500   Total Treatment Time : 60 minutes   Total Billable Time: 30 minutes one on one    Precautions: Standard    SUBJECTIVE     Pt reports: Consistent with HEP and has an exercise bike she is using at home. May be stronger, but doesn't feel much of a change with respect to stiffness and medial knee pain. FU with PA this week to determine next steps in care.    FOTO IE - 44%  FOTO 2/9/2023 - 46%  FOTO 2/20/2023 - 52%  FOTO Goal - 59%    She was compliant with home exercise program.  Response to previous treatment: felt good after with min soreness and no pain  Functional change: none    Pain: 0/10 - seated 6/10 with walking. 2/10 walking following manual  Location: L knee medial    OBJECTIVE     Objective Measures updated at progress report unless specified.     Lower Extremity Strength at IE:  Right LE   Left LE     Quadriceps: 5/5 Quadriceps: 5-/5   Hamstrings: 5/5 Hamstrings: 4+/5   Hip Flexion: 4/5 Hip Flexion: 4/5   Hip Extension:  4/5 Hip Extension: 4-/5   Hip ABD: 4-/5 Hip ADD: 3/5       Lower Extremity Strength 2/20/2023:  Right LE   Left LE     Quadriceps: 5/5 Quadriceps: 5-/5   Hamstrings: 5/5 Hamstrings: 5/5   Hip Extension:  4/5 Hip Extension: 4-/5   Invertors 4/5 Invertors 4/5     Hip ABD: 3+/5 Hip ABD: 3+/5     Hip ADD: 4/5 Hip ADD: 4-/5     + RF and adductors    Treatment     Tomasa received the  treatments listed below:      therapeutic exercises to develop strength, endurance, and ROM for 60 minutes (30 min one on one) including:    Assessment as above  Bike x8 min Lvl 4 for quad endurance  Quad stretch prone 3x30 sec ea  Adductor stretch standing 3x30 sec  Butterfly stretch 3x30 sec  Bridge holds 3x burn  Clam reps 3x burn ea    Patient education:   - importance of quad strength  - Review continue doing the bike at the gym and to add knee extension and hamstring curl machine  - Review HEP    Held:  SLR for adductors 2x15x5 sec  Bridge adduction 10x10 sec  Reverse clams 2x10  SL Bridge x10 ea  Matrix leg extension 25lbs 3x10 DL  ASLR 5x10 sec ea  Prone Hip extension 2x10 5 sec hold  Seated Hamstring Curl 25# 3x10 with slow eccentric  St. Hip extension 3x10 3 sec hold YTB both sides  Inversion with YTB 3x20 3 sec hold    manual therapy techniques: Joint mobilizations were applied to the: L knee for 00 minutes, including:    Sustained Lateral glide @ 30 degrees to TKE with manual and then with active quad contraction  Grade III lateral glide at TKE  Knee Assessment  Kinesio taping of left knee longitudinal around patella      Patient Education and Home Exercises     Home Exercises Provided and Patient Education Provided     Education provided:   - Continue HEP provided at eval and addition of new HEP provided today     Written Home Exercises Provided: yes. Exercises were reviewed and Tomasa was able to demonstrate them prior to the end of the session.  Tomasa demonstrated good  understanding of the education provided. See EMR under Patient Instructions for exercises provided during therapy sessions    ASSESSMENT     Demonstrates improved strength compared to last week and IE along with improving functional scores per FOTO, but cont to have medial knee pain with walking. Able to reduce knee pain slightly with adductor stretching and bridges, but returns after walking for ~1-2 minutes.     Will benefit from  cont strengthening via HEP.    Await changes in POC per FU with PA/MD team.    Tomasa Is progressing well towards her goals.   Pt prognosis is Good.     Pt will continue to benefit from skilled outpatient physical therapy to address the deficits listed in the problem list box on initial evaluation, provide pt/family education and to maximize pt's level of independence in the home and community environment.   Pt's spiritual, cultural and educational needs considered and pt agreeable to plan of care and goals.     Anticipated barriers to physical therapy: age    GOALS: Short Term Goals:  0-3 weeks  1.Report decreased L knee pain  < / =  3/10  to increase tolerance for ADLs  2. 30 sec sit to stand >10 reps  3. Increase strength by 1/3 MMT grade in quad and hips  to increase tolerance for ADL and work activities.  4. Pt to tolerate HEP to improve ROM and independence with ADL's     Long Term Goals: 4-6 weeks  1.Report decreased L knee pain < / = 1/10  to increase tolerance for ADLs  2.Patient goal: ADLs without complaint  3.Increase strength to >/= 4+/5 in quad and hips  to increase tolerance for ADL and work activities.  4. Pt will report at CJ level (20-40% impaired) on FOTO knee to demonstrate increase in LE function with every day tasks.     PLAN     Await changes per FU    KEILA NIX, PT, DPT, OCS

## 2023-02-22 ENCOUNTER — PATIENT MESSAGE (OUTPATIENT)
Dept: SPORTS MEDICINE | Facility: CLINIC | Age: 73
End: 2023-02-22

## 2023-02-22 ENCOUNTER — OFFICE VISIT (OUTPATIENT)
Dept: SPORTS MEDICINE | Facility: CLINIC | Age: 73
End: 2023-02-22
Payer: MEDICARE

## 2023-02-22 ENCOUNTER — HOSPITAL ENCOUNTER (OUTPATIENT)
Dept: RADIOLOGY | Facility: HOSPITAL | Age: 73
Discharge: HOME | End: 2023-02-22
Attending: PHYSICIAN ASSISTANT
Payer: MEDICARE

## 2023-02-22 VITALS
WEIGHT: 144.13 LBS | SYSTOLIC BLOOD PRESSURE: 153 MMHG | DIASTOLIC BLOOD PRESSURE: 70 MMHG | HEIGHT: 62 IN | BODY MASS INDEX: 26.52 KG/M2 | HEART RATE: 72 BPM

## 2023-02-22 DIAGNOSIS — G89.29 CHRONIC PAIN OF LEFT KNEE: ICD-10-CM

## 2023-02-22 DIAGNOSIS — M17.12 PRIMARY OSTEOARTHRITIS OF LEFT KNEE: ICD-10-CM

## 2023-02-22 DIAGNOSIS — M25.562 CHRONIC PAIN OF LEFT KNEE: ICD-10-CM

## 2023-02-22 DIAGNOSIS — M17.12 PRIMARY OSTEOARTHRITIS OF LEFT KNEE: Primary | ICD-10-CM

## 2023-02-22 PROCEDURE — 73564 X-RAY EXAM KNEE 4 OR MORE: CPT | Mod: 26,50,, | Performed by: RADIOLOGY

## 2023-02-22 PROCEDURE — 1159F MED LIST DOCD IN RCRD: CPT | Mod: CPTII,S$GLB,, | Performed by: PHYSICIAN ASSISTANT

## 2023-02-22 PROCEDURE — 99999 PR PBB SHADOW E&M-EST. PATIENT-LVL III: ICD-10-PCS | Mod: PBBFAC,,, | Performed by: PHYSICIAN ASSISTANT

## 2023-02-22 PROCEDURE — 3078F DIAST BP <80 MM HG: CPT | Mod: CPTII,S$GLB,, | Performed by: PHYSICIAN ASSISTANT

## 2023-02-22 PROCEDURE — 1160F RVW MEDS BY RX/DR IN RCRD: CPT | Mod: CPTII,S$GLB,, | Performed by: PHYSICIAN ASSISTANT

## 2023-02-22 PROCEDURE — 1101F PR PT FALLS ASSESS DOC 0-1 FALLS W/OUT INJ PAST YR: ICD-10-PCS | Mod: CPTII,S$GLB,, | Performed by: PHYSICIAN ASSISTANT

## 2023-02-22 PROCEDURE — 1159F PR MEDICATION LIST DOCUMENTED IN MEDICAL RECORD: ICD-10-PCS | Mod: CPTII,S$GLB,, | Performed by: PHYSICIAN ASSISTANT

## 2023-02-22 PROCEDURE — 99214 OFFICE O/P EST MOD 30 MIN: CPT | Mod: S$GLB,,, | Performed by: PHYSICIAN ASSISTANT

## 2023-02-22 PROCEDURE — 3288F PR FALLS RISK ASSESSMENT DOCUMENTED: ICD-10-PCS | Mod: CPTII,S$GLB,, | Performed by: PHYSICIAN ASSISTANT

## 2023-02-22 PROCEDURE — 3077F PR MOST RECENT SYSTOLIC BLOOD PRESSURE >= 140 MM HG: ICD-10-PCS | Mod: CPTII,S$GLB,, | Performed by: PHYSICIAN ASSISTANT

## 2023-02-22 PROCEDURE — 73564 X-RAY EXAM KNEE 4 OR MORE: CPT | Mod: TC,50

## 2023-02-22 PROCEDURE — 73564 XR KNEE ORTHO BILAT WITH FLEXION: ICD-10-PCS | Mod: 26,50,, | Performed by: RADIOLOGY

## 2023-02-22 PROCEDURE — 3008F BODY MASS INDEX DOCD: CPT | Mod: CPTII,S$GLB,, | Performed by: PHYSICIAN ASSISTANT

## 2023-02-22 PROCEDURE — 99999 PR PBB SHADOW E&M-EST. PATIENT-LVL III: CPT | Mod: PBBFAC,,, | Performed by: PHYSICIAN ASSISTANT

## 2023-02-22 PROCEDURE — 1160F PR REVIEW ALL MEDS BY PRESCRIBER/CLIN PHARMACIST DOCUMENTED: ICD-10-PCS | Mod: CPTII,S$GLB,, | Performed by: PHYSICIAN ASSISTANT

## 2023-02-22 PROCEDURE — 99214 PR OFFICE/OUTPT VISIT, EST, LEVL IV, 30-39 MIN: ICD-10-PCS | Mod: S$GLB,,, | Performed by: PHYSICIAN ASSISTANT

## 2023-02-22 PROCEDURE — 1101F PT FALLS ASSESS-DOCD LE1/YR: CPT | Mod: CPTII,S$GLB,, | Performed by: PHYSICIAN ASSISTANT

## 2023-02-22 PROCEDURE — 3008F PR BODY MASS INDEX (BMI) DOCUMENTED: ICD-10-PCS | Mod: CPTII,S$GLB,, | Performed by: PHYSICIAN ASSISTANT

## 2023-02-22 PROCEDURE — 3288F FALL RISK ASSESSMENT DOCD: CPT | Mod: CPTII,S$GLB,, | Performed by: PHYSICIAN ASSISTANT

## 2023-02-22 PROCEDURE — 1125F AMNT PAIN NOTED PAIN PRSNT: CPT | Mod: CPTII,S$GLB,, | Performed by: PHYSICIAN ASSISTANT

## 2023-02-22 PROCEDURE — 3077F SYST BP >= 140 MM HG: CPT | Mod: CPTII,S$GLB,, | Performed by: PHYSICIAN ASSISTANT

## 2023-02-22 PROCEDURE — 1125F PR PAIN SEVERITY QUANTIFIED, PAIN PRESENT: ICD-10-PCS | Mod: CPTII,S$GLB,, | Performed by: PHYSICIAN ASSISTANT

## 2023-02-22 PROCEDURE — 3078F PR MOST RECENT DIASTOLIC BLOOD PRESSURE < 80 MM HG: ICD-10-PCS | Mod: CPTII,S$GLB,, | Performed by: PHYSICIAN ASSISTANT

## 2023-02-22 NOTE — PROGRESS NOTES
CC: Left knee pain  72 y.o. female returns to clinic for follow up left knee. She has had two steroid injections in the past, 9/12/18 and 8/3/22. The initial CSI provided significant relief for a few years. The most recent CSI provided complete relief of pain for 2 weeks. She then received left knee synvisc series, which she completed on 12/28/22 with no relief of pain. She has been attending formal PT with improvement of her strength and function but no change in her pain. Medially based left knee pain.  Bothers her on a daily basis, most significantly when sleeping and pivoting.  Here to discuss additional treatment options.    Pain Score:   7    REVIEW OF SYSTEMS:   Constitution: Negative. Negative for chills, fever and night sweats.    Hematologic/Lymphatic: Negative for bleeding problem. Does not bruise/bleed easily.   Skin: Negative for dry skin, itching and rash.   Musculoskeletal: Negative for falls. Positive for left knee pain and muscle weakness.     All other review of symptoms were reviewed and found to be noncontributory.     PAST MEDICAL HISTORY:   Past Medical History:   Diagnosis Date    Cataract     OU    Chronic interstitial cystitis     History of hepatitis C; S/p RX with SVR 12 documented 06/2017 6/22/2017    HLD (hyperlipidemia)     Malignant melanoma of skin of trunk, except scrotum     Migraine, unspecified, without mention of intractable migraine without mention of status migrainosus     Nonspecific abnormal results of liver function study     Nontoxic multinodular goiter     Osteopenia        PAST SURGICAL HISTORY:   Past Surgical History:   Procedure Laterality Date    BREAST BIOPSY      CATARACT EXTRACTION Right 03/02/2020    cataracts      [facility]    COLONOSCOPY N/A 12/14/2016    Procedure: COLONOSCOPY;  Surgeon: Wicho Painting MD;  Location: [address];  Service: Endoscopy;  Laterality: N/A;    COLONOSCOPY N/A 1/14/2020    Procedure: COLONOSCOPY;  Surgeon:  NANCY Maher MD;  Location: [facility] ([address] [place] [address]);  Service: Endoscopy;  Laterality: N/A;  1/8/20 left vm to confirm appt-rb    FINGER SURGERY Right 2010    index finger fused    MOLE REMOVAL      TONSILLECTOMY, ADENOIDECTOMY      TOTAL ABDOMINAL HYSTERECTOMY         FAMILY HISTORY:   Family History   Problem Relation Age of Onset    Osteoarthritis Mother         s/p hip fx    Diabetes Mother     Cancer Mother         colon    Thyroid disease Mother     Kidney failure Mother         d.93 s/p PAD procedure    Cataracts Mother     Macular degeneration Mother     Heart failure Father     Stroke Father         d.97 complications    Hypertension Father     Cataracts Father     Fibromyalgia Sister     Glaucoma Sister         dry eyes, s/p duct surgery    Other Sister         acoustic tumor,s/p removal & vertigo resolved, +cochlear implant    Other Daughter         inflammation, better off red meat    Chronic back pain Daughter     GI problems Daughter         liver w/up and on cholestyramine    Obesity Daughter     Melanoma Neg Hx     Amblyopia Neg Hx     Blindness Neg Hx     Retinal detachment Neg Hx     Strabismus Neg Hx        SOCIAL HISTORY:   Social History     Socioeconomic History    Marital status:    Occupational History    Occupation: retired     Employer: [employer]   Tobacco Use    Smoking status: Never    Smokeless tobacco: Never   Substance and Sexual Activity    Alcohol use: Yes     Comment: socially    Sexual activity: Yes     Partners: Male   Social History Narrative        Retired  of an elementary school    Has 2 daughters, no thyroid problems    Helping out w/ 19yo grandson now @ [employer]    Exercising @ [employer] 2 days/wk            FAMILY HISTORY:     Mom d.93 status post colon cancer, status post hip     fracture. She has severe DJD and is diabetic.     Dad d. 97 status post stroke, CHF. Dependent, bed to w/c.      Significant memory decline, usually doesn't  recognize family        Two sisters in good health.         SCREENING TESTS:      Social Determinants of Health     Financial Resource Strain: Low Risk     Difficulty of Paying Living Expenses: Not very hard   Food Insecurity: No Food Insecurity    Worried About Running Out of Food in the Last Year: Never true    Ran Out of Food in the Last Year: Never true   Transportation Needs: No Transportation Needs    Lack of Transportation (Medical): No    Lack of Transportation (Non-Medical): No   Stress: No Stress Concern Present    Feeling of Stress : Not at all   Social Connections: Unknown    Marital Status:    Housing Stability: Unknown    Unable to Pay for Housing in the Last Year: No    Unstable Housing in the Last Year: No       MEDICATIONS:     Current Outpatient Medications:     cetirizine 10 mg Cap, PRN, Disp: , Rfl:     fluticasone propionate (FLONASE) 50 mcg/actuation nasal spray, 1 spray (50 mcg total) by Each Nostril route once daily., Disp: 16 g, Rfl: 0    glucosam/chondro/herb 149/hyal (GLUCOS CHOND CPLX ADVANCED ORAL), Take by mouth., Disp: , Rfl:     glycerin adult suppository, as needed. , Disp: , Rfl:     magnesium oxide (MAG-OX) 400 mg (241.3 mg magnesium) tablet, Take 400 mg by mouth once daily., Disp: , Rfl:     MISCELLANEOUS MEDICAL SUPPLY [facility], as needed. EYE ITCH RELIEF 0.25 EYE DROPS, Disp: , Rfl:     mometasone (NASONEX) 50 mcg/actuation nasal spray, 2 sprays by Nasal route once daily., Disp: , Rfl:     naproxen sodium 220 mg Cap, PRN, Disp: , Rfl:     OCEAN NASAL 0.65 % nasal spray, as needed. , Disp: , Rfl:     TUMS 200 mg calcium (500 mg) chewable tablet, 1 tablet as needed. , Disp: , Rfl:     azelastine (ASTELIN) 137 mcg (0.1 %) nasal spray, 1 spray (137 mcg total) by Nasal route 2 (two) times daily., Disp: 30 mL, Rfl: 0    dietary supplement Pack, Take 1 tablet by mouth 2 (two) times daily., Disp: , Rfl:     ketoconazole (NIZORAL) 2 % shampoo, Wash hair with medicated shampoo at least  "2x/week - let sit on scalp at least 5 minutes prior to rinsing (Patient not taking: Reported on 1/23/2023), Disp: 120 mL, Rfl: 5    rosuvastatin (CRESTOR) 10 MG tablet, Take 1 tablet (10 mg total) by mouth once daily., Disp: 90 tablet, Rfl: 3    ALLERGIES:   Review of patient's allergies indicates:   Allergen Reactions    Iodine and iodide containing products Nausea And Vomiting     Other reaction(s): SEVERE    Sulfa (sulfonamide antibiotics)      Other reaction(s): unknown  Other reaction(s): RASH        PHYSICAL EXAMINATION:  BP (!) 153/70   Pulse 72   Ht 5' 2" (1.575 m)   Wt 65.4 kg (144 lb 1.6 oz)   BMI 26.36 kg/m²   General: Well-developed well-nourished 72 y.o. femalein no acute distress   Cardiovascular: Regular rhythm by palpation of distal pulse, normal color and temperature, no concerning varicosities on symptomatic side   Lungs: No labored breathing or wheezing appreciated   Neuro: Alert and oriented ×3   Psychiatric: well oriented to person, place and time, demonstrates normal mood and affect   Skin: No rashes, lesions or ulcers, normal temperature, turgor, and texture on involved extremity    General    Vitals reviewed.  Constitutional: She is oriented to person, place, and time. She appears well-developed and well-nourished. No distress.   Cardiovascular:  Normal rate and regular rhythm.            Pulmonary/Chest: Effort normal. No respiratory distress.   Neurological: She is alert and oriented to person, place, and time.   Psychiatric: She has a normal mood and affect. Her behavior is normal. Thought content normal.           Examination of the left knee again demonstrates intact extensor mechanism.  Mild chronic soft tissue swelling.  No effusion.  Pain over the medial joint line.  Positive patellar crepitus and grind.  Pain medially with varus load.  Ligamentously stable.  No peripheral vascular disease.  2+ DP.    IMAGING:  X-rays including standing, weight bearing AP and flexion bilateral " knees, BILATERAL knee lateral and sunrise views ordered and images reviewed by me show:    Moderate to advanced DJD.  Near bone-on-bone in full extension medially. Kellgren-Nadeem stage 3.    ASSESSMENT:      ICD-10-CM ICD-9-CM   1. Primary osteoarthritis of left knee  M17.12 715.16   2. Chronic pain of left knee  M25.562 719.46    G89.29 338.29       PLAN:     Findings discussed with the patient.  Discussed the details of a total knee replacement in addition to other non operative measures.  I gather that the knee bothers her on a daily basis but she is unsure whether she wants to proceed with the replacement.  Alternatively, we discussed potential benefits of Zilretta injection. Information on injection provided. She would like to proceed with this injection. She is not quite ready to move forward with TKA.  -Plan for left knee Zilretta injection. Authorization placed. Will return in 3 weeks after authorization  -Continue to wear brace as she wants.  -Continue patellofemoral and quad HEP  -If she continues to have significant pain and problems despite these additional conservative treatment measures, next step would be the knee replacement surgery as discussed.  -I made the decision to obtain old records of the patient including previous notes and imaging. New imaging was ordered today of the extremity or extremities evaluated. I independently reviewed and interpreted the radiographs and/or MRIs today as well as prior imaging. Reviewed with patient in detail.  -RTC in 3 weeks with Teresa Richard PA-C for left knee Zilretta injection.

## 2023-02-23 ENCOUNTER — PATIENT MESSAGE (OUTPATIENT)
Dept: SPORTS MEDICINE | Facility: CLINIC | Age: 73
End: 2023-02-23
Payer: MEDICARE

## 2023-02-24 ENCOUNTER — PATIENT MESSAGE (OUTPATIENT)
Dept: REHABILITATION | Facility: HOSPITAL | Age: 73
End: 2023-02-24
Payer: MEDICARE

## 2023-02-24 NOTE — PROGRESS NOTES
72 year-old female presents  for well care.   Nonsmoker, nonalcohol consumer.     SCREENING TESTS:   Cholesterol/ lab, reviewed   Colonoscopy 1/20 no polyps, rec 5 yrs   Mammogram, 2021  WWE, MARLENE, ovaries intact.  DEXA 2022- resumed alendronate then d/c 2/2 constipation, now resumed   As she has started eating prunes w/ improvement in her BM  Taking Calcium and Vit D3    Eye-UTD  DDS-UTD    VACCINATIONS:  Tdap, 12/2012  Zostavax, 10/2012, pharmacist niece gave vaccine  Shingrix: UTD  Flu vaccine yearly  Pneumovax, 12/2013   Prevnar, 9/2015  COvid: 2/2 + booster    MEDS: Reviewed.     REVIEW OF SYSTEMS:   Left knee pain, s/p inj/gel/PT and not resolving  To see Dr. Zuñiga and considering surgery  Tx: NSAIDS/Tylenol/ice pack  Pain 4-5 today  ++ weight not responding to low calorie diet 5549-6371/day  Interested in taking Almased, a 9 amino acid supplement for weight loss      ADL's: 100% independent  Memory: Good, some delayed recall  Mental Health: Good  AD's: + will, and living will and M/POA   Nutrition: Good, eating very healthy  Gait: No falls- 2022-23  Safety: Intact  Urinary incontinence:  + nocturia x 1  Remainder of review negative except as previously noted.       PHYSICAL EXAM:  VSS:  GENERAL: Alert and oriented, in no acute distress. Well developed, well   nourished, conversant and cooperative, pleasant as always   EYES: Conjunctivae and lids unremarkable. Sclerae anicteric  NECK: Supple. Prominent thyroid, no LN, left lateral small mass, movable, but NT, reviewed US and CT of area 2019 and 2021 and no worrisome findings  RESPIRATORY: Efforts unlabored.   LUNGS: Clear to auscultation.   HEART: Regular rate and rhythm. No carotid bruits noted,   1+ pedal pulse. No edema.   ABDOMEN: Bowel sounds present, soft, nontender.   No hepatosplenomegaly.  MUSCULOSKELETAL: Gait normal.   No clubbing, cyanosis or edema noted.   Calves nontender  NEURO: LARSEN. No tremor noted  SKIN: warm and dry    IMPRESSION:    Annual  PE.    PHx melanoma, yearly monitoring   Family history of colon cancer./dm    Hx Hep C - s/p tx   Neutropenia , asx-   Osteopenia, on supplements,continue Ca and vitamin D, and restarted alendronate to continue weekly schedule  Holiday- will restart today,( do not see that she is taking)    HLD, asx, continue qd rosuvastatin 10mg   Multinodular thyroid, asx   Vitamin D deficiency- continue vitamin D3, 2K IU qd  Elevated BP- to check at home and log blood pressure readings and call in a week or 2, she is to avoid salt and be cautious with her caffeine  Left knee pain- f/up pending Dr. Zuñiga, Tylenol > NSAIDS     PLAN:  Hydration  Low fat diet  Low salt diet  Document blood pressure readings and send in logs in 1 or 2 weeks  Call prn .  RTC 6 months for BP check and update labs            Answers submitted by the patient for this visit:  Review of Systems Questionnaire (Submitted on 3/3/2023)  activity change: Yes- w/ knee pain  unexpected weight change: Yes- difficulty losing weight   neck pain: No  hearing loss: Yes- + hearing aides  rhinorrhea: No  trouble swallowing: No  eye discharge: No  visual disturbance: No  chest tightness: No  wheezing: No  chest pain: No  palpitations: No  blood in stool: No  constipation: No  vomiting: No  diarrhea: No  polydipsia: No  polyuria: No  difficulty urinating: No  hematuria: No  menstrual problem: No  dysuria: No  joint swelling: Yes- left knee  arthralgias: Yes- left knee  headaches: No  weakness: Yes-left knee  confusion: No  dysphoric mood: No    >40'  minutes  was spent today on H/P, review of MR and MDM

## 2023-02-25 ENCOUNTER — PATIENT MESSAGE (OUTPATIENT)
Dept: INTERNAL MEDICINE | Facility: CLINIC | Age: 73
End: 2023-02-25
Payer: MEDICARE

## 2023-02-25 DIAGNOSIS — E78.5 HYPERLIPIDEMIA, UNSPECIFIED HYPERLIPIDEMIA TYPE: Primary | ICD-10-CM

## 2023-02-25 DIAGNOSIS — E04.2 NONTOXIC MULTINODULAR GOITER: ICD-10-CM

## 2023-03-02 ENCOUNTER — LAB VISIT (OUTPATIENT)
Dept: LAB | Facility: HOSPITAL | Age: 73
End: 2023-03-02
Attending: INTERNAL MEDICINE
Payer: MEDICARE

## 2023-03-02 DIAGNOSIS — E78.5 HYPERLIPIDEMIA, UNSPECIFIED HYPERLIPIDEMIA TYPE: ICD-10-CM

## 2023-03-02 DIAGNOSIS — E04.2 NONTOXIC MULTINODULAR GOITER: ICD-10-CM

## 2023-03-02 LAB
ALBUMIN SERPL BCP-MCNC: 4.3 G/DL (ref 3.5–5.2)
ALP SERPL-CCNC: 50 U/L (ref 55–135)
ALT SERPL W/O P-5'-P-CCNC: 24 U/L (ref 10–44)
ANION GAP SERPL CALC-SCNC: 7 MMOL/L (ref 8–16)
AST SERPL-CCNC: 26 U/L (ref 10–40)
BASOPHILS # BLD AUTO: 0.03 K/UL (ref 0–0.2)
BASOPHILS NFR BLD: 1 % (ref 0–1.9)
BILIRUB SERPL-MCNC: 0.6 MG/DL (ref 0.1–1)
BUN SERPL-MCNC: 7 MG/DL (ref 8–23)
CALCIUM SERPL-MCNC: 10 MG/DL (ref 8.7–10.5)
CHLORIDE SERPL-SCNC: 102 MMOL/L (ref 95–110)
CHOLEST SERPL-MCNC: 154 MG/DL (ref 120–199)
CHOLEST/HDLC SERPL: 2.3 {RATIO} (ref 2–5)
CO2 SERPL-SCNC: 27 MMOL/L (ref 23–29)
CREAT SERPL-MCNC: 0.7 MG/DL (ref 0.5–1.4)
DIFFERENTIAL METHOD: ABNORMAL
EOSINOPHIL # BLD AUTO: 0.1 K/UL (ref 0–0.5)
EOSINOPHIL NFR BLD: 2.9 % (ref 0–8)
ERYTHROCYTE [DISTWIDTH] IN BLOOD BY AUTOMATED COUNT: 12.4 % (ref 11.5–14.5)
EST. GFR  (NO RACE VARIABLE): >60 ML/MIN/1.73 M^2
GLUCOSE SERPL-MCNC: 88 MG/DL (ref 70–110)
HCT VFR BLD AUTO: 37.7 % (ref 37–48.5)
HDLC SERPL-MCNC: 68 MG/DL (ref 40–75)
HDLC SERPL: 44.2 % (ref 20–50)
HGB BLD-MCNC: 12.8 G/DL (ref 12–16)
IMM GRANULOCYTES # BLD AUTO: 0.01 K/UL (ref 0–0.04)
IMM GRANULOCYTES NFR BLD AUTO: 0.3 % (ref 0–0.5)
LDLC SERPL CALC-MCNC: 71.2 MG/DL (ref 63–159)
LYMPHOCYTES # BLD AUTO: 1.5 K/UL (ref 1–4.8)
LYMPHOCYTES NFR BLD: 46.5 % (ref 18–48)
MCH RBC QN AUTO: 32.5 PG (ref 27–31)
MCHC RBC AUTO-ENTMCNC: 34 G/DL (ref 32–36)
MCV RBC AUTO: 96 FL (ref 82–98)
MONOCYTES # BLD AUTO: 0.3 K/UL (ref 0.3–1)
MONOCYTES NFR BLD: 9 % (ref 4–15)
NEUTROPHILS # BLD AUTO: 1.3 K/UL (ref 1.8–7.7)
NEUTROPHILS NFR BLD: 40.3 % (ref 38–73)
NONHDLC SERPL-MCNC: 86 MG/DL
NRBC BLD-RTO: 0 /100 WBC
PLATELET # BLD AUTO: 315 K/UL (ref 150–450)
PMV BLD AUTO: 9.4 FL (ref 9.2–12.9)
POTASSIUM SERPL-SCNC: 4.4 MMOL/L (ref 3.5–5.1)
PROT SERPL-MCNC: 7.3 G/DL (ref 6–8.4)
RBC # BLD AUTO: 3.94 M/UL (ref 4–5.4)
SODIUM SERPL-SCNC: 136 MMOL/L (ref 136–145)
TRIGL SERPL-MCNC: 74 MG/DL (ref 30–150)
TSH SERPL DL<=0.005 MIU/L-ACNC: 0.65 UIU/ML (ref 0.4–4)
WBC # BLD AUTO: 3.12 K/UL (ref 3.9–12.7)

## 2023-03-02 PROCEDURE — 36415 COLL VENOUS BLD VENIPUNCTURE: CPT | Mod: PO | Performed by: INTERNAL MEDICINE

## 2023-03-02 PROCEDURE — 84443 ASSAY THYROID STIM HORMONE: CPT | Performed by: INTERNAL MEDICINE

## 2023-03-02 PROCEDURE — 80053 COMPREHEN METABOLIC PANEL: CPT | Performed by: INTERNAL MEDICINE

## 2023-03-02 PROCEDURE — 85025 COMPLETE CBC W/AUTO DIFF WBC: CPT | Performed by: INTERNAL MEDICINE

## 2023-03-02 PROCEDURE — 80061 LIPID PANEL: CPT | Performed by: INTERNAL MEDICINE

## 2023-03-03 ENCOUNTER — TELEPHONE (OUTPATIENT)
Dept: INTERNAL MEDICINE | Facility: CLINIC | Age: 73
End: 2023-03-03
Payer: MEDICARE

## 2023-03-03 NOTE — TELEPHONE ENCOUNTER
----- Message from Rosanna Huizar MD sent at 3/3/2023  9:45 AM CST -----  Please review your lab work and we will discuss at your pending office visit.   verna

## 2023-03-05 LAB — NONINV COLON CA DNA+OCC BLD SCRN STL QL: NEGATIVE

## 2023-03-06 ENCOUNTER — TELEPHONE (OUTPATIENT)
Dept: INTERNAL MEDICINE | Facility: CLINIC | Age: 73
End: 2023-03-06

## 2023-03-06 ENCOUNTER — OFFICE VISIT (OUTPATIENT)
Dept: INTERNAL MEDICINE | Facility: CLINIC | Age: 73
End: 2023-03-06
Payer: MEDICARE

## 2023-03-06 VITALS
HEIGHT: 62 IN | BODY MASS INDEX: 26.78 KG/M2 | DIASTOLIC BLOOD PRESSURE: 78 MMHG | OXYGEN SATURATION: 99 % | RESPIRATION RATE: 20 BRPM | SYSTOLIC BLOOD PRESSURE: 144 MMHG | TEMPERATURE: 97 F | HEART RATE: 79 BPM | WEIGHT: 145.5 LBS

## 2023-03-06 DIAGNOSIS — E55.9 VITAMIN D DEFICIENCY: ICD-10-CM

## 2023-03-06 DIAGNOSIS — Z85.820 HISTORY OF MELANOMA: ICD-10-CM

## 2023-03-06 DIAGNOSIS — Z86.19 HISTORY OF HEPATITIS C: ICD-10-CM

## 2023-03-06 DIAGNOSIS — M25.562 CHRONIC PAIN OF LEFT KNEE: ICD-10-CM

## 2023-03-06 DIAGNOSIS — E04.2 NONTOXIC MULTINODULAR GOITER: ICD-10-CM

## 2023-03-06 DIAGNOSIS — M85.80 OSTEOPENIA, UNSPECIFIED LOCATION: ICD-10-CM

## 2023-03-06 DIAGNOSIS — E78.5 HYPERLIPIDEMIA, UNSPECIFIED HYPERLIPIDEMIA TYPE: ICD-10-CM

## 2023-03-06 DIAGNOSIS — Z00.00 ANNUAL PHYSICAL EXAM: Primary | ICD-10-CM

## 2023-03-06 DIAGNOSIS — G89.29 CHRONIC PAIN OF LEFT KNEE: ICD-10-CM

## 2023-03-06 DIAGNOSIS — D70.9 NEUTROPENIA, UNSPECIFIED TYPE: ICD-10-CM

## 2023-03-06 PROCEDURE — 3078F DIAST BP <80 MM HG: CPT | Mod: CPTII,S$GLB,, | Performed by: INTERNAL MEDICINE

## 2023-03-06 PROCEDURE — 99397 PER PM REEVAL EST PAT 65+ YR: CPT | Mod: GZ,S$GLB,, | Performed by: INTERNAL MEDICINE

## 2023-03-06 PROCEDURE — 3288F FALL RISK ASSESSMENT DOCD: CPT | Mod: CPTII,S$GLB,, | Performed by: INTERNAL MEDICINE

## 2023-03-06 PROCEDURE — 99499 UNLISTED E&M SERVICE: CPT | Mod: S$GLB,,, | Performed by: INTERNAL MEDICINE

## 2023-03-06 PROCEDURE — 99999 PR PBB SHADOW E&M-EST. PATIENT-LVL V: ICD-10-PCS | Mod: PBBFAC,,, | Performed by: INTERNAL MEDICINE

## 2023-03-06 PROCEDURE — 3008F PR BODY MASS INDEX (BMI) DOCUMENTED: ICD-10-PCS | Mod: CPTII,S$GLB,, | Performed by: INTERNAL MEDICINE

## 2023-03-06 PROCEDURE — 3077F SYST BP >= 140 MM HG: CPT | Mod: CPTII,S$GLB,, | Performed by: INTERNAL MEDICINE

## 2023-03-06 PROCEDURE — 3077F PR MOST RECENT SYSTOLIC BLOOD PRESSURE >= 140 MM HG: ICD-10-PCS | Mod: CPTII,S$GLB,, | Performed by: INTERNAL MEDICINE

## 2023-03-06 PROCEDURE — 1159F MED LIST DOCD IN RCRD: CPT | Mod: CPTII,S$GLB,, | Performed by: INTERNAL MEDICINE

## 2023-03-06 PROCEDURE — 1101F PT FALLS ASSESS-DOCD LE1/YR: CPT | Mod: CPTII,S$GLB,, | Performed by: INTERNAL MEDICINE

## 2023-03-06 PROCEDURE — 3008F BODY MASS INDEX DOCD: CPT | Mod: CPTII,S$GLB,, | Performed by: INTERNAL MEDICINE

## 2023-03-06 PROCEDURE — 1101F PR PT FALLS ASSESS DOC 0-1 FALLS W/OUT INJ PAST YR: ICD-10-PCS | Mod: CPTII,S$GLB,, | Performed by: INTERNAL MEDICINE

## 2023-03-06 PROCEDURE — 99999 PR PBB SHADOW E&M-EST. PATIENT-LVL V: CPT | Mod: PBBFAC,,, | Performed by: INTERNAL MEDICINE

## 2023-03-06 PROCEDURE — 1125F AMNT PAIN NOTED PAIN PRSNT: CPT | Mod: CPTII,S$GLB,, | Performed by: INTERNAL MEDICINE

## 2023-03-06 PROCEDURE — 1160F PR REVIEW ALL MEDS BY PRESCRIBER/CLIN PHARMACIST DOCUMENTED: ICD-10-PCS | Mod: CPTII,S$GLB,, | Performed by: INTERNAL MEDICINE

## 2023-03-06 PROCEDURE — 99397 PR PREVENTIVE VISIT,EST,65 & OVER: ICD-10-PCS | Mod: GZ,S$GLB,, | Performed by: INTERNAL MEDICINE

## 2023-03-06 PROCEDURE — 3288F PR FALLS RISK ASSESSMENT DOCUMENTED: ICD-10-PCS | Mod: CPTII,S$GLB,, | Performed by: INTERNAL MEDICINE

## 2023-03-06 PROCEDURE — 1160F RVW MEDS BY RX/DR IN RCRD: CPT | Mod: CPTII,S$GLB,, | Performed by: INTERNAL MEDICINE

## 2023-03-06 PROCEDURE — 1125F PR PAIN SEVERITY QUANTIFIED, PAIN PRESENT: ICD-10-PCS | Mod: CPTII,S$GLB,, | Performed by: INTERNAL MEDICINE

## 2023-03-06 PROCEDURE — 3078F PR MOST RECENT DIASTOLIC BLOOD PRESSURE < 80 MM HG: ICD-10-PCS | Mod: CPTII,S$GLB,, | Performed by: INTERNAL MEDICINE

## 2023-03-06 PROCEDURE — 1159F PR MEDICATION LIST DOCUMENTED IN MEDICAL RECORD: ICD-10-PCS | Mod: CPTII,S$GLB,, | Performed by: INTERNAL MEDICINE

## 2023-03-06 RX ORDER — ALENDRONATE SODIUM 70 MG/1
70 TABLET ORAL
Qty: 12 TABLET | Refills: 3
Start: 2023-03-06 | End: 2023-05-31 | Stop reason: SDUPTHER

## 2023-03-06 NOTE — TELEPHONE ENCOUNTER
----- Message from Rosanna Huizar MD sent at 3/5/2023 12:58 PM CST -----  Your Cologuard test has resulted and it is negative for the DNA material that is associated with colon lesions.   verna

## 2023-03-07 ENCOUNTER — PATIENT OUTREACH (OUTPATIENT)
Dept: ADMINISTRATIVE | Facility: HOSPITAL | Age: 73
End: 2023-03-07
Payer: MEDICARE

## 2023-03-09 ENCOUNTER — OFFICE VISIT (OUTPATIENT)
Dept: SPORTS MEDICINE | Facility: CLINIC | Age: 73
End: 2023-03-09
Payer: MEDICARE

## 2023-03-09 ENCOUNTER — PATIENT MESSAGE (OUTPATIENT)
Dept: REHABILITATION | Facility: HOSPITAL | Age: 73
End: 2023-03-09
Payer: MEDICARE

## 2023-03-09 VITALS
SYSTOLIC BLOOD PRESSURE: 158 MMHG | BODY MASS INDEX: 25.95 KG/M2 | DIASTOLIC BLOOD PRESSURE: 79 MMHG | WEIGHT: 141 LBS | HEIGHT: 62 IN | HEART RATE: 77 BPM

## 2023-03-09 DIAGNOSIS — G89.29 CHRONIC PAIN OF LEFT KNEE: ICD-10-CM

## 2023-03-09 DIAGNOSIS — M25.562 CHRONIC PAIN OF LEFT KNEE: ICD-10-CM

## 2023-03-09 DIAGNOSIS — M17.12 PRIMARY OSTEOARTHRITIS OF LEFT KNEE: Primary | ICD-10-CM

## 2023-03-09 PROCEDURE — 3288F PR FALLS RISK ASSESSMENT DOCUMENTED: ICD-10-PCS | Mod: CPTII,S$GLB,, | Performed by: ORTHOPAEDIC SURGERY

## 2023-03-09 PROCEDURE — 3078F DIAST BP <80 MM HG: CPT | Mod: CPTII,S$GLB,, | Performed by: ORTHOPAEDIC SURGERY

## 2023-03-09 PROCEDURE — 1125F PR PAIN SEVERITY QUANTIFIED, PAIN PRESENT: ICD-10-PCS | Mod: CPTII,S$GLB,, | Performed by: ORTHOPAEDIC SURGERY

## 2023-03-09 PROCEDURE — 3077F PR MOST RECENT SYSTOLIC BLOOD PRESSURE >= 140 MM HG: ICD-10-PCS | Mod: CPTII,S$GLB,, | Performed by: ORTHOPAEDIC SURGERY

## 2023-03-09 PROCEDURE — 3078F PR MOST RECENT DIASTOLIC BLOOD PRESSURE < 80 MM HG: ICD-10-PCS | Mod: CPTII,S$GLB,, | Performed by: ORTHOPAEDIC SURGERY

## 2023-03-09 PROCEDURE — 1125F AMNT PAIN NOTED PAIN PRSNT: CPT | Mod: CPTII,S$GLB,, | Performed by: ORTHOPAEDIC SURGERY

## 2023-03-09 PROCEDURE — 1101F PT FALLS ASSESS-DOCD LE1/YR: CPT | Mod: CPTII,S$GLB,, | Performed by: ORTHOPAEDIC SURGERY

## 2023-03-09 PROCEDURE — 99999 PR PBB SHADOW E&M-EST. PATIENT-LVL III: ICD-10-PCS | Mod: PBBFAC,,, | Performed by: ORTHOPAEDIC SURGERY

## 2023-03-09 PROCEDURE — 99214 PR OFFICE/OUTPT VISIT, EST, LEVL IV, 30-39 MIN: ICD-10-PCS | Mod: S$GLB,,, | Performed by: ORTHOPAEDIC SURGERY

## 2023-03-09 PROCEDURE — 3077F SYST BP >= 140 MM HG: CPT | Mod: CPTII,S$GLB,, | Performed by: ORTHOPAEDIC SURGERY

## 2023-03-09 PROCEDURE — 3288F FALL RISK ASSESSMENT DOCD: CPT | Mod: CPTII,S$GLB,, | Performed by: ORTHOPAEDIC SURGERY

## 2023-03-09 PROCEDURE — 3008F PR BODY MASS INDEX (BMI) DOCUMENTED: ICD-10-PCS | Mod: CPTII,S$GLB,, | Performed by: ORTHOPAEDIC SURGERY

## 2023-03-09 PROCEDURE — 99999 PR PBB SHADOW E&M-EST. PATIENT-LVL III: CPT | Mod: PBBFAC,,, | Performed by: ORTHOPAEDIC SURGERY

## 2023-03-09 PROCEDURE — 1101F PR PT FALLS ASSESS DOC 0-1 FALLS W/OUT INJ PAST YR: ICD-10-PCS | Mod: CPTII,S$GLB,, | Performed by: ORTHOPAEDIC SURGERY

## 2023-03-09 PROCEDURE — 99214 OFFICE O/P EST MOD 30 MIN: CPT | Mod: S$GLB,,, | Performed by: ORTHOPAEDIC SURGERY

## 2023-03-09 PROCEDURE — 3008F BODY MASS INDEX DOCD: CPT | Mod: CPTII,S$GLB,, | Performed by: ORTHOPAEDIC SURGERY

## 2023-03-09 NOTE — PROGRESS NOTES
CC: Left knee pain    72 y.o. female returns to clinic with left knee pain. She would like to discuss TKA. Patient has failed conservative treatment (physical therapy, HA injections, CSI). Wakes her up at night. Pain with movement and prolonged standing/walking.  Bothers her on a daily basis.  This is a quality of life issue for her.    Prior Hx 11/29/22:  72 y.o. female returns to clinic with left knee pain. She had  steroid injection 3 months ago that provided 100% relief for 2 weeks. Her pain has returned.  Medially based.  Bothers her on a daily basis.  Here to discuss additional treatment options.    Prior Hx:  72 y.o. Female who presents as a new patient to me.  She complains of left knee pain and subjective stiffness with worsening after prolonged periods of walking/standing.  All some subjectively stiff when she first gets up in the morning.  Pain has been present for several months.  Denies prior treatment of significance.  Sometimes upwards of 9/10.  Treatment has included rest, activity modifications, and oral medication.  Denies ipsilateral groin or hip pain.  No back or radicular symptoms.    Pain Score:   5    REVIEW OF SYSTEMS:   Constitution: Negative. Negative for chills, fever and night sweats.    Hematologic/Lymphatic: Negative for bleeding problem. Does not bruise/bleed easily.   Skin: Negative for dry skin, itching and rash.   Musculoskeletal: Negative for falls. Positive for left knee pain and muscle weakness.     All other review of symptoms were reviewed and found to be noncontributory.     PAST MEDICAL HISTORY:   Past Medical History:   Diagnosis Date    Cataract     OU    Chronic interstitial cystitis     History of hepatitis C; S/p RX with SVR 12 documented 06/2017 6/22/2017    HLD (hyperlipidemia)     Malignant melanoma of skin of trunk, except scrotum     Migraine, unspecified, without mention of intractable migraine without mention of status migrainosus     Nonspecific abnormal results  of liver function study     Nontoxic multinodular goiter     Osteopenia        PAST SURGICAL HISTORY:   Past Surgical History:   Procedure Laterality Date    BREAST BIOPSY      CATARACT EXTRACTION Right 03/02/2020    cataracts      B/L at [facility]    COLONOSCOPY N/A 12/14/2016    Procedure: COLONOSCOPY;  Surgeon: Wicho Painting MD;  Location: [facility] ENDO (4TH FLR);  Service: Endoscopy;  Laterality: N/A;    COLONOSCOPY N/A 1/14/2020    Procedure: COLONOSCOPY;  Surgeon: NANCY Maher MD;  Location: [facility] ENDO ([facility] FLR);  Service: Endoscopy;  Laterality: N/A;  1/8/20 left vm to confirm appt-rb    FINGER SURGERY Right 2010    index finger fused    MOLE REMOVAL      TONSILLECTOMY, ADENOIDECTOMY      TOTAL ABDOMINAL HYSTERECTOMY         FAMILY HISTORY:   Family History   Problem Relation Age of Onset    Osteoarthritis Mother         s/p hip fx    Diabetes Mother     Cancer Mother         colon    Thyroid disease Mother     Kidney failure Mother         d.93 s/p PAD procedure    Cataracts Mother     Macular degeneration Mother     Heart failure Father     Stroke Father         d.97 complications    Hypertension Father     Cataracts Father     Fibromyalgia Sister     Glaucoma Sister         dry eyes, s/p duct surgery    Other Sister         acoustic tumor,s/p removal & vertigo resolved, +cochlear implant    Other Daughter         inflammation, better off red meat    Chronic back pain Daughter     GI problems Daughter         liver w/up and on cholestyramine    Obesity Daughter     Melanoma Neg Hx     Amblyopia Neg Hx     Blindness Neg Hx     Retinal detachment Neg Hx     Strabismus Neg Hx        SOCIAL HISTORY:   Social History     Socioeconomic History    Marital status:    Occupational History    Occupation: retired     Employer: [employer]   Tobacco Use    Smoking status: Never    Smokeless tobacco: Never   Substance and Sexual Activity    Alcohol use: Yes     Comment: socially    Sexual activity:  Yes     Partners: Male   Social History Narrative        Retired  of an elementary school    Has 2 daughters, no thyroid problems    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 She has severe DJD and is diabetic.     Dad d. 97 status post stroke, CHF. Dependent, bed to w/c.      Significant memory decline, usually doesn't recognize family        Two sisters in good health.         SCREENING TESTS:      Social Determinants of Health     Financial Resource Strain: Low Risk     Difficulty of Paying Living Expenses: Not hard at all   Food Insecurity: No Food Insecurity    Worried About Running Out of Food in the Last Year: Never true    Ran Out of Food in the Last Year: Never true   Transportation Needs: No Transportation Needs    Lack of Transportation (Medical): No    Lack of Transportation (Non-Medical): No   Physical Activity: Insufficiently Active    Days of Exercise per Week: 6 days    Minutes of Exercise per Session: 20 min   Stress: Stress Concern Present    Feeling of Stress : To some extent   Social Connections: Unknown    Frequency of Communication with Friends and Family: More than three times a week    Frequency of Social Gatherings with Friends and Family: Once a week    Active Member of Clubs or Organizations: Yes    Attends Club or Organization Meetings: More than 4 times per year    Marital Status:    Housing Stability: Low Risk     Unable to Pay for Housing in the Last Year: No    Number of Places Lived in the Last Year: 1    Unstable Housing in the Last Year: No       MEDICATIONS:     Current Outpatient Medications:     alendronate (FOSAMAX) 70 MG tablet, Take 1 tablet (70 mg total) by mouth every 7 days., Disp: 12 tablet, Rfl: 3    cetirizine 10 mg Cap, PRN, Disp: , Rfl:     dietary supplement Pack, Take 1 tablet by mouth 2 (two) times  "daily., Disp: , Rfl:     fluticasone propionate (FLONASE) 50 mcg/actuation nasal spray, 1 spray (50 mcg total) by Each Nostril route once daily., Disp: 16 g, Rfl: 0    glucosam/chondro/herb 149/hyal (GLUCOS CHOND CPLX ADVANCED ORAL), Take by mouth., Disp: , Rfl:     glycerin adult suppository, as needed. , Disp: , Rfl:     magnesium oxide (MAG-OX) 400 mg (241.3 mg magnesium) tablet, Take 400 mg by mouth once daily., Disp: , Rfl:     MISCELLANEOUS MEDICAL SUPPLY [facility], as needed. EYE ITCH RELIEF 0.25 EYE DROPS, Disp: , Rfl:     mometasone (NASONEX) 50 mcg/actuation nasal spray, 2 sprays by Nasal route once daily., Disp: , Rfl:     naproxen sodium 220 mg Cap, PRN, Disp: , Rfl:     OCEAN NASAL 0.65 % nasal spray, as needed. , Disp: , Rfl:     rosuvastatin (CRESTOR) 10 MG tablet, TAKE 1 TABLET BY MOUTH EVERY DAY, Disp: 90 tablet, Rfl: 3    TUMS 200 mg calcium (500 mg) chewable tablet, 1 tablet as needed. , Disp: , Rfl:     ALLERGIES:   Review of patient's allergies indicates:   Allergen Reactions    Iodine and iodide containing products Nausea And Vomiting     Other reaction(s): SEVERE    Sulfa (sulfonamide antibiotics)      Other reaction(s): unknown  Other reaction(s): RASH        PHYSICAL EXAMINATION:  BP (!) 158/79   Pulse 77   Ht 5' 2" (1.575 m)   Wt 64 kg (141 lb)   BMI 25.79 kg/m²   General: Well-developed well-nourished 72 y.o. femalein no acute distress   Cardiovascular: Regular rhythm by palpation of distal pulse, normal color and temperature, no concerning varicosities on symptomatic side   Lungs: No labored breathing or wheezing appreciated   Neuro: Alert and oriented ×3   Psychiatric: well oriented to person, place and time, demonstrates normal mood and affect   Skin: No rashes, lesions or ulcers, normal temperature, turgor, and texture on involved extremity    Ortho/SPM Exam  Examination of the left knee again demonstrates intact extensor mechanism.  Mild chronic soft tissue swelling.  No effusion.  " Pain over the medial joint line.  Positive patellar crepitus and grind.  Pain medially with varus load.  Ligamentously stable.  No peripheral vascular disease.  2+ DP.    IMAGING:  X-rays including standing, weight bearing AP and flexion bilateral knees, LEFT knee lateral and sunrise views ordered and images reviewed by me show:    Moderate to advanced DJD.  Near bone-on-bone in full extension medially. Kellgren-Nadeem stage 3.    ASSESSMENT:      ICD-10-CM ICD-9-CM   1. Primary osteoarthritis of left knee  M17.12 715.16   2. Chronic pain of left knee  M25.562 719.46    G89.29 338.29       PLAN:     Findings discussed with the patient.  She wishes to proceed with a total knee replacement.  The details of such were discussed include the expected postop rehab and recovery course.  Outlined risks of surgery include but are not limited to continued or recurrent pain and upwards of 30% of patients, infection, DVT/PE, stiffness, fracture, aseptic loosening among others.  I expect her to do well.  Patient specific factors taken into account and the [place] triathlon system would be best fitting in this case.    Plan is left total knee arthroplasty. Mahamed with UBP. Regular cement. Non X3 poly.    ASA for DVT prophylaxis    Dental and medical clearance per the preop center.    Informed Consent:    The details of the surgical procedure were explained, including the location of probable incisions and a description of possible hardware and/or grafts to be used. Alternatives to both operative and non-operative options with associated risks and benefits were discussed. The patient understands the likely convalescence after surgery and, in particular, the expected postop rehab and recovery course. The outlined risks and potential complications of the proposed procedure include but are not limited to: infection, poor wound healing, scarring, deformity, stiffness, swelling, continued or recurrent pain, instability, hardware or  prosthetic failure if implanted, symptomatic hardware requiring removal, dislocation, weakness, neurovascular injury, numbness, chronic regional pain disorder, tissue nonhealing/irreparability/retear, subsequent contralateral limb injury or pathology, chondral injury, arthritis, fracture, blood clot formation, inability to return to previous level of activity, anesthetic or regional block complication up to death, need for additional procedure as indicated intraoperatively, and potential need for further surgery.    The patient was also informed and understands that the risks of surgery are greater for patients with a current condition or history of heart disease, obesity, clotting disorders, recurrent infections, steroid use, current or past smoking, and factors such as sedentary lifestyle and noncompliance with medications, therapy or follow-up. The degree of the increased risk is hard to estimate with any degree of precision. If applicable, smoking cessation was discussed.     All questions were answered. The patient has verbalized understanding of these issues and wishes to proceed with the surgery as discussed.

## 2023-03-12 ENCOUNTER — PATIENT MESSAGE (OUTPATIENT)
Dept: INTERNAL MEDICINE | Facility: CLINIC | Age: 73
End: 2023-03-12
Payer: MEDICARE

## 2023-03-13 ENCOUNTER — PATIENT MESSAGE (OUTPATIENT)
Dept: INTERNAL MEDICINE | Facility: CLINIC | Age: 73
End: 2023-03-13
Payer: MEDICARE

## 2023-03-13 NOTE — TELEPHONE ENCOUNTER
Nursing Focus: Chemotherapy    D: Positive blood return via red lumen of PICC. Insertion site is clean/dry/intact, dressing intact with no complaints of pain.  Urine output is recorded in intake in Doc Flowsheet.      I: Premedications given per order (see electronic medical administration record). Dose #2 of CIVI ifos/mesna started to infuse over 24 hours. Reviewed pt teaching on chemotherapy side effects.  Pt denies need for further teaching. Chemotherapy double checked per protocol by two chemotherapy competent RN's.     A: Tolerating infusion well. Denies nausea and or pain.     P: Continue to monitor urine output and symptoms of nausea. Screen for symptoms of toxicity.     She only had 1 blood pressure that was worrisome that was a 161 systolic.  Recommend she continue to monitor for another couple of weeks if the readings are as normal as the remainder of the readings she sent and then she can monitor sparingly if she starts to have elevated readings then we would need to address the problem with potential prescriptive therapy

## 2023-03-15 ENCOUNTER — PATIENT MESSAGE (OUTPATIENT)
Dept: ADMINISTRATIVE | Facility: OTHER | Age: 73
End: 2023-03-15
Payer: MEDICARE

## 2023-03-15 ENCOUNTER — PATIENT MESSAGE (OUTPATIENT)
Dept: SPORTS MEDICINE | Facility: CLINIC | Age: 73
End: 2023-03-15
Payer: MEDICARE

## 2023-03-15 DIAGNOSIS — M17.12 PRIMARY OSTEOARTHRITIS OF LEFT KNEE: Primary | ICD-10-CM

## 2023-03-17 ENCOUNTER — PATIENT MESSAGE (OUTPATIENT)
Dept: RESEARCH | Facility: HOSPITAL | Age: 73
End: 2023-03-17
Payer: MEDICARE

## 2023-03-23 ENCOUNTER — PATIENT MESSAGE (OUTPATIENT)
Dept: SPORTS MEDICINE | Facility: CLINIC | Age: 73
End: 2023-03-23
Payer: MEDICARE

## 2023-03-23 DIAGNOSIS — M79.609 PAIN IN EXTREMITY, UNSPECIFIED EXTREMITY: Primary | ICD-10-CM

## 2023-03-23 DIAGNOSIS — Z01.818 PRE-OP TESTING: Primary | ICD-10-CM

## 2023-03-23 NOTE — ANESTHESIA PAT ROS NOTE
03/23/2023  Tomasa Reed is a 72 y.o., female.      Pre-op Assessment          Review of Systems         Anesthesia Assessment: Preoperative EQUATION    Planned Procedure: Procedure(s) (LRB):  ARTHROPLASTY, KNEE, TOTAL (Left)  Requested Anesthesia Type:Regional  Surgeon: REMY Zuñiga MD  Service: Orthopedics  Known or anticipated Date of Surgery:4/12/2023    Surgeon notes: reviewed    Electronic QUestionnaire Assessment completed via nurse interview with patient.        Triage considerations:     The patient has no apparent active cardiac condition (No unstable coronary Syndrome such as severe unstable angina or recent [<1 month] myocardial infarction, decompensated CHF, severe valvular   disease or significant arrhythmia)    Previous anesthesia records:MAC and No problems  1/1420 Colonoscopy   Airway/Jaw/Neck:  Airway Findings: Mouth Opening: Normal Tongue: Normal  General Airway Assessment: Adult  Mallampati: II  Improves to II with phonation    Last PCP note: within 1 month , within [facility] , Dr. Plunkett-Kasperd  Subspecialty notes: Dermatology, Ortho    Other important co-morbidities: HLD and melanoma (2011 R Chest), Hep C (tx), [place] Thyroid, Vit D Def, Cerv DDD, Neutropenia       Tests already available:  Available tests,  within [facility] .   3/2/23 TSH, CBC, CMP, Lipid Panel  1/11/22 Cerv XR  9/23/11 neck CT            Instructions given. (See in Nurse's note)    Optimization:  Anesthesia Preop Clinic Assessment  Indicated Will Schedule POC    Medical Opinion Indicated       Sub-specialist consult indicated:   [facility] NP       Plan:    Testing:  CBC and PT/INR   Pre-anesthesia  visit       Visit focus: possible regional anesthesia and/or nerve block      Consultation: [facility] NP for medical and anesthesia optimization     Patient  has previously scheduled Medical Appointment:  4/3    Navigation: Tests Scheduled.              Consults scheduled.             Results will be tracked by Preop Clinic.

## 2023-03-27 ENCOUNTER — PATIENT MESSAGE (OUTPATIENT)
Dept: ADMINISTRATIVE | Facility: OTHER | Age: 73
End: 2023-03-27
Payer: MEDICARE

## 2023-03-27 NOTE — TELEPHONE ENCOUNTER
Pt was given the packet on the mandatory total joint class. She read over all of the info and has no questions.

## 2023-03-30 ENCOUNTER — TELEPHONE (OUTPATIENT)
Dept: SPORTS MEDICINE | Facility: CLINIC | Age: 73
End: 2023-03-30
Payer: MEDICARE

## 2023-03-30 NOTE — TELEPHONE ENCOUNTER
LVM for patient stating we would unfortunately have to move her appointment. Offered same day but at an earlier time. Gave call back # to reschedule further if needed.

## 2023-04-02 PROBLEM — M17.12 PRIMARY OSTEOARTHRITIS OF LEFT KNEE: Status: ACTIVE | Noted: 2023-04-02

## 2023-04-02 NOTE — ASSESSMENT & PLAN NOTE
Encouraged weight loss, healthy diet (DASH/Mediterranean) and exercise.   Patient should exercise 30 minutes at least five times weekly. Limit alcohol.  Treated with: Crestor

## 2023-04-03 ENCOUNTER — PATIENT MESSAGE (OUTPATIENT)
Dept: SPORTS MEDICINE | Facility: CLINIC | Age: 73
End: 2023-04-03

## 2023-04-03 ENCOUNTER — HOSPITAL ENCOUNTER (OUTPATIENT)
Dept: RADIOLOGY | Facility: HOSPITAL | Age: 73
Discharge: HOME | End: 2023-04-03
Attending: PHYSICIAN ASSISTANT
Payer: MEDICARE

## 2023-04-03 ENCOUNTER — OFFICE VISIT (OUTPATIENT)
Dept: SPORTS MEDICINE | Facility: CLINIC | Age: 73
End: 2023-04-03
Payer: MEDICARE

## 2023-04-03 ENCOUNTER — OFFICE VISIT (OUTPATIENT)
Dept: INTERNAL MEDICINE | Facility: CLINIC | Age: 73
End: 2023-04-03
Payer: MEDICARE

## 2023-04-03 ENCOUNTER — PATIENT MESSAGE (OUTPATIENT)
Dept: ADMINISTRATIVE | Facility: OTHER | Age: 73
End: 2023-04-03
Payer: MEDICARE

## 2023-04-03 VITALS
HEIGHT: 62 IN | SYSTOLIC BLOOD PRESSURE: 173 MMHG | OXYGEN SATURATION: 98 % | DIASTOLIC BLOOD PRESSURE: 88 MMHG | BODY MASS INDEX: 27.2 KG/M2 | TEMPERATURE: 98 F | WEIGHT: 147.81 LBS | HEART RATE: 76 BPM

## 2023-04-03 VITALS
SYSTOLIC BLOOD PRESSURE: 144 MMHG | WEIGHT: 147 LBS | BODY MASS INDEX: 26.89 KG/M2 | HEART RATE: 74 BPM | DIASTOLIC BLOOD PRESSURE: 79 MMHG

## 2023-04-03 DIAGNOSIS — R06.83 SNORING: ICD-10-CM

## 2023-04-03 DIAGNOSIS — M17.12 PRIMARY OSTEOARTHRITIS OF LEFT KNEE: ICD-10-CM

## 2023-04-03 DIAGNOSIS — E04.2 NONTOXIC MULTINODULAR GOITER: ICD-10-CM

## 2023-04-03 DIAGNOSIS — R03.0 ELEVATED BP WITHOUT DIAGNOSIS OF HYPERTENSION: ICD-10-CM

## 2023-04-03 DIAGNOSIS — Z01.818 PREOPERATIVE EXAMINATION: Primary | ICD-10-CM

## 2023-04-03 DIAGNOSIS — D70.9 NEUTROPENIA, UNSPECIFIED TYPE: ICD-10-CM

## 2023-04-03 DIAGNOSIS — M25.562 LEFT KNEE PAIN, UNSPECIFIED CHRONICITY: ICD-10-CM

## 2023-04-03 DIAGNOSIS — M17.12 PRIMARY OSTEOARTHRITIS OF LEFT KNEE: Primary | ICD-10-CM

## 2023-04-03 DIAGNOSIS — E78.5 HYPERLIPIDEMIA, UNSPECIFIED HYPERLIPIDEMIA TYPE: ICD-10-CM

## 2023-04-03 DIAGNOSIS — Z86.19 HISTORY OF HEPATITIS C: ICD-10-CM

## 2023-04-03 DIAGNOSIS — Z85.820 HISTORY OF MELANOMA: ICD-10-CM

## 2023-04-03 PROCEDURE — 99215 OFFICE O/P EST HI 40 MIN: CPT | Mod: S$GLB,,, | Performed by: NURSE PRACTITIONER

## 2023-04-03 PROCEDURE — 99999 PR PBB SHADOW E&M-EST. PATIENT-LVL IV: ICD-10-PCS | Mod: PBBFAC,,, | Performed by: NURSE PRACTITIONER

## 2023-04-03 PROCEDURE — 1125F PR PAIN SEVERITY QUANTIFIED, PAIN PRESENT: ICD-10-PCS | Mod: CPTII,S$GLB,, | Performed by: PHYSICIAN ASSISTANT

## 2023-04-03 PROCEDURE — 3079F PR MOST RECENT DIASTOLIC BLOOD PRESSURE 80-89 MM HG: ICD-10-PCS | Mod: CPTII,S$GLB,, | Performed by: NURSE PRACTITIONER

## 2023-04-03 PROCEDURE — 3078F PR MOST RECENT DIASTOLIC BLOOD PRESSURE < 80 MM HG: ICD-10-PCS | Mod: CPTII,S$GLB,, | Performed by: PHYSICIAN ASSISTANT

## 2023-04-03 PROCEDURE — 77073 XR HIP TO ANKLE: ICD-10-PCS | Mod: 26,,, | Performed by: RADIOLOGY

## 2023-04-03 PROCEDURE — 1125F AMNT PAIN NOTED PAIN PRSNT: CPT | Mod: CPTII,S$GLB,, | Performed by: PHYSICIAN ASSISTANT

## 2023-04-03 PROCEDURE — 99417 PR PROLONGED SVC, OUTPT, W/WO DIRECT PT CONTACT,  EA ADDTL 15 MIN: ICD-10-PCS | Mod: S$GLB,,, | Performed by: NURSE PRACTITIONER

## 2023-04-03 PROCEDURE — 1159F PR MEDICATION LIST DOCUMENTED IN MEDICAL RECORD: ICD-10-PCS | Mod: CPTII,S$GLB,, | Performed by: NURSE PRACTITIONER

## 2023-04-03 PROCEDURE — 1159F PR MEDICATION LIST DOCUMENTED IN MEDICAL RECORD: ICD-10-PCS | Mod: CPTII,S$GLB,, | Performed by: PHYSICIAN ASSISTANT

## 2023-04-03 PROCEDURE — 3008F PR BODY MASS INDEX (BMI) DOCUMENTED: ICD-10-PCS | Mod: CPTII,S$GLB,, | Performed by: NURSE PRACTITIONER

## 2023-04-03 PROCEDURE — 3077F SYST BP >= 140 MM HG: CPT | Mod: CPTII,S$GLB,, | Performed by: NURSE PRACTITIONER

## 2023-04-03 PROCEDURE — 1159F MED LIST DOCD IN RCRD: CPT | Mod: CPTII,S$GLB,, | Performed by: NURSE PRACTITIONER

## 2023-04-03 PROCEDURE — 99417 PROLNG OP E/M EACH 15 MIN: CPT | Mod: S$GLB,,, | Performed by: NURSE PRACTITIONER

## 2023-04-03 PROCEDURE — 99215 OFFICE O/P EST HI 40 MIN: CPT | Mod: S$GLB,,, | Performed by: PHYSICIAN ASSISTANT

## 2023-04-03 PROCEDURE — 3079F DIAST BP 80-89 MM HG: CPT | Mod: CPTII,S$GLB,, | Performed by: NURSE PRACTITIONER

## 2023-04-03 PROCEDURE — 99999 PR PBB SHADOW E&M-EST. PATIENT-LVL IV: ICD-10-PCS | Mod: PBBFAC,,, | Performed by: PHYSICIAN ASSISTANT

## 2023-04-03 PROCEDURE — 3078F DIAST BP <80 MM HG: CPT | Mod: CPTII,S$GLB,, | Performed by: PHYSICIAN ASSISTANT

## 2023-04-03 PROCEDURE — 3077F PR MOST RECENT SYSTOLIC BLOOD PRESSURE >= 140 MM HG: ICD-10-PCS | Mod: CPTII,S$GLB,, | Performed by: PHYSICIAN ASSISTANT

## 2023-04-03 PROCEDURE — 3008F BODY MASS INDEX DOCD: CPT | Mod: CPTII,S$GLB,, | Performed by: PHYSICIAN ASSISTANT

## 2023-04-03 PROCEDURE — 1101F PT FALLS ASSESS-DOCD LE1/YR: CPT | Mod: CPTII,S$GLB,, | Performed by: PHYSICIAN ASSISTANT

## 2023-04-03 PROCEDURE — 1160F PR REVIEW ALL MEDS BY PRESCRIBER/CLIN PHARMACIST DOCUMENTED: ICD-10-PCS | Mod: CPTII,S$GLB,, | Performed by: PHYSICIAN ASSISTANT

## 2023-04-03 PROCEDURE — 3008F BODY MASS INDEX DOCD: CPT | Mod: CPTII,S$GLB,, | Performed by: NURSE PRACTITIONER

## 2023-04-03 PROCEDURE — 1160F RVW MEDS BY RX/DR IN RCRD: CPT | Mod: CPTII,S$GLB,, | Performed by: PHYSICIAN ASSISTANT

## 2023-04-03 PROCEDURE — 99215 PR OFFICE/OUTPT VISIT, EST, LEVL V, 40-54 MIN: ICD-10-PCS | Mod: S$GLB,,, | Performed by: PHYSICIAN ASSISTANT

## 2023-04-03 PROCEDURE — 3077F SYST BP >= 140 MM HG: CPT | Mod: CPTII,S$GLB,, | Performed by: PHYSICIAN ASSISTANT

## 2023-04-03 PROCEDURE — 77073 BONE LENGTH STUDIES: CPT | Mod: 26,,, | Performed by: RADIOLOGY

## 2023-04-03 PROCEDURE — 99999 PR PBB SHADOW E&M-EST. PATIENT-LVL IV: CPT | Mod: PBBFAC,,, | Performed by: NURSE PRACTITIONER

## 2023-04-03 PROCEDURE — 99999 PR PBB SHADOW E&M-EST. PATIENT-LVL IV: CPT | Mod: PBBFAC,,, | Performed by: PHYSICIAN ASSISTANT

## 2023-04-03 PROCEDURE — 99215 PR OFFICE/OUTPT VISIT, EST, LEVL V, 40-54 MIN: ICD-10-PCS | Mod: S$GLB,,, | Performed by: NURSE PRACTITIONER

## 2023-04-03 PROCEDURE — 3288F FALL RISK ASSESSMENT DOCD: CPT | Mod: CPTII,S$GLB,, | Performed by: PHYSICIAN ASSISTANT

## 2023-04-03 PROCEDURE — 77073 BONE LENGTH STUDIES: CPT | Mod: TC

## 2023-04-03 PROCEDURE — 1101F PR PT FALLS ASSESS DOC 0-1 FALLS W/OUT INJ PAST YR: ICD-10-PCS | Mod: CPTII,S$GLB,, | Performed by: PHYSICIAN ASSISTANT

## 2023-04-03 PROCEDURE — 3008F PR BODY MASS INDEX (BMI) DOCUMENTED: ICD-10-PCS | Mod: CPTII,S$GLB,, | Performed by: PHYSICIAN ASSISTANT

## 2023-04-03 PROCEDURE — 1159F MED LIST DOCD IN RCRD: CPT | Mod: CPTII,S$GLB,, | Performed by: PHYSICIAN ASSISTANT

## 2023-04-03 PROCEDURE — 3077F PR MOST RECENT SYSTOLIC BLOOD PRESSURE >= 140 MM HG: ICD-10-PCS | Mod: CPTII,S$GLB,, | Performed by: NURSE PRACTITIONER

## 2023-04-03 PROCEDURE — 3288F PR FALLS RISK ASSESSMENT DOCUMENTED: ICD-10-PCS | Mod: CPTII,S$GLB,, | Performed by: PHYSICIAN ASSISTANT

## 2023-04-03 RX ORDER — MIDAZOLAM HYDROCHLORIDE 1 MG/ML
4 INJECTION INTRAMUSCULAR; INTRAVENOUS
Status: CANCELLED | OUTPATIENT
Start: 2023-04-03 | End: 2023-04-04

## 2023-04-03 RX ORDER — CELECOXIB 200 MG/1
400 CAPSULE ORAL ONCE
Status: CANCELLED | OUTPATIENT
Start: 2023-04-03 | End: 2023-04-03

## 2023-04-03 RX ORDER — ASPIRIN 81 MG/1
81 TABLET ORAL 2 TIMES DAILY
Qty: 84 TABLET | Refills: 0 | Status: SHIPPED | OUTPATIENT
Start: 2023-04-03 | End: 2023-09-11 | Stop reason: ALTCHOICE

## 2023-04-03 RX ORDER — SODIUM CHLORIDE 9 MG/ML
INJECTION, SOLUTION INTRAVENOUS
Status: CANCELLED | OUTPATIENT
Start: 2023-04-03

## 2023-04-03 RX ORDER — ACETAMINOPHEN 500 MG
1000 TABLET ORAL EVERY 6 HOURS
Status: CANCELLED | OUTPATIENT
Start: 2023-04-03

## 2023-04-03 RX ORDER — METHOCARBAMOL 750 MG/1
750 TABLET, FILM COATED ORAL 3 TIMES DAILY
Status: CANCELLED | OUTPATIENT
Start: 2023-04-03

## 2023-04-03 RX ORDER — CELECOXIB 200 MG/1
200 CAPSULE ORAL 2 TIMES DAILY
Qty: 56 CAPSULE | Refills: 0 | Status: SHIPPED | OUTPATIENT
Start: 2023-04-03 | End: 2023-05-10

## 2023-04-03 RX ORDER — OXYCODONE HYDROCHLORIDE 5 MG/1
5 TABLET ORAL
Status: CANCELLED | OUTPATIENT
Start: 2023-04-03

## 2023-04-03 RX ORDER — ONDANSETRON 2 MG/ML
4 INJECTION INTRAMUSCULAR; INTRAVENOUS EVERY 8 HOURS PRN
Status: CANCELLED | OUTPATIENT
Start: 2023-04-03

## 2023-04-03 RX ORDER — FENTANYL CITRATE 50 UG/ML
25 INJECTION, SOLUTION INTRAMUSCULAR; INTRAVENOUS EVERY 5 MIN PRN
Status: CANCELLED | OUTPATIENT
Start: 2023-04-03

## 2023-04-03 RX ORDER — OXYCODONE HYDROCHLORIDE 5 MG/1
5-10 TABLET ORAL
Qty: 28 TABLET | Refills: 0 | Status: SHIPPED | OUTPATIENT
Start: 2023-04-03 | End: 2023-04-18

## 2023-04-03 RX ORDER — MORPHINE SULFATE 2 MG/ML
2 INJECTION, SOLUTION INTRAMUSCULAR; INTRAVENOUS
Status: CANCELLED | OUTPATIENT
Start: 2023-04-03

## 2023-04-03 RX ORDER — NALOXONE HCL 0.4 MG/ML
0.02 VIAL (ML) INJECTION
Status: CANCELLED | OUTPATIENT
Start: 2023-04-03

## 2023-04-03 RX ORDER — CEFAZOLIN SODIUM 2 G/50ML
2 SOLUTION INTRAVENOUS
Status: CANCELLED | OUTPATIENT
Start: 2023-04-03 | End: 2023-04-04

## 2023-04-03 RX ORDER — MUPIROCIN 20 MG/G
1 OINTMENT TOPICAL
Status: CANCELLED | OUTPATIENT
Start: 2023-04-03

## 2023-04-03 RX ORDER — ACETAMINOPHEN 500 MG
1000 TABLET ORAL
Status: CANCELLED | OUTPATIENT
Start: 2023-04-03

## 2023-04-03 RX ORDER — PREGABALIN 75 MG/1
75 CAPSULE ORAL NIGHTLY
Status: CANCELLED | OUTPATIENT
Start: 2023-04-03

## 2023-04-03 RX ORDER — SODIUM CHLORIDE 0.9 % (FLUSH) 0.9 %
10 SYRINGE (ML) INJECTION
Status: CANCELLED | OUTPATIENT
Start: 2023-04-03

## 2023-04-03 RX ORDER — ASPIRIN 81 MG/1
81 TABLET ORAL 2 TIMES DAILY
Status: CANCELLED | OUTPATIENT
Start: 2023-04-03

## 2023-04-03 RX ORDER — MUPIROCIN 20 MG/G
1 OINTMENT TOPICAL 2 TIMES DAILY
Status: CANCELLED | OUTPATIENT
Start: 2023-04-03 | End: 2023-04-08

## 2023-04-03 RX ORDER — ONDANSETRON 4 MG/1
4 TABLET, ORALLY DISINTEGRATING ORAL EVERY 8 HOURS PRN
Qty: 30 TABLET | Refills: 0 | Status: SHIPPED | OUTPATIENT
Start: 2023-04-03 | End: 2023-09-11 | Stop reason: ALTCHOICE

## 2023-04-03 RX ORDER — PREGABALIN 75 MG/1
75 CAPSULE ORAL
Status: CANCELLED | OUTPATIENT
Start: 2023-04-03

## 2023-04-03 RX ORDER — TALC
6 POWDER (GRAM) TOPICAL NIGHTLY PRN
Status: CANCELLED | OUTPATIENT
Start: 2023-04-03

## 2023-04-03 RX ORDER — SODIUM CHLORIDE 9 MG/ML
INJECTION, SOLUTION INTRAVENOUS CONTINUOUS
Status: CANCELLED | OUTPATIENT
Start: 2023-04-03 | End: 2023-04-04

## 2023-04-03 RX ORDER — CELECOXIB 200 MG/1
200 CAPSULE ORAL DAILY
Status: CANCELLED | OUTPATIENT
Start: 2023-04-03

## 2023-04-03 RX ORDER — POLYETHYLENE GLYCOL 3350 17 G/17G
17 POWDER, FOR SOLUTION ORAL DAILY
Status: CANCELLED | OUTPATIENT
Start: 2023-04-03

## 2023-04-03 RX ORDER — CEFAZOLIN SODIUM 2 G/50ML
2 SOLUTION INTRAVENOUS
Status: CANCELLED | OUTPATIENT
Start: 2023-04-03

## 2023-04-03 RX ORDER — FAMOTIDINE 20 MG/1
20 TABLET, FILM COATED ORAL 2 TIMES DAILY
Status: CANCELLED | OUTPATIENT
Start: 2023-04-03

## 2023-04-03 RX ORDER — OXYCODONE HYDROCHLORIDE 5 MG/1
10 TABLET ORAL
Status: CANCELLED | OUTPATIENT
Start: 2023-04-03

## 2023-04-03 RX ORDER — PROCHLORPERAZINE EDISYLATE 5 MG/ML
5 INJECTION INTRAMUSCULAR; INTRAVENOUS EVERY 6 HOURS PRN
Status: CANCELLED | OUTPATIENT
Start: 2023-04-03

## 2023-04-03 RX ORDER — AMOXICILLIN 250 MG
1 CAPSULE ORAL 2 TIMES DAILY
Status: CANCELLED | OUTPATIENT
Start: 2023-04-03

## 2023-04-03 RX ORDER — LIDOCAINE HYDROCHLORIDE 10 MG/ML
1 INJECTION, SOLUTION EPIDURAL; INFILTRATION; INTRACAUDAL; PERINEURAL
Status: CANCELLED | OUTPATIENT
Start: 2023-04-03

## 2023-04-03 RX ORDER — FENTANYL CITRATE 50 UG/ML
100 INJECTION, SOLUTION INTRAMUSCULAR; INTRAVENOUS
Status: CANCELLED | OUTPATIENT
Start: 2023-04-03 | End: 2023-04-04

## 2023-04-03 RX ORDER — BISACODYL 10 MG
10 SUPPOSITORY, RECTAL RECTAL EVERY 12 HOURS PRN
Status: CANCELLED | OUTPATIENT
Start: 2023-04-03

## 2023-04-03 NOTE — PROGRESS NOTES
Tomasa Reed is a 72 y.o. year old here today for pre surgery optimization visit  in preparation for a Left total knee arthroplasty to be performed by Dr. Zuñiga on 4/12/2023.  she was last seen and treated in the clinic on 3/9/2023. she will be medically optimized by the pre op center. There has been no significant change in medical status since last visit. No fever, chills, malaise, or unexplained weight change.      Allergies, Medications, past medical and surgical history reviewed.    Focused examination performed.    Patient declined to see surgeon today. All questions answered. Patient encouraged to call with questions. Contact information given.     Pre, jozef, and post operative procedures and expectations discussed. Goals of successful surgery reviewed and include manageable pain levels, surgical site free of infection, medication management, and ambulation with PT/OT assistance. Healthy weight management discussed with patient and caregiver who were receptive to eduction of healthy diet and activity. No other necessary lifestyle changes identified. Educated patient about signs and symptoms of infection, medication management, anticoagulation therapy, risk of tobacco and alcohol use, and self-care to promote healing. Surgical guide given for future reference. Hibiclens given to patient with instructions. All questions were answered.     Tomasa Reed verbalized an understanding to the education and goals. Patient has displayed readiness to engage in care and is ready to proceed with surgery.  Patient reports her  Fab is able and ready to provide assistance at home after discharge.    Surgical and blood consents signed.    Tomasa Reed will contact us if there are any questions, concerns, or changes in medical status prior to surgery.       Joint class: Given total joint class packet on 3/23/2023    Patient has discussed discharge planning with surgeon. Patient will be  discharged to home following surgery.   patient will be scheduled with [facility]. [place].    50 minutes of time was spent on patient education, review of records, templating, H&P, , appointment scheduling and optimizing patient for surgery.

## 2023-04-03 NOTE — HPI
This is a 72 y.o. female  who presents today with  for a preoperative evaluation in preparation for an  Orthopedic  procedure.  Scheduled for  left knee arthroplasty.   Surgery is indicated for osteoarthritis of left knee   Patient is new to me.  Details of current problem: The duration of problem is  2-3 years. Denies trauma. She has tried steroid/gel injections with decreasing effectiveness.   Reports symptoms of  constant stabbing, aching pain to left knee. Denies instability.   Aggravating factors include:  sitting, difficulty rising from siting, walking, and decreased ADLs.    Relieving factors are  TENS unit, Tylenol ES/Advil prn.    Reports pain: 7/10 to left knee; no use of assistive devices.   The history has been obtained by speaking with the patient and reviewing the electronic medical record and/or outside health information. Significant health conditions for the perioperative period are discussed below in assessment and plan.     Patient reports current health status to be Good.  Denies any new symptoms before surgery.

## 2023-04-03 NOTE — H&P (VIEW-ONLY)
Tomasa Reed  is here for a completion of her perioperative paperwork. she  Is scheduled to undergo left total knee arthroplasty. [employer] with UBP. Regular cement. Non X3 poly on 4/12/2023.  She is a healthy individual and does need clearance for this procedure.     Dental clearance scanned into media tab. Appointment with Moraima Salas today for preop clearance.    Patient planning on staying overnight.    Risks, indications and benefits of the surgical procedure were discussed with the patient. All questions with regard to surgery, rehab, expected return to functional activities, activities of daily living and recreational endeavors were answered to her satisfaction.    Discussed COVID-19 with the patient, they are aware of our current policies and procedures, were given the option of delaying surgery, and they elect to proceed.    Patient was informed and understands the risks of surgery are greater for patients with a current condition or hx of heart disease, obesity, clotting disorders, recurrent infections, steroid use, current or past smoking, and factors such as sedentary lifestyle and noncompliance with medications, therapy or f/u. The degree of the increased risk is hard to estimate w/ any degree of precision.    Once no other questions were asked, a brief history and physical exam was then performed.    PAST MEDICAL HISTORY:   Past Medical History:   Diagnosis Date    Cataract     OU    Chronic interstitial cystitis     History of hepatitis C; S/p RX with SVR 12 documented 06/2017 6/22/2017    HLD (hyperlipidemia)     Malignant melanoma of skin of trunk, except scrotum     Migraine, unspecified, without mention of intractable migraine without mention of status migrainosus     Nonspecific abnormal results of liver function study     Nontoxic multinodular goiter     Osteopenia      PAST SURGICAL HISTORY:   Past Surgical History:   Procedure Laterality Date    BREAST BIOPSY      CATARACT  EXTRACTION Right 03/02/2020    cataracts      B/L at [facility]    COLONOSCOPY N/A 12/14/2016    Procedure: COLONOSCOPY;  Surgeon: Wicho Painting MD;  [facility]  Service: Endoscopy;  Laterality: N/A;    COLONOSCOPY N/A 1/14/2020    Procedure: COLONOSCOPY;  Surgeon: NANCY Maher MD;  Location[facility]  Service: Endoscopy;  Laterality: N/A;  1/8/20 left vm to confirm appt-rb    FINGER SURGERY Right 2010    index finger fused    MOLE REMOVAL      TONSILLECTOMY, ADENOIDECTOMY      TOTAL ABDOMINAL HYSTERECTOMY       FAMILY HISTORY:   Family History   Problem Relation Age of Onset    Osteoarthritis Mother         s/p hip fx    Diabetes Mother     Cancer Mother         colon    Thyroid disease Mother     Kidney failure Mother         d.93 s/p PAD procedure    Cataracts Mother     Macular degeneration Mother     Heart failure Father     Stroke Father         d.97 complications    Hypertension Father     Cataracts Father     Fibromyalgia Sister     Glaucoma Sister         dry eyes, s/p duct surgery    Other Sister         acoustic tumor,s/p removal & vertigo resolved, +cochlear implant    Other Daughter         inflammation, better off red meat    Chronic back pain Daughter     GI problems Daughter         liver w/up and on cholestyramine    Obesity Daughter     Melanoma Neg Hx     Amblyopia Neg Hx     Blindness Neg Hx     Retinal detachment Neg Hx     Strabismus Neg Hx      SOCIAL HISTORY:   Social History     Socioeconomic History    Marital status:    Occupational History    Occupation: retired     Employer: [employer]   Tobacco Use    Smoking status: Never    Smokeless tobacco: Never   Substance and Sexual Activity    Alcohol use: Yes     Comment: socially    Sexual activity: Yes     Partners: Male   Social History Narrative        Retired  of an elementary school    Has 2 daughters, no thyroid problems    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 She has severe DJD and is diabetic.     Dad d. 97 status post stroke, CHF. Dependent, bed to w/c.      Significant memory decline, usually doesn't recognize family        Two sisters in good health.         SCREENING TESTS:      Social Determinants of Health     Financial Resource Strain: Low Risk     Difficulty of Paying Living Expenses: Not hard at all   Food Insecurity: No Food Insecurity    Worried About Running Out of Food in the Last Year: Never true    Ran Out of Food in the Last Year: Never true   Transportation Needs: No Transportation Needs    Lack of Transportation (Medical): No    Lack of Transportation (Non-Medical): No   Physical Activity: Insufficiently Active    Days of Exercise per Week: 6 days    Minutes of Exercise per Session: 20 min   Stress: Stress Concern Present    Feeling of Stress : To some extent   Social Connections: Unknown    Frequency of Communication with Friends and Family: More than three times a week    Frequency of Social Gatherings with Friends and Family: Once a week    Active Member of Clubs or Organizations: Yes    Attends Club or Organization Meetings: More than 4 times per year    Marital Status:    Housing Stability: Low Risk     Unable to Pay for Housing in the Last Year: No    Number of Places Lived in the Last Year: 1    Unstable Housing in the Last Year: No       MEDICATIONS:   Current Outpatient Medications:     alendronate (FOSAMAX) 70 MG tablet, Take 1 tablet (70 mg total) by mouth every 7 days., Disp: 12 tablet, Rfl: 3    cetirizine 10 mg Cap, PRN, Disp: , Rfl:     dietary supplement Pack, Take 1 tablet by mouth 2 (two) times daily., Disp: , Rfl:     fluticasone propionate (FLONASE) 50 mcg/actuation nasal spray, 1 spray (50 mcg total) by Each Nostril route once daily., Disp: 16 g, Rfl: 0    glucosam/chondro/herb  149/hyal (GLUCOS CHOND CPLX ADVANCED ORAL), Take by mouth., Disp: , Rfl:     glycerin adult suppository, as needed. , Disp: , Rfl:     magnesium oxide (MAG-OX) 400 mg (241.3 mg magnesium) tablet, Take 400 mg by mouth once daily., Disp: , Rfl:     mometasone (NASONEX) 50 mcg/actuation nasal spray, 2 sprays by Nasal route once daily., Disp: , Rfl:     naproxen sodium 220 mg Cap, PRN, Disp: , Rfl:     OCEAN NASAL 0.65 % nasal spray, as needed. , Disp: , Rfl:     rosuvastatin (CRESTOR) 10 MG tablet, TAKE 1 TABLET BY MOUTH EVERY DAY, Disp: 90 tablet, Rfl: 3    TUMS 200 mg calcium (500 mg) chewable tablet, 1 tablet as needed. , Disp: , Rfl:     MISCELLANEOUS MEDICAL SUPPLY MIS, as needed. EYE ITCH RELIEF 0.25 EYE DROPS, Disp: , Rfl:   ALLERGIES:   Review of patient's allergies indicates:   Allergen Reactions    Iodine and iodide containing products Nausea And Vomiting     Other reaction(s): SEVERE    Sulfa (sulfonamide antibiotics)      Other reaction(s): unknown  Other reaction(s): RASH       Review of Systems   Constitution: Negative. Negative for chills, fever and night sweats.   HENT: Negative for congestion and headaches.    Eyes: Negative for blurred vision, left vision loss and right vision loss.   Cardiovascular: Negative for chest pain and syncope.   Respiratory: Negative for cough and shortness of breath.    Endocrine: Negative for polydipsia, polyphagia and polyuria.   Hematologic/Lymphatic: Negative for bleeding problem. Does not bruise/bleed easily.   Skin: Negative for dry skin, itching and rash.   Musculoskeletal: Negative for falls and muscle weakness.   Gastrointestinal: Negative for abdominal pain and bowel incontinence.   Genitourinary: Negative for bladder incontinence and nocturia.   Neurological: Negative for disturbances in coordination, loss of balance and seizures.   Psychiatric/Behavioral: Negative for depression. The patient does not have insomnia.    Allergic/Immunologic: Negative for hives  and persistent infections.     PHYSICAL EXAM:  GEN: A&Ox3, WD WN NAD  HEENT: WNL  CHEST: CTAB, no W/R/R  HEART: RRR, no M/R/G   ABD: Soft, NT ND, BS x4 QUADS  MS: Refer to previous note for detailed MS exam  NEURO: CN II-XII intact       The surgical consent was then reviewed with the patient, who agreed with all the contents of the consent form and it was signed.     PHYSICAL THERAPY:  She was also instructed regarding physical therapy and will begin on POD#1-3. She is doing physical therapy at [facility].    POST OP CARE: Instructions were reviewed including care of the wound and dressing after surgery and when she can shower.     PAIN MANAGEMENT: Tomasa Hamilton Derek was instructed regarding the Polar ice unit that will be in place after surgery and her postoperative pain medications.     MEDICATION:  Roxicodone 5 mg 1-2 q 4 hours PRN for pain  Zofran 4 mg q 8 hours PRN for nausea and vomiting.  Aspirin 81mg BID x 6 weeks for DVT prophylaxis starting on the evening after surgery.  Celebrex 200mg BID     Post op meds to be delivered bedside prior to discharge. Deliver to family if patient is in surgery at 5pm.     Patient was instructed to purchase and take Colace to counter possible GI side effects of taking opiates.     DVT prophylaxis was discussed with the patient today including risk factors for developing DVTs and history of DVTs. The patient was asked if any specific recommendations were given from the doctor/s that did pre-operative surgical clearance.      If the patient was previously taking 81mg baby aspirin, they were told to not take additional baby aspirin, using the above stated aspirin and to restart the 81mg aspirin daily after completion of the aspirin dose.      Patient was also told to buy over the counter Prilosec medication and take it once daily for GI protection as long as they are taking NSAIDs or Aspirin.     The patient was told that narcotic pain  medications may make them drowsy and instructions were given to not sign legal documents, drive or operate heavy machinery, cars, or equipment while under the influence of narcotic medications.     As there were no other questions to be asked, she was given my business card along with Dr. Zuñiga's business card if she has any questions or concerns prior to surgery or in the postop period.

## 2023-04-03 NOTE — ASSESSMENT & PLAN NOTE
BP today: 173/88  Recent home BP readings available: Systolic range ( 115-161 ) and Diastolic range (54-86 ); requested per PCP. Only one BP reading out of range (161/78). It was reccommended to continue monitoring BP occasionally.  Denies headaches/urine volume changes/dizziness/syncope/vision changes.  Recommend follow-up with PCP for continued BP monitoring.

## 2023-04-03 NOTE — PROGRESS NOTES
[facility]  Progress Note    Patient Name: Tomasa Reed  MRN: [id number]  Date of Evaluation- 04/03/2023  PCP- Greda Huizar MD    Future cases for Tomasa Reed [[id number]]       Case ID Status Date Time Erick Procedure Provider Location    [id number] [facility] 4/12/2023 10:14  ARTHROPLASTY, KNEE, TOTAL W Hayden Zuñiga MD [[id number]] [facility] OR            HPI:  This is a 72 y.o. female  who presents today with  for a preoperative evaluation in preparation for an  Orthopedic  procedure.  Scheduled for  left knee arthroplasty.   Surgery is indicated for osteoarthritis of left knee   Patient is new to me.  Details of current problem: The duration of problem is  2-3 years. Denies trauma. She has tried steroid/gel injections with decreasing effectiveness.   Reports symptoms of  constant stabbing, aching pain to left knee. Denies instability.   Aggravating factors include:  sitting, difficulty rising from siting, walking, and decreased ADLs.    Relieving factors are  TENS unit, Tylenol ES/Advil prn.    Reports pain: 7/10 to left knee; no use of assistive devices.   The history has been obtained by speaking with the patient and reviewing the electronic medical record and/or outside health information. Significant health conditions for the perioperative period are discussed below in assessment and plan.     Patient reports current health status to be Good.  Denies any new symptoms before surgery.        Subjective/ Objective:     Chief Complaint: Preoperative evaulation, perioperative medical management, and complication reduction plan.     Functional Capacity: Was going to gym up until Jan/Feb 2023- riding stationary bike. Now using bike at home for 20 minutes daily without CP/SOB.       Anesthesia issues: PONV with hysterectomy     Difficulty mouth opening: No    Steroid use in the last 12 months:  right knee    Dental Issues: No    Family anesthesia difficulty: None        Family Hx of Thrombosis: None    Past Medical History:   Diagnosis Date    Cataract     OU    Chronic interstitial cystitis     History of hepatitis C; S/p RX with SVR 12 documented 06/2017 06/22/2017    HLD (hyperlipidemia)     Malignant melanoma of skin of trunk, except scrotum     Migraine, unspecified, without mention of intractable migraine without mention of status migrainosus     Nonspecific abnormal results of liver function study     Nontoxic multinodular goiter     Osteopenia          Past Medical History Pertinent Negatives:   Diagnosis Date Noted    Acute leukemia 12/17/2015    Acute pancreatitis 12/17/2015    Alcohol dependence 12/17/2015    Alzheimer's disease 12/17/2015    Amblyopia 05/16/2017    Anemia 12/17/2015    Angina pectoris 12/17/2015    Anxiety 12/17/2015    Aplasia bone marrow 12/17/2015    Asthma 12/17/2015    Atrial fibrillation 12/17/2015    Back pain 12/17/2015    Basal cell carcinoma 02/27/2013    Bipolar disorder 12/17/2015    Bladder cancer 12/17/2015    Bone cancer 12/17/2015    Bone marrow transplant status 12/17/2015    Brain cancer 12/17/2015    Breast cancer 12/17/2015    Cancer of lymphatic and hematopoietic tissue 12/17/2015    Cerebral palsy 12/17/2015    Cervical cancer 12/17/2015    Chronic bronchitis 12/17/2015    Chronic hepatitis 12/17/2015    Cirrhosis 12/17/2015    Colon cancer 12/17/2015    Complications of reattached extremity 12/17/2015    Coronary artery disease 12/17/2015    Cystic fibrosis 12/17/2015    Deep vein thrombosis 04/03/2023    Dependence on renal dialysis 12/17/2015    Depression 12/17/2015    Diabetes mellitus 05/22/2018    Diabetes mellitus, type 2 04/03/2023    Diabetes with neurologic complications 12/17/2015    Diabetic retinopathy 05/16/2017    Disorder of kidney and ureter 12/17/2015    Emphysema of lung 12/17/2015    Endometrial cancer 12/17/2015    Esophageal cancer 12/17/2015    Fallopian tube cancer, carcinoma 12/17/2015     Fibromyalgia 12/17/2015    Gastrostomy status 12/17/2015    GERD (gastroesophageal reflux disease) 04/03/2023    Glaucoma 12/17/2015    Glomerulonephritis 12/17/2015    Heart failure 12/17/2015    Heart transplanted 12/17/2015    Hemolytic anemia 12/17/2015    Hepatitis B 12/17/2015    HIV infection 12/17/2015    Hypertension 12/17/2015    Hypothyroidism 12/17/2015    Immune deficiency disorder 12/17/2015    Immune disorder 12/17/2015    Inflammatory bowel disease 12/17/2015    Interstitial nephritis chronic 12/17/2015    Intracranial hemorrhage 12/17/2015    Late complications of amputation stump 12/17/2015    Leukemia 12/17/2015    Liver cancer 12/17/2015    Liver transplanted 12/17/2015    Lung cancer 12/17/2015    Lung transplanted 12/17/2015    Macular degeneration 05/16/2017    Malnutrition 12/17/2015    Multiple sclerosis 12/17/2015    Myocardial infarction 12/17/2015    Neoplasm of lip 12/17/2015    Obesity 12/17/2015    Osteoporosis, unspecified 01/28/2013    Ovarian cancer 12/17/2015    Pancreatic cancer 12/17/2015    Pancreatic disease 12/17/2015    Paranoia 12/17/2015    Parkinson disease 12/17/2015    Peripheral vascular disease 12/17/2015    Phantom limb syndrome 12/17/2015    Pneumonia 12/17/2015    Pneumonia due to other staphylococcus 12/17/2015    Polyneuropathy 12/17/2015    Pulmonary embolism 12/17/2015    Rectal cancer 12/17/2015    Renal cancer 12/17/2015    Renal manifestation of secondary diabetes mellitus 12/17/2015    Respiratory failure 12/17/2015    Retinal detachment 05/16/2017    Rheumatoid arthritis 12/17/2015    Schizoaffective disorder 12/17/2015    Seizures 12/17/2015    Septic shock 12/17/2015    Sickle cell anemia 12/17/2015    Sickle cell trait 05/16/2017    Skin ulcer 12/17/2015    Sleep apnea 12/17/2015    Small intestine cancer 12/17/2015    Spinal cord disease 12/17/2015    Squamous Cell Carcinoma 02/27/2013    Stomach cancer 12/17/2015    Strabismus 05/16/2017    Stroke  12/17/2015    Testicular cancer 12/17/2015    Thyroid cancer 12/17/2015    Tobacco dependence 12/17/2015    Trouble in sleeping 12/17/2015    Type 2 diabetes mellitus 12/17/2015    Type 2 diabetes mellitus with ophthalmic manifestations 12/17/2015    Type 2 diabetes with peripheral circulatory disorder, controlled 12/17/2015    Urinary incontinence 12/17/2015    Uterine cancer 12/17/2015    Uveitis 05/16/2017    Vaginal cancer 12/17/2015    Vulvar cancer 12/17/2015         Past Surgical History:   Procedure Laterality Date    BREAST BIOPSY      CATARACT EXTRACTION Right 03/02/2020    cataracts      B/L at [facility]    COLONOSCOPY N/A 12/14/2016    Procedure: COLONOSCOPY;  Surgeon: iWcho Painting MD;  Location: [facility] ENDO (4TH FLR);  Service: Endoscopy;  Laterality: N/A;    COLONOSCOPY N/A 1/14/2020    Procedure: COLONOSCOPY;  Surgeon: NANCY Maher MD;  Location: [facility] ENDO (4TH FLR);  Service: Endoscopy;  Laterality: N/A;  1/8/20 left vm to confirm appt-rb    FINGER SURGERY Right 2010    index finger fused    MOLE REMOVAL      TONSILLECTOMY, ADENOIDECTOMY      TOTAL ABDOMINAL HYSTERECTOMY         Review of Systems   Constitutional:  Negative for chills, fatigue, fever and unexpected weight change.   HENT:  Positive for hearing loss (hearing aids). Negative for dental problem, postnasal drip, rhinorrhea, sore throat, tinnitus and trouble swallowing.    Eyes:  Negative for photophobia, pain, discharge and visual disturbance.   Respiratory:  Negative for apnea, cough, chest tightness, shortness of breath and wheezing.         STOP BANG risk factors:  Snoring     Cardiovascular:  Negative for chest pain, palpitations and leg swelling.   Gastrointestinal:  Negative for abdominal pain, blood in stool, constipation, nausea and vomiting.   Endocrine: Negative for cold intolerance and heat intolerance.   Genitourinary:  Negative for decreased urine volume, difficulty urinating, dysuria, frequency, hematuria and  "urgency.   Musculoskeletal:  Positive for arthralgias (left knee). Negative for back pain, neck pain and neck stiffness.   Skin:  Negative for rash and wound.   Allergic/Immunologic: Positive for environmental allergies.   Neurological:  Negative for dizziness, tremors, seizures, syncope, weakness, numbness and headaches.   Hematological:  Negative for adenopathy. Bruises/bleeds easily.   Psychiatric/Behavioral:  Negative for confusion, sleep disturbance and suicidal ideas.             VITALS  Visit Vitals  BP (!) 173/88 (BP Location: Left arm, Patient Position: Sitting)   Pulse 76   Temp 98.1 °F (36.7 °C) (Oral)   Ht 5' 2" (1.575 m)   Wt 67 kg (147 lb 12.8 oz)   SpO2 98%   BMI 27.03 kg/m²          Physical Exam  Vitals reviewed.   Constitutional:       General: She is not in acute distress.     Appearance: She is well-developed.   HENT:      Head: Normocephalic.      Nose: Nose normal.      Mouth/Throat:      Pharynx: No oropharyngeal exudate.   Eyes:      General:         Right eye: No discharge.         Left eye: No discharge.      Conjunctiva/sclera: Conjunctivae normal.      Pupils: Pupils are equal, round, and reactive to light.   Neck:      Thyroid: No thyromegaly.      Vascular: No carotid bruit or JVD.      Trachea: No tracheal deviation.   Cardiovascular:      Rate and Rhythm: Normal rate and regular rhythm.      Pulses:           Carotid pulses are 2+ on the right side and 2+ on the left side.       Dorsalis pedis pulses are 2+ on the right side and 2+ on the left side.        Posterior tibial pulses are 2+ on the right side and 2+ on the left side.      Heart sounds: Normal heart sounds. No murmur heard.     Comments: trace edema- BLE  Pulmonary:      Effort: Pulmonary effort is normal. No respiratory distress.      Breath sounds: Normal breath sounds. No stridor. No wheezing, rhonchi or rales.   Abdominal:      General: Bowel sounds are normal. There is no distension.      Palpations: Abdomen is soft. "      Tenderness: There is no abdominal tenderness. There is no guarding.   Musculoskeletal:      Cervical back: Normal range of motion. No pain with movement.   Lymphadenopathy:      Cervical: No cervical adenopathy.   Skin:     General: Skin is warm and dry.      Capillary Refill: Capillary refill takes less than 2 seconds.      Findings: No erythema or rash.   Neurological:      Mental Status: She is alert and oriented to person, place, and time.      Coordination: Coordination normal.        Significant Labs:  Lab Results   Component Value Date    WBC 3.21 (L) 04/03/2023    HGB 13.1 04/03/2023    HCT 37.9 04/03/2023     04/03/2023    CHOL 154 03/02/2023    TRIG 74 03/02/2023    HDL 68 03/02/2023    ALT 24 03/02/2023    AST 26 03/02/2023     03/02/2023    K 4.4 03/02/2023     03/02/2023    CREATININE 0.7 03/02/2023    BUN 7 (L) 03/02/2023    CO2 27 03/02/2023    TSH 0.645 03/02/2023    INR 1.0 04/03/2023    HGBA1C 4.9 06/23/2004       Diagnostic Studies: No relevant studies.    EKG:   Results for orders placed or performed in visit on 06/01/20   IN OFFICE EKG 12-LEAD (to [place])    Collection Time: 06/01/20 10:01 AM    Narrative    Test Reason : Z01.818    Vent. Rate : 063 BPM     Atrial Rate : 063 BPM     P-R Int : 164 ms          QRS Dur : 080 ms      QT Int : 420 ms       P-R-T Axes : 035 048 048 degrees     QTc Int : 429 ms    Normal sinus rhythm  Normal ECG  When compared with ECG of 02-DEC-2019 10:58,  No significant change was found  Confirmed by Kaylynn Alonso MD ([id number]) on 6/1/2020 2:44:02 PM    Referred By: [id number] ZARI LEAL           Confirmed By:Kaylynn Alonso MD           Imaging   Xray C-Spine 1/11/22  Impression:     Multilevel degenerative change resulting in bilateral neural foraminal narrowing as above.  There may be some degree of canal narrowing C5-6.  Findings can be correlated with cervical spine MRI as warranted clinically.     Cervical ribs at C7.        Electronically  signed by: Adeola Butcher  Date:                                            01/11/2022  Time:                                           10:11        Carotid 1/30/22  Carotid atherosclerosis present bilaterally.  No evidence of significant internal carotid artery stenosis (less than 50%) bilaterally.  Vertebral flow is antegrade bilaterally.      Active Cardiac Conditions: None      Revised Cardiac Risk Index   High -Risk Surgery  Intraperitoneal; Intrathoracic; suprainguinal vascular Yes- + 1 No- 0   History of Ischemic Heart Disease   (Hx of MI/positive exercise test/current chest pain due to ischemia/use of nitrate therapy/EKG with pathological Q waves) Yes- + 1 No- 0   History of CHF  (Pulmonary edema/bilateral rales or S3 gallop/PND/CXR showing pulmonary vascular redistribution) Yes- + 1 No- 0   History of CVA   (Prior stroke or TIA) Yes- + 1 No- 0   Pre-operative treatment with insulin Yes- + 1 No- 0   Pre-operative creatinine > 2mg/dl Yes- + 1 No- 0   Total: 0      Risk Status:  Estimated risk of cardiac complications after non-cardiac surgery using the Revised Cardiac Risk Index for Preoperative risk is 3.9 %      ARISCAT (Canet) risk index: Intermediate: 13.3% risk of post-op pulmonary complications.    American Society of Anesthesiologists Physical Status classification (ASA): 3           No further cardiac workup needed prior to surgery.                   Assessment/Plan:     HLD (hyperlipidemia)  Encouraged weight loss, healthy diet (DASH/Mediterranean) and exercise.   Patient should exercise 30 minutes at least five times weekly. Limit alcohol.  Treated with: Crestor    History of melanoma  To right chest- removed in 2011- stable.  Followed per Derm; last seen 12/2022    Nontoxic multinodular goiter  Denies compressive symptoms  S/P FNA Bx 2013 with benign results  TSH normal      History of hepatitis C; S/p RX with SVR 24 documented 09/2017  Stable; last RNA not detected 6/2018    Primary osteoarthritis  of left knee  Scheduled with Dr. Zuñiga on 4/12/23 for left knee arthroplasty.    Neutropenia  Chronic and stable.  Managed per PCP; not followed Hem-Onc.     Snoring  Denies KACY. Possible sleep apnea: recommend caution with sedating medication in the perioperative period.   Not interested in Sleep Clinic referral    Elevated BP without diagnosis of hypertension  BP today: 173/88  Recent home BP readings available: Systolic range ( 115-161 ) and Diastolic range (54-86 ); requested per PCP. Only one BP reading out of range (161/78). It was reccommended to continue monitoring BP occasionally.  Denies headaches/urine volume changes/dizziness/syncope/vision changes.  Recommend follow-up with PCP for continued BP monitoring.      Discussion/Management of Perioperative Care    Thromboembolic prophylaxis (VTE) Care: Risk factors for thrombosis include: age and surgical procedure.  I recommend prophylaxis of thromboembolism with the use of compression stockings/pneumatic devices, and/or pharmacologic agents. The benefits should outweigh the risks for pharmacologic prophylaxis in the perioperative period. I also encourage early ambulation if not contraindicated during the post-operative period.    Risk factors for post-operative pulmonary complications include:age > 65 years and surgery > 3 hours. To reduce the risk of pulmonary complications, prophylactic recommendations include: incentive spirometry use/deep breathing, early ambulation, and pain control.    Risk factors for renal complications include: age. To reduce the risk of postoperative renal complications, I recommend the patient maintain adequate fluid volume status by drinking 2 liters of water daily.  Avoid/reduce NSAIDS and MONTEIRO-2 inhibitors use as well as IV contrast for renal protection.    I recommend the use of appropriate prophylactic antibiotics to reduce the risk of surgical site infections.    Delirium risk factors include advanced age. I recommend to  avoid/reduce use of benzodiazepine use (not for patients who take on a regular basis), anticholinergics, Benadryl,  and agents that may cause postoperative serotonin syndrome.  Controlled pain can decrease the risk for postop delirium and since opioids are used for postoperative pain control, I suggest using the lowest dose for the shortest amount of time necessary for pain management.     The patient is at an increased risk for urinary retention due to : possible regional anesthesia and advanced age. I recommend to avoid/decrease the use of benzodiazepines, anticholinergics, and Benadryl in the perioperative period. I also recommend using opioids for the shortest period of time if possible.          This visit was focused on Preoperative evaluation, Perioperative Medical management, complication reduction plans. I suggest that the patient follows up with primary care or relevant sub specialists for ongoing health care.    I appreciate the opportunity to be involved in this patients care. Please feel free to contact me if there were any questions about this consultation.        I spent a total of 88 minutes on the day of the visit.This includes face to face time and non-face to face time preparing to see the patient (e.g., review of tests), obtaining and/or reviewing separately obtained history, documenting clinical information in the electronic or other health record, independently interpreting results and communicating results to the patient/family/caregiver, or care coordinator.       Patient is optimized for surgery.       Moraima Salas NP  Perioperative Medicine  [facility]

## 2023-04-03 NOTE — DISCHARGE INSTRUCTIONS
Your surgery has been scheduled for:_________4/12/23_________________________________    You should report to:  ____[facility], located on the [place] side of the first floor of the           [facility] ([phone])  ____[facility], located on the [facility] side of the            Second floor of the [facility] ([phone])  ____3rd Floor [facility] located on the [facility] side of the [facility] [phone]  __X__[facility]: located at [address] [facility] [place] [address]. [address].     Please Note   Tell your doctor if you take Aspirin, products containing Aspirin, herbal medications  or blood thinners, such as Coumadin, Ticlid, or Plavix.  (Consult your provider regarding holding or stopping before surgery).  Arrange for someone to drive you home following surgery.  You will not be allowed to leave the surgical facility alone or drive yourself home following sedation and anesthesia.    Before Surgery  Stop taking all herbal medications, vitamins, and supplements 7 days prior to surgery  No Motrin/Advil (Ibuprofen) 7 days before surgery  No Aleve (Naproxen) 7 days before surgery  Stop Taking Asprin, products containing Aspirin __7___days before surgery   No Goody's/BC Powder 7 days before surgery  Refrain from drinking alcoholic beverages for 24 hours before and after surgery  Stop or limit smoking at least 24 hours prior to surgery  You may take Tylenol for pain    Night before Surgery  Do not eat or drink after midnight  Take a shower or bath (shower is recommended).  Bathe with Hibiclens soap or an antibacterial soap from the neck down.  If not supplied by your surgeon, hibiclens soap will need to be purchased over the counter in pharmacy.  Rinse soap off thoroughly.  Shampoo your hair with your regular shampoo    The Day of Surgery  Take another bath or shower with hibiclens  or any antibacterial soap, to reduce the chance of infection.  Take heart and blood pressure medications with a small sip of water, as advised by the perioperative team.  Do not take fluid pills  You may brush your teeth and rinse your mouth, but do not swallow any additional water.   Do not apply perfumes, powder, body lotions or deodorant on the day of surgery.  Nail polish should be removed.  Do not wear makeup or moisturizer  Wear comfortable clothes, such as a button front shirt and loose fitting pants.  Leave all jewelry, including body piercings, and valuables at home.    Bring any devices you will neeed after surgery such as crutches or canes.  If you have sleep apnea, please bring your CPAP machine  In the event that your physical condition changes including the onset of a cold or respiratory illness, or if you have to delay or cancel your surgery, please notify your surgeon.

## 2023-04-03 NOTE — H&P
Tomasa Reed  is here for a completion of her perioperative paperwork. she  Is scheduled to undergo left total knee arthroplasty. [place] [employer] with UBP. Regular cement. Non X3 poly on 4/12/2023.  She is a healthy individual and does need clearance for this procedure.     Dental clearance scanned into media tab. Appointment with Moraima Salas today for preop clearance.    Patient planning on staying overnight.    Risks, indications and benefits of the surgical procedure were discussed with the patient. All questions with regard to surgery, rehab, expected return to functional activities, activities of daily living and recreational endeavors were answered to her satisfaction.    Discussed COVID-19 with the patient, they are aware of our current policies and procedures, were given the option of delaying surgery, and they elect to proceed.    Patient was informed and understands the risks of surgery are greater for patients with a current condition or hx of heart disease, obesity, clotting disorders, recurrent infections, steroid use, current or past smoking, and factors such as sedentary lifestyle and noncompliance with medications, therapy or f/u. The degree of the increased risk is hard to estimate w/ any degree of precision.    Once no other questions were asked, a brief history and physical exam was then performed.    PAST MEDICAL HISTORY:   Past Medical History:   Diagnosis Date    Cataract     OU    Chronic interstitial cystitis     History of hepatitis C; S/p RX with SVR 12 documented 06/2017 6/22/2017    HLD (hyperlipidemia)     Malignant melanoma of skin of trunk, except scrotum     Migraine, unspecified, without mention of intractable migraine without mention of status migrainosus     Nonspecific abnormal results of liver function study     Nontoxic multinodular goiter     Osteopenia      PAST SURGICAL HISTORY:   Past Surgical History:   Procedure Laterality Date    BREAST BIOPSY      CATARACT  EXTRACTION Right 03/02/2020    cataracts      [facility]    COLONOSCOPY N/A 12/14/2016    Procedure: COLONOSCOPY;  Surgeon: Wicho Painting MD;  [facility]  Service: Endoscopy;  Laterality: N/A;    COLONOSCOPY N/A 1/14/2020    Procedure: COLONOSCOPY;  Surgeon: NANCY Maher MD;  [facility]  Service: Endoscopy;  Laterality: N/A;  1/8/20 left vm to confirm appt-rb    FINGER SURGERY Right 2010    index finger fused    MOLE REMOVAL      TONSILLECTOMY, ADENOIDECTOMY      TOTAL ABDOMINAL HYSTERECTOMY       FAMILY HISTORY:   Family History   Problem Relation Age of Onset    Osteoarthritis Mother         s/p hip fx    Diabetes Mother     Cancer Mother         colon    Thyroid disease Mother     Kidney failure Mother         d.93 s/p PAD procedure    Cataracts Mother     Macular degeneration Mother     Heart failure Father     Stroke Father         d.97 complications    Hypertension Father     Cataracts Father     Fibromyalgia Sister     Glaucoma Sister         dry eyes, s/p duct surgery    Other Sister         acoustic tumor,s/p removal & vertigo resolved, +cochlear implant    Other Daughter         inflammation, better off red meat    Chronic back pain Daughter     GI problems Daughter         liver w/up and on cholestyramine    Obesity Daughter     Melanoma Neg Hx     Amblyopia Neg Hx     Blindness Neg Hx     Retinal detachment Neg Hx     Strabismus Neg Hx      SOCIAL HISTORY:   Social History     Socioeconomic History    Marital status:    Occupational History    Occupation: retired     Employer: [employer]   Tobacco Use    Smoking status: Never    Smokeless tobacco: Never   Substance and Sexual Activity    Alcohol use: Yes     Comment: socially    Sexual activity: Yes     Partners: Male   Social History Narrative        Retired  of an elementary school    Has 2 daughters, no thyroid problems    Helping out w/ 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 She has severe DJD and is diabetic.     Dad d. 97 status post stroke, CHF. Dependent, bed to w/c.      Significant memory decline, usually doesn't recognize family        Two sisters in good health.         SCREENING TESTS:      Social Determinants of Health     Financial Resource Strain: Low Risk     Difficulty of Paying Living Expenses: Not hard at all   Food Insecurity: No Food Insecurity    Worried About Running Out of Food in the Last Year: Never true    Ran Out of Food in the Last Year: Never true   Transportation Needs: No Transportation Needs    Lack of Transportation (Medical): No    Lack of Transportation (Non-Medical): No   Physical Activity: Insufficiently Active    Days of Exercise per Week: 6 days    Minutes of Exercise per Session: 20 min   Stress: Stress Concern Present    Feeling of Stress : To some extent   Social Connections: Unknown    Frequency of Communication with Friends and Family: More than three times a week    Frequency of Social Gatherings with Friends and Family: Once a week    Active Member of Clubs or Organizations: Yes    Attends Club or Organization Meetings: More than 4 times per year    Marital Status:    Housing Stability: Low Risk     Unable to Pay for Housing in the Last Year: No    Number of Places Lived in the Last Year: 1    Unstable Housing in the Last Year: No       MEDICATIONS:   Current Outpatient Medications:     alendronate (FOSAMAX) 70 MG tablet, Take 1 tablet (70 mg total) by mouth every 7 days., Disp: 12 tablet, Rfl: 3    cetirizine 10 mg Cap, PRN, Disp: , Rfl:     dietary supplement Pack, Take 1 tablet by mouth 2 (two) times daily., Disp: , Rfl:     fluticasone propionate (FLONASE) 50 mcg/actuation nasal spray, 1 spray (50 mcg total) by Each Nostril route once daily., Disp: 16 g, Rfl: 0    glucosam/chondro/herb  149/hyal (GLUCOS CHOND CPLX ADVANCED ORAL), Take by mouth., Disp: , Rfl:     glycerin adult suppository, as needed. , Disp: , Rfl:     magnesium oxide (MAG-OX) 400 mg (241.3 mg magnesium) tablet, Take 400 mg by mouth once daily., Disp: , Rfl:     mometasone (NASONEX) 50 mcg/actuation nasal spray, 2 sprays by Nasal route once daily., Disp: , Rfl:     naproxen sodium 220 mg Cap, PRN, Disp: , Rfl:     OCEAN NASAL 0.65 % nasal spray, as needed. , Disp: , Rfl:     rosuvastatin (CRESTOR) 10 MG tablet, TAKE 1 TABLET BY MOUTH EVERY DAY, Disp: 90 tablet, Rfl: 3    TUMS 200 mg calcium (500 mg) chewable tablet, 1 tablet as needed. , Disp: , Rfl:     MISCELLANEOUS MEDICAL SUPPLY MIS, as needed. EYE ITCH RELIEF 0.25 EYE DROPS, Disp: , Rfl:   ALLERGIES:   Review of patient's allergies indicates:   Allergen Reactions    Iodine and iodide containing products Nausea And Vomiting     Other reaction(s): SEVERE    Sulfa (sulfonamide antibiotics)      Other reaction(s): unknown  Other reaction(s): RASH       Review of Systems   Constitution: Negative. Negative for chills, fever and night sweats.   HENT: Negative for congestion and headaches.    Eyes: Negative for blurred vision, left vision loss and right vision loss.   Cardiovascular: Negative for chest pain and syncope.   Respiratory: Negative for cough and shortness of breath.    Endocrine: Negative for polydipsia, polyphagia and polyuria.   Hematologic/Lymphatic: Negative for bleeding problem. Does not bruise/bleed easily.   Skin: Negative for dry skin, itching and rash.   Musculoskeletal: Negative for falls and muscle weakness.   Gastrointestinal: Negative for abdominal pain and bowel incontinence.   Genitourinary: Negative for bladder incontinence and nocturia.   Neurological: Negative for disturbances in coordination, loss of balance and seizures.   Psychiatric/Behavioral: Negative for depression. The patient does not have insomnia.    Allergic/Immunologic: Negative for hives  and persistent infections.     PHYSICAL EXAM:  GEN: A&Ox3, WD WN NAD  HEENT: WNL  CHEST: CTAB, no W/R/R  HEART: RRR, no M/R/G   ABD: Soft, NT ND, BS x4 QUADS  MS: Refer to previous note for detailed MS exam  NEURO: CN II-XII intact       The surgical consent was then reviewed with the patient, who agreed with all the contents of the consent form and it was signed.     PHYSICAL THERAPY:  She was also instructed regarding physical therapy and will begin on POD#1-3. She is doing physical therapy at [facility].    POST OP CARE: Instructions were reviewed including care of the wound and dressing after surgery and when she can shower.     PAIN MANAGEMENT: Tomasa Hamilton Derek was instructed regarding the Polar ice unit that will be in place after surgery and her postoperative pain medications.     MEDICATION:  Roxicodone 5 mg 1-2 q 4 hours PRN for pain  Zofran 4 mg q 8 hours PRN for nausea and vomiting.  Aspirin 81mg BID x 6 weeks for DVT prophylaxis starting on the evening after surgery.  Celebrex 200mg BID     Post op meds to be delivered bedside prior to discharge. Deliver to family if patient is in surgery at 5pm.     Patient was instructed to purchase and take Colace to counter possible GI side effects of taking opiates.     DVT prophylaxis was discussed with the patient today including risk factors for developing DVTs and history of DVTs. The patient was asked if any specific recommendations were given from the doctor/s that did pre-operative surgical clearance.      If the patient was previously taking 81mg baby aspirin, they were told to not take additional baby aspirin, using the above stated aspirin and to restart the 81mg aspirin daily after completion of the aspirin dose.      Patient was also told to buy over the counter Prilosec medication and take it once daily for GI protection as long as they are taking NSAIDs or Aspirin.     The patient was told that narcotic pain  medications may make them drowsy and instructions were given to not sign legal documents, drive or operate heavy machinery, cars, or equipment while under the influence of narcotic medications.     As there were no other questions to be asked, she was given my business card along with Dr. Zuñiga's business card if she has any questions or concerns prior to surgery or in the postop period.

## 2023-04-03 NOTE — ASSESSMENT & PLAN NOTE
Denies KACY. Possible sleep apnea: recommend caution with sedating medication in the perioperative period.   Not interested in Sleep Clinic referral

## 2023-04-03 NOTE — OUTPATIENT SUBJECTIVE & OBJECTIVE
Outpatient Subjective & Objective      Chief Complaint: Preoperative evaulation, perioperative medical management, and complication reduction plan.     Functional Capacity: Was going to gym up until Jan/Feb 2023- riding stationary bike. Now using bike at home for 20 minutes daily without CP/SOB.       Anesthesia issues: PONV with hysterectomy     Difficulty mouth opening: No    Steroid use in the last 12 months:  right knee    Dental Issues: No    Family anesthesia difficulty: None       Family Hx of Thrombosis: None    Past Medical History:   Diagnosis Date    Cataract     OU    Chronic interstitial cystitis     History of hepatitis C; S/p RX with SVR 12 documented 06/2017 06/22/2017    HLD (hyperlipidemia)     Malignant melanoma of skin of trunk, except scrotum     Migraine, unspecified, without mention of intractable migraine without mention of status migrainosus     Nonspecific abnormal results of liver function study     Nontoxic multinodular goiter     Osteopenia          Past Medical History Pertinent Negatives:   Diagnosis Date Noted    Acute leukemia 12/17/2015    Acute pancreatitis 12/17/2015    Alcohol dependence 12/17/2015    Alzheimer's disease 12/17/2015    Amblyopia 05/16/2017    Anemia 12/17/2015    Angina pectoris 12/17/2015    Anxiety 12/17/2015    Aplasia bone marrow 12/17/2015    Asthma 12/17/2015    Atrial fibrillation 12/17/2015    Back pain 12/17/2015    Basal cell carcinoma 02/27/2013    Bipolar disorder 12/17/2015    Bladder cancer 12/17/2015    Bone cancer 12/17/2015    Bone marrow transplant status 12/17/2015    Brain cancer 12/17/2015    Breast cancer 12/17/2015    Cancer of lymphatic and hematopoietic tissue 12/17/2015    Cerebral palsy 12/17/2015    Cervical cancer 12/17/2015    Chronic bronchitis 12/17/2015    Chronic hepatitis 12/17/2015    Cirrhosis 12/17/2015    Colon cancer 12/17/2015    Complications of reattached extremity 12/17/2015    Coronary artery disease 12/17/2015     Cystic fibrosis 12/17/2015    Deep vein thrombosis 04/03/2023    Dependence on renal dialysis 12/17/2015    Depression 12/17/2015    Diabetes mellitus 05/22/2018    Diabetes mellitus, type 2 04/03/2023    Diabetes with neurologic complications 12/17/2015    Diabetic retinopathy 05/16/2017    Disorder of kidney and ureter 12/17/2015    Emphysema of lung 12/17/2015    Endometrial cancer 12/17/2015    Esophageal cancer 12/17/2015    Fallopian tube cancer, carcinoma 12/17/2015    Fibromyalgia 12/17/2015    Gastrostomy status 12/17/2015    GERD (gastroesophageal reflux disease) 04/03/2023    Glaucoma 12/17/2015    Glomerulonephritis 12/17/2015    Heart failure 12/17/2015    Heart transplanted 12/17/2015    Hemolytic anemia 12/17/2015    Hepatitis B 12/17/2015    HIV infection 12/17/2015    Hypertension 12/17/2015    Hypothyroidism 12/17/2015    Immune deficiency disorder 12/17/2015    Immune disorder 12/17/2015    Inflammatory bowel disease 12/17/2015    Interstitial nephritis chronic 12/17/2015    Intracranial hemorrhage 12/17/2015    Late complications of amputation stump 12/17/2015    Leukemia 12/17/2015    Liver cancer 12/17/2015    Liver transplanted 12/17/2015    Lung cancer 12/17/2015    Lung transplanted 12/17/2015    Macular degeneration 05/16/2017    Malnutrition 12/17/2015    Multiple sclerosis 12/17/2015    Myocardial infarction 12/17/2015    Neoplasm of lip 12/17/2015    Obesity 12/17/2015    Osteoporosis, unspecified 01/28/2013    Ovarian cancer 12/17/2015    Pancreatic cancer 12/17/2015    Pancreatic disease 12/17/2015    Paranoia 12/17/2015    Parkinson disease 12/17/2015    Peripheral vascular disease 12/17/2015    Phantom limb syndrome 12/17/2015    Pneumonia 12/17/2015    Pneumonia due to other staphylococcus 12/17/2015    Polyneuropathy 12/17/2015    Pulmonary embolism 12/17/2015    Rectal cancer 12/17/2015    Renal cancer 12/17/2015    Renal manifestation of secondary diabetes mellitus 12/17/2015     Respiratory failure 12/17/2015    Retinal detachment 05/16/2017    Rheumatoid arthritis 12/17/2015    Schizoaffective disorder 12/17/2015    Seizures 12/17/2015    Septic shock 12/17/2015    Sickle cell anemia 12/17/2015    Sickle cell trait 05/16/2017    Skin ulcer 12/17/2015    Sleep apnea 12/17/2015    Small intestine cancer 12/17/2015    Spinal cord disease 12/17/2015    Squamous Cell Carcinoma 02/27/2013    Stomach cancer 12/17/2015    Strabismus 05/16/2017    Stroke 12/17/2015    Testicular cancer 12/17/2015    Thyroid cancer 12/17/2015    Tobacco dependence 12/17/2015    Trouble in sleeping 12/17/2015    Type 2 diabetes mellitus 12/17/2015    Type 2 diabetes mellitus with ophthalmic manifestations 12/17/2015    Type 2 diabetes with peripheral circulatory disorder, controlled 12/17/2015    Urinary incontinence 12/17/2015    Uterine cancer 12/17/2015    Uveitis 05/16/2017    Vaginal cancer 12/17/2015    Vulvar cancer 12/17/2015         Past Surgical History:   Procedure Laterality Date    BREAST BIOPSY      CATARACT EXTRACTION Right 03/02/2020    cataracts      [facility]    COLONOSCOPY N/A 12/14/2016    Procedure: COLONOSCOPY;  Surgeon: Wicho Painting MD;  Location: [facility] ENDO ([address] [place] [address]);  Service: Endoscopy;  Laterality: N/A;    COLONOSCOPY N/A 1/14/2020    Procedure: COLONOSCOPY;  Surgeon: NANCY Maher MD;  Location: [facility] ([address] [place] [address]);  Service: Endoscopy;  Laterality: N/A;  1/8/20 left vm to confirm appt-rb    FINGER SURGERY Right 2010    index finger fused    MOLE REMOVAL      TONSILLECTOMY, ADENOIDECTOMY      TOTAL ABDOMINAL HYSTERECTOMY         Review of Systems   Constitutional:  Negative for chills, fatigue, fever and unexpected weight change.   HENT:  Positive for hearing loss (hearing aids). Negative for dental problem, postnasal drip, rhinorrhea, sore throat, tinnitus and trouble swallowing.    Eyes:  Negative for photophobia, pain, discharge and visual disturbance.  "  Respiratory:  Negative for apnea, cough, chest tightness, shortness of breath and wheezing.         STOP BANG risk factors:  Snoring     Cardiovascular:  Negative for chest pain, palpitations and leg swelling.   Gastrointestinal:  Negative for abdominal pain, blood in stool, constipation, nausea and vomiting.   Endocrine: Negative for cold intolerance and heat intolerance.   Genitourinary:  Negative for decreased urine volume, difficulty urinating, dysuria, frequency, hematuria and urgency.   Musculoskeletal:  Positive for arthralgias (left knee). Negative for back pain, neck pain and neck stiffness.   Skin:  Negative for rash and wound.   Allergic/Immunologic: Positive for environmental allergies.   Neurological:  Negative for dizziness, tremors, seizures, syncope, weakness, numbness and headaches.   Hematological:  Negative for adenopathy. Bruises/bleeds easily.   Psychiatric/Behavioral:  Negative for confusion, sleep disturbance and suicidal ideas.             VITALS  Visit Vitals  BP (!) 173/88 (BP Location: Left arm, Patient Position: Sitting)   Pulse 76   Temp 98.1 °F (36.7 °C) (Oral)   Ht 5' 2" (1.575 m)   Wt 67 kg (147 lb 12.8 oz)   SpO2 98%   BMI 27.03 kg/m²          Physical Exam  Vitals reviewed.   Constitutional:       General: She is not in acute distress.     Appearance: She is well-developed.   HENT:      Head: Normocephalic.      Nose: Nose normal.      Mouth/Throat:      Pharynx: No oropharyngeal exudate.   Eyes:      General:         Right eye: No discharge.         Left eye: No discharge.      Conjunctiva/sclera: Conjunctivae normal.      Pupils: Pupils are equal, round, and reactive to light.   Neck:      Thyroid: No thyromegaly.      Vascular: No carotid bruit or JVD.      Trachea: No tracheal deviation.   Cardiovascular:      Rate and Rhythm: Normal rate and regular rhythm.      Pulses:           Carotid pulses are 2+ on the right side and 2+ on the left side.       Dorsalis pedis pulses are " 2+ on the right side and 2+ on the left side.        Posterior tibial pulses are 2+ on the right side and 2+ on the left side.      Heart sounds: Normal heart sounds. No murmur heard.     Comments: trace edema- BLE  Pulmonary:      Effort: Pulmonary effort is normal. No respiratory distress.      Breath sounds: Normal breath sounds. No stridor. No wheezing, rhonchi or rales.   Abdominal:      General: Bowel sounds are normal. There is no distension.      Palpations: Abdomen is soft.      Tenderness: There is no abdominal tenderness. There is no guarding.   Musculoskeletal:      Cervical back: Normal range of motion. No pain with movement.   Lymphadenopathy:      Cervical: No cervical adenopathy.   Skin:     General: Skin is warm and dry.      Capillary Refill: Capillary refill takes less than 2 seconds.      Findings: No erythema or rash.   Neurological:      Mental Status: She is alert and oriented to person, place, and time.      Coordination: Coordination normal.        Significant Labs:  Lab Results   Component Value Date    WBC 3.21 (L) 04/03/2023    HGB 13.1 04/03/2023    HCT 37.9 04/03/2023     04/03/2023    CHOL 154 03/02/2023    TRIG 74 03/02/2023    HDL 68 03/02/2023    ALT 24 03/02/2023    AST 26 03/02/2023     03/02/2023    K 4.4 03/02/2023     03/02/2023    CREATININE 0.7 03/02/2023    BUN 7 (L) 03/02/2023    CO2 27 03/02/2023    TSH 0.645 03/02/2023    INR 1.0 04/03/2023    HGBA1C 4.9 06/23/2004       Diagnostic Studies: No relevant studies.    EKG:   Results for orders placed or performed in visit on 06/01/20   IN OFFICE EKG 12-LEAD (to [place])    Collection Time: 06/01/20 10:01 AM    Narrative    Test Reason : Z01.818    Vent. Rate : 063 BPM     Atrial Rate : 063 BPM     P-R Int : 164 ms          QRS Dur : 080 ms      QT Int : 420 ms       P-R-T Axes : 035 048 048 degrees     QTc Int : 429 ms    Normal sinus rhythm  Normal ECG  When compared with ECG of 02-DEC-2019 10:58,  No  significant change was found  Confirmed by Kaylynn Alonso MD ([id number]) on 6/1/2020 2:44:02 PM    Referred By: [id number] ZARI LEAL           Confirmed By:Kaylynn Alonso MD           Imaging   Xray C-Spine 1/11/22  Impression:     Multilevel degenerative change resulting in bilateral neural foraminal narrowing as above.  There may be some degree of canal narrowing C5-6.  Findings can be correlated with cervical spine MRI as warranted clinically.     Cervical ribs at C7.        Electronically signed by: Adeola Bucther  Date:                                            01/11/2022  Time:                                           10:11        Carotid 1/30/22  Carotid atherosclerosis present bilaterally.  No evidence of significant internal carotid artery stenosis (less than 50%) bilaterally.  Vertebral flow is antegrade bilaterally.      Active Cardiac Conditions: None      Revised Cardiac Risk Index   High -Risk Surgery  Intraperitoneal; Intrathoracic; suprainguinal vascular Yes- + 1 No- 0   History of Ischemic Heart Disease   (Hx of MI/positive exercise test/current chest pain due to ischemia/use of nitrate therapy/EKG with pathological Q waves) Yes- + 1 No- 0   History of CHF  (Pulmonary edema/bilateral rales or S3 gallop/PND/CXR showing pulmonary vascular redistribution) Yes- + 1 No- 0   History of CVA   (Prior stroke or TIA) Yes- + 1 No- 0   Pre-operative treatment with insulin Yes- + 1 No- 0   Pre-operative creatinine > 2mg/dl Yes- + 1 No- 0   Total: 0      Risk Status:  Estimated risk of cardiac complications after non-cardiac surgery using the Revised Cardiac Risk Index for Preoperative risk is 3.9 %      ARISCAT (Canet) risk index: Intermediate: 13.3% risk of post-op pulmonary complications.    American Society of Anesthesiologists Physical Status classification (ASA): 3           No further cardiac workup needed prior to surgery.    Outpatient Subjective & Objective

## 2023-04-05 ENCOUNTER — TELEPHONE (OUTPATIENT)
Dept: SPORTS MEDICINE | Facility: CLINIC | Age: 73
End: 2023-04-05
Payer: MEDICARE

## 2023-04-05 NOTE — TELEPHONE ENCOUNTER
----- Message from Linnea Downey sent at 4/5/2023 10:24 AM CDT -----  Regarding: FW: appt  Contact: [phone]    ----- Message -----  From: Sarah Quintana  Sent: 4/5/2023  10:23 AM CDT  To: [facility] Downey Staff  Subject: appt                                             Pt requesting an early appt time for surgery dated 4/12 due to not being able to go all day without eating. Pls call to discuss.

## 2023-04-11 ENCOUNTER — ANESTHESIA EVENT (OUTPATIENT)
Dept: SURGERY | Facility: HOSPITAL | Age: 73
End: 2023-04-11
Payer: MEDICARE

## 2023-04-11 ENCOUNTER — PATIENT MESSAGE (OUTPATIENT)
Dept: ADMINISTRATIVE | Facility: OTHER | Age: 73
End: 2023-04-11
Payer: MEDICARE

## 2023-04-12 ENCOUNTER — ANESTHESIA (OUTPATIENT)
Dept: SURGERY | Facility: HOSPITAL | Age: 73
End: 2023-04-12
Payer: MEDICARE

## 2023-04-12 ENCOUNTER — PATIENT MESSAGE (OUTPATIENT)
Dept: ADMINISTRATIVE | Facility: OTHER | Age: 73
End: 2023-04-12
Payer: MEDICARE

## 2023-04-12 ENCOUNTER — HOSPITAL ENCOUNTER (OUTPATIENT)
Facility: HOSPITAL | Age: 73
Discharge: HOME | End: 2023-04-12
Attending: ORTHOPAEDIC SURGERY | Admitting: ORTHOPAEDIC SURGERY
Payer: MEDICARE

## 2023-04-12 VITALS
OXYGEN SATURATION: 100 % | HEART RATE: 64 BPM | SYSTOLIC BLOOD PRESSURE: 125 MMHG | TEMPERATURE: 98 F | RESPIRATION RATE: 16 BRPM | BODY MASS INDEX: 27.05 KG/M2 | HEIGHT: 62 IN | DIASTOLIC BLOOD PRESSURE: 65 MMHG | WEIGHT: 147 LBS

## 2023-04-12 DIAGNOSIS — M17.12 PRIMARY OSTEOARTHRITIS OF LEFT KNEE: ICD-10-CM

## 2023-04-12 PROCEDURE — 36000710: Performed by: ORTHOPAEDIC SURGERY

## 2023-04-12 PROCEDURE — 97535 SELF CARE MNGMENT TRAINING: CPT

## 2023-04-12 PROCEDURE — D9220A PRA ANESTHESIA: Mod: ANES,,, | Performed by: ANESTHESIOLOGY

## 2023-04-12 PROCEDURE — 71000015 HC POSTOP RECOV 1ST HR: Performed by: ORTHOPAEDIC SURGERY

## 2023-04-12 PROCEDURE — 27447 TOTAL KNEE ARTHROPLASTY: CPT | Mod: 82,LT,, | Performed by: STUDENT IN AN ORGANIZED HEALTH CARE EDUCATION/TRAINING PROGRAM

## 2023-04-12 PROCEDURE — 27201423 OPTIME MED/SURG SUP & DEVICES STERILE SUPPLY: Performed by: ORTHOPAEDIC SURGERY

## 2023-04-12 PROCEDURE — 25000003 PHARM REV CODE 250: Performed by: PHYSICIAN ASSISTANT

## 2023-04-12 PROCEDURE — 25000003 PHARM REV CODE 250: Performed by: ANESTHESIOLOGY

## 2023-04-12 PROCEDURE — D9220A PRA ANESTHESIA: ICD-10-PCS | Mod: ANES,,, | Performed by: ANESTHESIOLOGY

## 2023-04-12 PROCEDURE — 63600175 PHARM REV CODE 636 W HCPCS: Performed by: ANESTHESIOLOGY

## 2023-04-12 PROCEDURE — 97116 GAIT TRAINING THERAPY: CPT

## 2023-04-12 PROCEDURE — 71000016 HC POSTOP RECOV ADDL HR: Performed by: ORTHOPAEDIC SURGERY

## 2023-04-12 PROCEDURE — 71000039 HC RECOVERY, EACH ADD'L HOUR: Performed by: ORTHOPAEDIC SURGERY

## 2023-04-12 PROCEDURE — 27447 PR TOTAL KNEE ARTHROPLASTY: ICD-10-PCS | Mod: LT,,, | Performed by: ORTHOPAEDIC SURGERY

## 2023-04-12 PROCEDURE — C1776 JOINT DEVICE (IMPLANTABLE): HCPCS | Performed by: ORTHOPAEDIC SURGERY

## 2023-04-12 PROCEDURE — 36000711: Performed by: ORTHOPAEDIC SURGERY

## 2023-04-12 PROCEDURE — 37000008 HC ANESTHESIA 1ST 15 MINUTES: Performed by: ORTHOPAEDIC SURGERY

## 2023-04-12 PROCEDURE — 97530 THERAPEUTIC ACTIVITIES: CPT

## 2023-04-12 PROCEDURE — 97537 COMMUNITY/WORK REINTEGRATION: CPT

## 2023-04-12 PROCEDURE — 63600175 PHARM REV CODE 636 W HCPCS: Performed by: NURSE ANESTHETIST, CERTIFIED REGISTERED

## 2023-04-12 PROCEDURE — 25000003 PHARM REV CODE 250: Performed by: NURSE ANESTHETIST, CERTIFIED REGISTERED

## 2023-04-12 PROCEDURE — 97161 PT EVAL LOW COMPLEX 20 MIN: CPT

## 2023-04-12 PROCEDURE — 27447 PR TOTAL KNEE ARTHROPLASTY: ICD-10-PCS | Mod: 82,LT,, | Performed by: STUDENT IN AN ORGANIZED HEALTH CARE EDUCATION/TRAINING PROGRAM

## 2023-04-12 PROCEDURE — 99900035 HC TECH TIME PER 15 MIN (STAT)

## 2023-04-12 PROCEDURE — 71000033 HC RECOVERY, INTIAL HOUR: Performed by: ORTHOPAEDIC SURGERY

## 2023-04-12 PROCEDURE — 64448 LEFT ADDUCTOR CANAL CATHETER: ICD-10-PCS | Mod: 59,LT,, | Performed by: ANESTHESIOLOGY

## 2023-04-12 PROCEDURE — D9220A PRA ANESTHESIA: ICD-10-PCS | Mod: CRNA,,, | Performed by: NURSE ANESTHETIST, CERTIFIED REGISTERED

## 2023-04-12 PROCEDURE — 63600175 PHARM REV CODE 636 W HCPCS: Performed by: PHYSICIAN ASSISTANT

## 2023-04-12 PROCEDURE — 25000003 PHARM REV CODE 250: Performed by: ORTHOPAEDIC SURGERY

## 2023-04-12 PROCEDURE — 64448 NJX AA&/STRD FEM NRV NFS IMG: CPT | Performed by: ANESTHESIOLOGY

## 2023-04-12 PROCEDURE — 94761 N-INVAS EAR/PLS OXIMETRY MLT: CPT

## 2023-04-12 PROCEDURE — 37000009 HC ANESTHESIA EA ADD 15 MINS: Performed by: ORTHOPAEDIC SURGERY

## 2023-04-12 PROCEDURE — 63600175 PHARM REV CODE 636 W HCPCS: Performed by: ORTHOPAEDIC SURGERY

## 2023-04-12 PROCEDURE — 27447 TOTAL KNEE ARTHROPLASTY: CPT | Mod: LT,,, | Performed by: ORTHOPAEDIC SURGERY

## 2023-04-12 PROCEDURE — 63600175 PHARM REV CODE 636 W HCPCS: Performed by: STUDENT IN AN ORGANIZED HEALTH CARE EDUCATION/TRAINING PROGRAM

## 2023-04-12 PROCEDURE — C1713 ANCHOR/SCREW BN/BN,TIS/BN: HCPCS | Performed by: ORTHOPAEDIC SURGERY

## 2023-04-12 PROCEDURE — D9220A PRA ANESTHESIA: Mod: CRNA,,, | Performed by: NURSE ANESTHETIST, CERTIFIED REGISTERED

## 2023-04-12 DEVICE — CEMENT BONE SIMPLEX HV RADPQ: Type: IMPLANTABLE DEVICE | Site: KNEE | Status: FUNCTIONAL

## 2023-04-12 DEVICE — PATELLA TRIATHLON 33X9 SYMTRC: Type: IMPLANTABLE DEVICE | Site: KNEE | Status: FUNCTIONAL

## 2023-04-12 DEVICE — COMPONENT FEM CR TRI SZ3 L: Type: IMPLANTABLE DEVICE | Site: KNEE | Status: FUNCTIONAL

## 2023-04-12 DEVICE — IMPLANTABLE DEVICE: Type: IMPLANTABLE DEVICE | Site: KNEE | Status: FUNCTIONAL

## 2023-04-12 DEVICE — BASEPLATE SZ 3 TRI TS IMPLANT: Type: IMPLANTABLE DEVICE | Site: KNEE | Status: FUNCTIONAL

## 2023-04-12 RX ORDER — MORPHINE SULFATE 2 MG/ML
2 INJECTION, SOLUTION INTRAMUSCULAR; INTRAVENOUS
Status: DISCONTINUED | OUTPATIENT
Start: 2023-04-12 | End: 2023-04-12 | Stop reason: HOSPADM

## 2023-04-12 RX ORDER — MIDAZOLAM HYDROCHLORIDE 1 MG/ML
INJECTION INTRAMUSCULAR; INTRAVENOUS
Status: DISCONTINUED | OUTPATIENT
Start: 2023-04-12 | End: 2023-04-12

## 2023-04-12 RX ORDER — BUPIVACAINE HYDROCHLORIDE 5 MG/ML
INJECTION, SOLUTION EPIDURAL; INTRACAUDAL
Status: DISCONTINUED | OUTPATIENT
Start: 2023-04-12 | End: 2023-04-12 | Stop reason: HOSPADM

## 2023-04-12 RX ORDER — POLYETHYLENE GLYCOL 3350 17 G/17G
17 POWDER, FOR SOLUTION ORAL DAILY
Status: DISCONTINUED | OUTPATIENT
Start: 2023-04-12 | End: 2023-04-12 | Stop reason: HOSPADM

## 2023-04-12 RX ORDER — OXYCODONE HYDROCHLORIDE 5 MG/1
5 TABLET ORAL
Status: DISCONTINUED | OUTPATIENT
Start: 2023-04-12 | End: 2023-04-12 | Stop reason: HOSPADM

## 2023-04-12 RX ORDER — AMOXICILLIN 250 MG
1 CAPSULE ORAL 2 TIMES DAILY
Status: DISCONTINUED | OUTPATIENT
Start: 2023-04-12 | End: 2023-04-12 | Stop reason: HOSPADM

## 2023-04-12 RX ORDER — DEXAMETHASONE SODIUM PHOSPHATE 4 MG/ML
INJECTION, SOLUTION INTRA-ARTICULAR; INTRALESIONAL; INTRAMUSCULAR; INTRAVENOUS; SOFT TISSUE
Status: DISCONTINUED | OUTPATIENT
Start: 2023-04-12 | End: 2023-04-12

## 2023-04-12 RX ORDER — NALOXONE HCL 0.4 MG/ML
0.02 VIAL (ML) INJECTION
Status: DISCONTINUED | OUTPATIENT
Start: 2023-04-12 | End: 2023-04-12 | Stop reason: HOSPADM

## 2023-04-12 RX ORDER — METOCLOPRAMIDE HYDROCHLORIDE 5 MG/ML
10 INJECTION INTRAMUSCULAR; INTRAVENOUS EVERY 10 MIN PRN
Status: DISCONTINUED | OUTPATIENT
Start: 2023-04-12 | End: 2023-04-12 | Stop reason: HOSPADM

## 2023-04-12 RX ORDER — MUPIROCIN 20 MG/G
1 OINTMENT TOPICAL
Status: COMPLETED | OUTPATIENT
Start: 2023-04-12 | End: 2023-04-12

## 2023-04-12 RX ORDER — MIDAZOLAM HYDROCHLORIDE 1 MG/ML
4 INJECTION INTRAMUSCULAR; INTRAVENOUS
Status: DISCONTINUED | OUTPATIENT
Start: 2023-04-12 | End: 2023-04-12 | Stop reason: HOSPADM

## 2023-04-12 RX ORDER — ROPIVACAINE HYDROCHLORIDE 5 MG/ML
INJECTION, SOLUTION EPIDURAL; INFILTRATION; PERINEURAL
Status: COMPLETED | OUTPATIENT
Start: 2023-04-12 | End: 2023-04-12

## 2023-04-12 RX ORDER — ACETAMINOPHEN 500 MG
1000 TABLET ORAL
Status: COMPLETED | OUTPATIENT
Start: 2023-04-12 | End: 2023-04-12

## 2023-04-12 RX ORDER — TALC
6 POWDER (GRAM) TOPICAL NIGHTLY PRN
Status: DISCONTINUED | OUTPATIENT
Start: 2023-04-12 | End: 2023-04-12 | Stop reason: HOSPADM

## 2023-04-12 RX ORDER — CEFAZOLIN SODIUM 1 G/3ML
INJECTION, POWDER, FOR SOLUTION INTRAMUSCULAR; INTRAVENOUS
Status: DISCONTINUED | OUTPATIENT
Start: 2023-04-12 | End: 2023-04-12

## 2023-04-12 RX ORDER — FENTANYL CITRATE 50 UG/ML
25 INJECTION, SOLUTION INTRAMUSCULAR; INTRAVENOUS EVERY 5 MIN PRN
Status: DISCONTINUED | OUTPATIENT
Start: 2023-04-12 | End: 2023-04-12 | Stop reason: HOSPADM

## 2023-04-12 RX ORDER — BISACODYL 10 MG
10 SUPPOSITORY, RECTAL RECTAL EVERY 12 HOURS PRN
Status: DISCONTINUED | OUTPATIENT
Start: 2023-04-12 | End: 2023-04-12 | Stop reason: HOSPADM

## 2023-04-12 RX ORDER — PROPOFOL 10 MG/ML
VIAL (ML) INTRAVENOUS CONTINUOUS PRN
Status: DISCONTINUED | OUTPATIENT
Start: 2023-04-12 | End: 2023-04-12

## 2023-04-12 RX ORDER — ONDANSETRON 2 MG/ML
4 INJECTION INTRAMUSCULAR; INTRAVENOUS EVERY 8 HOURS PRN
Status: DISCONTINUED | OUTPATIENT
Start: 2023-04-12 | End: 2023-04-12 | Stop reason: HOSPADM

## 2023-04-12 RX ORDER — TRANEXAMIC ACID 100 MG/ML
INJECTION, SOLUTION INTRAVENOUS
Status: DISCONTINUED | OUTPATIENT
Start: 2023-04-12 | End: 2023-04-12

## 2023-04-12 RX ORDER — HALOPERIDOL 5 MG/ML
0.5 INJECTION INTRAMUSCULAR EVERY 10 MIN PRN
Status: DISCONTINUED | OUTPATIENT
Start: 2023-04-12 | End: 2023-04-12 | Stop reason: HOSPADM

## 2023-04-12 RX ORDER — SODIUM CHLORIDE 9 MG/ML
INJECTION, SOLUTION INTRAVENOUS CONTINUOUS
Status: DISCONTINUED | OUTPATIENT
Start: 2023-04-12 | End: 2023-04-12 | Stop reason: HOSPADM

## 2023-04-12 RX ORDER — ASPIRIN 81 MG/1
81 TABLET ORAL 2 TIMES DAILY
Status: DISCONTINUED | OUTPATIENT
Start: 2023-04-12 | End: 2023-04-12 | Stop reason: HOSPADM

## 2023-04-12 RX ORDER — PREGABALIN 75 MG/1
75 CAPSULE ORAL NIGHTLY
Status: DISCONTINUED | OUTPATIENT
Start: 2023-04-12 | End: 2023-04-12 | Stop reason: HOSPADM

## 2023-04-12 RX ORDER — PREGABALIN 75 MG/1
75 CAPSULE ORAL
Status: COMPLETED | OUTPATIENT
Start: 2023-04-12 | End: 2023-04-12

## 2023-04-12 RX ORDER — ONDANSETRON 2 MG/ML
INJECTION INTRAMUSCULAR; INTRAVENOUS
Status: DISCONTINUED | OUTPATIENT
Start: 2023-04-12 | End: 2023-04-12

## 2023-04-12 RX ORDER — PROCHLORPERAZINE EDISYLATE 5 MG/ML
5 INJECTION INTRAMUSCULAR; INTRAVENOUS EVERY 6 HOURS PRN
Status: DISCONTINUED | OUTPATIENT
Start: 2023-04-12 | End: 2023-04-12 | Stop reason: HOSPADM

## 2023-04-12 RX ORDER — CARBOXYMETHYLCELLULOSE SODIUM 5 MG/ML
SOLUTION/ DROPS OPHTHALMIC
Status: DISCONTINUED | OUTPATIENT
Start: 2023-04-12 | End: 2023-04-12

## 2023-04-12 RX ORDER — PHENYLEPHRINE HYDROCHLORIDE 10 MG/ML
INJECTION INTRAVENOUS
Status: DISCONTINUED | OUTPATIENT
Start: 2023-04-12 | End: 2023-04-12

## 2023-04-12 RX ORDER — LIDOCAINE HYDROCHLORIDE 10 MG/ML
1 INJECTION, SOLUTION EPIDURAL; INFILTRATION; INTRACAUDAL; PERINEURAL
Status: DISCONTINUED | OUTPATIENT
Start: 2023-04-12 | End: 2023-04-12 | Stop reason: HOSPADM

## 2023-04-12 RX ORDER — SODIUM CHLORIDE 0.9 % (FLUSH) 0.9 %
10 SYRINGE (ML) INJECTION
Status: DISCONTINUED | OUTPATIENT
Start: 2023-04-12 | End: 2023-04-12 | Stop reason: HOSPADM

## 2023-04-12 RX ORDER — DEXTROMETHORPHAN HYDROBROMIDE, GUAIFENESIN 5; 100 MG/5ML; MG/5ML
650 LIQUID ORAL EVERY 8 HOURS
COMMUNITY

## 2023-04-12 RX ORDER — MUPIROCIN 20 MG/G
1 OINTMENT TOPICAL 2 TIMES DAILY
Status: DISCONTINUED | OUTPATIENT
Start: 2023-04-12 | End: 2023-04-12 | Stop reason: HOSPADM

## 2023-04-12 RX ORDER — ACETAMINOPHEN 500 MG
1000 TABLET ORAL EVERY 6 HOURS
Status: DISCONTINUED | OUTPATIENT
Start: 2023-04-12 | End: 2023-04-12 | Stop reason: HOSPADM

## 2023-04-12 RX ORDER — OXYCODONE HYDROCHLORIDE 5 MG/1
10 TABLET ORAL
Status: DISCONTINUED | OUTPATIENT
Start: 2023-04-12 | End: 2023-04-12 | Stop reason: HOSPADM

## 2023-04-12 RX ORDER — ROPIVACAINE HYDROCHLORIDE 2 MG/ML
INJECTION, SOLUTION EPIDURAL; INFILTRATION; PERINEURAL CONTINUOUS
Status: DISPENSED | OUTPATIENT
Start: 2023-04-12

## 2023-04-12 RX ORDER — FAMOTIDINE 10 MG/ML
INJECTION INTRAVENOUS
Status: DISCONTINUED | OUTPATIENT
Start: 2023-04-12 | End: 2023-04-12

## 2023-04-12 RX ORDER — SODIUM CHLORIDE 9 MG/ML
INJECTION, SOLUTION INTRAVENOUS
Status: COMPLETED | OUTPATIENT
Start: 2023-04-12 | End: 2023-04-12

## 2023-04-12 RX ORDER — HYDROCODONE BITARTRATE AND ACETAMINOPHEN 5; 325 MG/1; MG/1
1 TABLET ORAL EVERY 4 HOURS PRN
Status: DISCONTINUED | OUTPATIENT
Start: 2023-04-12 | End: 2023-04-12 | Stop reason: HOSPADM

## 2023-04-12 RX ORDER — CELECOXIB 200 MG/1
400 CAPSULE ORAL ONCE
Status: COMPLETED | OUTPATIENT
Start: 2023-04-12 | End: 2023-04-12

## 2023-04-12 RX ORDER — HYDROMORPHONE HYDROCHLORIDE 1 MG/ML
0.2 INJECTION, SOLUTION INTRAMUSCULAR; INTRAVENOUS; SUBCUTANEOUS EVERY 5 MIN PRN
Status: DISCONTINUED | OUTPATIENT
Start: 2023-04-12 | End: 2023-04-12 | Stop reason: HOSPADM

## 2023-04-12 RX ORDER — LIDOCAINE HYDROCHLORIDE AND EPINEPHRINE 15; 5 MG/ML; UG/ML
INJECTION, SOLUTION EPIDURAL
Status: DISCONTINUED | OUTPATIENT
Start: 2023-04-12 | End: 2023-04-12

## 2023-04-12 RX ORDER — METHOCARBAMOL 750 MG/1
750 TABLET, FILM COATED ORAL 3 TIMES DAILY
Status: DISCONTINUED | OUTPATIENT
Start: 2023-04-12 | End: 2023-04-12 | Stop reason: HOSPADM

## 2023-04-12 RX ORDER — FAMOTIDINE 20 MG/1
20 TABLET, FILM COATED ORAL 2 TIMES DAILY
Status: DISCONTINUED | OUTPATIENT
Start: 2023-04-12 | End: 2023-04-12 | Stop reason: HOSPADM

## 2023-04-12 RX ORDER — KETAMINE HCL IN 0.9 % NACL 50 MG/5 ML
SYRINGE (ML) INTRAVENOUS
Status: DISCONTINUED | OUTPATIENT
Start: 2023-04-12 | End: 2023-04-12

## 2023-04-12 RX ORDER — VANCOMYCIN HYDROCHLORIDE 1 G/20ML
INJECTION, POWDER, LYOPHILIZED, FOR SOLUTION INTRAVENOUS
Status: DISCONTINUED | OUTPATIENT
Start: 2023-04-12 | End: 2023-04-12 | Stop reason: HOSPADM

## 2023-04-12 RX ORDER — FENTANYL CITRATE 50 UG/ML
100 INJECTION, SOLUTION INTRAMUSCULAR; INTRAVENOUS
Status: DISCONTINUED | OUTPATIENT
Start: 2023-04-12 | End: 2023-04-12 | Stop reason: HOSPADM

## 2023-04-12 RX ADMIN — DEXAMETHASONE SODIUM PHOSPHATE 8 MG: 4 INJECTION, SOLUTION INTRAMUSCULAR; INTRAVENOUS at 07:04

## 2023-04-12 RX ADMIN — ONDANSETRON 4 MG: 2 INJECTION, SOLUTION INTRAMUSCULAR; INTRAVENOUS at 07:04

## 2023-04-12 RX ADMIN — ACETAMINOPHEN 1000 MG: 500 TABLET ORAL at 06:04

## 2023-04-12 RX ADMIN — MUPIROCIN 1 G: 20 OINTMENT TOPICAL at 06:04

## 2023-04-12 RX ADMIN — MIDAZOLAM 1 MG: 1 INJECTION INTRAMUSCULAR; INTRAVENOUS at 06:04

## 2023-04-12 RX ADMIN — PREGABALIN 75 MG: 75 CAPSULE ORAL at 06:04

## 2023-04-12 RX ADMIN — PROPOFOL 100 MCG/KG/MIN: 10 INJECTION, EMULSION INTRAVENOUS at 07:04

## 2023-04-12 RX ADMIN — ROPIVACAINE HYDROCHLORIDE 10 ML: 5 INJECTION EPIDURAL; INFILTRATION; PERINEURAL at 06:04

## 2023-04-12 RX ADMIN — CEFAZOLIN 2 G: 330 INJECTION, POWDER, FOR SOLUTION INTRAMUSCULAR; INTRAVENOUS at 07:04

## 2023-04-12 RX ADMIN — OXYCODONE HYDROCHLORIDE 5 MG: 5 TABLET ORAL at 11:04

## 2023-04-12 RX ADMIN — MIDAZOLAM HYDROCHLORIDE 2 MG: 1 INJECTION, SOLUTION INTRAMUSCULAR; INTRAVENOUS at 06:04

## 2023-04-12 RX ADMIN — Medication: at 09:04

## 2023-04-12 RX ADMIN — SODIUM CHLORIDE, SODIUM GLUCONATE, SODIUM ACETATE, POTASSIUM CHLORIDE, MAGNESIUM CHLORIDE, SODIUM PHOSPHATE, DIBASIC, AND POTASSIUM PHOSPHATE: .53; .5; .37; .037; .03; .012; .00082 INJECTION, SOLUTION INTRAVENOUS at 08:04

## 2023-04-12 RX ADMIN — ACETAMINOPHEN 1000 MG: 500 TABLET ORAL at 11:04

## 2023-04-12 RX ADMIN — FAMOTIDINE 20 MG: 10 INJECTION, SOLUTION INTRAVENOUS at 07:04

## 2023-04-12 RX ADMIN — FENTANYL CITRATE 50 MCG: 50 INJECTION, SOLUTION INTRAMUSCULAR; INTRAVENOUS at 06:04

## 2023-04-12 RX ADMIN — Medication 30 MG: at 07:04

## 2023-04-12 RX ADMIN — CARBOXYMETHYLCELLULOSE SODIUM 2 DROP: 5 SOLUTION/ DROPS OPHTHALMIC at 07:04

## 2023-04-12 RX ADMIN — CELECOXIB 400 MG: 200 CAPSULE ORAL at 06:04

## 2023-04-12 RX ADMIN — PHENYLEPHRINE HYDROCHLORIDE 100 MCG: 10 INJECTION INTRAVENOUS at 08:04

## 2023-04-12 RX ADMIN — SODIUM CHLORIDE: 9 INJECTION, SOLUTION INTRAVENOUS at 09:04

## 2023-04-12 RX ADMIN — MEPIVACAINE HYDROCHLORIDE 2.5 ML: 20 INJECTION, SOLUTION EPIDURAL; INFILTRATION at 07:04

## 2023-04-12 RX ADMIN — VANCOMYCIN HYDROCHLORIDE 1000 MG: 1 INJECTION, POWDER, LYOPHILIZED, FOR SOLUTION INTRAVENOUS at 06:04

## 2023-04-12 RX ADMIN — SODIUM CHLORIDE: 9 INJECTION, SOLUTION INTRAVENOUS at 07:04

## 2023-04-12 RX ADMIN — TRANEXAMIC ACID 1000 MG: 100 INJECTION, SOLUTION INTRAVENOUS at 07:04

## 2023-04-12 RX ADMIN — SODIUM CHLORIDE: 9 INJECTION, SOLUTION INTRAVENOUS at 06:04

## 2023-04-12 RX ADMIN — METHOCARBAMOL 750 MG: 750 TABLET ORAL at 09:04

## 2023-04-12 RX ADMIN — TRANEXAMIC ACID 1000 MG: 100 INJECTION, SOLUTION INTRAVENOUS at 08:04

## 2023-04-12 RX ADMIN — LIDOCAINE HYDROCHLORIDE,EPINEPHRINE BITARTRATE 4 ML: 15; .005 INJECTION, SOLUTION EPIDURAL; INFILTRATION; INTRACAUDAL; PERINEURAL at 08:04

## 2023-04-12 RX ADMIN — OXYCODONE HYDROCHLORIDE 5 MG: 5 TABLET ORAL at 09:04

## 2023-04-12 NOTE — PLAN OF CARE
Patient is AAO and VSS. Tolerating PO and states pain is tolerable. Left knee dressing CDI. Patient states they are ready for d/c. IV removed. Catheter tip intact. Spouse at bedside. Discharge instructions reviewed and copy given to the patient and spouse. Questions answered. Both verbalized understanding. Medication delivered to bedside. DME provided prior(walker). Patient wheeled to car by RN.

## 2023-04-12 NOTE — PLAN OF CARE
"Patient tolerated PT session fairly well but was limited by being dizzy/sleepy. Patient ambulated 50ft and 12ft with RW and contact guard assistance. No LOB or SOB noted. Patient ascended/descended 4" curb step with RW and contact guard assistance. Patient has an OPPT appointment on 4/13/2023.  present and verbalized understanding of all education provided. Patient ready to discharge home from PT standpoint.       Problem: Physical Therapy  Goal: Physical Therapy Goal  Outcome: Met     "

## 2023-04-12 NOTE — PLAN OF CARE
Problem: Occupational Therapy  Goal: Occupational Therapy Goal  Description: Goals to be met by: 4/12/23     Patient will increase functional independence with ADLs by performing:    UE Dressing with Modified [place].  LE Dressing with Modified [place] and Assistive Devices as needed.  Grooming while standing at sink with Modified [place].  Toileting from bedside commode with Modified [place] for hygiene and clothing management.   Bathing from  shower chair/bench with Modified [place].  Toilet transfer to bedside commode with Modified [place].    Outcome: Ongoing, Progressing

## 2023-04-12 NOTE — PLAN OF CARE
Discharge instructions and prescriptions given and explained to pt and pt's . Pt. verbalized understanding with no further questions. Peripheral IV D/C'd with catheter tip intact. VS WDL. Patient is awaiting ride home by .    ROAD TEST  O=SpO2 100% on RA  A=Ambulating around room and hallway with walker assistance  D=IV D/C'd  T=Tolerating regular diet  E=Voids  S=Performs self care with assistance (s/p knee replacement)  T=Teaching on surgical dressing complete

## 2023-04-12 NOTE — TRANSFER OF CARE
"Anesthesia Transfer of Care Note    Patient: Tomasa Reed    Procedure(s) Performed: Procedure(s) (LRB):  ARTHROPLASTY, KNEE, TOTAL (Left)    Patient location: PACU    Anesthesia Type: CSE, regional and general    Transport from OR: Transported from OR on room air with adequate spontaneous ventilation. Transported from OR on 6-10 L/min O2 by face mask with adequate spontaneous ventilation    Post pain: adequate analgesia    Post assessment: no apparent anesthetic complications and tolerated procedure well    Post vital signs: stable    Level of consciousness: awake    Nausea/Vomiting: no nausea/vomiting    Complications: none    Transfer of care protocol was followed      Last vitals:   Visit Vitals  BP (!) 147/71 (BP Location: Right arm, Patient Position: Lying)   Pulse 71   Temp 36.6 °C (97.9 °F) (Oral)   Resp 19   Ht 5' 2" (1.575 m)   Wt 66.7 kg (147 lb)   SpO2 99%   Breastfeeding No   BMI 26.89 kg/m²     "

## 2023-04-12 NOTE — ANESTHESIA PROCEDURE NOTES
Left adductor canal catheter    Patient location during procedure: pre-op   Block not for primary anesthetic.  Reason for block: at surgeon's request and post-op pain management   Post-op Pain Location: Left leg pain   Start time: 4/12/2023 6:41 AM  Timeout: 4/12/2023 6:40 AM   End time: 4/12/2023 6:52 AM    Staffing  Authorizing Provider: Alphonse Muller III, MD  Performing Provider: Alphonse Muller III, MD    Preanesthetic Checklist  Completed: patient identified, IV checked, site marked, risks and benefits discussed, surgical consent, monitors and equipment checked, pre-op evaluation and timeout performed  Peripheral Block  Patient position: supine  Prep: ChloraPrep and site prepped and draped  Patient monitoring: heart rate, cardiac monitor, continuous pulse ox, continuous capnometry and frequent blood pressure checks  Block type: adductor canal  Laterality: left  Injection technique: continuous  Needle  Needle type: Tuohy   Needle gauge: 17 G  Needle length: 3.5 in  Needle localization: anatomical landmarks and ultrasound guidance  Catheter type: spring wound  Catheter size: 19 G  Test dose: lidocaine 1.5% with Epi 1-to-200,000 and negative   -ultrasound image captured on disc.  Assessment  Injection assessment: negative aspiration, negative parasthesia and local visualized surrounding nerve  Paresthesia pain: none  Heart rate change: no  Slow fractionated injection: yes  Pain Tolerance: comfortable throughout block and no complaints  Medications:    Medications: ropivacaine (NAROPIN) injection 0.5% - Perineural   10 mL - 4/12/2023 6:45:00 AM    Additional Notes  VSS.  [facility] RN monitoring vitals throughout procedure.  Patient tolerated procedure well.

## 2023-04-12 NOTE — BRIEF OP NOTE
[place] - Surgery ([facility])  Brief Operative Note    SUMMARY     Surgery Date: 4/12/2023     Surgeon(s) and Role:     * REMY Zuñiga MD - Primary    Assisting Surgeon: None    Pre-op Diagnosis:  Primary osteoarthritis of left knee [M17.12]    Post-op Diagnosis:  Post-Op Diagnosis Codes:     * Primary osteoarthritis of left knee [M17.12]    Procedure(s) (LRB):  ARTHROPLASTY, KNEE, TOTAL (Left)    Anesthesia: Regional    Operative Findings: see op note    Estimated Blood Loss: * No values recorded between 4/12/2023  7:38 AM and 4/12/2023  9:25 AM *    Estimated Blood Loss has been documented.         Specimens:   Specimen (24h ago, onward)      None            [id number]

## 2023-04-12 NOTE — ANESTHESIA PROCEDURE NOTES
CSE    Patient location during procedure: OR  Start time: 4/12/2023 7:10 AM  Timeout: 4/12/2023 7:05 AM  End time: 4/12/2023 7:15 AM      Staffing  Authorizing Provider: Alphonse Muller III, MD  Performing Provider: Alphonse Muller III, MD    Preanesthetic Checklist  Completed: patient identified, IV checked, site marked, risks and benefits discussed, surgical consent, monitors and equipment checked, pre-op evaluation and timeout performed  CSE  Patient position: sitting  Prep: ChloraPrep  Patient monitoring: heart rate and cardiac monitor  Approach: midline  Spinal Needle  Needle type: Quincke   Needle gauge: 25 G  Needle length: 5 in  Epidural Needle  Injection technique: NABIL air  Needle type: Tuohy   Needle gauge: 17 G  Needle length: 3.5 in  Needle insertion depth: 7 cm  Location: L3-4  Needle localization: anatomical landmarks   Catheter  Catheter size: 19 G  Catheter at skin depth: 11 cm  Additional Documentation: negative aspiration for CSF and no paresthesia on injection  Assessment  Intrathecal Medications:   administered: primary anesthetic mcg of    Medications:    Medications: Intrathecal - mepivacaine 20 mg/mL (2 %) injection - Intrathecal   2.5 mL - 4/12/2023 7:12:00 AM

## 2023-04-12 NOTE — OP NOTE
[facility]   OPERATIVE REPORT   ORTHOPAEDIC SURGERY   PROVIDER: DR. RAHEEM TAVAREZ    PATIENT INFORMATION   Tomasa Reed 72 y.o. female 1950   MRN: [id number]   LOCATION: [facility]     DATE OF PROCEDURE: 4/12/2023     PREOPERATIVE DIAGNOSES:   Left knee advanced degenerative arthritis    POSTOPERATIVE DIAGNOSES:   Left knee advanced degenerative arthritis    PROCEDURES PERFORMED:   Left total knee arthroplasty (CPT 05557)    SURGEON:  RAHEEM Tavarez MD    ASSISTANTS:  Guy Olmos DO - Fellow - First Assist  DESTINEE Sen    First Assistant Duties: Due to the complexity of the case and the need for significant intra-operative decision making, first assistant duties were medically necessary. The chronicity of the disease process and the level of deformity dictated that first assistant duties be undertaken. Assistance was provided for joint exposure, manipulation, and retractor placement. There was no qualified resident available for the procedure.     ANESTHESIA: Spinal with adductor catheter and local anesthetic injection (0.5% Marcaine)    ESTIMATED BLOOD LOSS:  200 cc    IMPLANTS:   Implant Name Type Inv. Item Serial No.  Lot No. LRB No. Used Action   PIN FIX TEMP 1/8X2.5IN LF STER - [id number]  PIN FIX TEMP 1/8X2.5IN LF STER  SILVA [employer] MARTY. [id number] Left 1 Implanted and Explanted   CEMENT BONE SIMPLEX HV RADPQ - [id number]  CEMENT BONE SIMPLEX HV RADPQ  SILVA [employer] MARTY. [id number] Left 2 Implanted   PATELLA TRIATHLON 33X9 SYMTRC - [id number]  PATELLA TRIATHLON 33X9 SYMTRC  SILVA [employer] MARTY. [id number] Left 1 Implanted   COMPONENT FEM CR TRI SZ3 L - [id number]  COMPONENT FEM CR TRI SZ3 L  SILVA [employer] MARTY. [id number] Left 1 Implanted   BASEPLATE SZ 3 TRI TS IMPLANT - [id number]  BASEPLATE SZ 3 TRI TS IMPLANT  SILVA [employer] MARTY. [id number] Left 1 Implanted   TRIATHLON TIBIAL BEARING INSERT- CS     SILVA [facility] ([facility]) [id number] Left 1  Implanted      SPECIMENS: None.    COMPLICATIONS: None.     INTRAOPERATIVE COUNTS: Correct.     PROPHYLACTIC IV ANTIBIOTICS: Given per [facility] Protocol.    INDICATIONS FOR PROCEDURE:  Tomasa Reed is a 72 y.o. year-old with a longstanding history of left knee pain.  She has failed extensive conservative care to this point.  Preoperative imaging revealed degenerative joint disease and treatment options were discussed.  She elected to proceed with knee replacement.    Risks and complications were discussed including, but not limited to risks of anesthetic complications, infections, wound healing complications, aseptic loosening, instability, DVT, pulmonary embolism and death among others and she elected to proceed.    COMPONENTS USED:  The [employer] triathlon system with size femur 3, tibia 3 UBP, 13  mm polyethylene, 33 mm patella.    DESCRIPTION OF PROCEDURE:  The patient was taken to the Operating Room where anesthesia was administered by the Anesthesia Department. She was then placed in the supine position and all superficial neurovascular structures were well padded.  The left lower extremity was then sterilely prepped and draped in the normal fashion.    Under tourniquet control, a 20 cm longitudinal incision was made over the anterior aspect of the knee.  Subcutaneous tissue was sharply dissected down to the deep fascia, which was incised along the line of the incision.  A standard medial parapatellar arthrotomy was made.  The proximal medial tibial plateau was then subperiosteally exposed protecting the medial collateral ligament.  A portion of the infrapatellar fat pad was excised.  The patella was everted and the knee was flexed to 90 degrees.    The extramedullary tibial alignment guide was then placed and the proximal tibial osteotomy was made approximately 4 mm distal to the most shallow surface of the tibial plateau.  This was done perpendicular to the axis of the tibia with approximately 3 degrees  posterior slope.    A drill was then used to gain access into the intramedullary canal of the distal femur. The distal femoral cutting guide was placed and the 8 mm distal femoral cut was made in 5 degrees of valgus.  Femur was sized to a size 3.  The size 3 cutting guide was anteriorized 1.5 mm and applied and  the anterior femoral condyle, posterior condyle, and chamfer cuts were made. There was no notching anteriorly.  At this time, the cutting guide was removed and the cruciate ligaments, osteophytes, menisci and any debris was removed from the joint space.  Flexion and extension gaps were checked and were found to be equal at 13 mm. The notch cutting guide for the posterior stabilized housing was placed and the notch cuts were then made.    We then turned our attention back towards the proximal tibia.  This was sized to a size 3, placed in neutral rotation, and the keel was punched in the standard fashion.  Trial sizes of the 3 femur, 3 tibia, and 13 mm polyethylene liner were then placed.    The patellar cutting guide was then used to remove the dorsal 10 mm of the patellar surface to a final thickness of 14 mm.  This was sized to a size 33. Drill holes were placed and patellar trial was placed.    At this time, the knee was placed through range of motion.  Excellent patellofemoral tracking and stability were noted throughout.  Full extension was easily obtained and intraoperative range of motion was from approximately 0 to 125 degrees.    Trial components were removed and the bony surfaces were thoroughly irrigated in a pulse lavage fashion and dried.  Two batches of cement were mixed and the tibial, femoral and patellar components were cemented into position, impacted and held in place as the cement polymerized.  Any excess cement was removed using [place] elevators.  The 13 mm polyethylene was then locked into position.    The wound was once again thoroughly irrigated.  The tourniquet was deflated and any  significant bleeding was stopped using electrocautery.  Tranexamic acid was given intravenously pre incision and at the time of component cementation for hemostasis. The wound was irrigated with dilute betadine wash followed by normal saline via pulse lavage prior to closure.     The quadriceps tendon and parapatellar retinaculum were then closed with interrupted figure-of-eight sutures of #1 Vicryl.  Subcutaneous tissue was closed with interrupted inverted stitches #3-0 Vicryl.  Skin was approximated using staples.  Sterile dressing was applied and she was returned to the Postanesthesia Care Unit in stable condition.    As the attending surgeon I was physically present for the key/critical portions of the procedure.

## 2023-04-12 NOTE — PT/OT/SLP EVAL
Occupational Therapy Evaluation  and Discharge Note    Name: Tomasa Reed  MRN: [id number]  Admitting Diagnosis: <principal problem not specified> Day of Surgery  Recent Surgery: Procedure(s) (LRB):  ARTHROPLASTY, KNEE, TOTAL (Left) Day of Surgery    Recommendations:     Discharge Recommendations: home  Level of Assistance Recommended: 24 hours supervision  Discharge Equipment Recommendations: none  Barriers to discharge: None    Assessment:     Tomasa Reed is a 72 y.o. female with a medical diagnosis of <principal problem not specified>. She presents with performance deficits affecting function including impaired self care skills, impaired functional mobility, orthopedic precautions. Pt was able to perform supine/sit T/F c S and  Sit <> Stand Transfer with supervision with rolling walker Bed <> Chair Transfer using Stand Pivot technique with supervision with rolling walker Toilet Transfer Stand Pivot technique with supervision with rolling walker and bedside commode.  Able to perform UB/LB dressing c modified independence  She had c/o dizziness while sitting EOB. Educated pt on bathing, car transfers, and cryotherapy.       Rehab Prognosis: Good; patient would benefit from acute OT services to address these deficits and reach maximum level of function.    Plan:     Patient to be seen daily to address the above listed problems via self-care/home management, therapeutic activities, therapeutic exercises  Plan of Care Expires: 04/12/23  Plan of Care Reviewed with: patient, spouse    Subjective     Chief Complaint: Pt is s/p L TKA and is WBAT L LE  Patient Comments/Goals: I feel dizzy.  Pain/Comfort:  Pain Rating 1: 9/10    Patients cultural, spiritual, Jainism conflicts given the current situation: no    Social History:  Living Environment: Patient lives with their spouse in a single story home with number of outside stair(s): 1  Prior Level of Function: Prior to admission, patient was  independent.  Roles and Routines: Patient was driving prior to admission.  Equipment Used at Home: bedside commode, shower chair, walker, rolling  DME owned (not currently used): rolling walker and bedside commode  Assistance Upon Discharge: significant other    Objective:     Communicated with RN prior to session. Patient found supine with perineural catheter upon OT entry to room.    General Precautions: Standard, fall   Orthopedic Precautions: LLE weight bearing as tolerated   Braces: N/A    Respiratory Status: Room air    Occupational Performance    Gait belt applied - Yes    Bed Mobility:   Supine to sit from right side of bed with supervision    Functional Mobility/Transfers:  Sit <> Stand Transfer with contact guard assistance with rolling walker  Bed <> Chair Transfer using Stand Pivot technique with contact guard assistance with rolling walker  Toilet Transfer Stand Pivot technique with contact guard assistance with rolling walker and bedside commode  Functional Mobility: Pt was able to walk to hallway c CGA and RW.    Activities of Daily Living:  Grooming: contact guard assistance to wash hands while standing at sink c RW.  Upper Body Dressing: modified independence to don shirt  Lower Body Dressing: modified independence to don underwear, pants, socks, and shoes  Toileting: modified independence for toilet hygiene    Physical Exam:  Upper Extremity Range of Motion:     -       Right Upper Extremity: WFL  -       Left Upper Extremity: WFL  Upper Extremity Strength:    -       Right Upper Extremity: WFL  -       Left Upper Extremity: WFL    AMPAC 6 Click ADL:  AMPAC Total Score: 24    Treatment & Education:  Educated on the importance of mobility to maximize recovery  Educated on the importance of OOB mobility within safe range in order to decrease adverse effects of prolonged bedrest  Educated on safety with functional mobility; hand placement to ensure safe transfers to various surfaces in prep for  ADLs  Educated on weight bearing status      Patient clear to ambulate to/from bathroom with RN/PCT, assist x1 .    Patient left up in chair with all lines intact, call button in reach, and RN notified.    GOALS:   Multidisciplinary Problems       Occupational Therapy Goals          Problem: Occupational Therapy    Goal Priority Disciplines Outcome Interventions   Occupational Therapy Goal     OT, PT/OT Ongoing, Progressing    Description: Goals to be met by: 4/12/23     Patient will increase functional independence with ADLs by performing:    UE Dressing with Modified [place].  LE Dressing with Modified [place] and Assistive Devices as needed.  Grooming while standing at sink with Modified [place].  Toileting from bedside commode with Modified [place] for hygiene and clothing management.   Bathing from  shower chair/bench with Modified [place].  Toilet transfer to bedside commode with Modified [place].                         History:     Past Medical History:   Diagnosis Date    Cataract     OU    Chronic interstitial cystitis     History of hepatitis C; S/p RX with SVR 12 documented 06/2017 06/22/2017    HLD (hyperlipidemia)     Malignant melanoma of skin of trunk, except scrotum     Migraine, unspecified, without mention of intractable migraine without mention of status migrainosus     Nonspecific abnormal results of liver function study     Nontoxic multinodular goiter     Osteopenia          Past Surgical History:   Procedure Laterality Date    BREAST BIOPSY      CATARACT EXTRACTION Right 03/02/2020    cataracts      [facility]L [facility]    COLONOSCOPY N/A 12/14/2016    Procedure: COLONOSCOPY;  Surgeon: Wicho Painting MD;  Location: [address]);  Service: Endoscopy;  Laterality: N/A;    COLONOSCOPY N/A 1/14/2020    Procedure: COLONOSCOPY;  Surgeon: NANCY Maher MD;  Location: [facility] ([address] [place] [address]);  Service: Endoscopy;  Laterality: N/A;  1/8/20 left vm to confirm  appt-rb    FINGER SURGERY Right 2010    index finger fused    MOLE REMOVAL      TONSILLECTOMY, ADENOIDECTOMY      TOTAL ABDOMINAL HYSTERECTOMY         Time Tracking:     OT Date of Treatment: 04/12/23  OT Start Time: 1135  OT Stop Time: 1207  OT Total Time (min): 32 min    Billable Minutes: Evaluation 8, Self Care/Home Management 12, and Therapeutic Activity 12    4/12/2023

## 2023-04-12 NOTE — PLAN OF CARE
Pre Op checklist complete. Pt resting in bed with call light in reach. All questions answered. Pt belongings at bedside and plan to place them in a locker. Spouse brought to bedside. Pt still needs site wiliam/anes consent/blood consent/updated. Pt needs DME.

## 2023-04-12 NOTE — ANESTHESIA PREPROCEDURE EVALUATION
04/12/2023  Pre-operative evaluation for Procedure(s) (LRB):  ARTHROPLASTY, KNEE, TOTAL (Left)    Tomasa Reed is a 72 y.o. female     Patient Active Problem List   Diagnosis    History of melanoma    Nontoxic multinodular goiter    Osteopenia    HLD (hyperlipidemia)    Neutropenia    History of hepatitis C; S/p RX with SVR 24 documented 09/2017    Sensorineural hearing loss (SNHL) of both ears    Vitamin D deficiency    Overweight (BMI 25.0-29.9)    Pain in left knee    Encounter for preventive health examination    Primary osteoarthritis of left knee    Snoring    Elevated BP without diagnosis of hypertension       Review of patient's allergies indicates:   Allergen Reactions    Iodine and iodide containing products Nausea And Vomiting     Other reaction(s): SEVERE    Sulfa (sulfonamide antibiotics)      Other reaction(s): unknown  Other reaction(s): RASH       No current facility-administered medications on file prior to encounter.     Current Outpatient Medications on File Prior to Encounter   Medication Sig Dispense Refill    cetirizine 10 mg Cap PRN      glycerin adult suppository as needed.       mometasone (NASONEX) 50 mcg/actuation nasal spray 2 sprays by Nasal route once daily.      rosuvastatin (CRESTOR) 10 MG tablet TAKE 1 TABLET BY MOUTH EVERY DAY 90 tablet 3    TUMS 200 mg calcium (500 mg) chewable tablet 1 tablet as needed.       acetaminophen (TYLENOL) 650 MG TbSR Take 650 mg by mouth every 8 (eight) hours.      alendronate (FOSAMAX) 70 MG tablet Take 1 tablet (70 mg total) by mouth every 7 days. 12 tablet 3    dietary supplement Pack Take 1 tablet by mouth 2 (two) times daily.      fluticasone propionate (FLONASE) 50 mcg/actuation nasal spray 1 spray (50 mcg total) by Each Nostril route once daily. 16 g 0    glucosam/chondro/herb 149/hyal (GLUCOS CHOND  CPLX ADVANCED ORAL) Take by mouth.      magnesium oxide (MAG-OX) 400 mg (241.3 mg magnesium) tablet Take 400 mg by mouth once daily.      MISCELLANEOUS MEDICAL SUPPLY MISC as needed. EYE ITCH RELIEF 0.25 EYE DROPS      naproxen sodium 220 mg Cap PRN      OCEAN NASAL 0.65 % nasal spray as needed.          Past Surgical History:   Procedure Laterality Date    BREAST BIOPSY      CATARACT EXTRACTION Right 2020    cataracts      B/L at [facility]    COLONOSCOPY N/A 2016    Procedure: COLONOSCOPY;  Surgeon: Wicho Painting MD;  Location: [address]);  Service: Endoscopy;  Laterality: N/A;    COLONOSCOPY N/A 2020    Procedure: COLONOSCOPY;  Surgeon: NANCY Maher MD;  Location: [address]);  Service: Endoscopy;  Laterality: N/A;  20 left vm to confirm appt-rb    FINGER SURGERY Right 2010    index finger fused    MOLE REMOVAL      TONSILLECTOMY, ADENOIDECTOMY      TOTAL ABDOMINAL HYSTERECTOMY           CBC: No results for input(s): WBC, RBC, HGB, HCT, PLT, MCV, MCH, MCHC in the last 72 hours.    CMP: No results for input(s): NA, K, CL, CO2, BUN, CREATININE, GLU, MG, PHOS, CALCIUM, ALBUMIN, PROT, ALKPHOS, ALT, AST, BILITOT in the last 72 hours.    INR  No results for input(s): PT, INR, PROTIME, APTT in the last 72 hours.    BP Readings from Last 3 Encounters:   23 (!) 150/75   23 (!) 173/88   23 (!) 144/79         Diagnostic Studies:      EKD Echo:  No results found for this or any previous visit.        Pre-op Assessment    I have reviewed the Patient Summary Reports.     I have reviewed the Nursing Notes. I have reviewed the NPO Status.      Review of Systems  Hematology/Oncology:  Hematology Normal   Oncology Normal     EENT/Dental:EENT/Dental Normal   Cardiovascular:   Hypertension    Pulmonary:  Pulmonary Normal    Renal/:  Renal/ Normal     Hepatic/GI:   Liver Disease, Hepatitis (s/p treatment), C    Musculoskeletal:    Arthritis     Neurological:   Headaches    Endocrine:  Endocrine Normal    Dermatological:  Skin Normal    Psych:  Psychiatric Normal           Physical Exam  General: Well nourished, Cooperative, Alert and Oriented    Airway:  Mallampati: II   TM Distance: Normal  Neck ROM: Normal ROM        Anesthesia Plan  Type of Anesthesia, risks & benefits discussed:    Anesthesia Type: Gen Natural Airway, Regional, Spinal  Intra-op Monitoring Plan: Standard ASA Monitors  Post Op Pain Control Plan: multimodal analgesia, IV/PO Opioids PRN and peripheral nerve block  Induction:  IV  Informed Consent: Informed consent signed with the Patient and all parties understand the risks and agree with anesthesia plan.  All questions answered.   ASA Score: 2  Day of Surgery Review of History & Physical: H&P Update referred to the surgeon/provider.    Ready For Surgery From Anesthesia Perspective.     .

## 2023-04-12 NOTE — INTERVAL H&P NOTE
The patient has been examined and the H&P has been reviewed:    I concur with the findings and changes have been noted since the H&P was written: Patient cleared for surgery.    Anesthesia/Surgery risks, benefits and alternative options discussed and understood by patient/family.          There are no hospital problems to display for this patient.

## 2023-04-12 NOTE — OPERATIVE NOTE ADDENDUM
Certification of Assistant at Surgery       Surgery Date: 4/12/2023     Participating Surgeons:  Surgeon(s) and Role:     * REMY Zuñiga MD - Primary    Procedures:  Procedure(s) (LRB):  ARTHROPLASTY, KNEE, TOTAL (Left)    Assistant Surgeon's Certification of Necessity:  I understand that section 1842 (b) (6) (d) of the Social Security Act generally prohibits Medicare Part B reasonable charge payment for the services of assistants at surgery in teaching hospitals when qualified residents are available to furnish such services. I certify that the services for which payment is claimed were medically necessary, and that no qualified resident was available to perform the services. I further understand that these services are subject to post-payment review by the Medicare carrier.      LAKEISHA FITZGERALD DO    04/12/2023  1:07 PM

## 2023-04-12 NOTE — PT/OT/SLP EVAL
"Physical Therapy Evaluation, Treatment, and Discharge Note    Patient Name:  Tomasa Reed   MRN:  [id number]    Recommendations:     Discharge Recommendations: outpatient PT  Discharge Equipment Recommendations: none   Barriers to discharge: None    Assessment:     Tomasa Reed is a 72 y.o. female admitted with a medical diagnosis of s/p L TKA. Patient tolerated PT session fairly well but was limited by being dizzy/sleepy. Patient ambulated 50ft and 12ft with RW and contact guard assistance. No LOB or SOB noted. Patient ascended/descended 4" curb step with RW and contact guard assistance. Patient has an OPPT appointment on 4/13/2023.  present and verbalized understanding of all education provided. Patient ready to discharge home from PT standpoint.     Recent Surgery: Procedure(s) (LRB):  ARTHROPLASTY, KNEE, TOTAL (Left) Day of Surgery    Plan:     During this hospitalization, patient does not require further acute PT services.  Please re-consult if situation changes.      Subjective     Chief Complaint: Left posterior knee pain. Dizzy. Nausea. Sleepy.   Patient/Family Comments/goals: To walk without pain.   Pain/Comfort:  Pain Rating 1:  (yes but did not rate with number)  Location - Side 1: Left  Location - Orientation 1: posterior  Location 1: knee  Pain Addressed 1: Pre-medicate for activity, Reposition, Distraction    Patients cultural, spiritual, Lutheran conflicts given the current situation:  n/a    Living Environment:  Patient lives with her  in a [facility] with 1 step to enter. Prior to admission, patients level of function was independent for functional mobility. Equipment at home: bedside commode, walker, rolling, shower chair. Upon discharge, patient will have assistance from .    Objective:     Communicated with RN prior to session.  Patient found supine for 1st session and up in wheelchair for 2nd session with perineural catheter (Nimbus) upon PT entry to room. " " present during both sessions.     General Precautions: Standard, fall    Orthopedic Precautions:LLE weight bearing as tolerated   Braces: N/A  Respiratory Status: Room air    Exams:  Cognitive Exam:  Patient is oriented to Person, Place, Time, and Situation  Gross Motor Coordination:  WFL  Sensation:    -       Intact  RLE ROM: WFL  RLE Strength: WFL  LLE ROM: WFL except limited at knee due to pain  LLE Strength: appears WFL but did not formally assess due to pain    1st Functional Mobility:  Bed Mobility:     Scooting: contact guard assistance  Supine to Sit: contact guard assistance  Sit to Supine: contact guard assistance  Transfers: Did not attempt due to patient with complaint of nausea and dizziness once seated on edge of bed.     2nd Functional Mobility:  Transfers:     Sit to Stand:  contact guard assistance with rolling walker x2 from wheelchair   Gait: Patient ambulated 50ft and 12ft with Rolling Walker and contact guard assistance using 3-point gait. Patient demonstrated decreased erik and decreased step length during gait due to pain, decreased ROM, and decreased strength.  Stairs:  Patient ascended/descended 4" curb step with RW and contact guard assistance.    Treatment and Education:  Patient and  educated in:  -PT role and POC  -safety with transfers including hand placement  -gait sequencing and RW management  -OOB activity to maximize recovery including ambulating at home to prevent DVT   -car transfer  -curb training  -HEP for therex at home with handout provided       AM-PAC 6 CLICK MOBILITY  Total Score:23     Patient left supine after 1 session and up in chair after 2nd session with all lines intact, call button in reach, RN notified, and  present.    GOALS:   Multidisciplinary Problems       Physical Therapy Goals       Not on file              Multidisciplinary Problems (Resolved)          Problem: Physical Therapy    Goal Priority Disciplines Outcome Goal Variances " Interventions   Physical Therapy Goal   (Resolved)     PT, PT/OT Met                         History:     Past Medical History:   Diagnosis Date    Cataract     OU    Chronic interstitial cystitis     History of hepatitis C; S/p RX with SVR 12 documented 06/2017 06/22/2017    HLD (hyperlipidemia)     Malignant melanoma of skin of trunk, except scrotum     Migraine, unspecified, without mention of intractable migraine without mention of status migrainosus     Nonspecific abnormal results of liver function study     Nontoxic multinodular goiter     Osteopenia        Past Surgical History:   Procedure Laterality Date    BREAST BIOPSY      CATARACT EXTRACTION Right 03/02/2020    cataracts      B/L at [facility]    COLONOSCOPY N/A 12/14/2016    Procedure: COLONOSCOPY;  Surgeon: Wicho Painting MD;  Location: [facility] ([address] [place] [address]);  Service: Endoscopy;  Laterality: N/A;    COLONOSCOPY N/A 1/14/2020    Procedure: COLONOSCOPY;  Surgeon: NANCY Maher MD;  Location: [facility] ([address] [place] [address]);  Service: Endoscopy;  Laterality: N/A;  1/8/20 left vm to confirm appt-rb    FINGER SURGERY Right 2010    index finger fused    MOLE REMOVAL      TONSILLECTOMY, ADENOIDECTOMY      TOTAL ABDOMINAL HYSTERECTOMY         Time Tracking:     PT Received On: 04/12/23  PT Start Time: 1122 (1224)     PT Stop Time: 1138 (1239)  PT Total Time (min): 16 min + 15 min = 31 min total     Billable Minutes: Evaluation 8, Gait Training 15, and Therapeutic Activity 8    04/12/2023

## 2023-04-13 ENCOUNTER — PATIENT MESSAGE (OUTPATIENT)
Dept: ADMINISTRATIVE | Facility: OTHER | Age: 73
End: 2023-04-13
Payer: MEDICARE

## 2023-04-13 ENCOUNTER — CLINICAL SUPPORT (OUTPATIENT)
Dept: REHABILITATION | Facility: HOSPITAL | Age: 73
End: 2023-04-13
Payer: MEDICARE

## 2023-04-13 DIAGNOSIS — M25.662 DECREASED ROM OF LEFT KNEE: ICD-10-CM

## 2023-04-13 DIAGNOSIS — M25.562 LEFT KNEE PAIN, UNSPECIFIED CHRONICITY: Primary | ICD-10-CM

## 2023-04-13 DIAGNOSIS — M17.12 PRIMARY OSTEOARTHRITIS OF LEFT KNEE: ICD-10-CM

## 2023-04-13 PROCEDURE — 97112 NEUROMUSCULAR REEDUCATION: CPT

## 2023-04-13 PROCEDURE — 97161 PT EVAL LOW COMPLEX 20 MIN: CPT

## 2023-04-13 PROCEDURE — 97110 THERAPEUTIC EXERCISES: CPT

## 2023-04-13 NOTE — BRIEF OP NOTE
"Walker - Surgery ([facility])  Brief Operative Note    Surgery Date: 4/12/2023     Surgeon(s) and Role:     * REMY Zuñiga MD - Primary    Assisting Surgeon: None    Pre-op Diagnosis:  Primary osteoarthritis of left knee [M17.12]    Post-op Diagnosis:  Post-Op Diagnosis Codes:     * Primary osteoarthritis of left knee [M17.12]    Procedure(s) (LRB):  ARTHROPLASTY, KNEE, TOTAL (Left)    Anesthesia: Regional    Operative Findings: see op note    Estimated Blood Loss: * No values recorded between 4/12/2023  7:38 AM and 4/12/2023  9:25 AM *         Specimens:   Specimen (24h ago, onward)      None              Discharge Note    OUTCOME: Patient tolerated treatment/procedure well without complication and is now ready for discharge.    DISPOSITION: Home or Self Care    FINAL DIAGNOSIS:  <principal problem not specified>    FOLLOWUP: In clinic    DISCHARGE INSTRUCTIONS:    Discharge Procedure Orders   WALKER FOR HOME USE     Order Specific Question Answer Comments   Type of Walker: Adult (5'4"-6'6")    With wheels? Yes    Height: 5' 2" (1.575 m)    Weight: 66.7 kg (147 lb)    Length of need (1-99 months): 99    Please check all that apply: Patient is unable to safely ambulate without equipment.      Diet general     Activity as tolerated     Sponge bath only until clinic visit     Keep surgical extremity elevated     Lifting restrictions   Order Comments: No strenuous exercise or lifting of > 10 lbs     Weight bearing as tolerated     No driving, operating heavy equipment or signing legal documents while taking pain medication     Leave dressing on - Keep it clean, dry, and intact until clinic visit   Order Comments: Do not remove surgical dressing for 2 weeks post-op. This will be done only by MD at initial post-op visit. If dressing is completely saturated, replace with identical dressing - silver-impregnated hydrocolloid dressing.     Do not get dressings wet. Do not shower.     If dressing continues to be saturated " or there are signs of infection, please call [facility] [phone] for further instructions and to make appt to be seen.     Call MD for:  temperature >100.4     Call MD for:  persistent nausea and vomiting     Call MD for:  severe uncontrolled pain     Call MD for:  difficulty breathing, headache or visual disturbances     Call MD for:  redness, tenderness, or signs of infection (pain, swelling, redness, odor or green/yellow discharge around incision site)     Call MD for:  hives     Call MD for:  persistent dizziness or light-headedness     Call MD for:  extreme fatigue     Call MD for:  temperature >100.4     Call MD for:  persistent nausea and vomiting     Call MD for:  severe uncontrolled pain     Call MD for:  difficulty breathing, headache or visual disturbances     Call MD for:  redness, tenderness, or signs of infection (pain, swelling, redness, odor or green/yellow discharge around incision site)     Call MD for:  hives     Call MD for:  persistent dizziness or light-headedness     Call MD for:  extreme fatigue

## 2023-04-13 NOTE — ANESTHESIA POSTPROCEDURE EVALUATION
Anesthesia Post Evaluation    Patient: Tomasa Reed    Procedure(s) Performed: Procedure(s) (LRB):  ARTHROPLASTY, KNEE, TOTAL (Left)    Final Anesthesia Type: CSE      Patient location during evaluation: PACU  Patient participation: Yes- Able to Participate  Level of consciousness: awake and alert  Post-procedure vital signs: reviewed and stable  Pain management: adequate  Airway patency: patent    PONV status at discharge: No PONV  Anesthetic complications: no      Cardiovascular status: blood pressure returned to baseline  Respiratory status: unassisted  Hydration status: euvolemic  Follow-up not needed.          Vitals Value Taken Time   /65 04/12/23 1146   Temp 36.7 °C (98 °F) 04/12/23 0930   Pulse 64 04/12/23 1130   Resp 16 04/12/23 1130   SpO2 100 % 04/12/23 1130         Event Time   Out of Recovery 10:30:00         Pain/Manny Score: Pain Rating Prior to Med Admin: 6 (4/12/2023 11:18 AM)  Manny Score: 10 (4/12/2023 10:15 AM)

## 2023-04-13 NOTE — PLAN OF CARE
[facility]  Physical Therapy Initial Evaluation    Date: 4/13/2023   Name: Tomasa Reed  Clinic Number: [id number]    Therapy Diagnosis:   Encounter Diagnoses   Name Primary?    Primary osteoarthritis of left knee     Decreased ROM of left knee     Left knee pain, unspecified chronicity Yes     Physician: Kulwant Echavarria*    Physician Orders: PT Eval and Treat   Medical Diagnosis from Referral: M17.12 (ICD-10-CM) - Primary osteoarthritis of left knee  Evaluation Date: 4/13/2023  Authorization Period Expiration: 05/11/2023  Plan of Care Expiration: 7/13/2023  Visit # / Visits authorized: 1/ 1    Time In: 1030  Time Out: 1140  Total Appointment Time (timed & untimed codes): 70 minutes - 10 min ice    Precautions: Standard and Fall    Subjective   Date of surgery: 4/12/2023  History of current condition - Tomasa reports:     Patient underwent L TKA yesterday. Reports post op pain following surgery since nerve block wore off. She is taking pain medication, celebrex, and aspirin as prescribed. Pain pump also being utilized. Icing for pain and swelling management too. Has been advised to hold on removal of bandages for x3 days and has virtual FU with PA tomorrow.    Amb with FWW.    Goals - ADLs without pain     Medical History:   Past Medical History:   Diagnosis Date    Cataract     OU    Chronic interstitial cystitis     History of hepatitis C; S/p RX with SVR 12 documented 06/2017 06/22/2017    HLD (hyperlipidemia)     Malignant melanoma of skin of trunk, except scrotum     Migraine, unspecified, without mention of intractable migraine without mention of status migrainosus     Nonspecific abnormal results of liver function study     Nontoxic multinodular goiter     Osteopenia        Surgical History:   Tomasa Reed  has a past surgical history that includes Total abdominal hysterectomy; TONSILLECTOMY, ADENOIDECTOMY; Mole removal; Finger surgery (Right, 2010);  Colonoscopy (N/A, 12/14/2016); Breast biopsy; Colonoscopy (N/A, 1/14/2020); Cataract extraction (Right, 03/02/2020); cataracts; and Total knee arthroplasty (Left, 4/12/2023).    Medications:   Tomasa has a current medication list which includes the following prescription(s): acetaminophen, alendronate, aspirin, celecoxib, cetirizine, dietary supplement, fluticasone propionate, glucosam/chondro/herb 149/hyal, glycerin adult, magnesium oxide, medical supply, miscellaneous, mometasone, naproxen sodium, ocean nasal, ondansetron, oxycodone, rosuvastatin, and tums, and the following Facility-Administered Medications: ropivacaine (pf) 2 mg/ml (0.2%).    Allergies:   Review of patient's allergies indicates:   Allergen Reactions    Iodine and iodide containing products Nausea And Vomiting     Other reaction(s): SEVERE    Sulfa (sulfonamide antibiotics)      Other reaction(s): unknown  Other reaction(s): RASH        Imaging: see imaging    Prior Therapy: yes prior to TKA  Social History: lives with their family  Occupation: retired  Prior Level of Function: ADLs anabel weightbearing with pain  Current Level of Function: Amb with FWW with post op pain    Pain:  Current 4/10, worst 8/10, best 4/10   Location: { left knee(s)   Description: Aching, Dull, Tight, and Stiff  Aggravating Factors: Sitting, Standing, Laying, Bending, Walking, Extension, Flexing, Lifting, and Getting out of bed/chair  Easing Factors: pain medication, ice, and rest    Pts goals: ADLs w/o complaint    Objective     Observation: Post-op dressing/brace in place.    Gait: Amb short distance with FWW, able to bear weight fairly well with mild antalgic gait pattern and inadequate knee extension on loading.     Edema: Mod as expected post-op              Palpation: TTP medial joint line     Sensation:  Intact; diminished 2/2 residual nerve block.    Range of Motion:   Knee Right Active Left Active Right Passive Left Passive   Flexion 135 35 deg 135 71 deg in  supine   Extension +5 Lacking 3 +5 Lacking 5      Ankle Right Active Left Active   DF WNL Limited   PF WNL WNL     Lower Extremity Strength:    Formal MMT not performed 2/2 POD#1 and increased pain.    Fair to good Quad activation noted L LE.   Able to perform supine SLR with lag     Joint Mobility: hypomobile as expected post-op.     Flexibility:  Grossly hypomobile quad, hamstring and gastroc as expected on post-op L LE    Special Tests:  Not performed 2/2 post-op.    Functional tests:   SLS EO: Not performed 2/2 post-op.  SLS EC: Not performed 2/2 post-op.  SL Squat: Not performed 2/2 post-op.  DL Squat: Not performed 2/2 post-op.  Step-down: Not performed 2/2 post-op.      Limitation/Restriction for FOTO Knee Survey    Therapist reviewed FOTO scores for Tomasa Reed on 4/13/2023.   FOTO documents entered into [employer] - see Media section.    Limitation Score: see media%         TREATMENT   Treatment Time In: 1100  Treatment Time Out: 1140  Total Treatment time (time-based codes) separate from Evaluation: 30 minutes    Tomasa received therapeutic exercises to develop strength, endurance, ROM, flexibility, posture, and core stabilization for 15 minutes including:    Patient education:  - tissue healing timelines, impairments  - HEP in detail  - s/s and prevention of infection / DVT  - removal of bandages with clearance after PA FU visit    Calf stretch towel 3x30 sec  Heel slides 2x10 - towel at ankle and at thigh      Tomasa received the following manual therapy techniques: Joint mobilizations, Manual traction, Myofacial release, Manual Lymphatic Drainage, Soft tissue Mobilization, and Friction Massage were applied to the: L knee for 5 minutes, including:    Assessment  Fat pad and PFJ mobilizations Grade I-II      Tomasa participated in neuromuscular re-education activities to improve: Balance, Coordination, Kinesthetic, Sense, Proprioception, and Posture for 10 minutes. The following activities were  included:    Quad set [id number] sec - form, feeling in quad, slight heel raise  SLR supine 2x3 reps    ICE x10 min at end of visit    Home Exercises and Patient Education Provided    Education provided:   - see above    Written Home Exercises Provided: yes.  Exercises were reviewed and patient was able to demonstrate them prior to the end of the session.  Patient demonstrated good  understanding of the education provided.     See EMR under Patient Instructions for exercisesprovided 4/13/2023.    Assessment   Tomasa is a 72 y.o. female referred to outpatient Physical Therapy with a medical diagnosis of M17.12 (ICD-10-CM) - Primary osteoarthritis of left knee. Pt presents with post op pain and swelling, decreased A/PROM, LE flexibility deficits, joint hypomobility, and quad activation / LE strength deficits. Impairments and tissue healing timelines post op preventing performance of normal ADLs including weightbearing activities.    Pt prognosis is Excellent.   Pt will benefit from skilled outpatient Physical Therapy to address the deficits stated above and in the chart below, provide pt/family education, and to maximize pt's level of independence.     Plan of care discussed with patient: Yes  Pt's spiritual, cultural and educational needs considered and patient is agreeable to the plan of care and goals as stated below:     Anticipated Barriers for therapy: age    Medical Necessity is demonstrated by the following  History  Co-morbidities and personal factors that may impact the plan of care Co-morbidities:   advanced age    Personal Factors:   no deficits     Moderate   Examination  Body Structures and Functions, activity limitations and participation restrictions that may impact the plan of care Body Regions:   lower extremities    Body Systems:    ROM  strength  balance  gait  transfers  transitions  motor control  motor learning  edema  scar formation    Participation Restrictions:   covid-19    Activity  limitations:   Learning and applying knowledge  no deficits    General Tasks and Commands  no deficits    Communication  no deficits    Mobility  walking    Self care  no deficits    Domestic Life  no deficits    Interactions/Relationships  no deficits    Life Areas  no deficits    Community and Social Life  no deficits         high   Clinical Presentation stable and uncomplicated low   Decision Making/ Complexity Score: low     GOALS: Short Term Goals: 0-6 weeks  1.Report decreased L knee pain  < / =  1/10  to increase tolerance for amb  2. Increase knee ROM to 5-0-120 in order to be able to perform ADLs without difficulty.  3. Increase strength by 1/3 MMT grade in Quads to increase tolerance for ADL and work activities.  4. Pt to tolerate HEP to improve ROM and independence with ADL's     Long Term Goals: 6-12 weeks  1. Able to amb w/o deviation, complaint, or AD  2.Patient goal: ADLs w/o complaint  3.Increase strength to >/= 4+/5 in Quad and hip musculature to increase tolerance for ADL and work activities.  4. Pt will report at CJ level (20-40% impaired) on FOTO knee to demonstrate increase in LE function with every day tasks.     Plan   Plan of care Certification: 4/13/2023 to 7/13/2023.    Outpatient Physical Therapy 2-3 times weekly for 10 - 12 weeks to include the following interventions: Gait Training, Manual Therapy, Moist Heat/ Ice, Neuromuscular Re-ed, Patient Education, Self Care, Therapeutic Activities, and Therapeutic Exercise.     KEILA NIX, PT, DPT, OCS

## 2023-04-13 NOTE — ANESTHESIA POST-OP PAIN MANAGEMENT
Acute Pain Service Progress Note    4/13/2023 1005    Unable to reach patient for [facility] follow up. Voicemail left on patient's cell number encouraging to contact anesthesia at [phone] or [facility] 24/7 support line at [phone] for any questions or concerns about the nerve block or infusion pump. Will continue to follow up.

## 2023-04-14 ENCOUNTER — CLINICAL SUPPORT (OUTPATIENT)
Dept: REHABILITATION | Facility: HOSPITAL | Age: 73
End: 2023-04-14
Payer: MEDICARE

## 2023-04-14 ENCOUNTER — OFFICE VISIT (OUTPATIENT)
Dept: SPORTS MEDICINE | Facility: CLINIC | Age: 73
End: 2023-04-14
Payer: MEDICARE

## 2023-04-14 DIAGNOSIS — M25.662 DECREASED ROM OF LEFT KNEE: ICD-10-CM

## 2023-04-14 DIAGNOSIS — M25.562 LEFT KNEE PAIN, UNSPECIFIED CHRONICITY: Primary | ICD-10-CM

## 2023-04-14 DIAGNOSIS — Z96.652 S/P TOTAL KNEE ARTHROPLASTY, LEFT: Primary | ICD-10-CM

## 2023-04-14 PROCEDURE — 1159F PR MEDICATION LIST DOCUMENTED IN MEDICAL RECORD: ICD-10-PCS | Mod: CPTII,95,, | Performed by: PHYSICIAN ASSISTANT

## 2023-04-14 PROCEDURE — 97112 NEUROMUSCULAR REEDUCATION: CPT | Mod: CQ

## 2023-04-14 PROCEDURE — 97110 THERAPEUTIC EXERCISES: CPT | Mod: CQ

## 2023-04-14 PROCEDURE — 1160F RVW MEDS BY RX/DR IN RCRD: CPT | Mod: CPTII,95,, | Performed by: PHYSICIAN ASSISTANT

## 2023-04-14 PROCEDURE — 97140 MANUAL THERAPY 1/> REGIONS: CPT | Mod: CQ

## 2023-04-14 PROCEDURE — 99024 POSTOP FOLLOW-UP VISIT: CPT | Mod: 95,,, | Performed by: PHYSICIAN ASSISTANT

## 2023-04-14 PROCEDURE — 1160F PR REVIEW ALL MEDS BY PRESCRIBER/CLIN PHARMACIST DOCUMENTED: ICD-10-PCS | Mod: CPTII,95,, | Performed by: PHYSICIAN ASSISTANT

## 2023-04-14 PROCEDURE — 1159F MED LIST DOCD IN RCRD: CPT | Mod: CPTII,95,, | Performed by: PHYSICIAN ASSISTANT

## 2023-04-14 PROCEDURE — 99024 PR POST-OP FOLLOW-UP VISIT: ICD-10-PCS | Mod: 95,,, | Performed by: PHYSICIAN ASSISTANT

## 2023-04-14 PROCEDURE — 97014 ELECTRIC STIMULATION THERAPY: CPT | Mod: CQ

## 2023-04-14 RX ORDER — PREGABALIN 75 MG/1
75 CAPSULE ORAL 2 TIMES DAILY
Qty: 60 CAPSULE | Refills: 0 | Status: SHIPPED | OUTPATIENT
Start: 2023-04-14 | End: 2023-05-16 | Stop reason: SDUPTHER

## 2023-04-14 RX ORDER — METHOCARBAMOL 500 MG/1
500 TABLET, FILM COATED ORAL 3 TIMES DAILY
Qty: 30 TABLET | Refills: 0 | Status: SHIPPED | OUTPATIENT
Start: 2023-04-14 | End: 2023-04-24

## 2023-04-14 NOTE — PROGRESS NOTES
I called the patient today regarding her surgery with Dr. Zuñiga. The patient had a left TKA on 4/12/2023.    Pain Scale: 8 / 10. Patient having significant pain even with Nimbus pump and Oxy 5mg q6hr. Also taking Celebrex as instructed. I instructed her to take Tylenol with the Oxycodone, and if need be she can take two of the Oxy 5mg. She verbalized understanding. We will also add on Lyrica for neuropathy. I sent to her pharamcy.     Any issues with Fever: No.    Any issues with medications (specifically DVT prophylaxis): No.    Any issues with bowel movements:  Passing caesar: No.                                                                 Urination: No.                                                                 Constipation: No.    Completing at home exercises: Yes:     Any concerns regarding their dressing/bandage:  No.    Patient confirmed first OP-PT appointment:  12:00pm on  4/14/2023 at [place].    Any other concerns: No.        The patient was informed that if they have any urgent issues with their bandage, medications or any other health concerns regarding their surgery to call the 24/7 Orthopedic Post-op Hot Line at [phone]. The patient was reminded that if they have any chest pain or shortness of breath to call 911 or go to the ER.    The patient verbalized understanding and does not have any other questions

## 2023-04-14 NOTE — PROGRESS NOTES
[facility]   Physical Therapy Treatment Note     Name: Tomasa Reed  Clinic Number: [id number]    Therapy Diagnosis:   Encounter Diagnoses   Name Primary?    Left knee pain, unspecified chronicity Yes    Decreased ROM of left knee      Physician: Kulwant Echavarria*    Visit Date: 4/14/2023    Physician Orders: PT Eval and Treat   Medical Diagnosis from Referral: M17.12 (ICD-10-CM) - Primary osteoarthritis of left knee  Evaluation Date: 4/13/2023  Authorization Period Expiration: 05/11/2023  Plan of Care Expiration: 7/13/2023  Visit # / Visits authorized: 1 / 11 + eval    PTA Visit #: 1 / 5     Time In: 1155  Time Out: 1300  Total Treatment Time: 65 minutes  Total Billable Time: 65 minutes (1 TE, 1 MT, 2 NMR, 1 Estim Unattended)    Precautions: Standard and Fall    SUBJECTIVE     Patient reports: that she talked with the doctor this morning and they prescribed her Lyrica for pain that she is having. States that she had some sharp pains in her knee and leg, but was unable to check if she had a DVT. She presents in wheelchair. States that her leg is awake and knows she is moving it.  She was compliant with home exercise program.  Response to previous treatment: appropriate muscle response, muscle soreness  Functional change: presents in wheelchair    Pain: 7/10, currently  Location: Left knee    Patient goals: ADLs w/o complaint    OBJECTIVE     Objective Measures updated at progress report unless specified.   DOS 4/12/2023  2 week follow up on 4/27/2023 4/13/2023; ROM at eval  Range of Motion:   Knee Right Active Left Active Right Passive Left Passive   Flexion 135 35 deg 135 71 deg in supine   Extension +5 Lacking 3 +5 Lacking 5     4/14/2023; +1 degree hyperextension; 78 degrees AAROM    Treatment     Tomasa received the treatments listed below:      therapeutic exercises to develop strength, endurance, ROM, flexibility, posture, and core stabilization for 20 minutes  including:  Aerobic Activity to improve LE ROM, mobility, and CV endurance; Nustep - consider next visit  Ambulation with RW from clinic to waiting room (~330 feet); emphasized heel strike and knee flexion with gait cycle.  Long sitting HS/gastroc stretch; 10 second hold, 10 reps  Heel slides; 10 second hold, 10 reps    manual therapy techniques: Joint mobilizations were applied to the: Left knee for 20 minutes, including:  Grade II-III Patella Mobs  Bandage removal to assess knee joint and Aquacel.   Patient education:  - tissue healing timelines, impairments  - HEP in detail  - s/s and prevention of infection / DVT  - use of ABAD hose and elevation for swelling  - removal of pain pump per instructions provided by MD    neuromuscular re-education activities to improve: Coordination, Kinesthetic, Proprioception and Posture and motor control for 25 minutes.   The following activities were included after being cleared for contradictions: [place] Electrical Stimulation supervised to elicit muscle contraction of the quadriceps for 25 minutes. Pt received stimulation: Burst Frequency: 50 bps, Ramp: 2 sec, 10% duty cycle, amplitude 31 Luis with 10 second on time and 10 second off time while performing the following exercises. Patient tolerated treatment well without any adverse effects.     - Quad sets with towel; 10 minutes   - Hinged quad set with 1/2 foam; 5 minutes   - Short arc quads with medium bolster; 10 minutes with strap for tke    Patient Education and Home Exercises     Home Exercises Provided and Patient Education Provided     Education provided:   - See above    Written Home Exercises Provided: Patient instructed to cont prior HEP. Exercises were reviewed and Tomasa was able to demonstrate them prior to the end of the session.  Tomasa demonstrated good  understanding of the education provided. See EMR under Patient Instructions for exercises provided during therapy sessions    ASSESSMENT     Ms. Tomasa is  3 days s/p L TKA. She presents in a wheelchair with her RW. Removed bandages and ace wrap today per patient request secondary to complaints of discomfort and some leg and calf pain. Assessed knee joint which is warm to touch, however appears within normal post surgical warmth. Calf is TTP, however she is negative for signs of a DVT. Educated patient on signs and symptoms of a DVT as well as prevention. Use of NMES today for quadriceps based exercises to improve strength and motor control. She tolerated these well with fair to good quad contraction at this time. Ambulation performed from clinic to waiting room with RW requiring no rest breaks; good gait mechanics observed with min cueing for heel strike. Achieved 78 degrees of active assistive knee flexion ROM today. Continue per POC towards treatment goals.   Tomasa Is progressing well towards her goals.   Pt prognosis is Excellent.     Pt will continue to benefit from skilled outpatient physical therapy to address the deficits listed in the problem list box on initial evaluation, provide pt/family education and to maximize pt's level of independence in the home and community environment.     Pt's spiritual, cultural and educational needs considered and pt agreeable to plan of care and goals.     Anticipated barriers to physical therapy: age    Goals:   Short Term Goals: 0-6 weeks - Appropriate, ongoing  1.Report decreased L knee pain  < / =  1/10  to increase tolerance for amb  2. Increase knee ROM to 5-0-120 in order to be able to perform ADLs without difficulty.  3. Increase strength by 1/3 MMT grade in Quads to increase tolerance for ADL and work activities.  4. Pt to tolerate HEP to improve ROM and independence with ADL's     Long Term Goals: 6-12 weeks  1. Able to amb w/o deviation, complaint, or AD  2.Patient goal: ADLs w/o complaint  3.Increase strength to >/= 4+/5 in Quad and hip musculature to increase tolerance for ADL and work activities.  4. Pt will  report at CJ level (20-40% impaired) on FOTO knee to demonstrate increase in LE function with every day tasks.     PLAN     Plan of Care Certification: 4/13/2023 to 7/13/2023     Outpatient Physical Therapy 2-3 times weekly for 10 - 12 weeks to include the following interventions: Gait Training, Manual Therapy, Moist Heat/ Ice, Neuromuscular Re-ed, Patient Education, Self Care, Therapeutic Activities, and Therapeutic Exercise.     Victoria Modi, PTA

## 2023-04-16 NOTE — ANESTHESIA POST-OP PAIN MANAGEMENT
Attempted to call Tomasa Reed at 6:12 PM on 04/16/2023 regarding the PNC and Nimbus pump and the remind them that the PNC should be removed tomorrow.  There was no answer.    Katherin Watts MD  Regional Anesthesiology and Acute Pain Medicine  [facility]

## 2023-04-17 ENCOUNTER — CLINICAL SUPPORT (OUTPATIENT)
Dept: REHABILITATION | Facility: HOSPITAL | Age: 73
End: 2023-04-17
Payer: MEDICARE

## 2023-04-17 DIAGNOSIS — M25.662 DECREASED ROM OF LEFT KNEE: ICD-10-CM

## 2023-04-17 DIAGNOSIS — M25.562 LEFT KNEE PAIN, UNSPECIFIED CHRONICITY: Primary | ICD-10-CM

## 2023-04-17 PROCEDURE — 97112 NEUROMUSCULAR REEDUCATION: CPT | Mod: CQ

## 2023-04-17 PROCEDURE — 97014 ELECTRIC STIMULATION THERAPY: CPT | Mod: CQ

## 2023-04-17 PROCEDURE — 97110 THERAPEUTIC EXERCISES: CPT | Mod: CQ

## 2023-04-17 NOTE — PROGRESS NOTES
"[facility]   Physical Therapy Treatment Note     Name: Tomasa Reed  Clinic Number: [id number]    Therapy Diagnosis:   Encounter Diagnoses   Name Primary?    Left knee pain, unspecified chronicity Yes    Decreased ROM of left knee      Physician: Kulwant Echavarria*    Visit Date: 4/17/2023    Physician Orders: PT Eval and Treat   Medical Diagnosis from Referral: M17.12 (ICD-10-CM) - Primary osteoarthritis of left knee  Evaluation Date: 4/13/2023  Authorization Period Expiration: 05/11/2023  Plan of Care Expiration: 7/13/2023  Visit # / Visits authorized: 2 / 11 + eval    PTA Visit #: 2 / 5     Time In: 1151  Time Out: 1300  Total Treatment Time: 69 minutes  Total Billable Time: 69 minutes (2 NMR, 2 TE, 1 Estim Unattended)    Precautions: Standard and Fall    SUBJECTIVE     Patient reports: that her knee feels achy. States that her pain pump "completed" as they were pulling into the parking lot; she request it to be removed.  She was compliant with home exercise program.  Response to previous treatment: appropriate muscle response, muscle soreness  Functional change: ambulated from waiting room to clinic with RW    Pain: NT/10, currently  Location: Left knee    Patient goals: ADLs w/o complaint    OBJECTIVE     Objective Measures updated at progress report unless specified.   DOS 4/12/2023  2 week follow up on 4/27/2023 4/13/2023; ROM at eval  Range of Motion:   Knee Right Active Left Active Right Passive Left Passive   Flexion 135 35 deg 135 71 deg in supine   Extension +5 Lacking 3 +5 Lacking 5     4/14/2023; +1 degree hyperextension; 78 degrees AAROM  4/17/2023; 90 degrees AAROM Flexion    Treatment     Tomasa received the treatments listed below:      therapeutic exercises to develop strength, endurance, ROM, flexibility, posture, and core stabilization for 29 minutes including:  Patient education:  - tissue healing timelines, impairments  - HEP in detail  - s/s and " prevention of infection / DVT  - use of ABDA hose and elevation for swelling    Aerobic Activity to improve LE ROM, mobility, and CV endurance; Nustep for 5 minutes at seat 16  Ambulation with RW from clinic <> waiting room (~500 feet); emphasized heel strike and knee flexion with gait cycle.  Long sitting HS/gastroc stretch; 10 second hold, 10 reps  Heel slides; 10 second hold, 10 reps    manual therapy techniques: Joint mobilizations were applied to the: Left knee for 10 minutes, including:  Grade II-III Patella Mobs  Patient presents with empty pain pump. Patient expressed fear of removing pain pump independently. Pain pump removed by Victoria Modi PTA using [employer] Adhesive Remover to remove bandage, and [facility] Wound cleanser with sterile gauze to cleanse pain pump insertion site. Band aid applied to insertion site. No observable signs of infection at this time.      neuromuscular re-education activities to improve: Coordination, Kinesthetic, Proprioception and Posture and motor control for 30 minutes.   The following activities were included after being cleared for contradictions: [place] Electrical Stimulation supervised to elicit muscle contraction of the quadriceps for 25 minutes. Pt received stimulation: Burst Frequency: 50 bps, Ramp: 2 sec, 10% duty cycle, amplitude 26 Luis with 10 second on time and 10 second off time while performing the following exercises. Patient tolerated treatment well without any adverse effects.     - Hinged quad set with 1/2 foam; 10 minutes with cues for quad isolation, avoiding glute compensation   - Short arc quads with medium bolster; 10 minutes    - Long arc quads at EOM; 5 minutes - use of green strap for active lifting    TKE with ball on wall; 10 second hold, 20 reps    Patient Education and Home Exercises     Home Exercises Provided and Patient Education Provided     Education provided:   - See above    Written Home Exercises Provided: Patient instructed to cont  prior HEP. Exercises were reviewed and Tomasa was able to demonstrate them prior to the end of the session.  Tomasa demonstrated good  understanding of the education provided. See EMR under Patient Instructions for exercises provided during therapy sessions    ASSESSMENT     Ms. Randolph is 5 days s/p L TKA. She presents with RWABAD; demonstrates good tolerance to ambulation with appropriate gait mechanics at this time. Pain pump removed today per patient request. Some discomfort reported along joint line with active lift during LAQ's; this improves with use of strap for active assist. Achieved 90 degrees knee flexion ROM today. Continue per POC towards treatment goals.   Tomasa Is progressing well towards her goals.   Pt prognosis is Excellent.     Pt will continue to benefit from skilled outpatient physical therapy to address the deficits listed in the problem list box on initial evaluation, provide pt/family education and to maximize pt's level of independence in the home and community environment.     Pt's spiritual, cultural and educational needs considered and pt agreeable to plan of care and goals.     Anticipated barriers to physical therapy: age    Goals:   Short Term Goals: 0-6 weeks - Appropriate, ongoing  1.Report decreased L knee pain  < / =  1/10  to increase tolerance for amb  2. Increase knee ROM to 5-0-120 in order to be able to perform ADLs without difficulty.  3. Increase strength by 1/3 MMT grade in Quads to increase tolerance for ADL and work activities.  4. Pt to tolerate HEP to improve ROM and independence with ADL's     Long Term Goals: 6-12 weeks  1. Able to amb w/o deviation, complaint, or AD  2.Patient goal: ADLs w/o complaint  3.Increase strength to >/= 4+/5 in Quad and hip musculature to increase tolerance for ADL and work activities.  4. Pt will report at CJ level (20-40% impaired) on FOTO knee to demonstrate increase in LE function with every day tasks.     PLAN     Plan  of Care Certification: 4/13/2023 to 7/13/2023     Outpatient Physical Therapy 2-3 times weekly for 10 - 12 weeks to include the following interventions: Gait Training, Manual Therapy, Moist Heat/ Ice, Neuromuscular Re-ed, Patient Education, Self Care, Therapeutic Activities, and Therapeutic Exercise.     Victoria Modi, PTA

## 2023-04-18 ENCOUNTER — PATIENT MESSAGE (OUTPATIENT)
Dept: SPORTS MEDICINE | Facility: CLINIC | Age: 73
End: 2023-04-18
Payer: MEDICARE

## 2023-04-18 ENCOUNTER — CLINICAL SUPPORT (OUTPATIENT)
Dept: REHABILITATION | Facility: HOSPITAL | Age: 73
End: 2023-04-18
Payer: MEDICARE

## 2023-04-18 DIAGNOSIS — M25.662 DECREASED ROM OF LEFT KNEE: ICD-10-CM

## 2023-04-18 DIAGNOSIS — M25.562 LEFT KNEE PAIN, UNSPECIFIED CHRONICITY: Primary | ICD-10-CM

## 2023-04-18 DIAGNOSIS — Z96.652 S/P TOTAL KNEE ARTHROPLASTY, LEFT: Primary | ICD-10-CM

## 2023-04-18 PROCEDURE — 97014 ELECTRIC STIMULATION THERAPY: CPT | Mod: CQ

## 2023-04-18 PROCEDURE — 97112 NEUROMUSCULAR REEDUCATION: CPT | Mod: CQ

## 2023-04-18 PROCEDURE — 97110 THERAPEUTIC EXERCISES: CPT | Mod: CQ

## 2023-04-18 RX ORDER — OXYCODONE HYDROCHLORIDE 5 MG/1
5 TABLET ORAL EVERY 6 HOURS PRN
Qty: 28 TABLET | Refills: 0 | Status: SHIPPED | OUTPATIENT
Start: 2023-04-18 | End: 2023-09-11 | Stop reason: ALTCHOICE

## 2023-04-18 NOTE — TELEPHONE ENCOUNTER
Patient requested a refill of Roxicodone.     Linnea Downey ATC, OTC  Sports Medicine Assistant - Dr. Hayden Zuñiga   [facility] [place]

## 2023-04-18 NOTE — PROGRESS NOTES
[facility]   Physical Therapy Treatment Note     Name: Tomasa Reed  Clinic Number: [id number]    Therapy Diagnosis:   Encounter Diagnoses   Name Primary?    Left knee pain, unspecified chronicity Yes    Decreased ROM of left knee        Physician: Kulwant Echavarria*    Visit Date: 4/18/2023    Physician Orders: PT Eval and Treat   Medical Diagnosis from Referral: M17.12 (ICD-10-CM) - Primary osteoarthritis of left knee  Evaluation Date: 4/13/2023  Authorization Period Expiration: 05/11/2023  Plan of Care Expiration: 7/13/2023  Visit # / Visits authorized: 3 / 11 + eval    PTA Visit #: 3 / 5     Time In: 2:00 pm   Time Out: 3:10 pm  Total Treatment Time:  60 minutes  Total Billable Time:  60 minutes (2 NMR, 2 TE, 1 Estim Unattended)    Precautions: Standard and Fall    SUBJECTIVE     Patient reports: she took her pain medicine and tylenol about an hour ago. She also took her lyrica . She doesn't like taking it during the day because it makes her a little loopy but feels she may need it before therapy .   She was compliant with home exercise program.  Response to previous treatment: appropriate muscle response, muscle soreness  Functional change: ambulated from waiting room to clinic with RW    Pain: NT/10, currently  Location: Left knee    Patient goals: ADLs w/o complaint    OBJECTIVE     Objective Measures updated at progress report unless specified.   DOS 4/12/2023  2 week follow up on 4/27/2023 4/13/2023; ROM at eval  Range of Motion:   Knee Right Active Left Active Right Passive Left Passive   Flexion 135 35 deg 135 71 deg in supine   Extension +5 Lacking 3 +5 Lacking 5     4/14/2023; +1 degree hyperextension; 78 degrees AAROM  4/17/2023; 90 degrees AAROM Flexion    4/18/2023: 0 degrees of active extension ; 90 degrees of AA knee flexion    Treatment     Tomasa received the treatments listed below:      therapeutic exercises to develop strength, endurance, ROM,  flexibility, posture, and core stabilization for 29 minutes including:  Patient education:  - tissue healing timelines, impairments  - HEP in detail  - s/s and prevention of infection / DVT  - use of ABAD hose and elevation for swelling    Aerobic Activity to improve LE ROM, mobility, and CV endurance; Nustep for 5 minutes at seat 16  Ambulation with RW from clinic <> waiting room (~500 feet); emphasized heel strike and knee flexion with gait cycle.  Long sitting HS/gastroc stretch; 10 second hold, 10 reps  Heel slides; 10 second hold, 10 reps    manual therapy techniques: Joint mobilizations were applied to the: Left knee for 10 minutes, including:  Grade II-III Patella Mobs    neuromuscular re-education activities to improve: Coordination, Kinesthetic, Proprioception and Posture and motor control for 30 minutes.   The following activities were included after being cleared for contradictions: [place] Electrical Stimulation supervised to elicit muscle contraction of the quadriceps for 25 minutes. Pt received stimulation: Burst Frequency: 50 bps, Ramp: 2 sec, 10% duty cycle, amplitude 26 Luis with 10 second on time and 10 second off time while performing the following exercises. Patient tolerated treatment well without any adverse effects.     - Hinged quad set with 1/2 foam; 10 minutes with cues for quad isolation, avoiding glute compensation   - Short arc quads with medium bolster; 10 minutes    - Long arc quads at EOM; 5 minutes - use of green strap for active lifting    TKE with ball on wall; 10 second hold, 20 reps    Patient Education and Home Exercises     Home Exercises Provided and Patient Education Provided     Education provided:   - See above    Written Home Exercises Provided: Patient instructed to cont prior HEP. Exercises were reviewed and Tomasa was able to demonstrate them prior to the end of the session.  Tomasa demonstrated good  understanding of the education provided. See EMR under Patient  Instructions for exercises provided during therapy sessions    ASSESSMENT     Pt presents s/p 1 week L TKA. She demonstrates low pain levels and presents ambulating with RW , min UE use and good gait mechanics. She demonstrated good quadriceps activation . She notes pain with SAQs and LAQs along her joint line while performing . Strap assist used for pain and to promote TKE while performing. Pt is progressing well per this phase in rehab .     Tomasa Is progressing well towards her goals.   Pt prognosis is Excellent.     Pt will continue to benefit from skilled outpatient physical therapy to address the deficits listed in the problem list box on initial evaluation, provide pt/family education and to maximize pt's level of independence in the home and community environment.   Pt's spiritual, cultural and educational needs considered and pt agreeable to plan of care and goals.     Anticipated barriers to physical therapy: age    Goals:   Short Term Goals: 0-6 weeks - Appropriate, ongoing  1.Report decreased L knee pain  < / =  1/10  to increase tolerance for amb  2. Increase knee ROM to 5-0-120 in order to be able to perform ADLs without difficulty.  3. Increase strength by 1/3 MMT grade in Quads to increase tolerance for ADL and work activities.  4. Pt to tolerate HEP to improve ROM and independence with ADL's     Long Term Goals: 6-12 weeks  1. Able to amb w/o deviation, complaint, or AD  2.Patient goal: ADLs w/o complaint  3.Increase strength to >/= 4+/5 in Quad and hip musculature to increase tolerance for ADL and work activities.  4. Pt will report at CJ level (20-40% impaired) on FOTO knee to demonstrate increase in LE function with every day tasks.     PLAN     Plan of Care Certification: 4/13/2023 to 7/13/2023     Outpatient Physical Therapy 2-3 times weekly for 10 - 12 weeks to include the following interventions: Gait Training, Manual Therapy, Moist Heat/ Ice, Neuromuscular Re-ed, Patient Education, Self  Care, Therapeutic Activities, and Therapeutic Exercise.     Candi Dupree, PTA

## 2023-04-20 ENCOUNTER — PATIENT MESSAGE (OUTPATIENT)
Dept: ADMINISTRATIVE | Facility: OTHER | Age: 73
End: 2023-04-20
Payer: MEDICARE

## 2023-04-20 ENCOUNTER — CLINICAL SUPPORT (OUTPATIENT)
Dept: REHABILITATION | Facility: HOSPITAL | Age: 73
End: 2023-04-20
Payer: MEDICARE

## 2023-04-20 DIAGNOSIS — M25.562 LEFT KNEE PAIN, UNSPECIFIED CHRONICITY: Primary | ICD-10-CM

## 2023-04-20 DIAGNOSIS — M25.662 DECREASED ROM OF LEFT KNEE: ICD-10-CM

## 2023-04-20 PROCEDURE — 97014 ELECTRIC STIMULATION THERAPY: CPT

## 2023-04-20 PROCEDURE — 97140 MANUAL THERAPY 1/> REGIONS: CPT

## 2023-04-20 PROCEDURE — 97110 THERAPEUTIC EXERCISES: CPT

## 2023-04-20 PROCEDURE — 97112 NEUROMUSCULAR REEDUCATION: CPT

## 2023-04-20 NOTE — PROGRESS NOTES
[facility]   Physical Therapy Treatment Note     Name: Tomasa Reed  Clinic Number: [id number]    Therapy Diagnosis:   Encounter Diagnoses   Name Primary?    Left knee pain, unspecified chronicity Yes    Decreased ROM of left knee      Physician: Kulwant Echavarria*    Visit Date: 4/20/2023    Physician Orders: PT Eval and Treat   Medical Diagnosis from Referral: M17.12 (ICD-10-CM) - Primary osteoarthritis of left knee  Evaluation Date: 4/13/2023  Authorization Period Expiration: 05/11/2023  Plan of Care Expiration: 7/13/2023  Visit # / Visits authorized: 4 / 11 + eval    PTA Visit #: 3 / 5     Time In: 0900  Time Out: 1010  Total Treatment Time:  60 minutes  Total Billable Time:  60 minutes (1 NMR, 1 Man, 2 TE, 1 Estim Unattended)    Precautions: Standard and Fall    SUBJECTIVE     Patient reports: Improving function over time. Had pain last night so took lyrica; was loopy and having a hard time waking up, but needed it for sleep. Having some resting level of pain in medial knee.    She was compliant with home exercise program.  Response to previous treatment: appropriate muscle response, muscle soreness  Functional change: ambulated from waiting room to clinic with RW    Pain: NT/10, currently  Location: Left knee    Patient goals: ADLs w/o complaint    OBJECTIVE     Objective Measures updated at progress report unless specified.   DOS 4/12/2023  2 week follow up on 4/27/2023 4/13/2023; ROM at eval  Range of Motion:   Knee Right Active Left Active Right Passive Left Passive   Flexion 135 35 deg 135 71 deg in supine   Extension +5 Lacking 3 +5 Lacking 5     4/14/2023; +1 degree hyperextension; 78 degrees AAROM  4/17/2023; 90 degrees AAROM Flexion  4/18/2023: 0 degrees of active extension ; 90 degrees of AA knee flexion  4/20/2023: 0-83 at start; 0-100 deg at end      Treatment     Tomasa received the treatments listed below:      therapeutic exercises to develop  strength, endurance, ROM, flexibility, posture, and core stabilization for 35 minutes including:    Patient education:  - HEP frequency    Ambulation with RW from clinic <> waiting room (~500 feet); emphasized heel strike and knee flexion with gait cycle.    Heel slides 3x20x5 sec  Calf stretch 5x30 sec at start and end  HS stretch supine strap across body 2x30 sec  HS stretch supine strap outward 2x30 sec    FUTURE VISITS - adductor strength    Held:  Aerobic Activity to improve LE ROM, mobility, and CV endurance; Nustep for 5 minutes at seat 16  Long sitting HS/gastroc stretch; 10 second hold, 10 reps      manual therapy techniques: Joint mobilizations were applied to the: Left knee for 10 minutes, including:    Seated traction with AP tibia Grade II-III  Grade III Patella Mobs anabel inferior  Fat pad mobilization Grade I-II    neuromuscular re-education activities to improve: Coordination, Kinesthetic, Proprioception and Posture and motor control for 15 minutes.     The following activities were included after being cleared for contradictions: [place]itian Electrical Stimulation supervised to elicit muscle contraction of the quadriceps for 15 minutes. Pt received stimulation: Burst Frequency: 50 bps, Ramp: 2 sec, 10% duty cycle, amplitude 26 Luis with 10 second on time and 10 second off time while performing the following exercises. Patient tolerated treatment well without any adverse effects.     - Short arc quads with 1/2 bolster; 10 minutes    - Long arc quads at EOM; 5 minutes - use of green strap for active lifting      Held:  TKE with ball on wall; 10 second hold, 20 reps    ICE x10 min post visit    Patient Education and Home Exercises     Home Exercises Provided and Patient Education Provided     Education provided:   - See above    Written Home Exercises Provided: Patient instructed to cont prior HEP. Exercises were reviewed and Tomasa was able to demonstrate them prior to the end of the session.  Tomasa  demonstrated good  understanding of the education provided. See EMR under Patient Instructions for exercises provided during therapy sessions    ASSESSMENT     Demonstrating slowly improving ROM. Good quadriceps activation, but does have discomfort with SAQ / LAQ too secondary to NM activation / endurance deficits of quad.    Overall progressing as expected.    Cont focus on ROM, flexibility of calf, quad activation / strength.    Tomasa Is progressing well towards her goals.   Pt prognosis is Excellent.     Pt will continue to benefit from skilled outpatient physical therapy to address the deficits listed in the problem list box on initial evaluation, provide pt/family education and to maximize pt's level of independence in the home and community environment.   Pt's spiritual, cultural and educational needs considered and pt agreeable to plan of care and goals.     Anticipated barriers to physical therapy: age    Goals:   Short Term Goals: 0-6 weeks - Appropriate, ongoing  1.Report decreased L knee pain  < / =  1/10  to increase tolerance for amb  2. Increase knee ROM to 5-0-120 in order to be able to perform ADLs without difficulty.  3. Increase strength by 1/3 MMT grade in Quads to increase tolerance for ADL and work activities.  4. Pt to tolerate HEP to improve ROM and independence with ADL's     Long Term Goals: 6-12 weeks  1. Able to amb w/o deviation, complaint, or AD  2.Patient goal: ADLs w/o complaint  3.Increase strength to >/= 4+/5 in Quad and hip musculature to increase tolerance for ADL and work activities.  4. Pt will report at CJ level (20-40% impaired) on FOTO knee to demonstrate increase in LE function with every day tasks.     PLAN     Plan of Care Certification: 4/13/2023 to 7/13/2023     Outpatient Physical Therapy 2-3 times weekly for 10 - 12 weeks to include the following interventions: Gait Training, Manual Therapy, Moist Heat/ Ice, Neuromuscular Re-ed, Patient Education, Self Care,  Therapeutic Activities, and Therapeutic Exercise.     KELIA NIX, PT, DPT, OCS

## 2023-04-21 ENCOUNTER — PATIENT MESSAGE (OUTPATIENT)
Dept: ADMINISTRATIVE | Facility: OTHER | Age: 73
End: 2023-04-21
Payer: MEDICARE

## 2023-04-24 ENCOUNTER — CLINICAL SUPPORT (OUTPATIENT)
Dept: REHABILITATION | Facility: HOSPITAL | Age: 73
End: 2023-04-24
Payer: MEDICARE

## 2023-04-24 ENCOUNTER — PATIENT MESSAGE (OUTPATIENT)
Dept: ADMINISTRATIVE | Facility: OTHER | Age: 73
End: 2023-04-24
Payer: MEDICARE

## 2023-04-24 DIAGNOSIS — M25.662 DECREASED ROM OF LEFT KNEE: ICD-10-CM

## 2023-04-24 DIAGNOSIS — M25.562 LEFT KNEE PAIN, UNSPECIFIED CHRONICITY: Primary | ICD-10-CM

## 2023-04-24 PROBLEM — Z00.00 ENCOUNTER FOR PREVENTIVE HEALTH EXAMINATION: Status: RESOLVED | Noted: 2023-01-23 | Resolved: 2023-04-24

## 2023-04-24 PROCEDURE — 97110 THERAPEUTIC EXERCISES: CPT

## 2023-04-24 PROCEDURE — 97014 ELECTRIC STIMULATION THERAPY: CPT

## 2023-04-24 PROCEDURE — 97112 NEUROMUSCULAR REEDUCATION: CPT

## 2023-04-24 NOTE — PROGRESS NOTES
[facility]   Physical Therapy Treatment Note     Name: Tomasa Reed  Clinic Number: [id number]    Therapy Diagnosis:   Encounter Diagnoses   Name Primary?    Left knee pain, unspecified chronicity Yes    Decreased ROM of left knee        Physician: Kulwant Echavarria*    Visit Date: 4/24/2023    Physician Orders: PT Eval and Treat   Medical Diagnosis from Referral: M17.12 (ICD-10-CM) - Primary osteoarthritis of left knee  Evaluation Date: 4/13/2023  Authorization Period Expiration: 05/11/2023  Plan of Care Expiration: 7/13/2023  Visit # / Visits authorized: 5 / 11 + eval     PTA Visit #: 3 / 5     Time In: 1400  Time Out: 1500  Total Treatment Time:  60 minutes  Total Billable Time:  60 minutes (1 NMR, 3 TE, 1 Estim Unattended)    Precautions: Standard and Fall    SUBJECTIVE     Patient reports: Cont improving function over time. Cont to have to take lyrica at night due to pain. Reduced resting level of pain in medial knee.    She was compliant with home exercise program.  Response to previous treatment: appropriate muscle response, muscle soreness  Functional change: ambulated from waiting room to clinic with RW    Pain: NT/10, currently  Location: Left knee    Patient goals: ADLs w/o complaint    OBJECTIVE     Objective Measures updated at progress report unless specified.   DOS 4/12/2023  2 week follow up on 4/27/2023 4/13/2023; ROM at eval  Range of Motion:   Knee Right Active Left Active Right Passive Left Passive   Flexion 135 35 deg 135 71 deg in supine   Extension +5 Lacking 3 +5 Lacking 5     4/14/2023; +1 degree hyperextension; 78 degrees AAROM  4/17/2023; 90 degrees AAROM Flexion  4/18/2023: 0 degrees of active extension ; 90 degrees of AA knee flexion  4/20/2023: 0-83 at start; 0-100 deg at end      Treatment     Tomasa received the treatments listed below:      therapeutic exercises to develop strength, endurance, ROM, flexibility, posture, and core  stabilization for 45 minutes including:    Patient education:  - HEP frequency  - HEP adjustments    Nu-Step x8 min Lvl 0 for LE ROM and endurance    Calf stretch towel 4x30 sec   HS stretch towel 4x30 sec  Hinge stretch with quad activation 20x5 sec    For medial knee pain:  HS stretch supine strap across body 3x30 sec  HS stretch supine strap outward 3x30 sec  Jose Luis stretch 4x30 sec    LAQ 5lbs 3x8-12 reps - medial knee pain  Sidelying hip ADDuction 3x15  Prone HS curl 5lbs 3x8-12      FUTURE VISITS - adductor strength / heel slides      Held:  Heel slides 3x20x5 sec      manual therapy techniques: Joint mobilizations were applied to the: Left knee for 00 minutes, including:    Held:  Seated traction with AP tibia Grade II-III  Grade III Patella Mobs anabel inferior  Fat pad mobilization Grade I-II    neuromuscular re-education activities to improve: Coordination, Kinesthetic, Proprioception and Posture and motor control for 15 minutes.     The following activities were included after being cleared for contradictions: Argentine Electrical Stimulation supervised to elicit muscle contraction of the quadriceps for 15 minutes. Pt received stimulation: Burst Frequency: 50 bps, Ramp: 2 sec, 10% duty cycle, amplitude 26 Luis with 10 second on time and 10 second off time while performing the following exercises. Patient tolerated treatment well without any adverse effects.     - Quad set (hyper) with towel       Held:  - Short arc quads with 1/2 bolster; 10 minutes   - Long arc quads at EOM; 5 minutes - use of green strap for active lifting  TKE with ball on wall; 10 second hold, 20 reps    ICE x10 min post visit    Patient Education and Home Exercises     Home Exercises Provided and Patient Education Provided     Education provided:   - See above    Written Home Exercises Provided: Patient instructed to cont prior HEP. Exercises were reviewed and Tomasa was able to demonstrate them prior to the end of the session.  Tomasa  demonstrated good  understanding of the education provided. See EMR under Patient Instructions for exercises provided during therapy sessions    ASSESSMENT     Overall cont to be progressing as expected.    Good quadriceps isolation and improved knee extension ROM with NMES and only quad set today within visit. Patient did get medial knee discomfort with LAQ. Able to reduce medial knee discomfort with quad stretching. Adjusted HEP according to challenge.    Cont focus on ROM, flexibility of calf, quad activation / strength.    Tomasa Is progressing well towards her goals.   Pt prognosis is Excellent.     Pt will continue to benefit from skilled outpatient physical therapy to address the deficits listed in the problem list box on initial evaluation, provide pt/family education and to maximize pt's level of independence in the home and community environment.   Pt's spiritual, cultural and educational needs considered and pt agreeable to plan of care and goals.     Anticipated barriers to physical therapy: age    Goals:   Short Term Goals: 0-6 weeks - Appropriate, ongoing  1.Report decreased L knee pain  < / =  1/10  to increase tolerance for amb  2. Increase knee ROM to 5-0-120 in order to be able to perform ADLs without difficulty.  3. Increase strength by 1/3 MMT grade in Quads to increase tolerance for ADL and work activities.  4. Pt to tolerate HEP to improve ROM and independence with ADL's     Long Term Goals: 6-12 weeks  1. Able to amb w/o deviation, complaint, or AD  2.Patient goal: ADLs w/o complaint  3.Increase strength to >/= 4+/5 in Quad and hip musculature to increase tolerance for ADL and work activities.  4. Pt will report at CJ level (20-40% impaired) on FOTO knee to demonstrate increase in LE function with every day tasks.     PLAN     Plan of Care Certification: 4/13/2023 to 7/13/2023     Outpatient Physical Therapy 2-3 times weekly for 10 - 12 weeks to include the following interventions: Gait  Training, Manual Therapy, Moist Heat/ Ice, Neuromuscular Re-ed, Patient Education, Self Care, Therapeutic Activities, and Therapeutic Exercise.     KEILA NIX, PT, DPT, OCS

## 2023-04-25 ENCOUNTER — CLINICAL SUPPORT (OUTPATIENT)
Dept: REHABILITATION | Facility: HOSPITAL | Age: 73
End: 2023-04-25
Payer: MEDICARE

## 2023-04-25 ENCOUNTER — PATIENT MESSAGE (OUTPATIENT)
Dept: SPORTS MEDICINE | Facility: CLINIC | Age: 73
End: 2023-04-25
Payer: MEDICARE

## 2023-04-25 DIAGNOSIS — M25.562 LEFT KNEE PAIN, UNSPECIFIED CHRONICITY: Primary | ICD-10-CM

## 2023-04-25 DIAGNOSIS — M25.662 DECREASED ROM OF LEFT KNEE: ICD-10-CM

## 2023-04-25 PROCEDURE — 97112 NEUROMUSCULAR REEDUCATION: CPT | Mod: CQ

## 2023-04-25 PROCEDURE — 97014 ELECTRIC STIMULATION THERAPY: CPT | Mod: CQ

## 2023-04-25 PROCEDURE — 97110 THERAPEUTIC EXERCISES: CPT | Mod: CQ

## 2023-04-25 NOTE — PROGRESS NOTES
[facility]   Physical Therapy Treatment Note     Name: Tomasa Reed  Clinic Number: [id number]    Therapy Diagnosis:   Encounter Diagnoses   Name Primary?    Left knee pain, unspecified chronicity Yes    Decreased ROM of left knee      Physician: Kulwant Echavarria*    Visit Date: 4/25/2023    Physician Orders: PT Eval and Treat   Medical Diagnosis from Referral: M17.12 (ICD-10-CM) - Primary osteoarthritis of left knee  Evaluation Date: 4/13/2023  Authorization Period Expiration: 05/11/2023  Plan of Care Expiration: 7/13/2023  Visit # / Visits authorized: 6 / 11 + eval     PTA Visit #: 1 / 5     Time In: 0904  Time Out: 1006  Total Treatment Time: 62 minutes  Total Billable Time: 30 minutes (1 TE, 1 NMR, 1 Estim)    Precautions: Standard and Fall    SUBJECTIVE     Patient reports: that she had a hard time last night due to sciatic nerve pain in her Left leg. She reports the pain is still there and starts in her low back to the knee. States that she tried her bike at home but is unable to make it all the way around.  She was compliant with home exercise program.  Response to previous treatment: appropriate muscle response, muscle soreness  Functional change: ambulated from waiting room to clinic with RW    Pain: 6/10, currently  Location: Left knee    Patient goals: ADLs w/o complaint    OBJECTIVE     Objective Measures updated at progress report unless specified.   DOS 4/12/2023  2 week follow up on 4/27/2023 4/13/2023; ROM at eval  Range of Motion:   Knee Right Active Left Active Right Passive Left Passive   Flexion 135 35 deg 135 71 deg in supine   Extension +5 Lacking 3 +5 Lacking 5     4/14/2023; +1 degree hyperextension; 78 degrees AAROM  4/17/2023; 90 degrees AAROM Flexion  4/18/2023: 0 degrees of active extension ; 90 degrees of AA knee flexion  4/20/2023: 0-83 at start; 0-100 deg at end    Treatment     Tomasa received the treatments listed below:       therapeutic exercises to develop strength, endurance, ROM, flexibility, posture, and core stabilization for 42 minutes including:  Patient education:  - HEP frequency  - HEP adjustments    Aerobic Activity for LE ROM and endurance; Nustep for 8 minutes, Lvl 1    Calf stretch towel; 4 x 30 second hold LLE  HS stretch towel; 4 x 30 second hold LLE  Hinge stretch with quad activation; 20 x 5 second hold     For medial knee pain:  HS stretch supine strap across body; 3 x 30 second hold  HS stretch supine strap outward; 3 x 30 second hold  Jose Luis stretch; 4 x 30 second hold (towel under thigh)    LAQ at EOM; 3 x 8-12 reps with no weight today  Sidelying hip ADDuction; 3 x 15 reps  Prone HS curl 5lbs 3x8-12    *Resume next visit;  Heel slides; 3 x 20 x 5 second hold    manual therapy techniques: Joint mobilizations were applied to the: Left knee for 5 minutes, including:  Left hip distraction (short axis with pull from thigh); 3 reps with 10 second hold - reports centralization of Left LE symptoms from ankle to buttock/low back    neuromuscular re-education activities to improve: Coordination, Kinesthetic, Proprioception and Posture and motor control for 15 minutes.   The following activities were included after being cleared for contradictions: [place]e Electrical Stimulation supervised to elicit muscle contraction of the quadriceps for 15 minutes. Pt received stimulation: Burst Frequency: 50 bps, Ramp: 2 sec, 10% duty cycle, amplitude 27 Luis with 10 second on time and 10 second off time while performing the following exercises. Patient tolerated treatment well without any adverse effects.     - Quad set (hyper) with towel    Patient Education and Home Exercises     Home Exercises Provided and Patient Education Provided     Education provided:   - See above    Written Home Exercises Provided: Patient instructed to cont prior HEP. Exercises were reviewed and Tomasa was able to demonstrate them prior to the end of the  session.  Tomasa demonstrated good  understanding of the education provided. See EMR under Patient Instructions for exercises provided during therapy sessions    ASSESSMENT     Ms. Randolph is 2 weeks s/p L TKA. She presents with significant sciatic nerve pain into her Left leg; this improved with manual care and patient noted centralization of her symptoms from her ankle to buttock/low back. She had fair tolerance to exercises performed reporting medial knee pain and exacerbation of LE discomfort likely due to neural tension and decreased tissue extensibility. She has good quadriceps contraction at this time. Reports improvement in symptoms post session. She is challenged with side lying hip adduction exercise. Continue per POC towards treatment goals.  Tomasa Is progressing well towards her goals.   Pt prognosis is Excellent.     Pt will continue to benefit from skilled outpatient physical therapy to address the deficits listed in the problem list box on initial evaluation, provide pt/family education and to maximize pt's level of independence in the home and community environment.   Pt's spiritual, cultural and educational needs considered and pt agreeable to plan of care and goals.     Anticipated barriers to physical therapy: age    Goals:   Short Term Goals: 0-6 weeks - Appropriate, ongoing  1.Report decreased L knee pain  < / =  1/10  to increase tolerance for amb  2. Increase knee ROM to 5-0-120 in order to be able to perform ADLs without difficulty.  3. Increase strength by 1/3 MMT grade in Quads to increase tolerance for ADL and work activities.  4. Pt to tolerate HEP to improve ROM and independence with ADL's     Long Term Goals: 6-12 weeks  1. Able to amb w/o deviation, complaint, or AD  2.Patient goal: ADLs w/o complaint  3.Increase strength to >/= 4+/5 in Quad and hip musculature to increase tolerance for ADL and work activities.  4. Pt will report at CJ level (20-40% impaired) on FOTO knee to  demonstrate increase in LE function with every day tasks.     PLAN     Plan of Care Certification: 4/13/2023 to 7/13/2023     Outpatient Physical Therapy 2-3 times weekly for 10 - 12 weeks to include the following interventions: Gait Training, Manual Therapy, Moist Heat/ Ice, Neuromuscular Re-ed, Patient Education, Self Care, Therapeutic Activities, and Therapeutic Exercise.     Victoria Modi, PTA

## 2023-04-27 ENCOUNTER — CLINICAL SUPPORT (OUTPATIENT)
Dept: REHABILITATION | Facility: HOSPITAL | Age: 73
End: 2023-04-27
Payer: MEDICARE

## 2023-04-27 ENCOUNTER — OFFICE VISIT (OUTPATIENT)
Dept: SPORTS MEDICINE | Facility: CLINIC | Age: 73
End: 2023-04-27
Payer: MEDICARE

## 2023-04-27 ENCOUNTER — HOSPITAL ENCOUNTER (OUTPATIENT)
Dept: RADIOLOGY | Facility: HOSPITAL | Age: 73
Discharge: HOME | End: 2023-04-27
Attending: PHYSICIAN ASSISTANT
Payer: MEDICARE

## 2023-04-27 VITALS
SYSTOLIC BLOOD PRESSURE: 140 MMHG | DIASTOLIC BLOOD PRESSURE: 69 MMHG | HEART RATE: 99 BPM | BODY MASS INDEX: 26.87 KG/M2 | HEIGHT: 62 IN | WEIGHT: 146 LBS

## 2023-04-27 DIAGNOSIS — M25.662 DECREASED ROM OF LEFT KNEE: ICD-10-CM

## 2023-04-27 DIAGNOSIS — Z96.652 S/P TOTAL KNEE ARTHROPLASTY, LEFT: ICD-10-CM

## 2023-04-27 DIAGNOSIS — Z96.652 S/P TOTAL KNEE ARTHROPLASTY, LEFT: Primary | ICD-10-CM

## 2023-04-27 DIAGNOSIS — M25.562 LEFT KNEE PAIN, UNSPECIFIED CHRONICITY: Primary | ICD-10-CM

## 2023-04-27 PROCEDURE — 1125F PR PAIN SEVERITY QUANTIFIED, PAIN PRESENT: ICD-10-PCS | Mod: CPTII,S$GLB,, | Performed by: PHYSICIAN ASSISTANT

## 2023-04-27 PROCEDURE — 99999 PR PBB SHADOW E&M-EST. PATIENT-LVL IV: ICD-10-PCS | Mod: PBBFAC,,, | Performed by: PHYSICIAN ASSISTANT

## 2023-04-27 PROCEDURE — 1159F PR MEDICATION LIST DOCUMENTED IN MEDICAL RECORD: ICD-10-PCS | Mod: CPTII,S$GLB,, | Performed by: PHYSICIAN ASSISTANT

## 2023-04-27 PROCEDURE — 97112 NEUROMUSCULAR REEDUCATION: CPT | Mod: CQ

## 2023-04-27 PROCEDURE — 73560 XR KNEE 1 OR 2 VIEW LEFT: ICD-10-PCS | Mod: 26,LT,, | Performed by: RADIOLOGY

## 2023-04-27 PROCEDURE — 3078F DIAST BP <80 MM HG: CPT | Mod: CPTII,S$GLB,, | Performed by: PHYSICIAN ASSISTANT

## 2023-04-27 PROCEDURE — 3008F BODY MASS INDEX DOCD: CPT | Mod: CPTII,S$GLB,, | Performed by: PHYSICIAN ASSISTANT

## 2023-04-27 PROCEDURE — 3078F PR MOST RECENT DIASTOLIC BLOOD PRESSURE < 80 MM HG: ICD-10-PCS | Mod: CPTII,S$GLB,, | Performed by: PHYSICIAN ASSISTANT

## 2023-04-27 PROCEDURE — 1125F AMNT PAIN NOTED PAIN PRSNT: CPT | Mod: CPTII,S$GLB,, | Performed by: PHYSICIAN ASSISTANT

## 2023-04-27 PROCEDURE — 99024 POSTOP FOLLOW-UP VISIT: CPT | Mod: S$GLB,,, | Performed by: PHYSICIAN ASSISTANT

## 2023-04-27 PROCEDURE — 97110 THERAPEUTIC EXERCISES: CPT | Mod: CQ

## 2023-04-27 PROCEDURE — 73560 X-RAY EXAM OF KNEE 1 OR 2: CPT | Mod: TC,LT

## 2023-04-27 PROCEDURE — 99024 PR POST-OP FOLLOW-UP VISIT: ICD-10-PCS | Mod: S$GLB,,, | Performed by: PHYSICIAN ASSISTANT

## 2023-04-27 PROCEDURE — 97014 ELECTRIC STIMULATION THERAPY: CPT | Mod: CQ

## 2023-04-27 PROCEDURE — 3077F SYST BP >= 140 MM HG: CPT | Mod: CPTII,S$GLB,, | Performed by: PHYSICIAN ASSISTANT

## 2023-04-27 PROCEDURE — 1160F PR REVIEW ALL MEDS BY PRESCRIBER/CLIN PHARMACIST DOCUMENTED: ICD-10-PCS | Mod: CPTII,S$GLB,, | Performed by: PHYSICIAN ASSISTANT

## 2023-04-27 PROCEDURE — 3077F PR MOST RECENT SYSTOLIC BLOOD PRESSURE >= 140 MM HG: ICD-10-PCS | Mod: CPTII,S$GLB,, | Performed by: PHYSICIAN ASSISTANT

## 2023-04-27 PROCEDURE — 1159F MED LIST DOCD IN RCRD: CPT | Mod: CPTII,S$GLB,, | Performed by: PHYSICIAN ASSISTANT

## 2023-04-27 PROCEDURE — 3008F PR BODY MASS INDEX (BMI) DOCUMENTED: ICD-10-PCS | Mod: CPTII,S$GLB,, | Performed by: PHYSICIAN ASSISTANT

## 2023-04-27 PROCEDURE — 99999 PR PBB SHADOW E&M-EST. PATIENT-LVL IV: CPT | Mod: PBBFAC,,, | Performed by: PHYSICIAN ASSISTANT

## 2023-04-27 PROCEDURE — 1160F RVW MEDS BY RX/DR IN RCRD: CPT | Mod: CPTII,S$GLB,, | Performed by: PHYSICIAN ASSISTANT

## 2023-04-27 PROCEDURE — 73560 X-RAY EXAM OF KNEE 1 OR 2: CPT | Mod: 26,LT,, | Performed by: RADIOLOGY

## 2023-04-27 NOTE — PROGRESS NOTES
[facility]   Physical Therapy Treatment Note     Name: Tomasa Reed  Clinic Number: [id number]    Therapy Diagnosis:   Encounter Diagnoses   Name Primary?    Left knee pain, unspecified chronicity Yes    Decreased ROM of left knee      Physician: Kulwant Echavarria*    Visit Date: 4/27/2023    Physician Orders: PT Eval and Treat   Medical Diagnosis from Referral: M17.12 (ICD-10-CM) - Primary osteoarthritis of left knee  Evaluation Date: 4/13/2023  Authorization Period Expiration: 05/11/2023  Plan of Care Expiration: 7/13/2023  Visit # / Visits authorized: 7 / 11 + eval     PTA Visit #: 2 / 5     Time In: 0955  Time Out: 1100  Total Treatment Time: 65 minutes  Total Billable Time: 35 minutes (1 TE, 1 NMR, 1 EStim)    Precautions: Standard and Fall    SUBJECTIVE     Patient reports: she just came from the doctor, they are happy. They suggested Lyrica for her sciatic nerve pain. She reports trouble sleeping because of this. Reports no real pain in her knee, states it is stiff.   She was compliant with home exercise program.  Response to previous treatment: appropriate muscle response, muscle soreness  Functional change: ambulated from waiting room to clinic with RW    Pain: NT/10, currently  Location: Left knee    Patient goals: ADLs w/o complaint    OBJECTIVE     Objective Measures updated at progress report unless specified.   DOS 4/12/2023  2 week follow up on 4/27/2023 4/13/2023; ROM at eval  Range of Motion:   Knee Right Active Left Active Right Passive Left Passive   Flexion 135 35 deg 135 71 deg in supine   Extension +5 Lacking 3 +5 Lacking 5     4/14/2023; +1 degree hyperextension; 78 degrees AAROM  4/17/2023; 90 degrees AAROM Flexion  4/18/2023: 0 degrees of active extension ; 90 degrees of AA knee flexion  4/20/2023: 0-83 at start; 0-100 deg at end  4/27/2023; 96 degrees AROM Flexion; limited with PROM secondary to sciatic nerve pain    Treatment     Tomasa  received the treatments listed below:      therapeutic exercises to develop strength, endurance, ROM, flexibility, posture, and core stabilization for 50 minutes including:  Patient education:  - HEP frequency  - HEP adjustments    Aerobic Activity for LE ROM and endurance; Nustep for 8 minutes, Lvl 1    Calf stretch towel; 4 x 30 second hold LLE  HS stretch towel; 4 x 30 second hold LLE  Hinge stretch with quad activation; 20 x 5 second hold     For medial knee pain:  HS stretch supine strap across body; 3 x 30 second hold  HS stretch supine strap outward; 3 x 30 second hold  Jose Luis stretch; 4 x 30 second hold (towel under thigh)    LAQ at EOM; 3 x 8-12 reps with no weight today  Sidelying hip ADDuction; 3 x 15 reps    *Resume next visit;  Prone HS curl; 5lbs, 3 x 8-12  Heel slides; 3 x 20 x 5 second hold    manual therapy techniques: Joint mobilizations were applied to the: Left knee for 00 minutes, including:  Left hip distraction (short axis with pull from thigh); 3 reps with 10 second hold - reports centralization of Left LE symptoms from ankle to buttock/low back    neuromuscular re-education activities to improve: Coordination, Kinesthetic, Proprioception and Posture and motor control for 15 minutes.   The following activities were included after being cleared for contradictions: [place] Electrical Stimulation supervised to elicit muscle contraction of the quadriceps for 15 minutes. Pt received stimulation: Burst Frequency: 50 bps, Ramp: 2 sec, 10% duty cycle, amplitude 27 Luis with 10 second on time and 10 second off time while performing the following exercises. Patient tolerated treatment well without any adverse effects.     - Quad set (hyper) with towel    Patient Education and Home Exercises     Home Exercises Provided and Patient Education Provided     Education provided:   - See above    Written Home Exercises Provided: Patient instructed to cont prior HEP. Exercises were reviewed and Tomasa was able  to demonstrate them prior to the end of the session.  Tomasa demonstrated good  understanding of the education provided. See EMR under Patient Instructions for exercises provided during therapy sessions    ASSESSMENT     Ms. Randolph is 2 weeks, 2 days s/p L TKA. She presents ambulating with RW. She reports increased sciatic nerve pain in LLE that is worse at night and with activity; responds well to manual care and stretching. She demonstrates good tolerance to exercises performed. Deferred use of weight today with long arc quads secondary to exacerbation of medial knee pain with addition of resistance. Continue per POC towards treatment goals.   Tomasa Is progressing well towards her goals.   Pt prognosis is Excellent.     Pt will continue to benefit from skilled outpatient physical therapy to address the deficits listed in the problem list box on initial evaluation, provide pt/family education and to maximize pt's level of independence in the home and community environment.   Pt's spiritual, cultural and educational needs considered and pt agreeable to plan of care and goals.     Anticipated barriers to physical therapy: age    Goals:   Short Term Goals: 0-6 weeks - Appropriate, ongoing  1.Report decreased L knee pain  < / =  1/10  to increase tolerance for amb  2. Increase knee ROM to 5-0-120 in order to be able to perform ADLs without difficulty.  3. Increase strength by 1/3 MMT grade in Quads to increase tolerance for ADL and work activities.  4. Pt to tolerate HEP to improve ROM and independence with ADL's     Long Term Goals: 6-12 weeks  1. Able to amb w/o deviation, complaint, or AD  2.Patient goal: ADLs w/o complaint  3.Increase strength to >/= 4+/5 in Quad and hip musculature to increase tolerance for ADL and work activities.  4. Pt will report at CJ level (20-40% impaired) on FOTO knee to demonstrate increase in LE function with every day tasks.     PLAN     Plan of Care Certification: 4/13/2023 to  7/13/2023     Outpatient Physical Therapy 2-3 times weekly for 10 - 12 weeks to include the following interventions: Gait Training, Manual Therapy, Moist Heat/ Ice, Neuromuscular Re-ed, Patient Education, Self Care, Therapeutic Activities, and Therapeutic Exercise.     Victoria Modi, PTA

## 2023-04-27 NOTE — PROGRESS NOTES
S:Tomasa Reed presents for post-operative evaluation.     DATE OF PROCEDURE: 4/12/2023      PROCEDURES PERFORMED:   Left total knee arthroplasty (CPT 70336)    Tomasa Reed reports to be doing well 2wk s/p the above mentioned procedure. Denies fevers, chills, night sweats, chest pain, difficulty breathing, calf pain or tenderness. Going to PT 2-3xWeek at the [place] location with Letha. Seeing good progress daily. Pain levels are improving. She takes one Oxycodone 5mg rarely as needed for pain (after PT). Also says her sciatica has recently been acting up over the past few days. The Lyrica helps with this. She is also taking Celebrex. She is still taking the ASA for DVT prophylaxis.    O: The incisions are healing well.  No signs of infection.  Staples were removed. No significant pain or unusual tenderness. Dressed with [place] bandage. Some bruising over anterior shin and medial thigh. aROM knee 3-100 degrees. Passively I am able to get her to full extension.    Imaging:  Plain radiographs of the left knee done today demonstrate post op total knee arthroplasty prosthesis in place and intact without complications.    A/P: Imaging was reviewed with the patient. She is doing great overall. Ok to shower with incision uncovered at this point. No submerging. Continue ASA until completion at 6 weeks post op. Plan to follow the rehab plan as previously outlined. RTC in 4 weeks.

## 2023-04-28 ENCOUNTER — PATIENT MESSAGE (OUTPATIENT)
Dept: SPORTS MEDICINE | Facility: CLINIC | Age: 73
End: 2023-04-28
Payer: MEDICARE

## 2023-05-01 ENCOUNTER — CLINICAL SUPPORT (OUTPATIENT)
Dept: REHABILITATION | Facility: HOSPITAL | Age: 73
End: 2023-05-01
Payer: MEDICARE

## 2023-05-01 DIAGNOSIS — M25.562 LEFT KNEE PAIN, UNSPECIFIED CHRONICITY: Primary | ICD-10-CM

## 2023-05-01 DIAGNOSIS — M25.662 DECREASED ROM OF LEFT KNEE: ICD-10-CM

## 2023-05-01 PROCEDURE — 97014 ELECTRIC STIMULATION THERAPY: CPT

## 2023-05-01 PROCEDURE — 97112 NEUROMUSCULAR REEDUCATION: CPT

## 2023-05-01 PROCEDURE — 97110 THERAPEUTIC EXERCISES: CPT

## 2023-05-01 NOTE — PROGRESS NOTES
[facility]   Physical Therapy Treatment Note     Name: Tomasa Reed  Clinic Number: [id number]    Therapy Diagnosis:   Encounter Diagnoses   Name Primary?    Left knee pain, unspecified chronicity Yes    Decreased ROM of left knee        Physician: Kulwant Echavarria*    Visit Date: 5/1/2023    Physician Orders: PT Eval and Treat   Medical Diagnosis from Referral: M17.12 (ICD-10-CM) - Primary osteoarthritis of left knee  Evaluation Date: 4/13/2023  Authorization Period Expiration: 05/11/2023  Plan of Care Expiration: 7/13/2023  Visit # / Visits authorized: 8 / 11 + eval     PTA Visit #: 2 / 5     Time In: 0950   Time Out: 1100   Total Treatment Time: 70 minutes  Total Billable Time: 40 minutes (2 TE, 1 NMR, 1 EStim)    Precautions: Standard and Fall    SUBJECTIVE     Patient reports: Knee is feeling good. Reports main complaint is hip / sciatic pain. Has difficulty sleeping because of this. TENS helped some.    She was compliant with home exercise program.  Response to previous treatment: appropriate muscle response, muscle soreness  Functional change: ambulated from waiting room to clinic with RW    Pain: NT/10, currently  Location: Left knee    Patient goals: ADLs w/o complaint    OBJECTIVE     Objective Measures updated at progress report unless specified.   DOS 4/12/2023  2 weeks and 5 days as of 5/1/23 4/13/2023; ROM at eval  Range of Motion:   Knee Right Active Left Active Right Passive Left Passive   Flexion 135 35 deg 135 71 deg in supine   Extension +5 Lacking 3 +5 Lacking 5     4/14/2023; +1 degree hyperextension; 78 degrees AAROM  4/17/2023; 90 degrees AAROM Flexion  4/18/2023: 0 degrees of active extension ; 90 degrees of AA knee flexion  4/20/2023: 0-83 at start; 0-100 deg at end  4/27/2023; 96 degrees AROM Flexion; limited with PROM secondary to sciatic nerve pain    Treatment     Tomasa received the treatments listed below:      therapeutic exercises to  develop strength, endurance, ROM, flexibility, posture, and core stabilization for 48 minutes (23 min one on one) including:    Patient education:  - HEP frequency  - HEP adjustments    Knee to chest stretch 5x30 sec  Sciatic nerve glide 3x20 reps  LTR 30x5 sec  Bookopener stretch L x2 min w/ and w/o pillow    Heel slides; 30 x 10 second hold  Prone HS curl; 5lbs, 3x10 ea  Sidesteps at counter x10 laps - no band    Held:  Aerobic Activity for LE ROM and endurance; Nustep for 8 minutes, Lvl 1  Calf stretch towel; 4 x 30 second hold LLE  HS stretch towel; 4 x 30 second hold LLE  Hinge stretch with quad activation; 20 x 5 second hold   For medial knee pain:  HS stretch supine strap across body; 3 x 30 second hold  HS stretch supine strap outward; 3 x 30 second hold  Jose Luis stretch; 4 x 30 second hold (towel under thigh)      manual therapy techniques: Joint mobilizations were applied to the: Left knee for 02 minutes, including:    Left hip distraction (short axis with pull from thigh); 3 reps with 10 second hold - reports centralization of Left LE symptoms from ankle to buttock/low back    neuromuscular re-education activities to improve: Coordination, Kinesthetic, Proprioception and Posture and motor control for 20 minutes (15 min one on one).     TKE GTB DL 20x10 sec  LAQ holds 20x10 sec hold - toes pointed    The following activities were included after being cleared for contradictions: [place] Electrical Stimulation supervised to elicit muscle contraction of the quadriceps for 10 minutes. Pt received stimulation: Burst Frequency: 50 bps, Ramp: 2 sec, 10% duty cycle, amplitude 27 Luis with 10 second on time and 10 second off time while performing the following exercises. Patient tolerated treatment well without any adverse effects.     - Quad set (hyper) with towel    Patient Education and Home Exercises     Home Exercises Provided and Patient Education Provided     Education provided:   - See above    Written Home  Exercises Provided: Patient instructed to cont prior HEP. Exercises were reviewed and Tomasa was able to demonstrate them prior to the end of the session.  Tomasa demonstrated good  understanding of the education provided. See EMR under Patient Instructions for exercises provided during therapy sessions    ASSESSMENT     Ms. Randolph is 2 weeks, 5 days s/p L TKA. Sciatic pain cont to affect sleep. Improved symptoms anabel following book opener stretching and with sciatic nerve glide. Secondary to sciatic nerve pain patient demonstrated reduced knee extension ROM today requiring focus along with cont quad activation / strength deficits. Giovanni all quad activities well anabel with cuing to point toes to reduce neural tension.    HEP adjusted to help address s/p L TKA and sciatic pains. Cont to progress as able.    Tomasa Is progressing well towards her goals.   Pt prognosis is Excellent.     Pt will continue to benefit from skilled outpatient physical therapy to address the deficits listed in the problem list box on initial evaluation, provide pt/family education and to maximize pt's level of independence in the home and community environment.   Pt's spiritual, cultural and educational needs considered and pt agreeable to plan of care and goals.     Anticipated barriers to physical therapy: age    Goals:   Short Term Goals: 0-6 weeks - Appropriate, ongoing  1.Report decreased L knee pain  < / =  1/10  to increase tolerance for amb  2. Increase knee ROM to 5-0-120 in order to be able to perform ADLs without difficulty.  3. Increase strength by 1/3 MMT grade in Quads to increase tolerance for ADL and work activities.  4. Pt to tolerate HEP to improve ROM and independence with ADL's     Long Term Goals: 6-12 weeks  1. Able to amb w/o deviation, complaint, or AD  2.Patient goal: ADLs w/o complaint  3.Increase strength to >/= 4+/5 in Quad and hip musculature to increase tolerance for ADL and work activities.  4. Pt will report at  CJ level (20-40% impaired) on FOTO knee to demonstrate increase in LE function with every day tasks.     PLAN     Plan of Care Certification: 4/13/2023 to 7/13/2023     Outpatient Physical Therapy 2-3 times weekly for 10 - 12 weeks to include the following interventions: Gait Training, Manual Therapy, Moist Heat/ Ice, Neuromuscular Re-ed, Patient Education, Self Care, Therapeutic Activities, and Therapeutic Exercise.     KEILA NIX, PT, DPT, OCS

## 2023-05-02 ENCOUNTER — CLINICAL SUPPORT (OUTPATIENT)
Dept: REHABILITATION | Facility: HOSPITAL | Age: 73
End: 2023-05-02
Payer: MEDICARE

## 2023-05-02 ENCOUNTER — PATIENT MESSAGE (OUTPATIENT)
Dept: REHABILITATION | Facility: HOSPITAL | Age: 73
End: 2023-05-02

## 2023-05-02 DIAGNOSIS — M25.562 LEFT KNEE PAIN, UNSPECIFIED CHRONICITY: Primary | ICD-10-CM

## 2023-05-02 DIAGNOSIS — M25.662 DECREASED ROM OF LEFT KNEE: ICD-10-CM

## 2023-05-02 PROCEDURE — 97110 THERAPEUTIC EXERCISES: CPT | Mod: CQ

## 2023-05-02 PROCEDURE — 97140 MANUAL THERAPY 1/> REGIONS: CPT | Mod: CQ

## 2023-05-02 PROCEDURE — 97112 NEUROMUSCULAR REEDUCATION: CPT | Mod: CQ

## 2023-05-02 NOTE — PROGRESS NOTES
[facility]   Physical Therapy Treatment Note     Name: Tomasa Reed  Clinic Number: [id number]    Therapy Diagnosis:   Encounter Diagnoses   Name Primary?    Left knee pain, unspecified chronicity Yes    Decreased ROM of left knee        Physician: Kulwant Echavarria*    Visit Date: 5/2/2023    Physician Orders: PT Eval and Treat   Medical Diagnosis from Referral: M17.12 (ICD-10-CM) - Primary osteoarthritis of left knee  Evaluation Date: 4/13/2023  Authorization Period Expiration: 05/11/2023  Plan of Care Expiration: 7/13/2023  Visit # / Visits authorized: 9 / 11 + eval     PTA Visit #: 1 / 5     Time In: 11:00 am   Time Out: 12:00 pm   Total Treatment Time: 60 minutes  Total Billable Time: 60 minutes (2 TE, 1 NMR, 1-MT, )    Precautions: Standard and Fall    SUBJECTIVE     Patient reports: shes been taking the lyrica twice a day since Friday . She doesn't feel its helping because she was still having symptoms last night which cause her to have trouble sleeping. Her knee feels fine.     She was compliant with home exercise program.  Response to previous treatment: appropriate muscle response, muscle soreness  Functional change: ambulated from waiting room to clinic with RW    Pain: NT/10, currently  Location: Left knee    Patient goals: ADLs w/o complaint    OBJECTIVE     Objective Measures updated at progress report unless specified.   DOS 4/12/2023  2 weeks and 5 days as of 5/1/23 4/13/2023; ROM at eval  Range of Motion:   Knee Right Active Left Active Right Passive Left Passive   Flexion 135 35 deg 135 71 deg in supine   Extension +5 Lacking 3 +5 Lacking 5     4/14/2023; +1 degree hyperextension; 78 degrees AAROM  4/17/2023; 90 degrees AAROM Flexion  4/18/2023: 0 degrees of active extension ; 90 degrees of AA knee flexion  4/20/2023: 0-83 at start; 0-100 deg at end  4/27/2023; 96 degrees AROM Flexion; limited with PROM secondary to sciatic nerve pain  5/2/23 : 108  degrees active flexion     Treatment     Tomasa received the treatments listed below:      therapeutic exercises to develop strength, endurance, ROM, flexibility, posture, and core stabilization for 30 minutes including:    Knee to chest stretch 5x30 sec  Sciatic nerve glide 3x20 reps  LTR 30x5 sec  Bookopener stretch L x2 min w/ and w/o pillow    Heel slides; 30 x 10 second hold  Prone HS curl; 5lbs, 3x10 ea  Sidesteps at counter x10 laps - no band    Held:  Aerobic Activity for LE ROM and endurance; Nustep for 8 minutes, Lvl 1  Calf stretch towel; 4 x 30 second hold LLE  HS stretch towel; 4 x 30 second hold LLE  Hinge stretch with quad activation; 20 x 5 second hold   For medial knee pain:  HS stretch supine strap across body; 3 x 30 second hold  HS stretch supine strap outward; 3 x 30 second hold  Jose Luis stretch; 4 x 30 second hold (towel under thigh)      manual therapy techniques: Joint mobilizations were applied to the: Left knee for 10 minutes, including:    Left hip distraction (short axis with pull from thigh); 3 reps with 10 second hold - reports centralization of Left LE symptoms from ankle to buttock/low back  Patellar mobilizations all directions   Inferior patellar glide with posterior tibial mob    neuromuscular re-education activities to improve: Coordination, Kinesthetic, Proprioception and Posture and motor control for 20 minutes.     TKE GTB DL 20x10 sec  LAQ holds 20x10 sec hold - toes pointed    The following activities were included after being cleared for contradictions: [place] Electrical Stimulation supervised to elicit muscle contraction of the quadriceps for 10 minutes. Pt received stimulation: Burst Frequency: 50 bps, Ramp: 2 sec, 10% duty cycle, amplitude 27 Luis with 10 second on time and 10 second off time while performing the following exercises. Patient tolerated treatment well without any adverse effects.     - Quad set (hyper) with towel    Patient Education and Home Exercises      Home Exercises Provided and Patient Education Provided     Education provided:   - See above    Written Home Exercises Provided: Patient instructed to cont prior HEP. Exercises were reviewed and Tomasa was able to demonstrate them prior to the end of the session.  Tomasa demonstrated good  understanding of the education provided. See EMR under Patient Instructions for exercises provided during therapy sessions    ASSESSMENT      Ms. Randolph is 2 weeks, 6 days s/p L TKA. She continues to report sciatic pain in the LLE. She responds well to manual interventions performed with centralization noted . Also good response to manual interventions to knee and able to achieve 108 degrees AROM flexion today. She remains limited with achieving full knee extension due to sciatica pain. Practiced gait training with SPC today which pt tolerated well with good form, erik and stability and would be appropriate to begin use at home . Will continue to monitor and progress .       Tomasa Is progressing well towards her goals.   Pt prognosis is Excellent.     Pt will continue to benefit from skilled outpatient physical therapy to address the deficits listed in the problem list box on initial evaluation, provide pt/family education and to maximize pt's level of independence in the home and community environment.   Pt's spiritual, cultural and educational needs considered and pt agreeable to plan of care and goals.     Anticipated barriers to physical therapy: age    Goals:   Short Term Goals: 0-6 weeks - Appropriate, ongoing  1.Report decreased L knee pain  < / =  1/10  to increase tolerance for amb  2. Increase knee ROM to 5-0-120 in order to be able to perform ADLs without difficulty.  3. Increase strength by 1/3 MMT grade in Quads to increase tolerance for ADL and work activities.  4. Pt to tolerate HEP to improve ROM and independence with ADL's     Long Term Goals: 6-12 weeks  1. Able to amb w/o deviation, complaint, or  AD  2.Patient goal: ADLs w/o complaint  3.Increase strength to >/= 4+/5 in Quad and hip musculature to increase tolerance for ADL and work activities.  4. Pt will report at CJ level (20-40% impaired) on FOTO knee to demonstrate increase in LE function with every day tasks.     PLAN     Plan of Care Certification: 4/13/2023 to 7/13/2023     Outpatient Physical Therapy 2-3 times weekly for 10 - 12 weeks to include the following interventions: Gait Training, Manual Therapy, Moist Heat/ Ice, Neuromuscular Re-ed, Patient Education, Self Care, Therapeutic Activities, and Therapeutic Exercise.     Candi Dupree, PTA

## 2023-05-03 ENCOUNTER — PATIENT MESSAGE (OUTPATIENT)
Dept: SPORTS MEDICINE | Facility: CLINIC | Age: 73
End: 2023-05-03
Payer: MEDICARE

## 2023-05-03 ENCOUNTER — PATIENT MESSAGE (OUTPATIENT)
Dept: DERMATOLOGY | Facility: CLINIC | Age: 73
End: 2023-05-03
Payer: MEDICARE

## 2023-05-03 DIAGNOSIS — Z96.652 S/P TOTAL KNEE ARTHROPLASTY, LEFT: Primary | ICD-10-CM

## 2023-05-04 ENCOUNTER — CLINICAL SUPPORT (OUTPATIENT)
Dept: REHABILITATION | Facility: HOSPITAL | Age: 73
End: 2023-05-04
Payer: MEDICARE

## 2023-05-04 DIAGNOSIS — M25.562 LEFT KNEE PAIN, UNSPECIFIED CHRONICITY: Primary | ICD-10-CM

## 2023-05-04 DIAGNOSIS — M25.662 DECREASED ROM OF LEFT KNEE: ICD-10-CM

## 2023-05-04 PROCEDURE — 97112 NEUROMUSCULAR REEDUCATION: CPT | Mod: KX

## 2023-05-04 PROCEDURE — 97140 MANUAL THERAPY 1/> REGIONS: CPT | Mod: KX

## 2023-05-04 PROCEDURE — 97110 THERAPEUTIC EXERCISES: CPT | Mod: KX

## 2023-05-04 NOTE — PROGRESS NOTES
[facility]   Physical Therapy Treatment Note     Name: Tomasa Reed  Clinic Number: [id number]    Therapy Diagnosis:   Encounter Diagnoses   Name Primary?    Left knee pain, unspecified chronicity Yes    Decreased ROM of left knee        Physician: Kulwant Echavarria*    Visit Date: 5/4/2023    Physician Orders: PT Eval and Treat   Medical Diagnosis from Referral: M17.12 (ICD-10-CM) - Primary osteoarthritis of left knee  Evaluation Date: 4/13/2023  Authorization Period Expiration: 05/11/2023  Plan of Care Expiration: 7/13/2023  Visit # / Visits authorized: 10 / 11 + eval     PTA Visit #: 1 / 5     Time In: 0900  Time Out: 1020  Total Treatment Time: 80 minutes  Total Billable Time: 45 minutes (1 TE, 1 NMR, 1 MT)    Precautions: Standard and Fall    SUBJECTIVE     Patient reports: Relief from manual traction x2 days, but pain into buttock and down leg has returned affecting sleep. Knee cont to feel progression over time.    She was compliant with home exercise program.  Response to previous treatment: appropriate muscle response, muscle soreness  Functional change: ambulated from waiting room to clinic with RW    Pain: NT/10, currently  Location: Left knee    Patient goals: ADLs w/o complaint    OBJECTIVE     Objective Measures updated at progress report unless specified.   DOS 4/12/2023  3 weeks and 1 days as of 5/4/23 4/13/2023; ROM at eval  Range of Motion:   Knee Right Active Left Active Right Passive Left Passive   Flexion 135 35 deg  135 71 deg in supine   Extension +5 Lacking 3  +5 Lacking 5     4/14/2023; +1 degree hyperextension; 78 degrees AAROM  4/17/2023; 90 degrees AAROM Flexion  4/18/2023: 0 degrees of active extension ; 90 degrees of AA knee flexion  4/20/2023: 0-83 at start; 0-100 deg at end  4/27/2023; 96 degrees AROM Flexion; limited with PROM secondary to sciatic nerve pain  5/2/23 : 108 degrees active flexion   5/4/23 : Start 0-5-105; End 0-116 deg      Treatment     Tomasa received the treatments listed below:      therapeutic exercises to develop strength, endurance, ROM, flexibility, posture, and core stabilization for 55 minutes (20 min one on one) including:    Aerobic Activity for LE ROM and endurance; Nustep for 8 minutes, Lvl 1  Heel slides; 30 x 5 second hold  Sciatic nerve glide 3x20 reps  Prone HS curl; 5lbs, 3x15 ea  Bookopener stretch 2x1 min ea w/ wedge  SLR 3x12-15  Sidesteps at counter x10 laps - no band    Patient education:  - review of HEP in detail  - importance of reducing back tightness with regards to knee and distal symptoms  - importance of knee extension ROM and quad strength with HEP -> progress SLR to 3x12-15 reps      NEXT VISIT - Reduce radicular symptoms prior to quad progressions ideally WB      Held:  Knee to chest stretch 5x30 sec  LTR 30x5 sec  Calf stretch towel; 4 x 30 second hold LLE  HS stretch towel; 4 x 30 second hold LLE  Hinge stretch with quad activation; 20 x 5 second hold   For medial knee pain:  HS stretch supine strap across body; 3 x 30 second hold  HS stretch supine strap outward; 3 x 30 second hold  Jose Luis stretch; 4 x 30 second hold (towel under thigh)      manual therapy techniques: Joint mobilizations were applied to the: Left knee for 15 minutes one on one, including:    Left hip distraction (short axis with pull from thigh); 3 reps with 10 second hold - reports centralization of Left LE symptoms from ankle to buttock/low back  Gapping into L rotation Grade II-III  STM to L L/S paraspinals  Patellar mobilizations all directions   Inferior patellar glide with posterior tibial mob      neuromuscular re-education activities to improve: Coordination, Kinesthetic, Proprioception and Posture and motor control for 10 minutes one on one.     PPT brace [id number] sec - burning  PPT brace march x20 - burning  Hallowing TrA activation 10x10 sec - burning    Held:  TKE GTB DL 20x10 sec  LAQ holds 20x10 sec hold - toes  pointed  The following activities were included after being cleared for contradictions: [place] Electrical Stimulation supervised to elicit muscle contraction of the quadriceps for 10 minutes. Pt received stimulation: Burst Frequency: 50 bps, Ramp: 2 sec, 10% duty cycle, amplitude 27 Luis with 10 second on time and 10 second off time while performing the following exercises. Patient tolerated treatment well without any adverse effects.     - Quad set (hyper) with towel    Patient Education and Home Exercises     Home Exercises Provided and Patient Education Provided     Education provided:   - See above    Written Home Exercises Provided: Patient instructed to cont prior HEP. Exercises were reviewed and Tomasa was able to demonstrate them prior to the end of the session.  Tomasa demonstrated good  understanding of the education provided. See EMR under Patient Instructions for exercises provided during therapy sessions 5/1/2023    ASSESSMENT     Patient is 3 weeks s/p L TKA. Cont to have s/s of L/S radiculopathy into L LE secondary to DDD/DJD. Able to centralize symptoms with manual interventions along with book opener stretching; advised patient to cont with increased frequency of this at home to reduce symptoms.    Regarding L knee, patient initially was lacking adequate knee extension ROM in part due to neural tension and joint hypomobility. Improved to 0 deg following interventions within visit.     Reviewed HEP in great detail with patient to help reduce radicular symptoms while progressing s/p L TKA as both affect one another. Demonstrated good verbal understanding.    Cont to address L radicular symptoms to help progress s/p L TKA.    Tomasa Is progressing well towards her goals.   Pt prognosis is Excellent.     Pt will continue to benefit from skilled outpatient physical therapy to address the deficits listed in the problem list box on initial evaluation, provide pt/family education and to maximize pt's  level of independence in the home and community environment.   Pt's spiritual, cultural and educational needs considered and pt agreeable to plan of care and goals.     Anticipated barriers to physical therapy: age    Goals:   Short Term Goals: 0-6 weeks - Appropriate, ongoing  1.Report decreased L knee pain  < / =  1/10  to increase tolerance for amb  2. Increase knee ROM to 5-0-120 in order to be able to perform ADLs without difficulty.  3. Increase strength by 1/3 MMT grade in Quads to increase tolerance for ADL and work activities.  4. Pt to tolerate HEP to improve ROM and independence with ADL's     Long Term Goals: 6-12 weeks  1. Able to amb w/o deviation, complaint, or AD  2.Patient goal: ADLs w/o complaint  3.Increase strength to >/= 4+/5 in Quad and hip musculature to increase tolerance for ADL and work activities.  4. Pt will report at CJ level (20-40% impaired) on FOTO knee to demonstrate increase in LE function with every day tasks.     PLAN     Plan of Care Certification: 4/13/2023 to 7/13/2023     Outpatient Physical Therapy 2-3 times weekly for 10 - 12 weeks to include the following interventions: Gait Training, Manual Therapy, Moist Heat/ Ice, Neuromuscular Re-ed, Patient Education, Self Care, Therapeutic Activities, and Therapeutic Exercise.     KEILA NIX, PT, DPT, OCS

## 2023-05-05 ENCOUNTER — OFFICE VISIT (OUTPATIENT)
Dept: DERMATOLOGY | Facility: CLINIC | Age: 73
End: 2023-05-05
Payer: MEDICARE

## 2023-05-05 DIAGNOSIS — D48.5 NEOPLASM OF UNCERTAIN BEHAVIOR OF SKIN: Primary | ICD-10-CM

## 2023-05-05 PROCEDURE — 99999 PR PBB SHADOW E&M-EST. PATIENT-LVL III: CPT | Mod: PBBFAC,,, | Performed by: DERMATOLOGY

## 2023-05-05 PROCEDURE — 88305 TISSUE EXAM BY PATHOLOGIST: CPT | Mod: 26,,, | Performed by: DERMATOLOGY

## 2023-05-05 PROCEDURE — 11102 TANGNTL BX SKIN SINGLE LES: CPT | Mod: S$GLB,,, | Performed by: DERMATOLOGY

## 2023-05-05 PROCEDURE — 3288F FALL RISK ASSESSMENT DOCD: CPT | Mod: CPTII,S$GLB,, | Performed by: DERMATOLOGY

## 2023-05-05 PROCEDURE — 1126F PR PAIN SEVERITY QUANTIFIED, NO PAIN PRESENT: ICD-10-PCS | Mod: CPTII,S$GLB,, | Performed by: DERMATOLOGY

## 2023-05-05 PROCEDURE — 88342 CHG IMMUNOCYTOCHEMISTRY: ICD-10-PCS | Mod: 26,,, | Performed by: DERMATOLOGY

## 2023-05-05 PROCEDURE — 1159F MED LIST DOCD IN RCRD: CPT | Mod: CPTII,S$GLB,, | Performed by: DERMATOLOGY

## 2023-05-05 PROCEDURE — 1126F AMNT PAIN NOTED NONE PRSNT: CPT | Mod: CPTII,S$GLB,, | Performed by: DERMATOLOGY

## 2023-05-05 PROCEDURE — 1160F PR REVIEW ALL MEDS BY PRESCRIBER/CLIN PHARMACIST DOCUMENTED: ICD-10-PCS | Mod: CPTII,S$GLB,, | Performed by: DERMATOLOGY

## 2023-05-05 PROCEDURE — 88342 IMHCHEM/IMCYTCHM 1ST ANTB: CPT | Mod: 26,,, | Performed by: DERMATOLOGY

## 2023-05-05 PROCEDURE — 99499 UNLISTED E&M SERVICE: CPT | Mod: S$GLB,,, | Performed by: DERMATOLOGY

## 2023-05-05 PROCEDURE — 1101F PT FALLS ASSESS-DOCD LE1/YR: CPT | Mod: CPTII,S$GLB,, | Performed by: DERMATOLOGY

## 2023-05-05 PROCEDURE — 3288F PR FALLS RISK ASSESSMENT DOCUMENTED: ICD-10-PCS | Mod: CPTII,S$GLB,, | Performed by: DERMATOLOGY

## 2023-05-05 PROCEDURE — 88305 TISSUE EXAM BY PATHOLOGIST: CPT | Performed by: DERMATOLOGY

## 2023-05-05 PROCEDURE — 11102 PR TANGENTIAL BIOPSY, SKIN, SINGLE LESION: ICD-10-PCS | Mod: S$GLB,,, | Performed by: DERMATOLOGY

## 2023-05-05 PROCEDURE — 1159F PR MEDICATION LIST DOCUMENTED IN MEDICAL RECORD: ICD-10-PCS | Mod: CPTII,S$GLB,, | Performed by: DERMATOLOGY

## 2023-05-05 PROCEDURE — 99499 NO LOS: ICD-10-PCS | Mod: S$GLB,,, | Performed by: DERMATOLOGY

## 2023-05-05 PROCEDURE — 1101F PR PT FALLS ASSESS DOC 0-1 FALLS W/OUT INJ PAST YR: ICD-10-PCS | Mod: CPTII,S$GLB,, | Performed by: DERMATOLOGY

## 2023-05-05 PROCEDURE — 99999 PR PBB SHADOW E&M-EST. PATIENT-LVL III: ICD-10-PCS | Mod: PBBFAC,,, | Performed by: DERMATOLOGY

## 2023-05-05 PROCEDURE — 1160F RVW MEDS BY RX/DR IN RCRD: CPT | Mod: CPTII,S$GLB,, | Performed by: DERMATOLOGY

## 2023-05-05 PROCEDURE — 88305 TISSUE EXAM BY PATHOLOGIST: ICD-10-PCS | Mod: 26,,, | Performed by: DERMATOLOGY

## 2023-05-05 PROCEDURE — 88342 IMHCHEM/IMCYTCHM 1ST ANTB: CPT | Performed by: DERMATOLOGY

## 2023-05-05 NOTE — PROGRESS NOTES
Subjective:      Patient ID:  Tomasa Reed is a 72 y.o. female who presents for   Chief Complaint   Patient presents with    Mole     R calf     Mole  Patient with new complaint of lesion(s)  Location: R calf  Duration: years  Symptoms: discolored  Relieving factors/Previous treatments: none    Had L knee replacement 3 wks ago.  A couple days ago, she noticed this mole present x yrs on her leg had become dark.    Review of Systems   Skin:  Positive for daily sunscreen use (on face) and activity-related sunscreen use. Negative for recent sunburn.   Hematologic/Lymphatic: Bruises/bleeds easily (bruises).     Objective:   Physical Exam   Constitutional: She appears well-developed and well-nourished. No distress.   Neurological: She is alert and oriented to person, place, and time. She is not disoriented.   Psychiatric: She has a normal mood and affect.   Skin:   Areas Examined (abnormalities noted in diagram):   RLE Inspected            Diagram Legend     Erythematous scaling macule/papule c/w actinic keratosis       Vascular papule c/w angioma      Pigmented verrucoid papule/plaque c/w seborrheic keratosis      Yellow umbilicated papule c/w sebaceous hyperplasia      Irregularly shaped tan macule c/w lentigo     1-2 mm smooth white papules consistent with Milia      Movable subcutaneous cyst with punctum c/w epidermal inclusion cyst      Subcutaneous movable cyst c/w pilar cyst      Firm pink to brown papule c/w dermatofibroma      Pedunculated fleshy papule(s) c/w skin tag(s)      Evenly pigmented macule c/w junctional nevus     Mildly variegated pigmented, slightly irregular-bordered macule c/w mildly atypical nevus      Flesh colored to evenly pigmented papule c/w intradermal nevus       Pink pearly papule/plaque c/w basal cell carcinoma      Erythematous hyperkeratotic cursted plaque c/w SCC      Surgical scar with no sign of skin cancer recurrence      Open and closed comedones      Inflammatory  papules and pustules      Verrucoid papule consistent consistent with wart     Erythematous eczematous patches and plaques     Dystrophic onycholytic nail with subungual debris c/w onychomycosis     Umbilicated papule    Erythematous-base heme-crusted tan verrucoid plaque consistent with inflamed seborrheic keratosis     Erythematous Silvery Scaling Plaque c/w Psoriasis     See annotation      Assessment / Plan:    Neoplasm of uncertain behavior of skin  Shave biopsy procedure note:    Shave biopsy performed after verbal consent including risk of infection, scar, recurrence, need for additional treatment of site. Area prepped with alcohol, anesthetized with approximately 1.0cc of 1% lidocaine with epinephrine. Lesional tissue shaved with razor blade. Hemostasis achieved with application of aluminum chloride followed by hyfrecation. No complications. Dressing applied. Wound care explained.    -     Specimen to Pathology, Dermatology  Pathology Orders:       Normal Orders This Visit    Specimen to Pathology, Dermatology     Questions:    Procedure Type: Dermatology and skin neoplasms    Number of Specimens: 1    ------------------------: -------------------------    Spec 1 Procedure: Biopsy    Spec 1 Clinical Impression: r/o MM vs inflamed nevus vs traumatized angioma vs other    Spec 1 Source: R medial lower leg    Release to patient:           RTC for skin check as previously recommended or sooner pending bx results

## 2023-05-05 NOTE — PATIENT INSTRUCTIONS
Shave Biopsy Wound Care    Your doctor has performed a shave biopsy today.  A band aid and vaseline ointment has been placed over the site.  This should remain in place for NO LONGER THAN 48 hours.  It is fine to remove the bandaid after 24 hours, if the area is no longer bleeding. It is recommended that you keep the area dry (do not wet)) for the first 24 hours.  After 24 hours, wash the area with warm soap and water and apply Vaseline jelly.  Many patients prefer to use Neosporin or Bacitracin ointment.  This is acceptable; however, know that you can develop an allergy to this medication even if you have used it safely for years.  It is important to keep the area moist.  Letting it dry out and get air slows healing time, and will worsen the scar.        If you notice increasing redness, tenderness, pain, or yellow drainage at the biopsy site, please notify your doctor.  These are signs of an infection.    If your biopsy site is bleeding, apply firm pressure for 15 minutes straight.  Repeat for another 15 minutes, if it is still bleeding.   If the surgical site continues to bleed, then please contact your doctor.      For [facility] users:   You will receive your biopsy results in [facility] as soon as they are available. Please be assured that your physician/provider will review your results and will then determine what further treatment, evaluation, or planning is required. You should be contacted by your physician's/provider's office within 5 business days of receiving your results; If not, please reach out to directly. This is one more way [facility] is putting you first.     [facility] [place] [address]/ [phone] FAX/ [email or web address]     Sun Protection      The [facility] D[facility]at[facility] would like to remind you of the importance of sun protection all year round and particularly during the summer when the suns rays are the strongest. It has been proven that both acute  and chronic sun exposure damages our cells and leads to skin cancer. Beyond skin cancer, the sun causes 90% of the symptoms of premature skin aging, including wrinkles, lentigines (brown spots), and thin, easily bruised skin. Proper sun protection can help prevent these unwanted conditions.    Many patients report that they dont go in the sun. It has been shown that the average person receives 18 hours of incidental sun exposure per week during activities such as walking through parking lots, driving, or sitting next to windows. This accumulates to several bad sunburns per year!    In choosing sunscreen, you want one that protects against both UVA and UVB rays (broad spectrum). It is recommended that you use one of SPF 30 or higher. It is important to apply the sunscreen about 20 minutes prior to sun exposure. Most sunscreens are chemical sunscreens and a reaction must take place in the skin so that they are effective. If they are applied and then you are immediately exposed to the sun or start sweating, this reaction has not had time to take place and you are therefore unprotected. Sunscreen needs to be reapplied every 2 hours if you are participating in water sports or sweating. We recommend Elta MD or CeraVe sunscreens for daily use; however there are many options and it is most important for you to find one that you will use on a consistent basis.    If you have sensitive skin, you may do best with a sunscreen that contains only physical blockers in the active ingredient section. The only physical blockers available in the [place] currently are titanium dioxide or zinc oxide. These are typically thicker and harder to apply, however they afford very good protection. Neutrogena Sensitive Skin, Blue Lizard Sensitive Skin (pink top) or Neutrogena Pure and Free are popular ones.     Aside from sunscreen, clothes with UV protection (UPF), wide brimmed hats, and sunglasses are other means of sun protection that we  recommend.      Based on a recent study (6/2021) and out of an abundance of caution, we are recommending that you AVOID the following sunscreens as they may contain the carcinogen, benzene:    Spray and gel sunscreens  Any [employer] or [facility] brands as well as Max Block and TopCare brands   Neutrogena Ultra Sheer Dry-touch Water Resistant Sunscreen LOTION SPF 70   Neutrogena Sheer Zinc Dry-touch Face Sunscreen LOTION SPF 50   5.   Aveeno Baby Continuous Protection Sensitive Skin Sunscreen LOTION - Broad Spectrum SPF 50    Please note that Benzene is not an ingredient or the degradation product of any ingredient in any sunscreen. This study suggested that the findings are a result of contamination in the manufacturing process. At this point, we don't know how effectively Benzene gets through the skin, if it gets absorbed systemically, and what effects it may have.     We do know that ultraviolet radiation is a well-established carcinogen. Please use daily sun protection/avoidance and use of at least SPF 30, broad-spectrum sunscreen not listed above.                       [facility]  [address]  [facility] [address]  Dept: [phone]  Dept Fax: [phone]

## 2023-05-08 ENCOUNTER — CLINICAL SUPPORT (OUTPATIENT)
Dept: REHABILITATION | Facility: HOSPITAL | Age: 73
End: 2023-05-08
Payer: MEDICARE

## 2023-05-08 DIAGNOSIS — M25.562 LEFT KNEE PAIN, UNSPECIFIED CHRONICITY: Primary | ICD-10-CM

## 2023-05-08 DIAGNOSIS — M25.662 DECREASED ROM OF LEFT KNEE: ICD-10-CM

## 2023-05-08 PROCEDURE — 97112 NEUROMUSCULAR REEDUCATION: CPT | Mod: KX

## 2023-05-08 PROCEDURE — 97110 THERAPEUTIC EXERCISES: CPT | Mod: KX

## 2023-05-08 PROCEDURE — 97530 THERAPEUTIC ACTIVITIES: CPT | Mod: KX

## 2023-05-08 NOTE — PLAN OF CARE
[facility]   Physical Therapy Treatment Note     Name: Tomasa Reed  Clinic Number: [id number]    Therapy Diagnosis:   Encounter Diagnoses   Name Primary?    Left knee pain, unspecified chronicity Yes    Decreased ROM of left knee      Physician: Kulwant Echavarria*    Visit Date: 5/8/2023    Physician Orders: PT Eval and Treat   Medical Diagnosis from Referral: M17.12 (ICD-10-CM) - Primary osteoarthritis of left knee  Evaluation Date: 4/13/2023  Authorization Period Expiration: 05/11/2023  Plan of Care Expiration: 7/13/2023  Visit # / Visits authorized: 11 / 11 + eval     PTA Visit #: 1 / 5     Time In: 1040  Time Out: 1140   Total Treatment Time: 60 minutes  Total Billable Time: 40 minutes (1 TE, 1 NM, 1 TA)    Precautions: Standard and Fall    SUBJECTIVE     Patient reports: Able to sleep a little better over the past weekend following adjustments to HEP, but cont to get discomfort into buttock anabel. Able to walk with increased ease and is no longer using walker.    NEXT VISIT - FOTO    She was compliant with home exercise program.  Response to previous treatment: appropriate muscle response, muscle soreness  Functional change: amb w/o walker    Pain: NT/10, currently  Location: Left knee    Patient goals: ADLs w/o complaint    OBJECTIVE     Objective Measures updated at progress report unless specified.   DOS 4/12/2023  3 weeks and 5 days as of 5/8/23 4/13/2023; ROM at eval  Range of Motion:   Knee Right Active Left Active Right Passive Left Passive   Flexion 135 35 deg  135 71 deg in supine   Extension +5 Lacking 3  +5 Lacking 5     4/14/2023; +1 degree hyperextension; 78 degrees AAROM  4/17/2023; 90 degrees AAROM Flexion  4/18/2023: 0 degrees of active extension ; 90 degrees of AA knee flexion  4/20/2023: 0-83 at start; 0-100 deg at end  4/27/2023; 96 degrees AROM Flexion; limited with PROM secondary to sciatic nerve pain  5/2/23 : 108 degrees active flexion   5/4/23  : Start 0-5-105; End 0-116 deg     Treatment     Tomasa received the treatments listed below:      therapeutic exercises to develop strength, endurance, ROM, flexibility, posture, and core stabilization for 40 minutes (20 min one on one) including:    Aerobic Activity for LE ROM and endurance; Bike for 8 minutes, Lvl 1  SB flexion 30x5 sec  HS curl matrix DL 35# 3x10-15  Book opener 2x10x10-15 sec ea  Sciatic nerve glide 2x20 ea    Patient education:  - review of HEP in detail  - importance of reducing back tightness with regards to knee and distal symptoms  - importance of knee extension ROM and quad strength with HEP -> progress SLR to 3x12-15 reps      NEXT VISIT - Reduce radicular symptoms prior to quad progressions ideally WB      Held:  Heel slides; 30 x 5 second hold  Prone HS curl; 5lbs, 3x15 ea  SLR 3x12-15  Calf stretch towel; 4 x 30 second hold LLE    manual therapy techniques: Joint mobilizations were applied to the: Left knee for 00 minutes one on one, including:    Held  Left hip distraction (short axis with pull from thigh); 3 reps with 10 second hold - reports centralization of Left LE symptoms from ankle to buttock/low back  Gapping into L rotation Grade II-III  STM to L L/S paraspinals  Patellar mobilizations all directions   Inferior patellar glide with posterior tibial mob      neuromuscular re-education activities to improve: Coordination, Kinesthetic, Proprioception and Posture and motor control for 10 minutes one on one.     Quad sets 10x10 sec  Sidesteps at counter x8 laps - YTB    Held:  TKE GTB DL 20x10 sec  LAQ holds 20x10 sec hold - toes pointed  The following activities were included after being cleared for contradictions: [place] Electrical Stimulation supervised to elicit muscle contraction of the quadriceps for 10 minutes. Pt received stimulation: Burst Frequency: 50 bps, Ramp: 2 sec, 10% duty cycle, amplitude 27 Luis with 10 second on time and 10 second off time while performing the  following exercises. Patient tolerated treatment well without any adverse effects.     - Quad set (hyper) with towel  PPT brace [id number] sec - burning  PPT brace march x20 - burning  Hallowing TrA activation 10x10 sec - burning      Tomasa participated in dynamic functional therapeutic activities to improve functional performance for 10  minutes one on one, including:    Shuttle DL press 1B1R 3x10  Sit to stand YTB 3x5 - hip dom      Patient Education and Home Exercises     Home Exercises Provided and Patient Education Provided     Education provided:   - See above    Written Home Exercises Provided: Patient instructed to cont prior HEP. Exercises were reviewed and Tomasa was able to demonstrate them prior to the end of the session.  Tomasa demonstrated good  understanding of the education provided. See EMR under Patient Instructions for exercises provided during therapy sessions 5/1/2023    ASSESSMENT     Patient is ~4 weeks s/p L TKA. Cont to have s/s of L/S radiculopathy into L LE secondary to DDD/DJD. Cont to be able to  centralize symptoms anabel with book opener stretching, but returns.    Regarding L knee, patient cont to lack adequate knee extension ROM at start of visits in part due to neural tension and joint hypomobility, which again improved to 0 deg following interventions within visit.     Requires cont PT intervention 2-3x a week to address L radicular symptoms to help maximize function s/p L TKA.    Tomasa Is progressing well towards her goals.   Pt prognosis is Excellent.     Pt will continue to benefit from skilled outpatient physical therapy to address the deficits listed in the problem list box on initial evaluation, provide pt/family education and to maximize pt's level of independence in the home and community environment.   Pt's spiritual, cultural and educational needs considered and pt agreeable to plan of care and goals.     Anticipated barriers to physical therapy: age    Goals:   Short  Term Goals: 0-6 weeks - Appropriate, ongoing  1.Report decreased L knee pain  < / =  1/10  to increase tolerance for amb  2. Increase knee ROM to 5-0-120 in order to be able to perform ADLs without difficulty.  3. Increase strength by 1/3 MMT grade in Quads to increase tolerance for ADL and work activities.  4. Pt to tolerate HEP to improve ROM and independence with ADL's     Long Term Goals: 6-12 weeks  1. Able to amb w/o deviation, complaint, or AD  2.Patient goal: ADLs w/o complaint  3.Increase strength to >/= 4+/5 in Quad and hip musculature to increase tolerance for ADL and work activities.  4. Pt will report at CJ level (20-40% impaired) on FOTO knee to demonstrate increase in LE function with every day tasks.     PLAN     Plan of Care Certification: 4/13/2023 to 7/13/2023     Outpatient Physical Therapy 2-3 times weekly for 10 - 12 weeks to include the following interventions: Gait Training, Manual Therapy, Moist Heat/ Ice, Neuromuscular Re-ed, Patient Education, Self Care, Therapeutic Activities, and Therapeutic Exercise.     KEILA NIX, PT, DPT, OCS

## 2023-05-08 NOTE — PROGRESS NOTES
[facility]   Physical Therapy Treatment Note     Name: Tomasa Reed  Clinic Number: [id number]    Therapy Diagnosis:   Encounter Diagnoses   Name Primary?    Left knee pain, unspecified chronicity Yes    Decreased ROM of left knee      Physician: Kulwant Echavarria*    Visit Date: 5/8/2023    Physician Orders: PT Eval and Treat   Medical Diagnosis from Referral: M17.12 (ICD-10-CM) - Primary osteoarthritis of left knee  Evaluation Date: 4/13/2023  Authorization Period Expiration: 05/11/2023  Plan of Care Expiration: 7/13/2023  Visit # / Visits authorized: 11 / 11 + eval     PTA Visit #: 1 / 5     Time In: 1040  Time Out: 1140   Total Treatment Time: 60 minutes  Total Billable Time: 40 minutes (1 TE, 1 NM, 1 TA)    Precautions: Standard and Fall    SUBJECTIVE     Patient reports: Able to sleep a little better over the past weekend following adjustments to HEP, but cont to get discomfort into buttock anabel. Able to walk with increased ease and is no longer using walker.    NEXT VISIT - FOTO    She was compliant with home exercise program.  Response to previous treatment: appropriate muscle response, muscle soreness  Functional change: amb w/o walker    Pain: NT/10, currently  Location: Left knee    Patient goals: ADLs w/o complaint    OBJECTIVE     Objective Measures updated at progress report unless specified.   DOS 4/12/2023  3 weeks and 5 days as of 5/8/23 4/13/2023; ROM at eval  Range of Motion:   Knee Right Active Left Active Right Passive Left Passive   Flexion 135 35 deg  135 71 deg in supine   Extension +5 Lacking 3  +5 Lacking 5     4/14/2023; +1 degree hyperextension; 78 degrees AAROM  4/17/2023; 90 degrees AAROM Flexion  4/18/2023: 0 degrees of active extension ; 90 degrees of AA knee flexion  4/20/2023: 0-83 at start; 0-100 deg at end  4/27/2023; 96 degrees AROM Flexion; limited with PROM secondary to sciatic nerve pain  5/2/23 : 108 degrees active flexion   5/4/23  : Start 0-5-105; End 0-116 deg     Treatment     Tomasa received the treatments listed below:      therapeutic exercises to develop strength, endurance, ROM, flexibility, posture, and core stabilization for 40 minutes (20 min one on one) including:    Aerobic Activity for LE ROM and endurance; Bike for 8 minutes, Lvl 1  SB flexion 30x5 sec  HS curl matrix DL 35# 3x10-15  Book opener 2x10x10-15 sec ea  Sciatic nerve glide 2x20 ea    Patient education:  - review of HEP in detail  - importance of reducing back tightness with regards to knee and distal symptoms  - importance of knee extension ROM and quad strength with HEP -> progress SLR to 3x12-15 reps      NEXT VISIT - Reduce radicular symptoms prior to quad progressions ideally WB      Held:  Heel slides; 30 x 5 second hold  Prone HS curl; 5lbs, 3x15 ea  SLR 3x12-15  Calf stretch towel; 4 x 30 second hold LLE    manual therapy techniques: Joint mobilizations were applied to the: Left knee for 00 minutes one on one, including:    Held  Left hip distraction (short axis with pull from thigh); 3 reps with 10 second hold - reports centralization of Left LE symptoms from ankle to buttock/low back  Gapping into L rotation Grade II-III  STM to L L/S paraspinals  Patellar mobilizations all directions   Inferior patellar glide with posterior tibial mob      neuromuscular re-education activities to improve: Coordination, Kinesthetic, Proprioception and Posture and motor control for 10 minutes one on one.     Quad sets 10x10 sec  Sidesteps at counter x8 laps - YTB    Held:  TKE GTB DL 20x10 sec  LAQ holds 20x10 sec hold - toes pointed  The following activities were included after being cleared for contradictions: Prydeinig Electrical Stimulation supervised to elicit muscle contraction of the quadriceps for 10 minutes. Pt received stimulation: Burst Frequency: 50 bps, Ramp: 2 sec, 10% duty cycle, amplitude 27 Luis with 10 second on time and 10 second off time while performing the  following exercises. Patient tolerated treatment well without any adverse effects.     - Quad set (hyper) with towel  PPT brace 1i56x01 sec - burning  PPT brace march x20 - burning  Hallowing TrA activation 10x10 sec - burning      Tomasa participated in dynamic functional therapeutic activities to improve functional performance for 10  minutes one on one, including:    Shuttle DL press 1B1R 3x10  Sit to stand YTB 3x5 - hip dom      Patient Education and Home Exercises     Home Exercises Provided and Patient Education Provided     Education provided:   - See above    Written Home Exercises Provided: Patient instructed to cont prior HEP. Exercises were reviewed and Tomasa was able to demonstrate them prior to the end of the session.  Tomasa demonstrated good  understanding of the education provided. See EMR under Patient Instructions for exercises provided during therapy sessions 5/1/2023    ASSESSMENT     Patient is ~4 weeks s/p L TKA. Cont to have s/s of L/S radiculopathy into L LE secondary to DDD/DJD. Cont to be able to  centralize symptoms anabel with book opener stretching, but returns.    Regarding L knee, patient cont to lack adequate knee extension ROM at start of visits in part due to neural tension and joint hypomobility, which again improved to 0 deg following interventions within visit.     Requires cont PT intervention 2-3x a week to address L radicular symptoms to help maximize function s/p L TKA.    Tomasa Is progressing well towards her goals.   Pt prognosis is Excellent.     Pt will continue to benefit from skilled outpatient physical therapy to address the deficits listed in the problem list box on initial evaluation, provide pt/family education and to maximize pt's level of independence in the home and community environment.   Pt's spiritual, cultural and educational needs considered and pt agreeable to plan of care and goals.     Anticipated barriers to physical therapy: age    Goals:   Short  Term Goals: 0-6 weeks - Appropriate, ongoing  1.Report decreased L knee pain  < / =  1/10  to increase tolerance for amb  2. Increase knee ROM to 5-0-120 in order to be able to perform ADLs without difficulty.  3. Increase strength by 1/3 MMT grade in Quads to increase tolerance for ADL and work activities.  4. Pt to tolerate HEP to improve ROM and independence with ADL's     Long Term Goals: 6-12 weeks  1. Able to amb w/o deviation, complaint, or AD  2.Patient goal: ADLs w/o complaint  3.Increase strength to >/= 4+/5 in Quad and hip musculature to increase tolerance for ADL and work activities.  4. Pt will report at CJ level (20-40% impaired) on FOTO knee to demonstrate increase in LE function with every day tasks.     PLAN     Plan of Care Certification: 4/13/2023 to 7/13/2023     Outpatient Physical Therapy 2-3 times weekly for 10 - 12 weeks to include the following interventions: Gait Training, Manual Therapy, Moist Heat/ Ice, Neuromuscular Re-ed, Patient Education, Self Care, Therapeutic Activities, and Therapeutic Exercise.     KEILA NIX, PT, DPT, OCS

## 2023-05-10 ENCOUNTER — CLINICAL SUPPORT (OUTPATIENT)
Dept: REHABILITATION | Facility: HOSPITAL | Age: 73
End: 2023-05-10
Payer: MEDICARE

## 2023-05-10 DIAGNOSIS — M25.662 DECREASED ROM OF LEFT KNEE: ICD-10-CM

## 2023-05-10 DIAGNOSIS — M25.562 LEFT KNEE PAIN, UNSPECIFIED CHRONICITY: Primary | ICD-10-CM

## 2023-05-10 PROCEDURE — 97530 THERAPEUTIC ACTIVITIES: CPT | Mod: CQ

## 2023-05-10 PROCEDURE — 97112 NEUROMUSCULAR REEDUCATION: CPT | Mod: CQ

## 2023-05-10 NOTE — PROGRESS NOTES
[facility]   Physical Therapy Treatment Note     Name: Tomasa Reed  Clinic Number: [id number]    Therapy Diagnosis:   Encounter Diagnoses   Name Primary?    Left knee pain, unspecified chronicity Yes    Decreased ROM of left knee      Physician: Kulwant Echavarria*    Visit Date: 5/10/2023    Physician Orders: PT Eval and Treat   Medical Diagnosis from Referral: M17.12 (ICD-10-CM) - Primary osteoarthritis of left knee  Evaluation Date: 4/13/2023  Authorization Period Expiration: 05/11/2023  Plan of Care Expiration: 7/13/2023  Visit # / Visits authorized: 12 / 23 + eval     PTA Visit #: 1 / 5     Time In: 1055  Time Out: 1157   Total Treatment Time: 62 minutes  Total Billable Time: 30 minutes (1 NMR, 1 TA)    Precautions: Standard and Fall    SUBJECTIVE     Patient reports: her back was better after previous session, however her pain started again last night. She presents with pain from her Left low back to the back of her knee.   She was compliant with home exercise program.  Response to previous treatment: appropriate muscle response, muscle soreness  Functional change: amb w/o walker    Pain: NT/10, currently  Location: Left knee    Patient goals: ADLs w/o complaint    OBJECTIVE     Objective Measures updated at progress report unless specified.   DOS 4/12/2023  4 weeks s/p as of 5/10/2023    4/13/2023; ROM at eval  Range of Motion:   Knee Right Active Left Active Right Passive Left Passive   Flexion 135 35 deg  135 71 deg in supine   Extension +5 Lacking 3  +5 Lacking 5     4/14/2023; +1 degree hyperextension; 78 degrees AAROM  4/17/2023; 90 degrees AAROM Flexion  4/18/2023: 0 degrees of active extension ; 90 degrees of AA knee flexion  4/20/2023: 0-83 at start; 0-100 deg at end  4/27/2023; 96 degrees AROM Flexion; limited with PROM secondary to sciatic nerve pain  5/2/23 : 108 degrees active flexion   5/4/23 : Start 0-5-105; End 0-116 deg     Treatment     Tomasa  received the treatments listed below:      therapeutic exercises to develop strength, endurance, ROM, flexibility, posture, and core stabilization for 34 minutes including:  Aerobic Activity for LE ROM and endurance; Bike for 8 minutes, Lvl 1  SB flexion; 30 x 5 second hold  HS curl matrix DL 35# 3x10-15  Book opener; 2 x 10 x 10-15 second hold each  Sciatic nerve glide 2x20 ea    Patient education:  - review of HEP in detail  - importance of reducing back tightness with regards to knee and distal symptoms  - importance of knee extension ROM and quad strength with HEP -> progress SLR to 3x12-15 reps    NEXT VISIT - Reduce radicular symptoms prior to quad progressions ideally WB    manual therapy techniques: Joint mobilizations were applied to the: Left knee for 8 minutes, including:  Left hip distraction - reports centralization of Left LE symptoms from ankle to buttock/low back    neuromuscular re-education activities to improve: Coordination, Kinesthetic, Proprioception and Posture and motor control for 10 minutes.   Quad sets 10 x 10 second hold  Sidesteps at counter x 8 laps with Yellow TB    Tomasa participated in dynamic functional therapeutic activities to improve functional performance for 10  minutes including:  Shuttle DL press; 1B1R, 3 x 10 reps  Sit to stand YTB; 3 x 5 reps - hip dom    Patient Education and Home Exercises     Home Exercises Provided and Patient Education Provided     Education provided:   - See above    Written Home Exercises Provided: Patient instructed to cont prior HEP. Exercises were reviewed and Tomasa was able to demonstrate them prior to the end of the session.  Tomasa demonstrated good  understanding of the education provided. See EMR under Patient Instructions for exercises provided during therapy sessions 5/1/2023    ASSESSMENT     Ms. Randolph is 4 weeks s/p L TKA. She presents with sciatic nerve symptoms into her Left LE. She responds very well to long axis hip distraction  reporting centralization of symptoms into Left low back. There is good tolerance to the exercises performed noting appropriate muscle fatigue. Emphasized hip and glutes during exercise today. Continue per POC towards treatment goals.   Tomasa Is progressing well towards her goals.   Pt prognosis is Excellent.     Pt will continue to benefit from skilled outpatient physical therapy to address the deficits listed in the problem list box on initial evaluation, provide pt/family education and to maximize pt's level of independence in the home and community environment.   Pt's spiritual, cultural and educational needs considered and pt agreeable to plan of care and goals.     Anticipated barriers to physical therapy: age    Goals:   Short Term Goals: 0-6 weeks - Appropriate, ongoing  1.Report decreased L knee pain  < / =  1/10  to increase tolerance for amb  2. Increase knee ROM to 5-0-120 in order to be able to perform ADLs without difficulty.  3. Increase strength by 1/3 MMT grade in Quads to increase tolerance for ADL and work activities.  4. Pt to tolerate HEP to improve ROM and independence with ADL's     Long Term Goals: 6-12 weeks  1. Able to amb w/o deviation, complaint, or AD  2.Patient goal: ADLs w/o complaint  3.Increase strength to >/= 4+/5 in Quad and hip musculature to increase tolerance for ADL and work activities.  4. Pt will report at CJ level (20-40% impaired) on FOTO knee to demonstrate increase in LE function with every day tasks.     PLAN     Plan of Care Certification: 4/13/2023 to 7/13/2023     Outpatient Physical Therapy 2-3 times weekly for 10 - 12 weeks to include the following interventions: Gait Training, Manual Therapy, Moist Heat/ Ice, Neuromuscular Re-ed, Patient Education, Self Care, Therapeutic Activities, and Therapeutic Exercise.     Victoria Modi, PTA

## 2023-05-11 ENCOUNTER — CLINICAL SUPPORT (OUTPATIENT)
Dept: REHABILITATION | Facility: HOSPITAL | Age: 73
End: 2023-05-11
Payer: MEDICARE

## 2023-05-11 DIAGNOSIS — M25.562 LEFT KNEE PAIN, UNSPECIFIED CHRONICITY: Primary | ICD-10-CM

## 2023-05-11 DIAGNOSIS — M25.662 DECREASED ROM OF LEFT KNEE: ICD-10-CM

## 2023-05-11 PROCEDURE — 97112 NEUROMUSCULAR REEDUCATION: CPT | Mod: KX

## 2023-05-11 PROCEDURE — 97140 MANUAL THERAPY 1/> REGIONS: CPT | Mod: KX

## 2023-05-11 PROCEDURE — 97110 THERAPEUTIC EXERCISES: CPT | Mod: KX

## 2023-05-11 NOTE — PROGRESS NOTES
[facility]   Physical Therapy Treatment Note     Name: Tomasa Reed  Clinic Number: [id number]    Therapy Diagnosis:   Encounter Diagnoses   Name Primary?    Left knee pain, unspecified chronicity Yes    Decreased ROM of left knee        Physician: Kulwant Echavarria*    Visit Date: 5/11/2023    Physician Orders: PT Eval and Treat   Medical Diagnosis from Referral: M17.12 (ICD-10-CM) - Primary osteoarthritis of left knee  Evaluation Date: 4/13/2023  Authorization Period Expiration: 05/11/2023  Plan of Care Expiration: 7/13/2023  Visit # / Visits authorized: 13 / 23 + eval     PTA Visit #: 1 / 5     Time In: 1055  Time Out: 1205  Total Treatment Time: 70 minutes   Total Billable Time: 40 minutes (1 NMR, 1 MT, 1 TE)     Precautions: Standard and Fall    SUBJECTIVE     Patient reports: Knee cont to be doing well s/p TKA, with cont aggravation of hip / low back discomfort anabel at night affecting sleep. Does cont to get relief with book opener and long axis traction.    FOTO IE - 6%  FOTO 5/10/2023 - 48%  FOTO Goal - 48%    She was compliant with home exercise program.  Response to previous treatment: appropriate muscle response, muscle soreness  Functional change: amb w/o walker    Pain: NT/10, currently  Location: Left knee    Patient goals: ADLs w/o complaint    OBJECTIVE     Objective Measures updated at progress report unless specified.   DOS 4/12/2023  4 weeks s/p as of 5/10/2023    4/13/2023; ROM at eval  Range of Motion:   Knee Right Active Left Active Right Passive Left Passive   Flexion 135 35 deg  135 71 deg in supine   Extension +5 Lacking 3  +5 Lacking 5     4/14/2023; +1 degree hyperextension; 78 degrees AAROM  4/17/2023; 90 degrees AAROM Flexion  4/18/2023: 0 degrees of active extension ; 90 degrees of AA knee flexion  4/20/2023: 0-83 at start; 0-100 deg at end  4/27/2023; 96 degrees AROM Flexion; limited with PROM secondary to sciatic nerve pain  5/2/23 : 108  degrees active flexion   5/4/23 : Start 0-5-105; End 0-116 deg     Treatment     Tomasa received the treatments listed below:      therapeutic exercises to develop strength, endurance, ROM, flexibility, posture, and core stabilization for 25 minutes (15 min one on one) including:    Book opener; 3 x 10 x 10-15 second hold each  Sciatic nerve glide 2x20 ea  Knee ext 3x10-15 15lbs  Bridge YTB 3x10  Green SB HS curls 30x5 sec    Patient education:  - review of HEP in detail  - increase frequency or hold times on book openers  - importance of knee extension ROM and quad strength with HEP -> progress SLR to 3x12-15 reps    NEXT VISIT - Reduce radicular symptoms at start of visit; increase focus on shuttle.    Held:  Aerobic Activity for LE ROM and endurance; Bike for 8 minutes, Lvl 1  SB flexion; 30 x 5 second hold  HS curl matrix DL 35# 3x10-15    manual therapy techniques: Joint mobilizations were applied to the: Left knee for 10 minutes one on one, including:    Left hip distraction Grade III-IV hold - reports centralization of Left LE symptoms from ankle to buttock/low back    neuromuscular re-education activities to improve: Coordination, Kinesthetic, Proprioception and Posture and motor control for 30 minutes (15 min one on one).     Quad sets 10 x 10 second hold  Sidesteps at table x5 laps GTB  Sidesteps at table x10 laps YTB - relief  SL hip abd 3x10 ea  SL hip abd YTB 3x10 ea    Tomasa participated in dynamic functional therapeutic activities to improve functional performance for 5  minutes including:    Sit to stand YTB; 3 x 5 reps - hip dom    Held:  Shuttle DL press; 1B1R, 3 x 10 reps    Patient Education and Home Exercises     Home Exercises Provided and Patient Education Provided     Education provided:   - See above    Written Home Exercises Provided: Patient instructed to cont prior HEP. Exercises were reviewed and Tomasa was able to demonstrate them prior to the end of the session.  Tomasa  demonstrated good  understanding of the education provided. See EMR under Patient Instructions for exercises provided during therapy sessions 5/1/2023    ASSESSMENT     Cont to be able to reduce symptoms in L sciatic / hip symptoms with book openers and long axis distraction. Discomfort returns with quad isolation e.g. leg extension and to an extent quad sets. Advised patient to increase frequency or duration of book openers to help cont to resolve symptoms ind.    Would cont to benefit from focus on relieving L hip / sciatic discomfort and LE strengthening in weightbearing positions anabel hip dominant focused activities. Cont to monitor knee extension ROM to ensure maintenance of 0 deg.    Tomasa Is progressing well towards her goals.   Pt prognosis is Excellent.     Pt will continue to benefit from skilled outpatient physical therapy to address the deficits listed in the problem list box on initial evaluation, provide pt/family education and to maximize pt's level of independence in the home and community environment.   Pt's spiritual, cultural and educational needs considered and pt agreeable to plan of care and goals.     Anticipated barriers to physical therapy: age    Goals:   Short Term Goals: 0-6 weeks - Appropriate, ongoing  1.Report decreased L knee pain  < / =  1/10  to increase tolerance for amb  2. Increase knee ROM to 5-0-120 in order to be able to perform ADLs without difficulty.  3. Increase strength by 1/3 MMT grade in Quads to increase tolerance for ADL and work activities.  4. Pt to tolerate HEP to improve ROM and independence with ADL's     Long Term Goals: 6-12 weeks  1. Able to amb w/o deviation, complaint, or AD  2.Patient goal: ADLs w/o complaint  3.Increase strength to >/= 4+/5 in Quad and hip musculature to increase tolerance for ADL and work activities.  4. Pt will report at CJ level (20-40% impaired) on FOTO knee to demonstrate increase in LE function with every day tasks.     PLAN      Plan of Care Certification: 4/13/2023 to 7/13/2023     Outpatient Physical Therapy 2-3 times weekly for 10 - 12 weeks to include the following interventions: Gait Training, Manual Therapy, Moist Heat/ Ice, Neuromuscular Re-ed, Patient Education, Self Care, Therapeutic Activities, and Therapeutic Exercise.     KEILA NIX, PT, DPT, OCS

## 2023-05-15 ENCOUNTER — CLINICAL SUPPORT (OUTPATIENT)
Dept: REHABILITATION | Facility: HOSPITAL | Age: 73
End: 2023-05-15
Payer: MEDICARE

## 2023-05-15 DIAGNOSIS — M25.562 LEFT KNEE PAIN, UNSPECIFIED CHRONICITY: Primary | ICD-10-CM

## 2023-05-15 DIAGNOSIS — M25.662 DECREASED ROM OF LEFT KNEE: ICD-10-CM

## 2023-05-15 PROCEDURE — 97112 NEUROMUSCULAR REEDUCATION: CPT | Mod: KX

## 2023-05-15 PROCEDURE — 97110 THERAPEUTIC EXERCISES: CPT | Mod: KX

## 2023-05-15 PROCEDURE — 97530 THERAPEUTIC ACTIVITIES: CPT | Mod: KX

## 2023-05-15 NOTE — PROGRESS NOTES
[facility]   Physical Therapy Treatment Note     Name: Tomasa Reed  Clinic Number: [id number]    Therapy Diagnosis:   Encounter Diagnoses   Name Primary?    Left knee pain, unspecified chronicity Yes    Decreased ROM of left knee      Physician: Kulwant Echavarria*    Visit Date: 5/15/2023    Physician Orders: PT Eval and Treat   Medical Diagnosis from Referral: M17.12 (ICD-10-CM) - Primary osteoarthritis of left knee  Evaluation Date: 4/13/2023  Authorization Period Expiration: 05/11/2023  Plan of Care Expiration: 7/13/2023  Visit # / Visits authorized: 14 / 23 + eval     PTA Visit #: 1 / 5     Time In: 1100  Time Out: 1205  Total Treatment Time: 65 minutes   Total Billable Time: 55 minutes (2 NMR, 1 TA, 1 TE)     Precautions: Standard and Fall    SUBJECTIVE     Patient reports: Book openers feel good with increased frequency. Cont to have hip discomfort however. Long weekend activities led to increased L hip and sciatic soreness.    FOTO IE - 6%  FOTO 5/10/2023 - 48%  FOTO Goal - 48%    She was compliant with home exercise program.  Response to previous treatment: appropriate muscle response, muscle soreness  Functional change: amb w/o walker    Pain: NT/10, currently  Location: Left knee    Patient goals: ADLs w/o complaint    OBJECTIVE     Objective Measures updated at progress report unless specified.   DOS 4/12/2023  4 weeks s/p as of 5/10/2023    4/13/2023; ROM at eval  Range of Motion:   Knee Right Active Left Active Right Passive Left Passive   Flexion 135 35 deg  135 71 deg in supine   Extension +5 Lacking 3  +5 Lacking 5     4/14/2023; +1 degree hyperextension; 78 degrees AAROM  4/17/2023; 90 degrees AAROM Flexion  4/18/2023: 0 degrees of active extension ; 90 degrees of AA knee flexion  4/20/2023: 0-83 at start; 0-100 deg at end  4/27/2023; 96 degrees AROM Flexion; limited with PROM secondary to sciatic nerve pain  5/2/23 : 108 degrees active flexion   5/4/23 :  Start 0-5-105; End 0-116 deg     Treatment     Tomasa received the treatments listed below:      therapeutic exercises to develop strength, endurance, ROM, flexibility, posture, and core stabilization for 20 minutes (10 min one on one) including:    Aerobic Activity for LE ROM and endurance; Bike for 8 minutes, Lvl 1  Book opener; 3 x 30 second hold each   Sciatic nerve glide 3x20 ea    Patient education:  - review of HEP in detail  - increase frequency or hold times on book openers  - importance of knee extension ROM and quad strength with HEP -> progress SLR to 3x12-15 reps    NEXT VISIT - Reduce radicular symptoms at start of visit; increase focus on shuttle.    Held:  Knee ext 3x10-15 15lbs - aggravating  Bridge YTB 3x10  Green SB HS curls 30x5 sec  SB flexion; 30 x 5 second hold  HS curl matrix DL 35# 3x10-15    manual therapy techniques: Joint mobilizations were applied to the: Left knee for 00 minutes one on one, including:    Left hip distraction Grade III-IV hold - reports centralization of Left LE symptoms from ankle to buttock/low back    neuromuscular re-education activities to improve: Coordination, Kinesthetic, Proprioception and Posture and motor control for 35 minutes (35 min one on one).     90/90 hip lift adduction 5x burn - relief  90/90 hip lift RTB abd 5x burn - relief  Hookling hip lift 5x burn  Sidesteps at table x10 laps RTB - relief    Held:  Quad sets 10 x 10 second hold  SL hip abd 3x10 ea  SL hip abd YTB 3x10 ea    Tomasa participated in dynamic functional therapeutic activities to improve functional performance for 10  minutes (10 min one on one) including:    Shuttle DL press; 1B1R, 3 x 10 reps  Shuttle SL press: 1B, 3x10 - R only; unable to perform on L    Held:  Sit to stand YTB; 3 x 5 reps - hip dom      Patient Education and Home Exercises     Home Exercises Provided and Patient Education Provided     Education provided:   - See above    Written Home Exercises Provided: Patient  instructed to cont prior HEP. Exercises were reviewed and Tomasa was able to demonstrate them prior to the end of the session.  Tomasa demonstrated good  understanding of the education provided. See EMR under Patient Instructions for exercises provided during therapy sessions 5/1/2023    ASSESSMENT     L knee s/p TKA is progressing well with respect to ROM with mild lack of full knee extension at start of care that improves with interventions. Does have L LE strength deficits with patient unable to perform SL shuttle press on involved side. L sciatic / hip symptoms that persist and are affecting function the most anabel sleep. Reduced L sciatic / hip symptoms with 90/90 hip lift, which was added to her HEP.     Would cont to benefit from focus on relieving L hip / sciatic discomfort and LE strengthening in weightbearing positions anabel hip dominant focused activities. Cont to monitor knee extension ROM to ensure maintenance of 0 deg.    Tomasa Is progressing well towards her goals.   Pt prognosis is Excellent.     Pt will continue to benefit from skilled outpatient physical therapy to address the deficits listed in the problem list box on initial evaluation, provide pt/family education and to maximize pt's level of independence in the home and community environment.   Pt's spiritual, cultural and educational needs considered and pt agreeable to plan of care and goals.     Anticipated barriers to physical therapy: age    Goals:   Short Term Goals: 0-6 weeks - Appropriate, ongoing  1.Report decreased L knee pain  < / =  1/10  to increase tolerance for amb  2. Increase knee ROM to 5-0-120 in order to be able to perform ADLs without difficulty.  3. Increase strength by 1/3 MMT grade in Quads to increase tolerance for ADL and work activities.  4. Pt to tolerate HEP to improve ROM and independence with ADL's     Long Term Goals: 6-12 weeks  1. Able to amb w/o deviation, complaint, or AD  2.Patient goal: ADLs w/o  complaint  3.Increase strength to >/= 4+/5 in Quad and hip musculature to increase tolerance for ADL and work activities.  4. Pt will report at CJ level (20-40% impaired) on FOTO knee to demonstrate increase in LE function with every day tasks.     PLAN     Plan of Care Certification: 4/13/2023 to 7/13/2023     Outpatient Physical Therapy 2-3 times weekly for 10 - 12 weeks to include the following interventions: Gait Training, Manual Therapy, Moist Heat/ Ice, Neuromuscular Re-ed, Patient Education, Self Care, Therapeutic Activities, and Therapeutic Exercise.     KEILA NIX, PT, DPT, OCS

## 2023-05-16 RX ORDER — PREGABALIN 75 MG/1
75 CAPSULE ORAL 2 TIMES DAILY
Qty: 60 CAPSULE | Refills: 0 | Status: SHIPPED | OUTPATIENT
Start: 2023-05-16 | End: 2023-09-11 | Stop reason: ALTCHOICE

## 2023-05-18 ENCOUNTER — CLINICAL SUPPORT (OUTPATIENT)
Dept: REHABILITATION | Facility: HOSPITAL | Age: 73
End: 2023-05-18
Payer: MEDICARE

## 2023-05-18 DIAGNOSIS — M25.662 DECREASED ROM OF LEFT KNEE: ICD-10-CM

## 2023-05-18 DIAGNOSIS — M25.562 LEFT KNEE PAIN, UNSPECIFIED CHRONICITY: Primary | ICD-10-CM

## 2023-05-18 LAB
FINAL PATHOLOGIC DIAGNOSIS: NORMAL
GROSS: NORMAL
Lab: NORMAL
MICROSCOPIC EXAM: NORMAL

## 2023-05-18 PROCEDURE — 97530 THERAPEUTIC ACTIVITIES: CPT | Mod: KX

## 2023-05-18 PROCEDURE — 97112 NEUROMUSCULAR REEDUCATION: CPT | Mod: KX

## 2023-05-18 PROCEDURE — 97110 THERAPEUTIC EXERCISES: CPT | Mod: KX

## 2023-05-18 NOTE — PROGRESS NOTES
[facility]   Physical Therapy Treatment Note     Name: Tomasa Reed  Clinic Number: [id number]    Therapy Diagnosis:   Encounter Diagnoses   Name Primary?    Left knee pain, unspecified chronicity Yes    Decreased ROM of left knee        Physician: Kulwant Echavarria*    Visit Date: 5/18/2023    Physician Orders: PT Eval and Treat   Medical Diagnosis from Referral: M17.12 (ICD-10-CM) - Primary osteoarthritis of left knee  Evaluation Date: 4/13/2023  Authorization Period Expiration: 05/11/2023  Plan of Care Expiration: 7/13/2023  Visit # / Visits authorized: 15 / 23 + eval     PTA Visit #: 1 / 5     Time In: 1035  Time Out: 1145  Total Treatment Time: 70 minutes   Total Billable Time: 40 minutes (1 NMR, 1 TA, 1 TE)     Precautions: Standard and Fall    SUBJECTIVE     Patient reports: Improving sleep for past 3 nights. Has a routine of stretching, TENS, lyrica, and L sidelying that has been helping.    FU with MD next week.    FOTO IE - 6%  FOTO 5/10/2023 - 48%  FOTO Goal - 48%    She was compliant with home exercise program.  Response to previous treatment: appropriate muscle response, muscle soreness  Functional change: amb w/o walker    Pain: NT/10, currently  Location: Left knee    Patient goals: ADLs w/o complaint    OBJECTIVE     Objective Measures updated at progress report unless specified.   DOS 4/12/2023  4 weeks s/p as of 5/10/2023    4/13/2023; ROM at eval  Range of Motion:   Knee Right Active Left Active Right Passive Left Passive   Flexion 135 35 deg  135 71 deg in supine   Extension +5 Lacking 3  +5 Lacking 5     4/14/2023; +1 degree hyperextension; 78 degrees AAROM  4/17/2023; 90 degrees AAROM Flexion  4/18/2023: 0 degrees of active extension ; 90 degrees of AA knee flexion  4/20/2023: 0-83 at start; 0-100 deg at end  4/27/2023; 96 degrees AROM Flexion; limited with PROM secondary to sciatic nerve pain  5/2/23 : 108 degrees active flexion   5/4/23 : Start  0-5-105; End 0-116 deg   5/18/23 : Start 0-5-110 deg; End 0-2-120 deg     Treatment     Tomasa received the treatments listed below:      therapeutic exercises to develop strength, endurance, ROM, flexibility, posture, and core stabilization for 30 minutes (10 min one on one) including:    Book opener; 3 x 30 second hold each: At start and end of visit  Aerobic Activity for LE ROM and endurance; Bike for 10 minutes, Lvl 3  LLLD heel prop x10 min    Patient education:  - review of HEP in detail  - increase frequency or hold times on book openers  - importance of knee extension ROM and quad strength with HEP -> progress SLR to 3x12-15 reps    NEXT VISIT - Cont focus on shuttle; 90/90 hip lift; LLLD    manual therapy techniques: Joint mobilizations were applied to the: Left knee for 00 minutes one on one, including:    Left hip distraction Grade III-IV hold - reports centralization of Left LE symptoms from ankle to buttock/low back    neuromuscular re-education activities to improve: Coordination, Kinesthetic, Proprioception and Posture and motor control for 30 minutes (20 min one on one).     Sidesteps at counter x5 laps RTB - relief  90/90 hip lift adduction 3x burn - relief  90/90 hip lift adduction + serratus punch 3x burn - relief  90/90 hip lift RTB abd 3x burn - relief  90/90 hip lift RTB abd + serratus punch 3x burn - relief      Held:  SL hip abd 3x10 ea  SL hip abd YTB 3x10 ea  Quad sets 10 x 10 second hold    Tomasa participated in dynamic functional therapeutic activities to improve functional performance for 20  minutes (10 min one on one) including:    Shuttle SL press: 1R 2x10 - L only  Shuttle DL -> SL press: 1B 2x10 - L down  Shuttle DL press; 1B, 2x10 reps - quad loading knee over toe    Held:  Sit to stand YTB; 3 x 5 reps - hip dom      Patient Education and Home Exercises     Home Exercises Provided and Patient Education Provided     Education provided:   - See above    Written Home Exercises  Provided: Patient instructed to cont prior HEP. Exercises were reviewed and Tomaas was able to demonstrate them prior to the end of the session.  Tomasa demonstrated good  understanding of the education provided. See EMR under Patient Instructions for exercises provided during therapy sessions 5/1/2023    ASSESSMENT     Reducing L sciatic / hip symptoms with new routine prior to bed secondary to reducing impingement of nerve roots and improving neural mobility. Neural tension contributory to decreased knee extension ROM at start of visits, which improves with LLLD extension stretching. 90/90 hip lift also helping with core stability and improving neural tension.    Able to maxine progression on L LE strength activities and able to perform SL shuttle press on involved side today w/o irritation of L hip / sciatic region.    Would cont to benefit from focus on relieving L hip / sciatic discomfort and LE strengthening in weightbearing positions anabel hip dominant focused activities. Cont to monitor knee extension ROM to ensure maintenance of 0 deg.    Tomasa Is progressing well towards her goals.   Pt prognosis is Excellent.     Pt will continue to benefit from skilled outpatient physical therapy to address the deficits listed in the problem list box on initial evaluation, provide pt/family education and to maximize pt's level of independence in the home and community environment.   Pt's spiritual, cultural and educational needs considered and pt agreeable to plan of care and goals.     Anticipated barriers to physical therapy: age    Goals:   Short Term Goals: 0-6 weeks - Appropriate, ongoing  1.Report decreased L knee pain  < / =  1/10  to increase tolerance for amb  2. Increase knee ROM to 5-0-120 in order to be able to perform ADLs without difficulty.  3. Increase strength by 1/3 MMT grade in Quads to increase tolerance for ADL and work activities.  4. Pt to tolerate HEP to improve ROM and independence with ADL's      Long Term Goals: 6-12 weeks  1. Able to amb w/o deviation, complaint, or AD  2.Patient goal: ADLs w/o complaint  3.Increase strength to >/= 4+/5 in Quad and hip musculature to increase tolerance for ADL and work activities.  4. Pt will report at CJ level (20-40% impaired) on FOTO knee to demonstrate increase in LE function with every day tasks.     PLAN     Plan of Care Certification: 4/13/2023 to 7/13/2023     Outpatient Physical Therapy 2-3 times weekly for 10 - 12 weeks to include the following interventions: Gait Training, Manual Therapy, Moist Heat/ Ice, Neuromuscular Re-ed, Patient Education, Self Care, Therapeutic Activities, and Therapeutic Exercise.     KEILA NIX, PT, DPT, OCS

## 2023-05-22 ENCOUNTER — CLINICAL SUPPORT (OUTPATIENT)
Dept: REHABILITATION | Facility: HOSPITAL | Age: 73
End: 2023-05-22
Payer: MEDICARE

## 2023-05-22 DIAGNOSIS — M25.662 DECREASED ROM OF LEFT KNEE: ICD-10-CM

## 2023-05-22 DIAGNOSIS — M25.562 LEFT KNEE PAIN, UNSPECIFIED CHRONICITY: Primary | ICD-10-CM

## 2023-05-22 PROCEDURE — 97112 NEUROMUSCULAR REEDUCATION: CPT | Mod: KX

## 2023-05-22 PROCEDURE — 97530 THERAPEUTIC ACTIVITIES: CPT | Mod: KX

## 2023-05-22 NOTE — PROGRESS NOTES
[facility]   Physical Therapy Treatment Note     Name: Tomasa Reed  Clinic Number: [id number]    Therapy Diagnosis:   Encounter Diagnoses   Name Primary?    Left knee pain, unspecified chronicity Yes    Decreased ROM of left knee      Physician: Kulwant Echavarria*    Visit Date: 5/22/2023    Physician Orders: PT Eval and Treat   Medical Diagnosis from Referral: M17.12 (ICD-10-CM) - Primary osteoarthritis of left knee  Evaluation Date: 4/13/2023  Authorization Period Expiration: 05/11/2023  Plan of Care Expiration: 7/13/2023  Visit # / Visits authorized: 16 / 23 + eval     PTA Visit #: 1 / 5     Time In: 1100  Time Out: 1200  Total Treatment Time: 60 minutes   Total Billable Time: 30 minutes (1 NMR, 1 TA)     Precautions: Standard and Fall    SUBJECTIVE     Patient reports: Cont on and off pain in L hip and low back. Has been able to stretch this out some to improve comfort. When she is moving symptoms improve, when she is still pain returns. Knee feeling stiff, but none painful.     FU with MD this week.    FOTO IE - 6%  FOTO 5/10/2023 - 48%  FOTO 5/22/2023 - 59%  FOTO Goal - 50%    She was compliant with home exercise program.  Response to previous treatment: appropriate muscle response, muscle soreness  Functional change: amb w/o walker    Pain: NT/10, currently  Location: Left knee    Patient goals: ADLs w/o complaint    OBJECTIVE     Objective Measures updated at progress report unless specified.   DOS 4/12/2023  6 weeks s/p as of 5/24/2023 4/13/2023; ROM at eval  Range of Motion:   Knee Right Active Left Active Right Passive Left Passive   Flexion 135 35 deg  135 71 deg in supine   Extension +5 Lacking 3  +5 Lacking 5     4/14/2023; +1 degree hyperextension; 78 degrees AAROM  4/17/2023; 90 degrees AAROM Flexion  4/18/2023: 0 degrees of active extension ; 90 degrees of AA knee flexion  4/20/2023: 0-83 at start; 0-100 deg at end  4/27/2023; 96 degrees AROM Flexion;  limited with PROM secondary to sciatic nerve pain  5/2/23 : 108 degrees active flexion   5/4/23 : Start 0-5-105; End 0-116 deg   5/18/23 : Start 0-5-110 deg; End 0-2-120 deg   5/22/23 : Start 0-2-116 deg; End 0-120 deg     Treatment     Tomasa received the treatments listed below:      therapeutic exercises to develop strength, endurance, ROM, flexibility, posture, and core stabilization for 15 minutes (5 min one on one)  including:    Book opener; 3 x 30 second hold each: At start and end of visit  Aerobic Activity for LE ROM and endurance; Bike for 10 minutes, Lvl 3  LLLD heel prop x10 min - Review    Patient education:  - review of HEP in detail  - increase frequency or hold times on book openers  - importance of knee extension ROM and quad strength with HEP -> progress SLR to 3x12-15 reps    NEXT VISIT - Cont focus on shuttle; 90/90 hip lift; LLLD    manual therapy techniques: Joint mobilizations were applied to the: Left knee for 00 minutes one on one, including:    Left hip distraction Grade III-IV hold - reports centralization of Left LE symptoms from ankle to buttock/low back    neuromuscular re-education activities to improve: Coordination, Kinesthetic, Proprioception and Posture and motor control for 25 minutes (15 min one on one).     Sidesteps at counter x7 laps RTB - relief  90/90 hip lift adduction 5x30 sec- relief  90/90 hip lift adduction + serratus punch 3x20 (2lbs)  90/90 hip lift RTB abd 5x30 sec- relief  90/90 hip lift RTB abd + serratus punch 3x20 (2lbs)    Held:  SL hip abd 3x10 ea  SL hip abd YTB 3x10 ea  Quad sets 10 x 10 second hold    Tomasa participated in dynamic functional therapeutic activities to improve functional performance for 20  minutes (10 min one on one) including:    Shuttle DL press; 1B1R , 3x10 reps - quad loading knee over toe  Shuttle DL -> SL press: 1B1R 3x10 - L down  Shuttle SL press: 1B1R 3x10 jonatan  Sit to stand RTB at knees 4x5     Held:  Sit to stand YTB; 3 x 5 reps  - hip dom      Patient Education and Home Exercises     Home Exercises Provided and Patient Education Provided     Education provided:   - See above    Written Home Exercises Provided: Patient instructed to cont prior HEP. Exercises were reviewed and Tomasa was able to demonstrate them prior to the end of the session.  Tomasa demonstrated good  understanding of the education provided. See EMR under Patient Instructions for exercises provided during therapy sessions 5/1/2023    ASSESSMENT     On and off flares of L sciatic / hip symptoms, but able to control better now with HEP and lyrica. Neural tension contributory to decreased knee extension ROM at start of visits, which improves with LLLD extension stretching. 90/90 hip lift also helping with core stability and improving neural tension.    Again to maxine progression on L LE strength activities and able to perform SL shuttle press on involved side today w/ increased resistance and w/o irritation of L hip / sciatic region.    Would cont to benefit from focus on relieving L hip / sciatic discomfort and LE strengthening in weightbearing positions anabel hip dominant focused activities. Cont to monitor knee extension ROM to ensure maintenance of 0 deg.    Await changes to POC per MD FU.    Tomasa Is progressing well towards her goals.   Pt prognosis is Excellent.     Pt will continue to benefit from skilled outpatient physical therapy to address the deficits listed in the problem list box on initial evaluation, provide pt/family education and to maximize pt's level of independence in the home and community environment.   Pt's spiritual, cultural and educational needs considered and pt agreeable to plan of care and goals.     Anticipated barriers to physical therapy: age    Goals:   Short Term Goals: 0-6 weeks - Appropriate, ongoing  1.Report decreased L knee pain  < / =  1/10  to increase tolerance for amb  2. Increase knee ROM to 5-0-120 in order to be able to perform  ADLs without difficulty.  3. Increase strength by 1/3 MMT grade in Quads to increase tolerance for ADL and work activities.  4. Pt to tolerate HEP to improve ROM and independence with ADL's     Long Term Goals: 6-12 weeks  1. Able to amb w/o deviation, complaint, or AD  2.Patient goal: ADLs w/o complaint  3.Increase strength to >/= 4+/5 in Quad and hip musculature to increase tolerance for ADL and work activities.  4. Pt will report at CJ level (20-40% impaired) on FOTO knee to demonstrate increase in LE function with every day tasks.     PLAN     Plan of Care Certification: 4/13/2023 to 7/13/2023     Outpatient Physical Therapy 2-3 times weekly for 10 - 12 weeks to include the following interventions: Gait Training, Manual Therapy, Moist Heat/ Ice, Neuromuscular Re-ed, Patient Education, Self Care, Therapeutic Activities, and Therapeutic Exercise.     KEILA NIX, PT, DPT, OCS

## 2023-05-24 NOTE — PROGRESS NOTES
"CC: Left TKA Post-Op    DATE OF PROCEDURE: 4/12/2023   PROCEDURES PERFORMED:   Left total knee arthroplasty (CPT 05676)    Tomasa Reed reports to be doing well 6wk s/p the above mentioned procedure. Denies fevers, chills, night sweats, chest pain, difficulty breathing, calf pain or tenderness. Going to PT 2-3xWeek at the [facility] location with Letha. Seeing good progress daily. Pain levels are improving. Patient report increase in sciatica on her left side. Addressing it at physical therapy. Given a HEP. Currently on Lyrica which helps with the pain. However, it makes her feel "in another dimension" so she does not take it on days when she has to drive.     O: LLE:  Well-healed midline incision.  Stable to varus and valgus stress.  Range of motion from 0-125 degrees of flexion.  No mid flexion instability.  Central patellar tracking.  No significant swelling.  Good perfusion and motor/sensory function to the left lower extremity.    Imaging:  Plain radiographs of the left knee done today demonstrate post op total knee arthroplasty prosthesis in place and intact without complications.    A/P:   -F/u in 6 weeks   -No soaking under water for another 3 weeks  -Discontinue Aspirin and Lyrica  -Restart Celebrex  -Avoid inversion table at this time.  She is had some recent sciatica which has complicated her recovery.  That has gotten better to some extent over the last week or so but if she develops any significant recurrent sciatica type symptoms we will likely need to consider MRI of the lumbar spine and possible epidural injection  "

## 2023-05-25 ENCOUNTER — CLINICAL SUPPORT (OUTPATIENT)
Dept: REHABILITATION | Facility: HOSPITAL | Age: 73
End: 2023-05-25
Payer: MEDICARE

## 2023-05-25 ENCOUNTER — HOSPITAL ENCOUNTER (OUTPATIENT)
Dept: RADIOLOGY | Facility: HOSPITAL | Age: 73
Discharge: HOME | End: 2023-05-25
Attending: ORTHOPAEDIC SURGERY
Payer: MEDICARE

## 2023-05-25 ENCOUNTER — OFFICE VISIT (OUTPATIENT)
Dept: SPORTS MEDICINE | Facility: CLINIC | Age: 73
End: 2023-05-25
Payer: MEDICARE

## 2023-05-25 VITALS
DIASTOLIC BLOOD PRESSURE: 78 MMHG | WEIGHT: 145 LBS | BODY MASS INDEX: 26.68 KG/M2 | HEIGHT: 62 IN | HEART RATE: 86 BPM | SYSTOLIC BLOOD PRESSURE: 150 MMHG

## 2023-05-25 DIAGNOSIS — M25.562 LEFT KNEE PAIN, UNSPECIFIED CHRONICITY: Primary | ICD-10-CM

## 2023-05-25 DIAGNOSIS — Z96.652 S/P TOTAL KNEE ARTHROPLASTY, LEFT: ICD-10-CM

## 2023-05-25 DIAGNOSIS — Z96.652 S/P TOTAL KNEE ARTHROPLASTY, LEFT: Primary | ICD-10-CM

## 2023-05-25 DIAGNOSIS — M25.662 DECREASED ROM OF LEFT KNEE: ICD-10-CM

## 2023-05-25 PROCEDURE — 99999 PR PBB SHADOW E&M-EST. PATIENT-LVL III: ICD-10-PCS | Mod: PBBFAC,,, | Performed by: ORTHOPAEDIC SURGERY

## 2023-05-25 PROCEDURE — 3288F FALL RISK ASSESSMENT DOCD: CPT | Mod: CPTII,S$GLB,, | Performed by: ORTHOPAEDIC SURGERY

## 2023-05-25 PROCEDURE — 1159F MED LIST DOCD IN RCRD: CPT | Mod: CPTII,S$GLB,, | Performed by: ORTHOPAEDIC SURGERY

## 2023-05-25 PROCEDURE — 99024 PR POST-OP FOLLOW-UP VISIT: ICD-10-PCS | Mod: S$GLB,,, | Performed by: ORTHOPAEDIC SURGERY

## 2023-05-25 PROCEDURE — 99024 POSTOP FOLLOW-UP VISIT: CPT | Mod: S$GLB,,, | Performed by: ORTHOPAEDIC SURGERY

## 2023-05-25 PROCEDURE — 73562 X-RAY EXAM OF KNEE 3: CPT | Mod: 26,RT,, | Performed by: RADIOLOGY

## 2023-05-25 PROCEDURE — 3077F SYST BP >= 140 MM HG: CPT | Mod: CPTII,S$GLB,, | Performed by: ORTHOPAEDIC SURGERY

## 2023-05-25 PROCEDURE — 3077F PR MOST RECENT SYSTOLIC BLOOD PRESSURE >= 140 MM HG: ICD-10-PCS | Mod: CPTII,S$GLB,, | Performed by: ORTHOPAEDIC SURGERY

## 2023-05-25 PROCEDURE — 3008F PR BODY MASS INDEX (BMI) DOCUMENTED: ICD-10-PCS | Mod: CPTII,S$GLB,, | Performed by: ORTHOPAEDIC SURGERY

## 2023-05-25 PROCEDURE — 73562 XR KNEE ORTHO LEFT WITH FLEXION: ICD-10-PCS | Mod: 26,RT,, | Performed by: RADIOLOGY

## 2023-05-25 PROCEDURE — 99999 PR PBB SHADOW E&M-EST. PATIENT-LVL III: CPT | Mod: PBBFAC,,, | Performed by: ORTHOPAEDIC SURGERY

## 2023-05-25 PROCEDURE — 73564 X-RAY EXAM KNEE 4 OR MORE: CPT | Mod: TC,LT

## 2023-05-25 PROCEDURE — 1159F PR MEDICATION LIST DOCUMENTED IN MEDICAL RECORD: ICD-10-PCS | Mod: CPTII,S$GLB,, | Performed by: ORTHOPAEDIC SURGERY

## 2023-05-25 PROCEDURE — 3078F PR MOST RECENT DIASTOLIC BLOOD PRESSURE < 80 MM HG: ICD-10-PCS | Mod: CPTII,S$GLB,, | Performed by: ORTHOPAEDIC SURGERY

## 2023-05-25 PROCEDURE — 3288F PR FALLS RISK ASSESSMENT DOCUMENTED: ICD-10-PCS | Mod: CPTII,S$GLB,, | Performed by: ORTHOPAEDIC SURGERY

## 2023-05-25 PROCEDURE — 73564 XR KNEE ORTHO LEFT WITH FLEXION: ICD-10-PCS | Mod: 26,LT,, | Performed by: RADIOLOGY

## 2023-05-25 PROCEDURE — 97112 NEUROMUSCULAR REEDUCATION: CPT | Mod: KX

## 2023-05-25 PROCEDURE — 3008F BODY MASS INDEX DOCD: CPT | Mod: CPTII,S$GLB,, | Performed by: ORTHOPAEDIC SURGERY

## 2023-05-25 PROCEDURE — 73564 X-RAY EXAM KNEE 4 OR MORE: CPT | Mod: 26,LT,, | Performed by: RADIOLOGY

## 2023-05-25 PROCEDURE — 1125F PR PAIN SEVERITY QUANTIFIED, PAIN PRESENT: ICD-10-PCS | Mod: CPTII,S$GLB,, | Performed by: ORTHOPAEDIC SURGERY

## 2023-05-25 PROCEDURE — 3078F DIAST BP <80 MM HG: CPT | Mod: CPTII,S$GLB,, | Performed by: ORTHOPAEDIC SURGERY

## 2023-05-25 PROCEDURE — 1101F PT FALLS ASSESS-DOCD LE1/YR: CPT | Mod: CPTII,S$GLB,, | Performed by: ORTHOPAEDIC SURGERY

## 2023-05-25 PROCEDURE — 1101F PR PT FALLS ASSESS DOC 0-1 FALLS W/OUT INJ PAST YR: ICD-10-PCS | Mod: CPTII,S$GLB,, | Performed by: ORTHOPAEDIC SURGERY

## 2023-05-25 PROCEDURE — 1125F AMNT PAIN NOTED PAIN PRSNT: CPT | Mod: CPTII,S$GLB,, | Performed by: ORTHOPAEDIC SURGERY

## 2023-05-25 RX ORDER — CELECOXIB 200 MG/1
200 CAPSULE ORAL 2 TIMES DAILY
Qty: 30 CAPSULE | Refills: 2 | Status: SHIPPED | OUTPATIENT
Start: 2023-05-25

## 2023-05-25 NOTE — PROGRESS NOTES
[facility]   Physical Therapy Treatment Note     Name: Tomasa Reed  Clinic Number: [id number]    Therapy Diagnosis:   Encounter Diagnoses   Name Primary?    Left knee pain, unspecified chronicity Yes    Decreased ROM of left knee      Physician: Kulwant Echavarria*    Visit Date: 5/25/2023    Physician Orders: PT Eval and Treat   Medical Diagnosis from Referral: M17.12 (ICD-10-CM) - Primary osteoarthritis of left knee  Evaluation Date: 4/13/2023  Authorization Period Expiration: 05/11/2023  Plan of Care Expiration: 7/13/2023  Visit # / Visits authorized: 17 / 23 + eval     PTA Visit #: 1 / 5     Time In: 1000  Time Out: 1100  Total Treatment Time: 60 minutes   Total Billable Time: 30 minutes (2 NMR)     Precautions: Standard and Fall    SUBJECTIVE     Patient reports: Had FU with MD who is pleased with progress s/p TKA. She is able to managed the LBP some with HEP currently and will be starting Celebrex to help with LBP.    FOTO IE - 6%  FOTO 5/10/2023 - 48%  FOTO 5/22/2023 - 59%  FOTO Goal - 50%    She was compliant with home exercise program.  Response to previous treatment: appropriate muscle response, muscle soreness  Functional change: amb w/o walker    Pain: NT/10, currently  Location: Left knee    Patient goals: ADLs w/o complaint    OBJECTIVE     Objective Measures updated at progress report unless specified.   DOS 4/12/2023  6 weeks s/p as of 5/24/2023 4/13/2023; ROM at eval  Range of Motion:   Knee Right Active Left Active Right Passive Left Passive   Flexion 135 35 deg  135 71 deg in supine   Extension +5 Lacking 3  +5 Lacking 5     4/14/2023; +1 degree hyperextension; 78 degrees AAROM  4/17/2023; 90 degrees AAROM Flexion  4/18/2023: 0 degrees of active extension ; 90 degrees of AA knee flexion  4/20/2023: 0-83 at start; 0-100 deg at end  4/27/2023; 96 degrees AROM Flexion; limited with PROM secondary to sciatic nerve pain  5/2/23 : 108 degrees active flexion    5/4/23 : Start 0-5-105; End 0-116 deg   5/18/23 : Start 0-5-110 deg; End 0-2-120 deg   5/22/23 : Start 0-2-116 deg; End 0-120 deg     Treatment     Tomasa received the treatments listed below:      therapeutic exercises to develop strength, endurance, ROM, flexibility, posture, and core stabilization for 10 minutes (5 min one on one)  including:    Book opener; 3 x 30 second hold each: At start and end of visit    Patient education:  - review of HEP in detail  - increase frequency or hold times on book openers  - importance of knee extension ROM and quad strength with HEP -> progress SLR to 3x12-15 reps    NEXT VISIT - Cont focus on shuttle; 90/90 hip lift; LLLD    Held:  Aerobic Activity for LE ROM and endurance; Bike for 10 minutes, Lvl 3  LLLD heel prop x10 min - Review    manual therapy techniques: Joint mobilizations were applied to the: Left knee for 00 minutes one on one, including:    Left hip distraction Grade III-IV hold - reports centralization of Left LE symptoms from ankle to buttock/low back    neuromuscular re-education activities to improve: Coordination, Kinesthetic, Proprioception and Posture and motor control for 50 minutes (25 min one on one).     90/90 hip lift adduction + serratus punch 5x30 sec  90/90 hip lift GTB abd + serratus punch 5x30 sec  Sidesteps at counter x5 laps GTB   SL hip abd 3x15 ea  SL hip abd into wall 20x5 sec  Lateral step up (2 steps up / down all reps) 2x10 ea jonatan (for glute activation)      Held:  90/90 hip lift RTB abd 5x30 sec- relief  90/90 hip lift adduction 5x30 sec- relief  SL hip abd YTB 3x10 ea  Quad sets 10 x 10 second hold    Tomasa participated in dynamic functional therapeutic activities to improve functional performance for 00  minutes (00 min one on one) including:    Held:  Shuttle DL press; 1B1R , 3x10 reps - quad loading knee over toe  Shuttle DL -> SL press: 1B1R 3x10 - L down  Shuttle SL press: 1B1R 3x10 jonatan  Sit to stand RTB at knees 4x5   Sit to  stand YTB; 3 x 5 reps - hip dom      Patient Education and Home Exercises     Home Exercises Provided and Patient Education Provided     Education provided:   - See above    Written Home Exercises Provided: Patient instructed to cont prior HEP. Exercises were reviewed and Tomasa was able to demonstrate them prior to the end of the session.  Tomasa demonstrated good  understanding of the education provided. See EMR under Patient Instructions for exercises provided during therapy sessions 5/1/2023    ASSESSMENT     Patient demonstrates good functional improvement s/p TKA per FOTO scores. LBP cont to be off and on, but patient able to manage this some with HEP.     Secondary to progress, but cont LBP / L hip pain plan to trial HEP ind x2 weeks with reassessment on knee and low back.    Tomasa Is progressing well towards her goals.   Pt prognosis is Excellent.     Pt will continue to benefit from skilled outpatient physical therapy to address the deficits listed in the problem list box on initial evaluation, provide pt/family education and to maximize pt's level of independence in the home and community environment.   Pt's spiritual, cultural and educational needs considered and pt agreeable to plan of care and goals.     Anticipated barriers to physical therapy: age    Goals:   Short Term Goals: 0-6 weeks - Appropriate, ongoing  1.Report decreased L knee pain  < / =  1/10  to increase tolerance for amb  2. Increase knee ROM to 5-0-120 in order to be able to perform ADLs without difficulty.  3. Increase strength by 1/3 MMT grade in Quads to increase tolerance for ADL and work activities.  4. Pt to tolerate HEP to improve ROM and independence with ADL's     Long Term Goals: 6-12 weeks  1. Able to amb w/o deviation, complaint, or AD  2.Patient goal: ADLs w/o complaint  3.Increase strength to >/= 4+/5 in Quad and hip musculature to increase tolerance for ADL and work activities.  4. Pt will report at CJ level (20-40%  impaired) on FOTO knee to demonstrate increase in LE function with every day tasks.     PLAN     Plan of Care Certification: 4/13/2023 to 7/13/2023     Outpatient Physical Therapy 2-3 times weekly for 10 - 12 weeks to include the following interventions: Gait Training, Manual Therapy, Moist Heat/ Ice, Neuromuscular Re-ed, Patient Education, Self Care, Therapeutic Activities, and Therapeutic Exercise.     KEILA NIX, PT, DPT, OCS

## 2023-05-31 ENCOUNTER — PATIENT MESSAGE (OUTPATIENT)
Dept: INTERNAL MEDICINE | Facility: CLINIC | Age: 73
End: 2023-05-31
Payer: MEDICARE

## 2023-05-31 RX ORDER — ALENDRONATE SODIUM 70 MG/1
70 TABLET ORAL
Qty: 12 TABLET | Refills: 3
Start: 2023-05-31 | End: 2023-06-07 | Stop reason: SDUPTHER

## 2023-05-31 NOTE — TELEPHONE ENCOUNTER
Dr Dominguez      We discussed at my last visit that I would begin taking Fosamax again. I had two boxes remaining from my previous prescription.  I now need a new Rx.   Please send the order to the pharmacy on my chart ([facility] on [address]).   Thank you   Tomasa Reed

## 2023-06-07 ENCOUNTER — PATIENT MESSAGE (OUTPATIENT)
Dept: INTERNAL MEDICINE | Facility: CLINIC | Age: 73
End: 2023-06-07
Payer: MEDICARE

## 2023-06-07 RX ORDER — ALENDRONATE SODIUM 70 MG/1
70 TABLET ORAL
Qty: 12 TABLET | Refills: 3 | Status: SHIPPED | OUTPATIENT
Start: 2023-06-07 | End: 2024-06-06

## 2023-06-08 ENCOUNTER — CLINICAL SUPPORT (OUTPATIENT)
Dept: REHABILITATION | Facility: HOSPITAL | Age: 73
End: 2023-06-08
Payer: MEDICARE

## 2023-06-08 DIAGNOSIS — M25.662 DECREASED ROM OF LEFT KNEE: ICD-10-CM

## 2023-06-08 DIAGNOSIS — M25.562 LEFT KNEE PAIN, UNSPECIFIED CHRONICITY: Primary | ICD-10-CM

## 2023-06-08 PROCEDURE — 97140 MANUAL THERAPY 1/> REGIONS: CPT | Mod: KX

## 2023-06-08 NOTE — PROGRESS NOTES
[facility] OUTPATIENT THERAPY AND WELLNESS   Physical Therapy Treatment Note     Name: Tomasa Reed  Clinic Number: [id number]    Therapy Diagnosis:   Encounter Diagnoses   Name Primary?    Left knee pain, unspecified chronicity Yes    Decreased ROM of left knee        Physician: Kulwant Echavarria*    Visit Date: 6/8/2023    Physician Orders: PT Eval and Treat   Medical Diagnosis from Referral: M17.12 (ICD-10-CM) - Primary osteoarthritis of left knee  Evaluation Date: 4/13/2023  Authorization Period Expiration: 05/11/2023  Plan of Care Expiration: 7/13/2023  Visit # / Visits authorized: 18 / 23 + eval      PTA Visit #: 1 / 5     Time In: 1005   Time Out: 1050  Total Treatment Time: 45 minutes   Total Billable Time: 35 minutes (2 NMR)     Precautions: Standard and Fall    SUBJECTIVE     Patient reports: Patient returns from short break in care. She is better able to manage LBP currently and has had ~2 days w/o complaint, but still persists to some level. She is nervous that this may return. Concerning her knee, overall it is progressing, but will swell and get stiff from time to time. Discomfort reduced with ice / Celebrex.    FOTO IE - 6%  FOTO 5/10/2023 - 48%  FOTO 5/22/2023 - 59%  FOTO Goal - 50%    She was compliant with home exercise program.  Response to previous treatment: appropriate muscle response, muscle soreness  Functional change: amb w/o walker    Pain: NT/10, currently  Location: Left knee    Patient goals: ADLs w/o complaint    OBJECTIVE     Objective Measures updated at progress report unless specified.   DOS 4/12/2023  8 weeks 1 day s/p as of 6/8/2023 4/13/2023; ROM at eval  Range of Motion:   Knee Right Active Left Active Right Passive Left Passive   Flexion 135 35 deg  135 71 deg in supine   Extension +5 Lacking 3  +5 Lacking 5     4/14/2023; +1 degree hyperextension; 78 degrees AAROM  4/17/2023; 90 degrees AAROM Flexion  4/18/2023: 0 degrees of active extension ; 90 degrees of AA  knee flexion  4/20/2023: 0-83 at start; 0-100 deg at end  4/27/2023; 96 degrees AROM Flexion; limited with PROM secondary to sciatic nerve pain  5/2/23 : 108 degrees active flexion   5/4/23 : Start 0-5-105; End 0-116 deg   5/18/23 : Start 0-5-110 deg; End 0-2-120 deg   5/22/23 : Start 0-2-116 deg; End 0-120 deg     Treatment     Tomasa received the treatments listed below:      therapeutic exercises to develop strength, endurance, ROM, flexibility, posture, and core stabilization for 10 minutes (0 min one on one)  including:    Aerobic Activity for LE ROM and endurance; Bike for 10 minutes, Lvl 3    Held:  Book opener; 3 x 30 second hold each: At start and end of visit  LLLD heel prop x10 min - Review    manual therapy techniques: Joint mobilizations were applied to the: Left knee for 35 minutes one on one, including:    Patient education:  - swelling may be normal based on activity level  - cont swelling management  - cont biking for stiffness  - self scar mobilization / PFJ mobilization    Scar tissue mobilization  PFJ mobilization inferior Grade III hold  AP grade II-III tibia on femur  Manual distraction  IASTM to quad, ITB, scar tissue    neuromuscular re-education activities to improve: Coordination, Kinesthetic, Proprioception and Posture and motor control for 00 minutes (00 min one on one).     Held:  90/90 hip lift adduction + serratus punch 5x30 sec  90/90 hip lift GTB abd + serratus punch 5x30 sec  Sidesteps at counter x5 laps GTB   SL hip abd 3x15 ea  SL hip abd into wall 20x5 sec  Lateral step up (2 steps up / down all reps) 2x10 ea jonatan (for glute activation)  90/90 hip lift RTB abd 5x30 sec- relief  90/90 hip lift adduction 5x30 sec- relief  SL hip abd YTB 3x10 ea  Quad sets 10 x 10 second hold    Tomasa participated in dynamic functional therapeutic activities to improve functional performance for 00  minutes (00 min one on one) including:    Held:  Shuttle DL press; 1B1R , 3x10 reps - quad loading  knee over toe  Shuttle DL -> SL press: 1B1R 3x10 - L down  Shuttle SL press: 1B1R 3x10 jonatan  Sit to stand RTB at knees 4x5   Sit to stand YTB; 3 x 5 reps - hip dom      Patient Education and Home Exercises     Home Exercises Provided and Patient Education Provided     Education provided:   - See above    Written Home Exercises Provided: Patient instructed to cont prior HEP. Exercises were reviewed and Tomasa was able to demonstrate them prior to the end of the session.  Tomasa demonstrated good  understanding of the education provided. See EMR under Patient Instructions for exercises provided during therapy sessions 5/1/2023    ASSESSMENT     Improving management of LBP with HEP and medications, but persists.     Knee cont to progress well. Swelling does cont to limit function some. Demonstrates quad tightness, PFJ hypomobility, and incision site scar tissue addressed within visit to help further improve mobility and reduce stiffness sensation. Giovanni this well and will be practicing self scar mobilization / PFJ mobilization at home.    Secondary to progress, but cont LBP / L hip pain and knee stiffness again plan to trial HEP ind x2 weeks with reassessment on knee and low back on 6/22.    Tomasa Is progressing well towards her goals.   Pt prognosis is Excellent.     Pt will continue to benefit from skilled outpatient physical therapy to address the deficits listed in the problem list box on initial evaluation, provide pt/family education and to maximize pt's level of independence in the home and community environment.   Pt's spiritual, cultural and educational needs considered and pt agreeable to plan of care and goals.     Anticipated barriers to physical therapy: age    Goals:   Short Term Goals: 0-6 weeks - Appropriate, ongoing  1.Report decreased L knee pain  < / =  1/10  to increase tolerance for amb  2. Increase knee ROM to 5-0-120 in order to be able to perform ADLs without difficulty.  3. Increase strength  by 1/3 MMT grade in Quads to increase tolerance for ADL and work activities.  4. Pt to tolerate HEP to improve ROM and independence with ADL's     Long Term Goals: 6-12 weeks  1. Able to amb w/o deviation, complaint, or AD  2.Patient goal: ADLs w/o complaint  3.Increase strength to >/= 4+/5 in Quad and hip musculature to increase tolerance for ADL and work activities.  4. Pt will report at CJ level (20-40% impaired) on FOTO knee to demonstrate increase in LE function with every day tasks.     PLAN     Plan of Care Certification: 4/13/2023 to 7/13/2023     Outpatient Physical Therapy 2-3 times weekly for 10 - 12 weeks to include the following interventions: Gait Training, Manual Therapy, Moist Heat/ Ice, Neuromuscular Re-ed, Patient Education, Self Care, Therapeutic Activities, and Therapeutic Exercise.     KEILA NIX, PT, DPT, OCS

## 2023-06-09 ENCOUNTER — PATIENT MESSAGE (OUTPATIENT)
Dept: ADMINISTRATIVE | Facility: OTHER | Age: 73
End: 2023-06-09
Payer: MEDICARE

## 2023-06-16 ENCOUNTER — PATIENT MESSAGE (OUTPATIENT)
Dept: SPORTS MEDICINE | Facility: CLINIC | Age: 73
End: 2023-06-16
Payer: MEDICARE

## 2023-06-22 ENCOUNTER — PATIENT MESSAGE (OUTPATIENT)
Dept: REHABILITATION | Facility: HOSPITAL | Age: 73
End: 2023-06-22

## 2023-06-22 ENCOUNTER — PATIENT MESSAGE (OUTPATIENT)
Dept: SPORTS MEDICINE | Facility: CLINIC | Age: 73
End: 2023-06-22
Payer: MEDICARE

## 2023-06-22 DIAGNOSIS — M25.562 CHRONIC PAIN OF LEFT KNEE: ICD-10-CM

## 2023-06-22 DIAGNOSIS — G89.29 CHRONIC PAIN OF LEFT KNEE: ICD-10-CM

## 2023-06-22 DIAGNOSIS — Z96.652 S/P TOTAL KNEE ARTHROPLASTY, LEFT: Primary | ICD-10-CM

## 2023-07-17 ENCOUNTER — OFFICE VISIT (OUTPATIENT)
Dept: SPORTS MEDICINE | Facility: CLINIC | Age: 73
End: 2023-07-17
Payer: MEDICARE

## 2023-07-17 ENCOUNTER — HOSPITAL ENCOUNTER (OUTPATIENT)
Dept: RADIOLOGY | Facility: HOSPITAL | Age: 73
Discharge: HOME | End: 2023-07-17
Attending: ORTHOPAEDIC SURGERY
Payer: MEDICARE

## 2023-07-17 VITALS
BODY MASS INDEX: 26.74 KG/M2 | HEART RATE: 87 BPM | DIASTOLIC BLOOD PRESSURE: 74 MMHG | WEIGHT: 145.31 LBS | SYSTOLIC BLOOD PRESSURE: 131 MMHG | HEIGHT: 62 IN

## 2023-07-17 DIAGNOSIS — M25.562 CHRONIC PAIN OF LEFT KNEE: ICD-10-CM

## 2023-07-17 DIAGNOSIS — G89.29 CHRONIC PAIN OF LEFT KNEE: ICD-10-CM

## 2023-07-17 DIAGNOSIS — Z96.652 S/P TOTAL KNEE ARTHROPLASTY, LEFT: ICD-10-CM

## 2023-07-17 DIAGNOSIS — M54.32 SCIATICA OF LEFT SIDE: Primary | ICD-10-CM

## 2023-07-17 PROCEDURE — 73564 XR KNEE ORTHO LEFT WITH FLEXION: ICD-10-PCS | Mod: 26,LT,, | Performed by: RADIOLOGY

## 2023-07-17 PROCEDURE — 3075F SYST BP GE 130 - 139MM HG: CPT | Mod: CPTII,S$GLB,, | Performed by: ORTHOPAEDIC SURGERY

## 2023-07-17 PROCEDURE — 73562 X-RAY EXAM OF KNEE 3: CPT | Mod: 26,RT,, | Performed by: RADIOLOGY

## 2023-07-17 PROCEDURE — 1125F AMNT PAIN NOTED PAIN PRSNT: CPT | Mod: CPTII,S$GLB,, | Performed by: ORTHOPAEDIC SURGERY

## 2023-07-17 PROCEDURE — 1101F PR PT FALLS ASSESS DOC 0-1 FALLS W/OUT INJ PAST YR: ICD-10-PCS | Mod: CPTII,S$GLB,, | Performed by: ORTHOPAEDIC SURGERY

## 2023-07-17 PROCEDURE — 3078F PR MOST RECENT DIASTOLIC BLOOD PRESSURE < 80 MM HG: ICD-10-PCS | Mod: CPTII,S$GLB,, | Performed by: ORTHOPAEDIC SURGERY

## 2023-07-17 PROCEDURE — 99999 PR PBB SHADOW E&M-EST. PATIENT-LVL III: CPT | Mod: PBBFAC,,, | Performed by: ORTHOPAEDIC SURGERY

## 2023-07-17 PROCEDURE — 3078F DIAST BP <80 MM HG: CPT | Mod: CPTII,S$GLB,, | Performed by: ORTHOPAEDIC SURGERY

## 2023-07-17 PROCEDURE — 1101F PT FALLS ASSESS-DOCD LE1/YR: CPT | Mod: CPTII,S$GLB,, | Performed by: ORTHOPAEDIC SURGERY

## 2023-07-17 PROCEDURE — 99999 PR PBB SHADOW E&M-EST. PATIENT-LVL III: ICD-10-PCS | Mod: PBBFAC,,, | Performed by: ORTHOPAEDIC SURGERY

## 2023-07-17 PROCEDURE — 1159F PR MEDICATION LIST DOCUMENTED IN MEDICAL RECORD: ICD-10-PCS | Mod: CPTII,S$GLB,, | Performed by: ORTHOPAEDIC SURGERY

## 2023-07-17 PROCEDURE — 73564 X-RAY EXAM KNEE 4 OR MORE: CPT | Mod: TC,LT

## 2023-07-17 PROCEDURE — 3075F PR MOST RECENT SYSTOLIC BLOOD PRESS GE 130-139MM HG: ICD-10-PCS | Mod: CPTII,S$GLB,, | Performed by: ORTHOPAEDIC SURGERY

## 2023-07-17 PROCEDURE — 3288F PR FALLS RISK ASSESSMENT DOCUMENTED: ICD-10-PCS | Mod: CPTII,S$GLB,, | Performed by: ORTHOPAEDIC SURGERY

## 2023-07-17 PROCEDURE — 1125F PR PAIN SEVERITY QUANTIFIED, PAIN PRESENT: ICD-10-PCS | Mod: CPTII,S$GLB,, | Performed by: ORTHOPAEDIC SURGERY

## 2023-07-17 PROCEDURE — 99214 PR OFFICE/OUTPT VISIT, EST, LEVL IV, 30-39 MIN: ICD-10-PCS | Mod: S$GLB,,, | Performed by: ORTHOPAEDIC SURGERY

## 2023-07-17 PROCEDURE — 73564 X-RAY EXAM KNEE 4 OR MORE: CPT | Mod: 26,LT,, | Performed by: RADIOLOGY

## 2023-07-17 PROCEDURE — 1159F MED LIST DOCD IN RCRD: CPT | Mod: CPTII,S$GLB,, | Performed by: ORTHOPAEDIC SURGERY

## 2023-07-17 PROCEDURE — 3288F FALL RISK ASSESSMENT DOCD: CPT | Mod: CPTII,S$GLB,, | Performed by: ORTHOPAEDIC SURGERY

## 2023-07-17 PROCEDURE — 99214 OFFICE O/P EST MOD 30 MIN: CPT | Mod: S$GLB,,, | Performed by: ORTHOPAEDIC SURGERY

## 2023-07-17 PROCEDURE — 73562 XR KNEE ORTHO LEFT WITH FLEXION: ICD-10-PCS | Mod: 26,RT,, | Performed by: RADIOLOGY

## 2023-07-17 NOTE — PROGRESS NOTES
CC: Left TKA Post-Op    DATE OF PROCEDURE: 4/12/2023   PROCEDURES PERFORMED:   Left total knee arthroplasty (CPT 03546)    Tomasa Reed reports to be doing well just over 3 months s/p the above mentioned procedure.  The postoperative course was complicated by sciatica and low back pathology.  Pain management referral placed on 6/22/23 for persistent symptoms despite initial conservative treatment.  This was approved by insurance but the patient was not contacted by the pain management team and she has no appointment.  Fortunately her sciatica symptoms are much improved.  She attributes this to doing some work on her inversion table. In fact she does not think she needs to be seen in pain management at this point.  States she has some mild soreness in the knee but getting better and overall pleased with that. Off all pain medication.    REVIEW OF SYSTEMS:   Constitution: Negative. Negative for chills, fever and night sweats.    Hematologic/Lymphatic: Negative for bleeding problem. Does not bruise/bleed easily.   Skin: Negative for dry skin, itching and rash.   Musculoskeletal: Negative for falls. Neg for left knee pain and muscle weakness.     All other review of symptoms were reviewed and found to be noncontributory.     PAST MEDICAL HISTORY:   Past Medical History:   Diagnosis Date    Cataract     OU    Chronic interstitial cystitis     History of hepatitis C; S/p RX with SVR 12 documented 06/2017 06/22/2017    HLD (hyperlipidemia)     Malignant melanoma of skin of trunk, except scrotum     Migraine, unspecified, without mention of intractable migraine without mention of status migrainosus     Nonspecific abnormal results of liver function study     Nontoxic multinodular goiter     Osteopenia      PAST SURGICAL HISTORY:   Past Surgical History:   Procedure Laterality Date    BREAST BIOPSY      CATARACT EXTRACTION Right 03/02/2020    cataracts      B/L at [facility]    COLONOSCOPY N/A 12/14/2016     Procedure: COLONOSCOPY;  Surgeon: Wicho Painting MD;  [facility]  Service: Endoscopy;  Laterality: N/A;    COLONOSCOPY N/A 1/14/2020    Procedure: COLONOSCOPY;  Surgeon: NANCY Maher MD;  [facility]  Service: Endoscopy;  Laterality: N/A;  1/8/20 left vm to confirm appt-rb    FINGER SURGERY Right 2010    index finger fused    MOLE REMOVAL      TONSILLECTOMY, ADENOIDECTOMY      TOTAL ABDOMINAL HYSTERECTOMY      TOTAL KNEE ARTHROPLASTY Left 4/12/2023    Procedure: ARTHROPLASTY, KNEE, TOTAL;  Surgeon: REMY Zuñiga MD;  Location: [facility] OR;  Service: Orthopedics;  Laterality: Left;  regional w/ catheter  spinal  adductor  pericapsular injection 0.25 ropivacaine     FAMILY HISTORY:   Family History   Problem Relation Age of Onset    Osteoarthritis Mother         s/p hip fx    Diabetes Mother     Cancer Mother         colon    Thyroid disease Mother     Kidney failure Mother         d.93 s/p PAD procedure    Cataracts Mother     Macular degeneration Mother     Heart failure Father     Stroke Father         d.97 complications    Hypertension Father     Cataracts Father     Fibromyalgia Sister     Glaucoma Sister         dry eyes, s/p duct surgery    Other Sister         acoustic tumor,s/p removal & vertigo resolved, +cochlear implant    Other Daughter         inflammation, better off red meat    Chronic back pain Daughter     GI problems Daughter         liver w/up and on cholestyramine    Obesity Daughter     Melanoma Neg Hx     Amblyopia Neg Hx     Blindness Neg Hx     Retinal detachment Neg Hx     Strabismus Neg Hx      SOCIAL HISTORY:   Social History     Socioeconomic History    Marital status:    Occupational History    Occupation: retired     Employer: [employer]   Tobacco Use    Smoking status: Never    Smokeless tobacco: Never   Substance and Sexual Activity    Alcohol use: Yes     Comment: socially    Drug use: Not Currently    Sexual activity: Yes      Partners: Male   Social History Narrative        Retired  of an elementary school    Has 2 daughters, no thyroid problems    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 She has severe DJD and is diabetic.     Dad d. 97 status post stroke, CHF. Dependent, bed to w/c.      Significant memory decline, usually doesn't recognize family        Two sisters in good health.         SCREENING TESTS:      Social Determinants of Health     Financial Resource Strain: Low Risk     Difficulty of Paying Living Expenses: Not hard at all   Food Insecurity: No Food Insecurity    Worried About Running Out of Food in the Last Year: Never true    Ran Out of Food in the Last Year: Never true   Transportation Needs: No Transportation Needs    Lack of Transportation (Medical): No    Lack of Transportation (Non-Medical): No   Physical Activity: Insufficiently Active    Days of Exercise per Week: 5 days    Minutes of Exercise per Session: 20 min   Stress: No Stress Concern Present    Feeling of Stress : Only a little   Social Connections: Unknown    Frequency of Communication with Friends and Family: More than three times a week    Frequency of Social Gatherings with Friends and Family: Three times a week    Active Member of Clubs or Organizations: Yes    Attends Club or Organization Meetings: More than 4 times per year    Marital Status:    Housing Stability: Low Risk     Unable to Pay for Housing in the Last Year: No    Number of Places Lived in the Last Year: 1    Unstable Housing in the Last Year: No     MEDICATIONS:     Current Outpatient Medications:     acetaminophen (TYLENOL) 650 MG TbSR, Take 650 mg by mouth every 8 (eight) hours., Disp: , Rfl:     alendronate (FOSAMAX) 70 MG tablet, Take 1 tablet (70 mg total) by mouth every 7 days., Disp: 12 tablet, Rfl: 3    celecoxib  (CELEBREX) 200 MG capsule, Take 1 capsule (200 mg total) by mouth 2 (two) times daily., Disp: 30 capsule, Rfl: 2    cetirizine 10 mg Cap, PRN, Disp: , Rfl:     dietary supplement Pack, Take 1 tablet by mouth 2 (two) times daily., Disp: , Rfl:     fluticasone propionate (FLONASE) 50 mcg/actuation nasal spray, 1 spray (50 mcg total) by Each Nostril route once daily., Disp: 16 g, Rfl: 0    glucosam/chondro/herb 149/hyal (GLUCOS CHOND CPLX ADVANCED ORAL), Take by mouth., Disp: , Rfl:     glycerin adult suppository, as needed. , Disp: , Rfl:     magnesium oxide (MAG-OX) 400 mg (241.3 mg magnesium) tablet, Take 400 mg by mouth once daily., Disp: , Rfl:     MISCELLANEOUS MEDICAL SUPPLY [facility], as needed. EYE ITCH RELIEF 0.25 EYE DROPS, Disp: , Rfl:     mometasone (NASONEX) 50 mcg/actuation nasal spray, 2 sprays by Nasal route once daily., Disp: , Rfl:     OCEAN NASAL 0.65 % nasal spray, as needed. , Disp: , Rfl:     rosuvastatin (CRESTOR) 10 MG tablet, TAKE 1 TABLET BY MOUTH EVERY DAY, Disp: 90 tablet, Rfl: 3    TUMS 200 mg calcium (500 mg) chewable tablet, 1 tablet as needed. , Disp: , Rfl:     aspirin (ECOTRIN) 81 MG EC tablet, Take 1 tablet (81 mg total) by mouth 2 (two) times a day., Disp: 84 tablet, Rfl: 0    ondansetron (ZOFRAN-ODT) 4 MG TbDL, Dissolve 1 tablet (4 mg total) by mouth every 8 (eight) hours as needed (nausea). (Patient not taking: Reported on 4/27/2023), Disp: 30 tablet, Rfl: 0    oxyCODONE (ROXICODONE) 5 MG immediate release tablet, Take 1 tablet (5 mg total) by mouth every 6 (six) hours as needed for Pain., Disp: 28 tablet, Rfl: 0    pregabalin (LYRICA) 75 MG capsule, Take 1 capsule (75 mg total) by mouth 2 (two) times daily., Disp: 60 capsule, Rfl: 0  No current facility-administered medications for this visit.    Facility-Administered Medications Ordered in Other Visits:     ropivacaine 0.2% Nimbus PainPRO Pump infusion 500 ML, , Perineural, Continuous, Jericho Alcantar MD, New Bag at 04/12/23  "[id number]    ALLERGIES:   Review of patient's allergies indicates:   Allergen Reactions    Iodine and iodide containing products Nausea And Vomiting     Other reaction(s): SEVERE    Sulfa (sulfonamide antibiotics)      Other reaction(s): unknown  Other reaction(s): RASH      PHYSICAL EXAMINATION:  /74   Pulse 87   Ht 5' 2" (1.575 m)   Wt 65.9 kg (145 lb 4.5 oz)   BMI 26.57 kg/m²   General: Well-developed well-nourished 73 y.o. femalein no acute distress   Cardiovascular: Regular rhythm by palpation of distal pulse, normal color and temperature, no concerning varicosities on symptomatic side   Lungs: No labored breathing or wheezing appreciated   Neuro: Alert and oriented ×3   Psychiatric: well oriented to person, place and time, demonstrates normal mood and affect   Skin: No rashes, lesions or ulcers, normal temperature, turgor, and texture on involved extremity    Ortho/SPM Exam  Examination of the left knee demonstrates intact extensor mechanism.  Mild residual soft tissue swelling.  No effusion.  Active extension is full.  Active assisted flexion to 130°.  Central patellar tracking.  Stable to varus and valgus stress.  No mid flexion instability.  No tenderness to palpation of significance.  Negative straight leg raise for radicular complaints.    IMAGING:  X-rays including standing, weight bearing AP and flexion bilateral knees, LEFT knee lateral and sunrise views ordered and images reviewed by me show:    Stable total knee prosthesis.  Well aligned.  No fracture, subchondral bone reaction or evidence of loosening.    ASSESSMENT:      ICD-10-CM ICD-9-CM   1. Sciatica of left side  M54.32 724.3   2. S/P total knee arthroplasty, left  Z96.652 V43.65       PLAN:     Findings discussed with the patient.  Her previous sciatica symptoms are much improved.  Denies desire to see pain management at this point.  Continue use of inversion table and self-management.  In regards to her left knee, findings at this time " are appropriate.  Good range of motion and balance.  No significant pain.  Continue activities to tolerance.  Previously discussed dental prophylaxis.  Return to clinic in 3 months with mid-level provider for repeat x-rays.    Procedures

## 2023-08-08 ENCOUNTER — OFFICE VISIT (OUTPATIENT)
Dept: OTOLARYNGOLOGY | Facility: CLINIC | Age: 73
End: 2023-08-08
Payer: MEDICARE

## 2023-08-08 ENCOUNTER — CLINICAL SUPPORT (OUTPATIENT)
Dept: OTOLARYNGOLOGY | Facility: CLINIC | Age: 73
End: 2023-08-08
Payer: MEDICARE

## 2023-08-08 VITALS
WEIGHT: 140.75 LBS | BODY MASS INDEX: 25.75 KG/M2 | DIASTOLIC BLOOD PRESSURE: 79 MMHG | SYSTOLIC BLOOD PRESSURE: 131 MMHG | HEART RATE: 80 BPM

## 2023-08-08 DIAGNOSIS — H92.01 OTALGIA, RIGHT EAR: ICD-10-CM

## 2023-08-08 DIAGNOSIS — H90.3 SENSORINEURAL HEARING LOSS (SNHL), BILATERAL: Primary | Chronic | ICD-10-CM

## 2023-08-08 DIAGNOSIS — J01.80 ACUTE NON-RECURRENT SINUSITIS OF OTHER SINUS: ICD-10-CM

## 2023-08-08 DIAGNOSIS — H90.3 SENSORINEURAL HEARING LOSS (SNHL), BILATERAL: ICD-10-CM

## 2023-08-08 DIAGNOSIS — J30.9 CHRONIC ALLERGIC RHINITIS: Chronic | ICD-10-CM

## 2023-08-08 DIAGNOSIS — H92.01 OTALGIA, RIGHT EAR: Primary | ICD-10-CM

## 2023-08-08 PROCEDURE — 99999 PR PBB SHADOW E&M-EST. PATIENT-LVL III: ICD-10-PCS | Mod: PBBFAC,,, | Performed by: OTOLARYNGOLOGY

## 2023-08-08 PROCEDURE — 92557 COMPREHENSIVE HEARING TEST: CPT | Mod: S$GLB,,, | Performed by: PHYSICIAN ASSISTANT

## 2023-08-08 PROCEDURE — 3075F SYST BP GE 130 - 139MM HG: CPT | Mod: CPTII,S$GLB,, | Performed by: OTOLARYNGOLOGY

## 2023-08-08 PROCEDURE — 3078F PR MOST RECENT DIASTOLIC BLOOD PRESSURE < 80 MM HG: ICD-10-PCS | Mod: CPTII,S$GLB,, | Performed by: OTOLARYNGOLOGY

## 2023-08-08 PROCEDURE — 1125F AMNT PAIN NOTED PAIN PRSNT: CPT | Mod: CPTII,S$GLB,, | Performed by: OTOLARYNGOLOGY

## 2023-08-08 PROCEDURE — 1101F PR PT FALLS ASSESS DOC 0-1 FALLS W/OUT INJ PAST YR: ICD-10-PCS | Mod: CPTII,S$GLB,, | Performed by: OTOLARYNGOLOGY

## 2023-08-08 PROCEDURE — 1159F PR MEDICATION LIST DOCUMENTED IN MEDICAL RECORD: ICD-10-PCS | Mod: CPTII,S$GLB,, | Performed by: OTOLARYNGOLOGY

## 2023-08-08 PROCEDURE — 3288F FALL RISK ASSESSMENT DOCD: CPT | Mod: CPTII,S$GLB,, | Performed by: OTOLARYNGOLOGY

## 2023-08-08 PROCEDURE — 3078F DIAST BP <80 MM HG: CPT | Mod: CPTII,S$GLB,, | Performed by: OTOLARYNGOLOGY

## 2023-08-08 PROCEDURE — 99214 OFFICE O/P EST MOD 30 MIN: CPT | Mod: S$GLB,,, | Performed by: OTOLARYNGOLOGY

## 2023-08-08 PROCEDURE — 99214 PR OFFICE/OUTPT VISIT, EST, LEVL IV, 30-39 MIN: ICD-10-PCS | Mod: S$GLB,,, | Performed by: OTOLARYNGOLOGY

## 2023-08-08 PROCEDURE — 1160F PR REVIEW ALL MEDS BY PRESCRIBER/CLIN PHARMACIST DOCUMENTED: ICD-10-PCS | Mod: CPTII,S$GLB,, | Performed by: OTOLARYNGOLOGY

## 2023-08-08 PROCEDURE — 3075F PR MOST RECENT SYSTOLIC BLOOD PRESS GE 130-139MM HG: ICD-10-PCS | Mod: CPTII,S$GLB,, | Performed by: OTOLARYNGOLOGY

## 2023-08-08 PROCEDURE — 99999 PR PBB SHADOW E&M-EST. PATIENT-LVL III: CPT | Mod: PBBFAC,,, | Performed by: OTOLARYNGOLOGY

## 2023-08-08 PROCEDURE — 1101F PT FALLS ASSESS-DOCD LE1/YR: CPT | Mod: CPTII,S$GLB,, | Performed by: OTOLARYNGOLOGY

## 2023-08-08 PROCEDURE — 3288F PR FALLS RISK ASSESSMENT DOCUMENTED: ICD-10-PCS | Mod: CPTII,S$GLB,, | Performed by: OTOLARYNGOLOGY

## 2023-08-08 PROCEDURE — 92567 TYMPANOMETRY: CPT | Mod: S$GLB,,, | Performed by: PHYSICIAN ASSISTANT

## 2023-08-08 PROCEDURE — 1125F PR PAIN SEVERITY QUANTIFIED, PAIN PRESENT: ICD-10-PCS | Mod: CPTII,S$GLB,, | Performed by: OTOLARYNGOLOGY

## 2023-08-08 PROCEDURE — 92557 PR COMPREHENSIVE HEARING TEST: ICD-10-PCS | Mod: S$GLB,,, | Performed by: PHYSICIAN ASSISTANT

## 2023-08-08 PROCEDURE — 1160F RVW MEDS BY RX/DR IN RCRD: CPT | Mod: CPTII,S$GLB,, | Performed by: OTOLARYNGOLOGY

## 2023-08-08 PROCEDURE — 1159F MED LIST DOCD IN RCRD: CPT | Mod: CPTII,S$GLB,, | Performed by: OTOLARYNGOLOGY

## 2023-08-08 PROCEDURE — 92567 PR TYMPA2METRY: ICD-10-PCS | Mod: S$GLB,,, | Performed by: PHYSICIAN ASSISTANT

## 2023-08-08 RX ORDER — AMOXICILLIN AND CLAVULANATE POTASSIUM 875; 125 MG/1; MG/1
1 TABLET, FILM COATED ORAL 2 TIMES DAILY
Qty: 20 TABLET | Refills: 0 | Status: SHIPPED | OUTPATIENT
Start: 2023-08-08 | End: 2023-08-18

## 2023-08-08 RX ORDER — METHYLPREDNISOLONE 4 MG/1
TABLET ORAL
Qty: 1 EACH | Refills: 0 | Status: SHIPPED | OUTPATIENT
Start: 2023-08-08 | End: 2023-09-11 | Stop reason: ALTCHOICE

## 2023-08-08 NOTE — PROGRESS NOTES
Tomasa Reed, a 73 y.o. female, was seen today in the clinic for an audiologic evaluation.  Patients main complaint was RIGHT ear pain which she feels is allergy related.  She admits to a history of hearing loss and does have hearing aids.      Audiogram results revealed a mild to moderate sensorineural hearing loss bilaterally.  Speech reception thresholds were noted at 25 dB in the right ear and 25 dB in the left ear.  Speech discrimination scores were 88% in the right ear and 92% in the left ear.  Tympanometry revealed Type A in the right ear and Type A in the left ear.     Recommendations:  Otologic evaluation  Annual audiogram  Hearing protection when in noise  Continued and consistent use of amplification

## 2023-08-08 NOTE — PROGRESS NOTES
Chief Complaint   Patient presents with    Otalgia     Patient right ear feels its allergy related     .     HPI: Tomasa Reed is 73 y.o. female who is self-referred for evaluation of right ear pain.  Symptoms include pain in the right ear.  She has has had nasal congestion, rhinorrhea which has been worse on her right side.  She has been taking Claritin and Flonase without relief.  Symptoms began 2 weeks ago and are unchanged since that time. Patient denies otorrhea or post-auricular pain. She admits to to hearing loss but feels it has not been worse with these symptoms. Last audiogram was in 2019 showing bilateral SNHL. She does where hearing aids.            Past Medical History:   Diagnosis Date    Cataract     OU    Chronic interstitial cystitis     History of hepatitis C; S/p RX with SVR 12 documented 06/2017 06/22/2017    HLD (hyperlipidemia)     Malignant melanoma of skin of trunk, except scrotum     Migraine, unspecified, without mention of intractable migraine without mention of status migrainosus     Nonspecific abnormal results of liver function study     Nontoxic multinodular goiter     Osteopenia      Social History     Socioeconomic History    Marital status:    Occupational History    Occupation: retired     Employer: [employer]   Tobacco Use    Smoking status: Never    Smokeless tobacco: Never   Substance and Sexual Activity    Alcohol use: Yes     Comment: socially    Drug use: Not Currently    Sexual activity: Yes     Partners: Male   Social History Narrative        Retired  of an elementary school    Has 2 daughters, no thyroid problems    Helping out w/ 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 She has severe DJD and is diabetic.     Dad d. 97 status post stroke, CHF. Dependent, bed to w/c.      Significant memory decline,  usually doesn't recognize family        Two sisters in good health.         SCREENING TESTS:      Social Determinants of Health     Financial Resource Strain: Low Risk  (7/17/2023)    Overall Financial Resource Strain (CARDIA)     Difficulty of Paying Living Expenses: Not hard at all   Food Insecurity: No Food Insecurity (7/17/2023)    Hunger Vital Sign     Worried About Running Out of Food in the Last Year: Never true     Ran Out of Food in the Last Year: Never true   Transportation Needs: No Transportation Needs (7/17/2023)    PRAPARE - Transportation     Lack of Transportation (Medical): No     Lack of Transportation (Non-Medical): No   Physical Activity: Insufficiently Active (7/17/2023)    Exercise Vital Sign     Days of Exercise per Week: 5 days     Minutes of Exercise per Session: 20 min   Stress: No Stress Concern Present (7/17/2023)    [place] [place] of Occupational Health - Occupational Stress Questionnaire     Feeling of Stress : Only a little   Social Connections: Unknown (7/17/2023)    Social Connection and Isolation Panel [NHANES]     Frequency of Communication with Friends and Family: More than three times a week     Frequency of Social Gatherings with Friends and Family: Three times a week     Active Member of Clubs or Organizations: Yes     Attends Club or Organization Meetings: More than 4 times per year     Marital Status:    Housing Stability: Low Risk  (7/17/2023)    Housing Stability Vital Sign     Unable to Pay for Housing in the Last Year: No     Number of Places Lived in the Last Year: 1     Unstable Housing in the Last Year: No     Past Surgical History:   Procedure Laterality Date    BREAST BIOPSY      CATARACT EXTRACTION Right 03/02/2020    cataracts      B/L at [facility]    COLONOSCOPY N/A 12/14/2016    Procedure: COLONOSCOPY;  Surgeon: Wciho Painting MD;  Location: [address]);  Service: Endoscopy;  Laterality: N/A;    COLONOSCOPY N/A 1/14/2020     Procedure: COLONOSCOPY;  Surgeon: NANCY Maher MD;  Location: [facility] ENDO (4TH FLR);  Service: Endoscopy;  Laterality: N/A;  1/8/20 left vm to confirm appt-rb    FINGER SURGERY Right 2010    index finger fused    MOLE REMOVAL      TONSILLECTOMY, ADENOIDECTOMY      TOTAL ABDOMINAL HYSTERECTOMY      TOTAL KNEE ARTHROPLASTY Left 4/12/2023    Procedure: ARTHROPLASTY, KNEE, TOTAL;  Surgeon: REMY Zuñiga MD;  Location: [facility] OR;  Service: Orthopedics;  Laterality: Left;  regional w/ catheter  spinal  adductor  pericapsular injection 0.25 ropivacaine     Family History   Problem Relation Age of Onset    Osteoarthritis Mother         s/p hip fx    Diabetes Mother     Cancer Mother         colon    Thyroid disease Mother     Kidney failure Mother         d.93 s/p PAD procedure    Cataracts Mother     Macular degeneration Mother     Heart failure Father     Stroke Father         d.97 complications    Hypertension Father     Cataracts Father     Fibromyalgia Sister     Glaucoma Sister         dry eyes, s/p duct surgery    Other Sister         acoustic tumor,s/p removal & vertigo resolved, +cochlear implant    Other Daughter         inflammation, better off red meat    Chronic back pain Daughter     GI problems Daughter         liver w/up and on cholestyramine    Obesity Daughter     Melanoma Neg Hx     Amblyopia Neg Hx     Blindness Neg Hx     Retinal detachment Neg Hx     Strabismus Neg Hx            Review of Systems  General: negative for chills, fever or weight loss  Psychological: negative for mood changes or depression  Ophthalmic: negative for blurry vision, photophobia or eye pain  ENT: see HPI  Respiratory: no cough, shortness of breath, or wheezing  Cardiovascular: no chest pain or dyspnea on exertion  Gastrointestinal: no abdominal pain, change in bowel habits, or black/ bloody stools  Musculoskeletal: negative for gait disturbance or muscular weakness  Neurological: no syncope or seizures; no  ataxia  Dermatological: negative for puritis,  rash and jaundice  Hematologic/lymphatic: no easy bruising, no new lumps or bumps      Physical Exam:    Vitals:    08/08/23 1039   BP: 131/79   Pulse: 80       Constitutional: Well appearing / communicating without difficutly.  NAD.  Eyes: EOM I Bilaterally  Head/Face: Normocephalic.  Negative paranasal sinus pressure/tenderness.  Salivary glands WNL.  House Brackmann I Bilaterally.    Right Ear: Auricle normal appearance. External Auditory Canal within normal limits no lesions or masses,TM w/o masses/lesions/perforations. TM mobility noted.   Left Ear: Auricle normal appearance. External Auditory Canal within normal limits no lesions or masses,TM w/o masses/lesions/perforations. TM mobility noted.  Nose: No gross nasal septal deviation. Inferior Turbinates 3+ bilaterally. No septal perforation. No masses/lesions. External nasal skin appears normal without masses/lesions.  Oral Cavity: Gingiva/lips within normal limits.  Dentition/gingiva healthy appearing. Mucus membranes moist. Floor of mouth soft, no masses palpated. Oral Tongue mobile. Hard Palate appears normal.    Oropharynx: Base of tongue appears normal. No masses/lesions noted. Tonsillar fossa/pharyngeal wall without lesions. Posterior oropharynx WNL.  Soft palate without masses. Midline uvula.   Neck/Lymphatic: No LAD I-VI bilaterally.  No thyromegaly.  No masses noted on exam.        Diagnostic testing reviewed:    Audiogram interpreted personally by me and discussed in detail with the patient today. Bilateral mild sloping to moderate SHNL; Tympnanometry shows type A tymps bilaterally.             Assessment:    ICD-10-CM ICD-9-CM    1. Sensorineural hearing loss (SNHL), bilateral  H90.3 389.18       2. Chronic allergic rhinitis  J30.9 477.9       3. Acute non-recurrent sinusitis of other sinus  J01.80 461.8       4. Otalgia, right ear  H92.01 388.70         The primary encounter diagnosis was Sensorineural  hearing loss (SNHL), bilateral. Diagnoses of Chronic allergic rhinitis, Acute non-recurrent sinusitis of other sinus, and Otalgia, right ear were also pertinent to this visit.      Plan:  No orders of the defined types were placed in this encounter.    Start nasal saline rinses BID  Continue Flonase 2 sprays per nostril daily  Continue Claritin 10 mg PO daily  Start Augmentin 875 mg PO BID for 10 days  Start medrol dose mayra  Discussed that the area of tenderness of the right ear on exam is at the lateral EAC; suspect that this may be due to hearing aid fit and suggest that she follow up with her audiologist to ensure proper fit.       Kandis Farrell MD

## 2023-08-22 ENCOUNTER — PATIENT MESSAGE (OUTPATIENT)
Dept: SPORTS MEDICINE | Facility: CLINIC | Age: 73
End: 2023-08-22
Payer: MEDICARE

## 2023-08-24 ENCOUNTER — PATIENT MESSAGE (OUTPATIENT)
Dept: SPORTS MEDICINE | Facility: CLINIC | Age: 73
End: 2023-08-24
Payer: MEDICARE

## 2023-09-05 ENCOUNTER — LAB VISIT (OUTPATIENT)
Dept: LAB | Facility: HOSPITAL | Age: 73
End: 2023-09-05
Attending: INTERNAL MEDICINE
Payer: MEDICARE

## 2023-09-05 DIAGNOSIS — D70.9 NEUTROPENIA, UNSPECIFIED TYPE: ICD-10-CM

## 2023-09-05 DIAGNOSIS — E78.5 HYPERLIPIDEMIA, UNSPECIFIED HYPERLIPIDEMIA TYPE: ICD-10-CM

## 2023-09-05 DIAGNOSIS — E04.2 NONTOXIC MULTINODULAR GOITER: ICD-10-CM

## 2023-09-05 LAB
ALBUMIN SERPL BCP-MCNC: 4.1 G/DL (ref 3.5–5.2)
ALP SERPL-CCNC: 55 U/L (ref 55–135)
ALT SERPL W/O P-5'-P-CCNC: 14 U/L (ref 10–44)
ANION GAP SERPL CALC-SCNC: 11 MMOL/L (ref 8–16)
AST SERPL-CCNC: 21 U/L (ref 10–40)
BASOPHILS # BLD AUTO: 0.02 K/UL (ref 0–0.2)
BASOPHILS NFR BLD: 0.7 % (ref 0–1.9)
BILIRUB SERPL-MCNC: 0.4 MG/DL (ref 0.1–1)
BUN SERPL-MCNC: 8 MG/DL (ref 8–23)
CALCIUM SERPL-MCNC: 9.4 MG/DL (ref 8.7–10.5)
CHLORIDE SERPL-SCNC: 102 MMOL/L (ref 95–110)
CO2 SERPL-SCNC: 24 MMOL/L (ref 23–29)
CREAT SERPL-MCNC: 0.7 MG/DL (ref 0.5–1.4)
DIFFERENTIAL METHOD: ABNORMAL
EOSINOPHIL # BLD AUTO: 0.1 K/UL (ref 0–0.5)
EOSINOPHIL NFR BLD: 3.2 % (ref 0–8)
ERYTHROCYTE [DISTWIDTH] IN BLOOD BY AUTOMATED COUNT: 13.6 % (ref 11.5–14.5)
EST. GFR  (NO RACE VARIABLE): >60 ML/MIN/1.73 M^2
GLUCOSE SERPL-MCNC: 85 MG/DL (ref 70–110)
HCT VFR BLD AUTO: 37.5 % (ref 37–48.5)
HGB BLD-MCNC: 12.7 G/DL (ref 12–16)
IMM GRANULOCYTES # BLD AUTO: 0 K/UL (ref 0–0.04)
IMM GRANULOCYTES NFR BLD AUTO: 0 % (ref 0–0.5)
LYMPHOCYTES # BLD AUTO: 1.3 K/UL (ref 1–4.8)
LYMPHOCYTES NFR BLD: 46.1 % (ref 18–48)
MCH RBC QN AUTO: 30.8 PG (ref 27–31)
MCHC RBC AUTO-ENTMCNC: 33.9 G/DL (ref 32–36)
MCV RBC AUTO: 91 FL (ref 82–98)
MONOCYTES # BLD AUTO: 0.2 K/UL (ref 0.3–1)
MONOCYTES NFR BLD: 7.9 % (ref 4–15)
NEUTROPHILS # BLD AUTO: 1.2 K/UL (ref 1.8–7.7)
NEUTROPHILS NFR BLD: 42.1 % (ref 38–73)
NRBC BLD-RTO: 0 /100 WBC
PLATELET # BLD AUTO: 286 K/UL (ref 150–450)
PMV BLD AUTO: 9.8 FL (ref 9.2–12.9)
POTASSIUM SERPL-SCNC: 4 MMOL/L (ref 3.5–5.1)
PROT SERPL-MCNC: 7.1 G/DL (ref 6–8.4)
RBC # BLD AUTO: 4.12 M/UL (ref 4–5.4)
SODIUM SERPL-SCNC: 137 MMOL/L (ref 136–145)
T4 FREE SERPL-MCNC: 0.84 NG/DL (ref 0.71–1.51)
TSH SERPL DL<=0.005 MIU/L-ACNC: 0.57 UIU/ML (ref 0.4–4)
WBC # BLD AUTO: 2.8 K/UL (ref 3.9–12.7)

## 2023-09-05 PROCEDURE — 84443 ASSAY THYROID STIM HORMONE: CPT | Performed by: INTERNAL MEDICINE

## 2023-09-05 PROCEDURE — 84439 ASSAY OF FREE THYROXINE: CPT | Performed by: INTERNAL MEDICINE

## 2023-09-05 PROCEDURE — 36415 COLL VENOUS BLD VENIPUNCTURE: CPT | Mod: PO | Performed by: INTERNAL MEDICINE

## 2023-09-05 PROCEDURE — 85025 COMPLETE CBC W/AUTO DIFF WBC: CPT | Performed by: INTERNAL MEDICINE

## 2023-09-05 PROCEDURE — 80053 COMPREHEN METABOLIC PANEL: CPT | Performed by: INTERNAL MEDICINE

## 2023-09-06 ENCOUNTER — TELEPHONE (OUTPATIENT)
Dept: PAIN MEDICINE | Facility: CLINIC | Age: 73
End: 2023-09-06
Payer: MEDICARE

## 2023-09-06 ENCOUNTER — TELEPHONE (OUTPATIENT)
Dept: INTERNAL MEDICINE | Facility: CLINIC | Age: 73
End: 2023-09-06
Payer: MEDICARE

## 2023-09-06 NOTE — TELEPHONE ENCOUNTER
Called pt to inform her that her new pt appt tomorrow with Dr. Campbell needs to be rescheduled due to a family emergency. Moved to 9/28 at 2:00pm at [facility] and placed on a wait list.

## 2023-09-06 NOTE — TELEPHONE ENCOUNTER
----- Message from Rosanna Huizar MD sent at 9/5/2023  6:54 PM CDT -----  Please review your lab work and we will discuss at your pending office visit.   verna

## 2023-09-09 NOTE — PROGRESS NOTES
73 y.o. femalepresents in f/u to elevated BP, MNT,neutropenia, and dyslipidemia    Neutropenia  Denied fever or chills or night sweats  ANC==>1176    MNT  Denied heat or cold intolerance  Denied dysphagia  TSH==>0.572  Free T4==>0.84    Elevated BP, tx low salt diet  Denied HA or dizziness  BP today==>102/66    Dyslipidemia tx w/rosuvastatin 10 mg  Denied unusual muscle pain or chest pain  LDL==>  Lab Results   Component Value Date    CHOL 154 03/02/2023    CHOL 155 05/26/2022    CHOL 218 (H) 01/06/2022     Lab Results   Component Value Date    HDL 68 03/02/2023    HDL 72 05/26/2022    HDL 61 01/06/2022     Lab Results   Component Value Date    LDLCALC 71.2 03/02/2023    LDLCALC 74.0 05/26/2022    LDLCALC 143.2 01/06/2022     Lab Results   Component Value Date    TRIG 74 03/02/2023    TRIG 45 05/26/2022    TRIG 69 01/06/2022     Lab Results   Component Value Date    CHOLHDL 44.2 03/02/2023    CHOLHDL 46.5 05/26/2022    CHOLHDL 28.0 01/06/2022         ROS:  No fever, chills, or night sweats  No blurry vision or visual disturbance  No dysphagia or early satiety  No palpitations or tachycardia  No cough or wheezing  No GERD or abdominal pain  No numbness or focal deficits  + knee surgery w/ improvement in knee but aggravated back and has appt PM  Remainder of review negative except as previously noted    PAST MEDICAL HX: Reviewed  PAST SURGICAL HX: Reviewed  SOCIAL HX: Reviewed  FAMILY HX: Reviewed    PHYSICAL EXAM:  VS: As noted  GENERAL: WDWN, A&O, NAD, conversant and co-operative  EYES: Conj/lids unremarkable, sclera anicteric, PERRL, EOMI    NECK: Supple w/o lymphadenopathy or thyromegaly  RESPIRATORY: Efforts are unlabored. Lungs CTAP  CARDIOVASCULAR: Heart RRR. No carotid bruits noted. 1+ pedal pulses. No LE edema  GASTROINTESTINAL: BS+, soft , NT/ND, no HSM noted  MUSCULOSKELETAL: Gait normal. No CCE  NEUROLOGIC: LARSEN. No tremor noted  SKIN: Warm and dry    IMPRESSION/PLAN:  HLD, stable  -continue rosuvastatin 10  mg q.day  -continue low-fat diet  Elevated blood pressure, stable  -continue low-salt diet  M NT, stable  Neutropenia, slight decrease, asx, 1176  -continue to monitor  Hypothyroidism, increased fatigue  -START levothyroxine 25mcg  HM  -Mammo - dtr just dx breast cancer, neg BRCA  -RTC 6 mos w/ lab

## 2023-09-11 ENCOUNTER — OFFICE VISIT (OUTPATIENT)
Dept: INTERNAL MEDICINE | Facility: CLINIC | Age: 73
End: 2023-09-11
Payer: MEDICARE

## 2023-09-11 VITALS
DIASTOLIC BLOOD PRESSURE: 66 MMHG | TEMPERATURE: 97 F | HEIGHT: 62 IN | HEART RATE: 72 BPM | RESPIRATION RATE: 20 BRPM | SYSTOLIC BLOOD PRESSURE: 102 MMHG | BODY MASS INDEX: 26.37 KG/M2 | WEIGHT: 143.31 LBS | OXYGEN SATURATION: 97 %

## 2023-09-11 DIAGNOSIS — Z12.31 ENCOUNTER FOR SCREENING MAMMOGRAM FOR BREAST CANCER: ICD-10-CM

## 2023-09-11 DIAGNOSIS — E04.2 NONTOXIC MULTINODULAR GOITER: ICD-10-CM

## 2023-09-11 DIAGNOSIS — E78.5 HYPERLIPIDEMIA, UNSPECIFIED HYPERLIPIDEMIA TYPE: Primary | ICD-10-CM

## 2023-09-11 DIAGNOSIS — R03.0 ELEVATED BP WITHOUT DIAGNOSIS OF HYPERTENSION: ICD-10-CM

## 2023-09-11 DIAGNOSIS — E03.4 HYPOTHYROIDISM DUE TO ACQUIRED ATROPHY OF THYROID: ICD-10-CM

## 2023-09-11 DIAGNOSIS — D70.9 NEUTROPENIA, UNSPECIFIED TYPE: ICD-10-CM

## 2023-09-11 DIAGNOSIS — Z78.0 POSTMENOPAUSAL ESTROGEN DEFICIENCY: ICD-10-CM

## 2023-09-11 PROCEDURE — 99999 PR PBB SHADOW E&M-EST. PATIENT-LVL IV: ICD-10-PCS | Mod: PBBFAC,,, | Performed by: INTERNAL MEDICINE

## 2023-09-11 PROCEDURE — 99214 PR OFFICE/OUTPT VISIT, EST, LEVL IV, 30-39 MIN: ICD-10-PCS | Mod: S$GLB,,, | Performed by: INTERNAL MEDICINE

## 2023-09-11 PROCEDURE — 1101F PT FALLS ASSESS-DOCD LE1/YR: CPT | Mod: CPTII,S$GLB,, | Performed by: INTERNAL MEDICINE

## 2023-09-11 PROCEDURE — 3288F PR FALLS RISK ASSESSMENT DOCUMENTED: ICD-10-PCS | Mod: CPTII,S$GLB,, | Performed by: INTERNAL MEDICINE

## 2023-09-11 PROCEDURE — 1159F PR MEDICATION LIST DOCUMENTED IN MEDICAL RECORD: ICD-10-PCS | Mod: CPTII,S$GLB,, | Performed by: INTERNAL MEDICINE

## 2023-09-11 PROCEDURE — 1101F PR PT FALLS ASSESS DOC 0-1 FALLS W/OUT INJ PAST YR: ICD-10-PCS | Mod: CPTII,S$GLB,, | Performed by: INTERNAL MEDICINE

## 2023-09-11 PROCEDURE — 3074F SYST BP LT 130 MM HG: CPT | Mod: CPTII,S$GLB,, | Performed by: INTERNAL MEDICINE

## 2023-09-11 PROCEDURE — 3078F PR MOST RECENT DIASTOLIC BLOOD PRESSURE < 80 MM HG: ICD-10-PCS | Mod: CPTII,S$GLB,, | Performed by: INTERNAL MEDICINE

## 2023-09-11 PROCEDURE — 1126F PR PAIN SEVERITY QUANTIFIED, NO PAIN PRESENT: ICD-10-PCS | Mod: CPTII,S$GLB,, | Performed by: INTERNAL MEDICINE

## 2023-09-11 PROCEDURE — 3074F PR MOST RECENT SYSTOLIC BLOOD PRESSURE < 130 MM HG: ICD-10-PCS | Mod: CPTII,S$GLB,, | Performed by: INTERNAL MEDICINE

## 2023-09-11 PROCEDURE — 3078F DIAST BP <80 MM HG: CPT | Mod: CPTII,S$GLB,, | Performed by: INTERNAL MEDICINE

## 2023-09-11 PROCEDURE — 99214 OFFICE O/P EST MOD 30 MIN: CPT | Mod: S$GLB,,, | Performed by: INTERNAL MEDICINE

## 2023-09-11 PROCEDURE — 1159F MED LIST DOCD IN RCRD: CPT | Mod: CPTII,S$GLB,, | Performed by: INTERNAL MEDICINE

## 2023-09-11 PROCEDURE — 99999 PR PBB SHADOW E&M-EST. PATIENT-LVL IV: CPT | Mod: PBBFAC,,, | Performed by: INTERNAL MEDICINE

## 2023-09-11 PROCEDURE — 1126F AMNT PAIN NOTED NONE PRSNT: CPT | Mod: CPTII,S$GLB,, | Performed by: INTERNAL MEDICINE

## 2023-09-11 PROCEDURE — 3288F FALL RISK ASSESSMENT DOCD: CPT | Mod: CPTII,S$GLB,, | Performed by: INTERNAL MEDICINE

## 2023-09-11 RX ORDER — LEVOTHYROXINE SODIUM 25 UG/1
25 TABLET ORAL
Qty: 90 TABLET | Refills: 3 | Status: SHIPPED | OUTPATIENT
Start: 2023-09-11 | End: 2024-03-12 | Stop reason: SDUPTHER

## 2023-09-13 ENCOUNTER — HOSPITAL ENCOUNTER (OUTPATIENT)
Dept: RADIOLOGY | Facility: HOSPITAL | Age: 73
Discharge: HOME | End: 2023-09-13
Attending: INTERNAL MEDICINE
Payer: MEDICARE

## 2023-09-13 VITALS — BODY MASS INDEX: 25.76 KG/M2 | HEIGHT: 62 IN | WEIGHT: 140 LBS

## 2023-09-13 DIAGNOSIS — Z12.31 ENCOUNTER FOR SCREENING MAMMOGRAM FOR BREAST CANCER: ICD-10-CM

## 2023-09-13 PROCEDURE — 77063 BREAST TOMOSYNTHESIS BI: CPT | Mod: 26,,, | Performed by: RADIOLOGY

## 2023-09-13 PROCEDURE — 77063 MAMMO DIGITAL SCREENING BILAT WITH TOMO: ICD-10-PCS | Mod: 26,,, | Performed by: RADIOLOGY

## 2023-09-13 PROCEDURE — 77067 MAMMO DIGITAL SCREENING BILAT WITH TOMO: ICD-10-PCS | Mod: 26,,, | Performed by: RADIOLOGY

## 2023-09-13 PROCEDURE — 77067 SCR MAMMO BI INCL CAD: CPT | Mod: 26,,, | Performed by: RADIOLOGY

## 2023-09-13 PROCEDURE — 77067 SCR MAMMO BI INCL CAD: CPT | Mod: TC,PO

## 2023-09-18 ENCOUNTER — TELEPHONE (OUTPATIENT)
Dept: INTERNAL MEDICINE | Facility: CLINIC | Age: 73
End: 2023-09-18
Payer: MEDICARE

## 2023-09-18 NOTE — TELEPHONE ENCOUNTER
----- Message from Rosanna Huizar MD sent at 9/18/2023  7:37 AM CDT -----  Please note that your mammogram was read as follows  Impression:  There is no mammographic evidence of malignancy.   verna

## 2023-09-21 ENCOUNTER — IMMUNIZATION (OUTPATIENT)
Dept: INTERNAL MEDICINE | Facility: CLINIC | Age: 73
End: 2023-09-21
Payer: MEDICARE

## 2023-09-21 PROCEDURE — 90694 FLU VACCINE - QUADRIVALENT - ADJUVANTED: ICD-10-PCS | Mod: S$GLB,,, | Performed by: INTERNAL MEDICINE

## 2023-09-21 PROCEDURE — 90694 VACC AIIV4 NO PRSRV 0.5ML IM: CPT | Mod: S$GLB,,, | Performed by: INTERNAL MEDICINE

## 2023-09-21 PROCEDURE — G0008 ADMIN INFLUENZA VIRUS VAC: HCPCS | Mod: S$GLB,,, | Performed by: INTERNAL MEDICINE

## 2023-09-21 PROCEDURE — G0008 FLU VACCINE - QUADRIVALENT - ADJUVANTED: ICD-10-PCS | Mod: S$GLB,,, | Performed by: INTERNAL MEDICINE

## 2023-10-12 ENCOUNTER — PATIENT MESSAGE (OUTPATIENT)
Dept: SPORTS MEDICINE | Facility: CLINIC | Age: 73
End: 2023-10-12
Payer: MEDICARE

## 2023-10-12 RX ORDER — CEPHALEXIN 500 MG/1
500 CAPSULE ORAL 4 TIMES DAILY
Qty: 4 CAPSULE | Refills: 0 | Status: SHIPPED | OUTPATIENT
Start: 2023-10-12 | End: 2023-10-13

## 2023-10-17 ENCOUNTER — HOSPITAL ENCOUNTER (OUTPATIENT)
Dept: RADIOLOGY | Facility: HOSPITAL | Age: 73
Discharge: HOME | End: 2023-10-17
Attending: PHYSICIAN ASSISTANT
Payer: MEDICARE

## 2023-10-17 ENCOUNTER — OFFICE VISIT (OUTPATIENT)
Dept: SPORTS MEDICINE | Facility: CLINIC | Age: 73
End: 2023-10-17
Payer: MEDICARE

## 2023-10-17 VITALS
WEIGHT: 145.31 LBS | SYSTOLIC BLOOD PRESSURE: 135 MMHG | DIASTOLIC BLOOD PRESSURE: 70 MMHG | HEIGHT: 62 IN | HEART RATE: 71 BPM | BODY MASS INDEX: 26.74 KG/M2

## 2023-10-17 DIAGNOSIS — M25.562 LEFT KNEE PAIN, UNSPECIFIED CHRONICITY: ICD-10-CM

## 2023-10-17 DIAGNOSIS — M25.562 CHRONIC PAIN OF LEFT KNEE: ICD-10-CM

## 2023-10-17 DIAGNOSIS — G89.29 CHRONIC PAIN OF LEFT KNEE: ICD-10-CM

## 2023-10-17 DIAGNOSIS — Z96.652 S/P TOTAL KNEE ARTHROPLASTY, LEFT: Primary | ICD-10-CM

## 2023-10-17 PROCEDURE — 73562 XR KNEE ORTHO LEFT WITH FLEXION: ICD-10-PCS | Mod: 26,RT,, | Performed by: RADIOLOGY

## 2023-10-17 PROCEDURE — 3075F PR MOST RECENT SYSTOLIC BLOOD PRESS GE 130-139MM HG: ICD-10-PCS | Mod: CPTII,S$GLB,, | Performed by: PHYSICIAN ASSISTANT

## 2023-10-17 PROCEDURE — 73562 X-RAY EXAM OF KNEE 3: CPT | Mod: 26,RT,, | Performed by: RADIOLOGY

## 2023-10-17 PROCEDURE — 73564 X-RAY EXAM KNEE 4 OR MORE: CPT | Mod: 26,LT,, | Performed by: RADIOLOGY

## 2023-10-17 PROCEDURE — 99999 PR PBB SHADOW E&M-EST. PATIENT-LVL IV: ICD-10-PCS | Mod: PBBFAC,,, | Performed by: PHYSICIAN ASSISTANT

## 2023-10-17 PROCEDURE — 1159F PR MEDICATION LIST DOCUMENTED IN MEDICAL RECORD: ICD-10-PCS | Mod: CPTII,S$GLB,, | Performed by: PHYSICIAN ASSISTANT

## 2023-10-17 PROCEDURE — 99213 PR OFFICE/OUTPT VISIT, EST, LEVL III, 20-29 MIN: ICD-10-PCS | Mod: S$GLB,,, | Performed by: PHYSICIAN ASSISTANT

## 2023-10-17 PROCEDURE — 1159F MED LIST DOCD IN RCRD: CPT | Mod: CPTII,S$GLB,, | Performed by: PHYSICIAN ASSISTANT

## 2023-10-17 PROCEDURE — 1126F AMNT PAIN NOTED NONE PRSNT: CPT | Mod: CPTII,S$GLB,, | Performed by: PHYSICIAN ASSISTANT

## 2023-10-17 PROCEDURE — 99213 OFFICE O/P EST LOW 20 MIN: CPT | Mod: S$GLB,,, | Performed by: PHYSICIAN ASSISTANT

## 2023-10-17 PROCEDURE — 1160F RVW MEDS BY RX/DR IN RCRD: CPT | Mod: CPTII,S$GLB,, | Performed by: PHYSICIAN ASSISTANT

## 2023-10-17 PROCEDURE — 1126F PR PAIN SEVERITY QUANTIFIED, NO PAIN PRESENT: ICD-10-PCS | Mod: CPTII,S$GLB,, | Performed by: PHYSICIAN ASSISTANT

## 2023-10-17 PROCEDURE — 3075F SYST BP GE 130 - 139MM HG: CPT | Mod: CPTII,S$GLB,, | Performed by: PHYSICIAN ASSISTANT

## 2023-10-17 PROCEDURE — 1160F PR REVIEW ALL MEDS BY PRESCRIBER/CLIN PHARMACIST DOCUMENTED: ICD-10-PCS | Mod: CPTII,S$GLB,, | Performed by: PHYSICIAN ASSISTANT

## 2023-10-17 PROCEDURE — 99999 PR PBB SHADOW E&M-EST. PATIENT-LVL IV: CPT | Mod: PBBFAC,,, | Performed by: PHYSICIAN ASSISTANT

## 2023-10-17 PROCEDURE — 73564 XR KNEE ORTHO LEFT WITH FLEXION: ICD-10-PCS | Mod: 26,LT,, | Performed by: RADIOLOGY

## 2023-10-17 PROCEDURE — 73562 X-RAY EXAM OF KNEE 3: CPT | Mod: TC,RT

## 2023-10-17 PROCEDURE — 3078F PR MOST RECENT DIASTOLIC BLOOD PRESSURE < 80 MM HG: ICD-10-PCS | Mod: CPTII,S$GLB,, | Performed by: PHYSICIAN ASSISTANT

## 2023-10-17 PROCEDURE — 3078F DIAST BP <80 MM HG: CPT | Mod: CPTII,S$GLB,, | Performed by: PHYSICIAN ASSISTANT

## 2023-10-17 NOTE — PROGRESS NOTES
CC: Left TKA Post-Op    DATE OF PROCEDURE: 4/12/2023   PROCEDURES PERFORMED:   Left total knee arthroplasty (CPT 90801)    Tomasa Reed reports to be doing well just over 6 months s/p the above mentioned procedure.  The postoperative course was complicated by sciatica and low back pathology.  Pain management referral placed on 6/22/23 for persistent symptoms despite initial conservative treatment.  This was approved by insurance but the patient was not contacted by the pain management team and she has no appointment. She was finally scheduled but had to cancel based on conflict and now has an appointment set for late November. Fortunately her sciatica symptoms are much improved.  She attributes this to doing some work on her inversion table. In fact she does not think she needs to be seen in pain management at this point. In regard to the knee she is doing well overall. She is off all pain medication. Does endorse some numbness over the incision. She is able to do all activity without issues. She does home exercises. Very happy with her progress.    REVIEW OF SYSTEMS:   Constitution: Negative. Negative for chills, fever and night sweats.    Hematologic/Lymphatic: Negative for bleeding problem. Does not bruise/bleed easily.   Skin: Negative for dry skin, itching and rash.   Musculoskeletal: Negative for falls. Neg for left knee pain and muscle weakness.     All other review of symptoms were reviewed and found to be noncontributory.     PAST MEDICAL HISTORY:   Past Medical History:   Diagnosis Date    Cataract     OU    Chronic interstitial cystitis     History of hepatitis C; S/p RX with SVR 12 documented 06/2017 06/22/2017    HLD (hyperlipidemia)     Malignant melanoma of skin of trunk, except scrotum     Migraine, unspecified, without mention of intractable migraine without mention of status migrainosus     Nonspecific abnormal results of liver function study     Nontoxic multinodular goiter      Osteopenia      PAST SURGICAL HISTORY:   Past Surgical History:   Procedure Laterality Date    BREAST BIOPSY      CATARACT EXTRACTION Right 03/02/2020    cataracts      B/L at [facility]    COLONOSCOPY N/A 12/14/2016    Procedure: COLONOSCOPY;  Surgeon: Wicho Painting MD;  [address]  Service: Endoscopy;  Laterality: N/A;    COLONOSCOPY N/A 1/14/2020    Procedure: COLONOSCOPY;  Surgeon: NANCY Maher MD;  [facility]  Service: Endoscopy;  Laterality: N/A;  1/8/20 left vm to confirm appt-rb    FINGER SURGERY Right 2010    index finger fused    MOLE REMOVAL      TONSILLECTOMY, ADENOIDECTOMY      TOTAL ABDOMINAL HYSTERECTOMY      TOTAL KNEE ARTHROPLASTY Left 4/12/2023    Procedure: ARTHROPLASTY, KNEE, TOTAL;  Surgeon: REMY Zuñiga MD;  Location: [facility];  Service: Orthopedics;  Laterality: Left;  regional w/ catheter  spinal  adductor  pericapsular injection 0.25 ropivacaine     FAMILY HISTORY:   Family History   Problem Relation Age of Onset    Osteoarthritis Mother         s/p hip fx    Diabetes Mother     Cancer Mother         colon    Thyroid disease Mother     Kidney failure Mother         d.93 s/p PAD procedure    Cataracts Mother     Macular degeneration Mother     Heart failure Father     Stroke Father         d.97 complications    Hypertension Father     Cataracts Father     Fibromyalgia Sister     Glaucoma Sister         dry eyes, s/p duct surgery    Other Sister         acoustic tumor,s/p removal & vertigo resolved, +cochlear implant    Other Daughter         inflammation, better off red meat    Chronic back pain Daughter     GI problems Daughter         liver w/up and on cholestyramine    Breast cancer Daughter         s/p mastectomy, ductal, tx hormonal    Obesity Daughter     Melanoma Neg Hx     Amblyopia Neg Hx     Blindness Neg Hx     Retinal detachment Neg Hx     Strabismus Neg Hx      SOCIAL HISTORY:   Social History     Socioeconomic History     Marital status:    Occupational History    Occupation: retired     Employer: [employer]   Tobacco Use    Smoking status: Never    Smokeless tobacco: Never   Substance and Sexual Activity    Alcohol use: Yes     Comment: socially    Drug use: Not Currently    Sexual activity: Yes     Partners: Male   Social History Narrative        Retired  of an elementary school    Has 2 daughters, no thyroid problems    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 She has severe DJD and is diabetic.     Dad d. 97 status post stroke, CHF. Dependent, bed to w/c.      Significant memory decline, usually doesn't recognize family        Two sisters in good health.         SCREENING TESTS:      Social Determinants of Health     Financial Resource Strain: Low Risk  (9/20/2023)    Overall Financial Resource Strain (CARDIA)     Difficulty of Paying Living Expenses: Not very hard   Food Insecurity: No Food Insecurity (9/20/2023)    Hunger Vital Sign     Worried About Running Out of Food in the Last Year: Never true     Ran Out of Food in the Last Year: Never true   Transportation Needs: No Transportation Needs (9/20/2023)    PRAPARE - Transportation     Lack of Transportation (Medical): No     Lack of Transportation (Non-Medical): No   Physical Activity: Insufficiently Active (9/20/2023)    Exercise Vital Sign     Days of Exercise per Week: 6 days     Minutes of Exercise per Session: 20 min   Stress: No Stress Concern Present (9/20/2023)    [place] of Occupational Health - Occupational Stress Questionnaire     Feeling of Stress : Only a little   Social Connections: Unknown (9/20/2023)    Social Connection and Isolation Panel [NHANES]     Frequency of Communication with Friends and Family: More than three times a week     Frequency of Social Gatherings with Friends  and Family: Twice a week     Active Member of Clubs or Organizations: Yes     Attends Club or Organization Meetings: More than 4 times per year     Marital Status:    Housing Stability: Low Risk  (9/20/2023)    Housing Stability Vital Sign     Unable to Pay for Housing in the Last Year: No     Number of Places Lived in the Last Year: 1     Unstable Housing in the Last Year: No     MEDICATIONS:     Current Outpatient Medications:     acetaminophen (TYLENOL) 650 MG TbSR, Take 650 mg by mouth every 8 (eight) hours., Disp: , Rfl:     alendronate (FOSAMAX) 70 MG tablet, Take 1 tablet (70 mg total) by mouth every 7 days., Disp: 12 tablet, Rfl: 3    celecoxib (CELEBREX) 200 MG capsule, Take 1 capsule (200 mg total) by mouth 2 (two) times daily., Disp: 30 capsule, Rfl: 2    cetirizine 10 mg Cap, PRN, Disp: , Rfl:     dietary supplement Pack, Take 1 tablet by mouth 2 (two) times daily., Disp: , Rfl:     fluticasone propionate (FLONASE) 50 mcg/actuation nasal spray, 1 spray (50 mcg total) by Each Nostril route once daily., Disp: 16 g, Rfl: 0    glucosam/chondro/herb 149/hyal (GLUCOS CHOND CPLX ADVANCED ORAL), Take by mouth., Disp: , Rfl:     glycerin adult suppository, as needed. , Disp: , Rfl:     levothyroxine (SYNTHROID) 25 MCG tablet, Take 1 tablet (25 mcg total) by mouth before breakfast., Disp: 90 tablet, Rfl: 3    magnesium oxide (MAG-OX) 400 mg (241.3 mg magnesium) tablet, Take 400 mg by mouth once daily., Disp: , Rfl:     MISCELLANEOUS MEDICAL SUPPLY [facility], as needed. EYE ITCH RELIEF 0.25 EYE DROPS, Disp: , Rfl:     mometasone (NASONEX) 50 mcg/actuation nasal spray, 2 sprays by Nasal route once daily., Disp: , Rfl:     OCEAN NASAL 0.65 % nasal spray, as needed. , Disp: , Rfl:     rosuvastatin (CRESTOR) 10 MG tablet, TAKE 1 TABLET BY MOUTH EVERY DAY, Disp: 90 tablet, Rfl: 3    TUMS 200 mg calcium (500 mg) chewable tablet, 1 tablet as needed. , Disp: , Rfl:   No current facility-administered medications for this  "visit.    Facility-Administered Medications Ordered in Other Visits:     ropivacaine 0.2% [employer] PainPRO Pump infusion 500 ML, , Perineural, Continuous, Jericho Alcantar MD, New Bag at 04/12/23 0947    ALLERGIES:   Review of patient's allergies indicates:   Allergen Reactions    Iodine and iodide containing products Nausea And Vomiting     Other reaction(s): SEVERE    Sulfa (sulfonamide antibiotics)      Other reaction(s): unknown  Other reaction(s): RASH      PHYSICAL EXAMINATION:  /70   Pulse 71   Ht 5' 2" (1.575 m)   Wt 65.9 kg (145 lb 4.5 oz)   BMI 26.57 kg/m²   General: Well-developed well-nourished 73 y.o. femalein no acute distress   Cardiovascular: Regular rhythm by palpation of distal pulse, normal color and temperature, no concerning varicosities on symptomatic side   Lungs: No labored breathing or wheezing appreciated   Neuro: Alert and oriented ×3   Psychiatric: well oriented to person, place and time, demonstrates normal mood and affect   Skin: No rashes, lesions or ulcers, normal temperature, turgor, and texture on involved extremity    Ortho/SPM Exam  Examination of the left knee demonstrates intact extensor mechanism.  No residual soft tissue swelling.  No effusion.  Active extension is full.  Active assisted flexion to 130°.  Central patellar tracking.  Stable to varus and valgus stress.  No mid flexion instability.  No tenderness to palpation.  Negative straight leg raise for radicular complaints.    IMAGING:  X-rays including standing, weight bearing AP and flexion bilateral knees, LEFT knee lateral and sunrise views ordered and images reviewed by me show:    Stable total knee prosthesis.  Well aligned.  No fracture, subchondral bone reaction or evidence of loosening.    ASSESSMENT:      ICD-10-CM ICD-9-CM   1. S/P total knee arthroplasty, left  Z96.652 V43.65   2. Chronic pain of left knee  M25.562 719.46    G89.29 338.29       PLAN:     Findings discussed with the patient.  Her previous " sciatica symptoms are much improved.  Denies desire to see pain management at this point.  Continue use of inversion table and self-management.  In regards to her left knee, findings at this time are appropriate.  Good range of motion and balance.  No significant pain.  Continue activities to tolerance.  Previously discussed dental prophylaxis.  Return to clinic in 6 months for annual check in and imaging.

## 2023-10-22 NOTE — PROGRESS NOTES
HPI     Wears contact OD for distance OS without correction for near. Patient was   able to get a double supply of contacts since she only has one lens, would   like rx for new glasses today.  Hasn't noticed any vision change.       Last edited by Kandice Dubois on 5/16/2017  8:43 AM.     ROS     Negative for: Constitutional, Gastrointestinal, Neurological, Skin,   Genitourinary, Musculoskeletal, HENT, Endocrine, Cardiovascular, Eyes,   Respiratory, Psychiatric, Allergic/Imm, Heme/Lymph    Last edited by Eric Barnes, OD on 5/16/2017  9:04 AM. (History)        Assessment /Plan     For exam results, see Encounter Report.    Nuclear sclerosis, bilateral    Myopia with astigmatism and presbyopia, left      1. Educated pt on presence of cataracts and effects on vision. No surgery at this time. Recheck in one year.  2. Spec Rx given and Contact lens Rx released to pt. Daily wear only advised, with education to risks of extended wear.  Discussed lens care, compliance and solutions. RTC yearly contact lens follow up.   . Different lens options discussed with patient. RTC 1 year full exam.                84

## 2023-11-13 ENCOUNTER — PATIENT MESSAGE (OUTPATIENT)
Dept: ADMINISTRATIVE | Facility: HOSPITAL | Age: 73
End: 2023-11-13
Payer: MEDICARE

## 2024-01-30 DIAGNOSIS — Z00.00 ENCOUNTER FOR MEDICARE ANNUAL WELLNESS EXAM: ICD-10-CM

## 2024-02-02 ENCOUNTER — PATIENT MESSAGE (OUTPATIENT)
Dept: ADMINISTRATIVE | Facility: OTHER | Age: 74
End: 2024-02-02
Payer: MEDICARE

## 2024-02-10 RX ORDER — ROSUVASTATIN CALCIUM 10 MG/1
TABLET, COATED ORAL
Qty: 90 TABLET | Refills: 3 | Status: SHIPPED | OUTPATIENT
Start: 2024-02-10

## 2024-02-14 NOTE — TELEPHONE ENCOUNTER
----- Message from Rosanna Huizar MD sent at 5/5/2017  8:42 AM CDT -----  Please review your lab work and we will discuss at your pending office visit.  Rosanna Dominguez  
Noted.  
caffeine

## 2024-02-19 ENCOUNTER — PATIENT MESSAGE (OUTPATIENT)
Dept: ADMINISTRATIVE | Facility: HOSPITAL | Age: 74
End: 2024-02-19
Payer: MEDICARE

## 2024-03-05 ENCOUNTER — LAB VISIT (OUTPATIENT)
Dept: LAB | Facility: HOSPITAL | Age: 74
End: 2024-03-05
Attending: INTERNAL MEDICINE
Payer: MEDICARE

## 2024-03-05 DIAGNOSIS — E03.4 HYPOTHYROIDISM DUE TO ACQUIRED ATROPHY OF THYROID: ICD-10-CM

## 2024-03-05 DIAGNOSIS — E78.5 HYPERLIPIDEMIA, UNSPECIFIED HYPERLIPIDEMIA TYPE: ICD-10-CM

## 2024-03-05 DIAGNOSIS — D70.9 NEUTROPENIA, UNSPECIFIED TYPE: ICD-10-CM

## 2024-03-05 LAB
ALBUMIN SERPL BCP-MCNC: 4.3 G/DL (ref 3.5–5.2)
ALP SERPL-CCNC: 55 U/L (ref 55–135)
ALT SERPL W/O P-5'-P-CCNC: 16 U/L (ref 10–44)
ANION GAP SERPL CALC-SCNC: 9 MMOL/L (ref 8–16)
AST SERPL-CCNC: 22 U/L (ref 10–40)
BASOPHILS # BLD AUTO: 0.04 K/UL (ref 0–0.2)
BASOPHILS NFR BLD: 1.3 % (ref 0–1.9)
BILIRUB SERPL-MCNC: 0.5 MG/DL (ref 0.1–1)
BUN SERPL-MCNC: 10 MG/DL (ref 8–23)
CALCIUM SERPL-MCNC: 10.2 MG/DL (ref 8.7–10.5)
CHLORIDE SERPL-SCNC: 103 MMOL/L (ref 95–110)
CHOLEST SERPL-MCNC: 151 MG/DL (ref 120–199)
CHOLEST/HDLC SERPL: 2.4 {RATIO} (ref 2–5)
CO2 SERPL-SCNC: 24 MMOL/L (ref 23–29)
CREAT SERPL-MCNC: 0.8 MG/DL (ref 0.5–1.4)
DIFFERENTIAL METHOD BLD: ABNORMAL
EOSINOPHIL # BLD AUTO: 0.1 K/UL (ref 0–0.5)
EOSINOPHIL NFR BLD: 2.9 % (ref 0–8)
ERYTHROCYTE [DISTWIDTH] IN BLOOD BY AUTOMATED COUNT: 13.1 % (ref 11.5–14.5)
EST. GFR  (NO RACE VARIABLE): >60 ML/MIN/1.73 M^2
GLUCOSE SERPL-MCNC: 93 MG/DL (ref 70–110)
HCT VFR BLD AUTO: 40.6 % (ref 37–48.5)
HDLC SERPL-MCNC: 63 MG/DL (ref 40–75)
HDLC SERPL: 41.7 % (ref 20–50)
HGB BLD-MCNC: 13.7 G/DL (ref 12–16)
IMM GRANULOCYTES # BLD AUTO: 0 K/UL (ref 0–0.04)
IMM GRANULOCYTES NFR BLD AUTO: 0 % (ref 0–0.5)
LDLC SERPL CALC-MCNC: 72.4 MG/DL (ref 63–159)
LYMPHOCYTES # BLD AUTO: 1.2 K/UL (ref 1–4.8)
LYMPHOCYTES NFR BLD: 37.6 % (ref 18–48)
MCH RBC QN AUTO: 32.5 PG (ref 27–31)
MCHC RBC AUTO-ENTMCNC: 33.7 G/DL (ref 32–36)
MCV RBC AUTO: 96 FL (ref 82–98)
MONOCYTES # BLD AUTO: 0.2 K/UL (ref 0.3–1)
MONOCYTES NFR BLD: 7.6 % (ref 4–15)
NEUTROPHILS # BLD AUTO: 1.6 K/UL (ref 1.8–7.7)
NEUTROPHILS NFR BLD: 50.6 % (ref 38–73)
NONHDLC SERPL-MCNC: 88 MG/DL
NRBC BLD-RTO: 0 /100 WBC
PLATELET # BLD AUTO: 318 K/UL (ref 150–450)
PMV BLD AUTO: 9.8 FL (ref 9.2–12.9)
POTASSIUM SERPL-SCNC: 4.5 MMOL/L (ref 3.5–5.1)
PROT SERPL-MCNC: 7.5 G/DL (ref 6–8.4)
RBC # BLD AUTO: 4.22 M/UL (ref 4–5.4)
SODIUM SERPL-SCNC: 136 MMOL/L (ref 136–145)
T4 FREE SERPL-MCNC: 0.79 NG/DL (ref 0.71–1.51)
TRIGL SERPL-MCNC: 78 MG/DL (ref 30–150)
TSH SERPL DL<=0.005 MIU/L-ACNC: 0.58 UIU/ML (ref 0.4–4)
WBC # BLD AUTO: 3.14 K/UL (ref 3.9–12.7)

## 2024-03-05 PROCEDURE — 85025 COMPLETE CBC W/AUTO DIFF WBC: CPT | Performed by: INTERNAL MEDICINE

## 2024-03-05 PROCEDURE — 84443 ASSAY THYROID STIM HORMONE: CPT | Performed by: INTERNAL MEDICINE

## 2024-03-05 PROCEDURE — 84439 ASSAY OF FREE THYROXINE: CPT | Performed by: INTERNAL MEDICINE

## 2024-03-05 PROCEDURE — 36415 COLL VENOUS BLD VENIPUNCTURE: CPT | Mod: PO | Performed by: INTERNAL MEDICINE

## 2024-03-05 PROCEDURE — 80061 LIPID PANEL: CPT | Performed by: INTERNAL MEDICINE

## 2024-03-05 PROCEDURE — 80053 COMPREHEN METABOLIC PANEL: CPT | Performed by: INTERNAL MEDICINE

## 2024-03-06 ENCOUNTER — TELEPHONE (OUTPATIENT)
Dept: INTERNAL MEDICINE | Facility: CLINIC | Age: 74
End: 2024-03-06
Payer: MEDICARE

## 2024-03-06 ENCOUNTER — PATIENT OUTREACH (OUTPATIENT)
Dept: ADMINISTRATIVE | Facility: HOSPITAL | Age: 74
End: 2024-03-06
Payer: MEDICARE

## 2024-03-06 DIAGNOSIS — Z78.0 MENOPAUSE: ICD-10-CM

## 2024-03-06 NOTE — TELEPHONE ENCOUNTER
----- Message from Rosanna Huizar MD sent at 3/6/2024 12:24 PM CST -----  Please review your lab work and we will discuss at your pending office visit.   verna

## 2024-03-10 NOTE — PROGRESS NOTES
That she is 73 year-old female presents  f/up chronic medical problems and their risk and complications as well as health maintenance       Dyslipidemia tx w/rosuvastatin 10 mg  Denied unusual muscle pain or chest pain  LDL==>72.4 (3/5/2024)        Lab Results   Component Value Date     CHOL 154 03/02/2023     CHOL 155 05/26/2022     CHOL 218 (H) 01/06/2022            Lab Results   Component Value Date     HDL 68 03/02/2023     HDL 72 05/26/2022     HDL 61 01/06/2022            Lab Results   Component Value Date     LDLCALC 71.2 03/02/2023     LDLCALC 74.0 05/26/2022     LDLCALC 143.2 01/06/2022            Lab Results   Component Value Date     TRIG 74 03/02/2023     TRIG 45 05/26/2022     TRIG 69 01/06/2022         Neutropenia  Denied fever or chills or night sweats  ANC==> 1570      MNT  Hypothyroidism, pt not taking levothyroxine  Denied heat or cold intolerance    TSH==>0.584   BP today==>118/60         Nonsmoker, nonalcohol consumer.     SCREENING TESTS:   Cholesterol/ lab, reviewed   Colonoscopy 1/20 no polyps, rec 5 yrs  1/2023 -cologuard was negative  Recall Mom d. Colon cancer and pt s/p polyps- tubular adenomas, 2016  Mammogram, 9/2023  WWEARNESTINE, MARLENE, ovaries intact.  DEXA 2022- resumed alendronate then d/c 2/2 constipation, now resumed   As she has started eating prunes w/ improvement in her BM  Taking Calcium and Vit D3    Eye-UTD  DDS-UTD    VACCINATIONS:  Tdap, 12/2012  Zostavax, 10/2012, pharmacist niece gave vaccine  Shingrix: UTD  Flu vaccine yearly  Pneumovax, 12/2013   Prevnar, 9/2015  Covid: 2/2 + booster    MEDS: Reviewed.     REVIEW OF SYSTEMS:   Right 3rd DIP , painful, to see Ortho  Nail disorder, to see Derm  +knee much better s/p surgery      ADL's: 100% independent  Memory: Good, some delayed recall  Mental Health: Good  AD's: + will, and living will and M/POA   Nutrition: Good, eating very healthy  Gait: No recent falls; walks regularly  Safety: Intact, doesn't answer phone spam and deletes  email spam  Urinary incontinence:  + nocturia x 1  Remainder of review negative except as previously noted.       PHYSICAL EXAM:  VSS:  GENERAL: Alert and oriented, in no acute distress. Well developed, well   nourished, conversant and cooperative, pleasant as always   EYES: Conjunctivae and lids unremarkable. Sclerae anicteric  NECK: Supple. Prominent thyroid, no LN, left lateral small mass, movable, but NT, reviewed US and CT of area 2019 and 2021 and no worrisome findings  RESPIRATORY: Efforts unlabored.   LUNGS: Clear to auscultation.   HEART: Regular rate and rhythm. No carotid bruits noted,   1+ pedal pulse. No edema.   ABDOMEN: Bowel sounds present, soft, nontender.   No hepatosplenomegaly.  MUSCULOSKELETAL: Gait normal.   No clubbing, cyanosis or edema noted.   NEURO: LARSEN. No tremor noted  SKIN: warm and dry  Nail disorder    IMPRESSION/PLAN:   HLD, stable  -continue rosuvastatin 10mg  -continue low fat diet   Neutropenia , stable  MNT, stable  Hypothyroidism, stable  -resume levothyroxine 25mcg   Osteopenia,stable  -DEXA pending  -continue alendronate  -started 11/2020, stopped by 2/2022, restarted 3/2023  -continue  supplements,continue Ca and vitamin D   Vitamin D deficiency- stable  -continue continue vitamin D3, 2K IU qd  Nail disorder  -Consult Derm    HX hepatitis C, s/p tx  HM  -reviewed vaccine recs  -DEXA  -rtc 6 mos w/ lab     >42  minutes  was spent today on H/P, review of MR and MDM

## 2024-03-12 ENCOUNTER — OFFICE VISIT (OUTPATIENT)
Dept: INTERNAL MEDICINE | Facility: CLINIC | Age: 74
End: 2024-03-12
Payer: MEDICARE

## 2024-03-12 ENCOUNTER — PATIENT MESSAGE (OUTPATIENT)
Dept: INTERNAL MEDICINE | Facility: CLINIC | Age: 74
End: 2024-03-12

## 2024-03-12 VITALS
WEIGHT: 142 LBS | HEIGHT: 62 IN | OXYGEN SATURATION: 99 % | SYSTOLIC BLOOD PRESSURE: 118 MMHG | BODY MASS INDEX: 26.13 KG/M2 | RESPIRATION RATE: 16 BRPM | DIASTOLIC BLOOD PRESSURE: 60 MMHG | TEMPERATURE: 97 F | HEART RATE: 70 BPM

## 2024-03-12 DIAGNOSIS — M85.80 OSTEOPENIA, UNSPECIFIED LOCATION: ICD-10-CM

## 2024-03-12 DIAGNOSIS — R03.0 ELEVATED BP WITHOUT DIAGNOSIS OF HYPERTENSION: ICD-10-CM

## 2024-03-12 DIAGNOSIS — E78.5 HYPERLIPIDEMIA, UNSPECIFIED HYPERLIPIDEMIA TYPE: Primary | ICD-10-CM

## 2024-03-12 DIAGNOSIS — E55.9 VITAMIN D DEFICIENCY: ICD-10-CM

## 2024-03-12 DIAGNOSIS — L60.9 NAIL DISORDER: ICD-10-CM

## 2024-03-12 DIAGNOSIS — D70.9 NEUTROPENIA, UNSPECIFIED TYPE: ICD-10-CM

## 2024-03-12 DIAGNOSIS — E04.2 NONTOXIC MULTINODULAR GOITER: ICD-10-CM

## 2024-03-12 DIAGNOSIS — E03.4 HYPOTHYROIDISM DUE TO ACQUIRED ATROPHY OF THYROID: ICD-10-CM

## 2024-03-12 PROCEDURE — 99215 OFFICE O/P EST HI 40 MIN: CPT | Mod: S$GLB,,, | Performed by: INTERNAL MEDICINE

## 2024-03-12 PROCEDURE — 99999 PR PBB SHADOW E&M-EST. PATIENT-LVL IV: CPT | Mod: PBBFAC,,, | Performed by: INTERNAL MEDICINE

## 2024-03-12 RX ORDER — LEVOTHYROXINE SODIUM 25 UG/1
25 TABLET ORAL
Qty: 90 TABLET | Refills: 3 | Status: SHIPPED | OUTPATIENT
Start: 2024-03-12

## 2024-03-12 NOTE — PROGRESS NOTES
"History and Physical  03/12/2024  10:19 AM    SUBJECTIVE:     Chief Complaint:  Annual visit, joint stiffness     History of Present Illness:  Tomasa Reed is a 73 y.o. female with a relevant medical history of DJD who presents with joint stiffness and pain.    Reports joint pain and stiffness, reports interest in possibly getting R middle finger DIP looked at for joint swelling and possible surgical fixation. Has history of fixation in another finger joint due to an "arthritic cyst" in that DIP. Reports nail discoloration and is concerned about a possible vitamin deficiency causing the discoloration and brittle nails. Patient is interested in seeing a dermatologist. Had left knee replacement last year, due next month for 1 year checkup with ortho. Still has L knee tingling at the incision site.     Patient is up to date on colonoscopy screenings, due in 2025 for next screening. DEXA scan is scheduled for tomorrow.     Review of Systems   Constitutional:  Negative for chills, diaphoresis, fever, malaise/fatigue and weight loss.   HENT:  Positive for congestion (took nasinex and zyrtec, reports seasonal allergies), hearing loss (wears hearing aids, appointment with audiologist and ENT next month for annual) and tinnitus. Negative for ear discharge, ear pain, nosebleeds, sinus pain and sore throat.    Eyes:  Negative for blurred vision, double vision, pain and redness.   Respiratory:  Negative for cough, shortness of breath and wheezing.    Cardiovascular:  Negative for chest pain, palpitations and leg swelling.   Gastrointestinal:  Negative for abdominal pain, blood in stool, constipation, diarrhea, heartburn, nausea and vomiting.   Genitourinary:  Negative for dysuria, frequency and urgency.   Musculoskeletal:  Positive for joint pain (in the hand DIPs) and neck pain (neck arthritis, sees chiropractor for management). Negative for falls and myalgias.   Skin:  Negative for itching and rash.   Neurological: "  Positive for tingling (L knee surgical site). Negative for dizziness, tremors, seizures, loss of consciousness, weakness and headaches.   Endo/Heme/Allergies:  Positive for environmental allergies.       HISTORY:     Past Medical History:   Diagnosis Date    Cataract     OU    Chronic interstitial cystitis     History of hepatitis C; S/p RX with SVR 12 documented 06/2017 06/22/2017    HLD (hyperlipidemia)     Malignant melanoma of skin of trunk, except scrotum     Migraine, unspecified, without mention of intractable migraine without mention of status migrainosus     Nonspecific abnormal results of liver function study     Nontoxic multinodular goiter     Osteopenia        Past Surgical History:   Procedure Laterality Date    BREAST BIOPSY      CATARACT EXTRACTION Right 03/02/2020    cataracts      B/L at [facility]    COLONOSCOPY N/A 12/14/2016    Procedure: COLONOSCOPY;  Surgeon: Wicho Painting MD;  [facility]  Service: Endoscopy;  Laterality: N/A;    COLONOSCOPY N/A 1/14/2020    Procedure: COLONOSCOPY;  Surgeon: NANCY Maher MD;  [facility]  Service: Endoscopy;  Laterality: N/A;  1/8/20 left vm to confirm appt-rb    FINGER SURGERY Right 2010    index finger fused    MOLE REMOVAL      TONSILLECTOMY, ADENOIDECTOMY      TOTAL ABDOMINAL HYSTERECTOMY      TOTAL KNEE ARTHROPLASTY Left 4/12/2023    Procedure: ARTHROPLASTY, KNEE, TOTAL;  Surgeon: REMY Zuñiga MD;  Location: [facility];  Service: Orthopedics;  Laterality: Left;  regional w/ catheter  spinal  adductor  pericapsular injection 0.25 ropivacaine       Family History   Problem Relation Age of Onset    Osteoarthritis Mother         s/p hip fx    Diabetes Mother     Cancer Mother         colon    Thyroid disease Mother     Kidney failure Mother         d.93 s/p PAD procedure    Cataracts Mother     Macular degeneration Mother     Heart failure Father     Stroke Father         d.97 complications     Hypertension Father     Cataracts Father     Fibromyalgia Sister     Glaucoma Sister         dry eyes, s/p duct surgery    Other Sister         acoustic tumor,s/p removal & vertigo resolved, +cochlear implant    Other Daughter         inflammation, better off red meat    Chronic back pain Daughter     GI problems Daughter         liver w/up and on cholestyramine    Breast cancer Daughter         s/p mastectomy, ductal, tx hormonal    Obesity Daughter     Melanoma Neg Hx     Amblyopia Neg Hx     Blindness Neg Hx     Retinal detachment Neg Hx     Strabismus Neg Hx        Social History     Socioeconomic History    Marital status:    Occupational History    Occupation: retired     Employer: [employer]   Tobacco Use    Smoking status: Never    Smokeless tobacco: Never   Substance and Sexual Activity    Alcohol use: Yes     Comment: socially    Drug use: Not Currently    Sexual activity: Yes     Partners: Male   Social History Narrative        Retired  of an elementary school    Has 2 daughters, no thyroid problems    Helping 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 She has severe DJD and is diabetic.     Dad d. 97 status post stroke, CHF. Dependent, bed to w/c.      Significant memory decline, usually doesn't recognize family        Two sisters in good health.         SCREENING TESTS:      Social Determinants of Health     Financial Resource Strain: Low Risk  (9/20/2023)    Overall Financial Resource Strain (CARDIA)     Difficulty of Paying Living Expenses: Not very hard   Food Insecurity: No Food Insecurity (9/20/2023)    Hunger Vital Sign     Worried About Running Out of Food in the Last Year: Never true     Ran Out of Food in the Last Year: Never true   Transportation Needs: No Transportation Needs (9/20/2023)    PRAPARE - Transportation     Lack of  Transportation (Medical): No     Lack of Transportation (Non-Medical): No   Physical Activity: Insufficiently Active (9/20/2023)    Exercise Vital Sign     Days of Exercise per Week: 6 days     Minutes of Exercise per Session: 20 min   Stress: No Stress Concern Present (9/20/2023)    [employer] [place] [employer] - Occupational Stress Questionnaire     Feeling of Stress : Only a little   Social Connections: Unknown (9/20/2023)    Social Connection and Isolation Panel [NHANES]     Frequency of Communication with Friends and Family: More than three times a week     Frequency of Social Gatherings with Friends and Family: Twice a week     Active Member of Clubs or Organizations: Yes     Attends Club or Organization Meetings: More than 4 times per year     Marital Status:    Housing Stability: Low Risk  (9/20/2023)    Housing Stability Vital Sign     Unable to Pay for Housing in the Last Year: No     Number of Places Lived in the Last Year: 1     Unstable Housing in the Last Year: No       MEDICATIONS & ALLERGIES:     Current Outpatient Medications on File Prior to Visit   Medication Sig Dispense Refill    acetaminophen (TYLENOL) 650 MG TbSR Take 650 mg by mouth every 8 (eight) hours.      alendronate (FOSAMAX) 70 MG tablet Take 1 tablet (70 mg total) by mouth every 7 days. 12 tablet 3    cetirizine 10 mg Cap PRN      dietary supplement Pack Take 1 tablet by mouth 2 (two) times daily.      fluticasone propionate (FLONASE) 50 mcg/actuation nasal spray 1 spray (50 mcg total) by Each Nostril route once daily. 16 g 0    glucosam/chondro/herb 149/hyal (GLUCOS CHOND CPLX ADVANCED ORAL) Take by mouth.      glycerin adult suppository as needed.       magnesium oxide (MAG-OX) 400 mg (241.3 mg magnesium) tablet Take 400 mg by mouth once daily.      MISCELLANEOUS MEDICAL SUPPLY MIS as needed. EYE ITCH RELIEF 0.25 EYE DROPS      mometasone (NASONEX) 50 mcg/actuation nasal spray 2 sprays by Nasal route once daily.       OCEAN NASAL 0.65 % nasal spray as needed.       rosuvastatin (CRESTOR) 10 MG tablet TAKE 1 TABLET BY MOUTH EVERY DAY 90 tablet 3    TUMS 200 mg calcium (500 mg) chewable tablet 1 tablet as needed.       celecoxib (CELEBREX) 200 MG capsule Take 1 capsule (200 mg total) by mouth 2 (two) times daily. (Patient not taking: Reported on 3/12/2024) 30 capsule 2    levothyroxine (SYNTHROID) 25 MCG tablet Take 1 tablet (25 mcg total) by mouth before breakfast. (Patient not taking: Reported on 3/12/2024) 90 tablet 3     Current Facility-Administered Medications on File Prior to Visit   Medication Dose Route Frequency Provider Last Rate Last Admin    ropivacaine 0.2% Nimbus PainPRO Pump infusion 500 ML   Perineural Continuous Jericho Alcantar MD   New Bag at 04/12/23 0942       Review of patient's allergies indicates:   Allergen Reactions    Iodine and iodide containing products Nausea And Vomiting     Other reaction(s): SEVERE    Sulfa (sulfonamide antibiotics)      Other reaction(s): unknown  Other reaction(s): RASH       OBJECTIVE:     Vital Signs Recent:  Temp: 97.3 °F (36.3 °C) (03/12/24 1014)  Pulse: 70 (03/12/24 1014)  Resp: 16 (03/12/24 1014)  BP: 118/60 (03/12/24 1014)  SpO2: 99 % (03/12/24 1014)  Oxygen Documentation:                          Vital Signs Range (Last 24H):  [unfilled]       Weight:  Body mass index is 25.97 kg/m².  Wt Readings from Last 3 Encounters:   03/12/24 64.4 kg (141 lb 15.6 oz)   10/17/23 65.9 kg (145 lb 4.5 oz)   09/13/23 63.5 kg (140 lb)        Physical Exam  Constitutional:       General: She is not in acute distress.     Appearance: Normal appearance.   HENT:      Head: Normocephalic and atraumatic.      Nose: Congestion present.   Eyes:      General: No scleral icterus.        Right eye: No discharge.         Left eye: No discharge.      Extraocular Movements: Extraocular movements intact.      Conjunctiva/sclera: Conjunctivae normal.      Pupils: Pupils are equal, round, and  reactive to light.   Neck:      Vascular: No carotid bruit.   Cardiovascular:      Rate and Rhythm: Normal rate and regular rhythm.      Pulses: Normal pulses.      Heart sounds: Normal heart sounds.   Abdominal:      General: Abdomen is flat. Bowel sounds are normal. There is no distension.      Palpations: Abdomen is soft. There is no mass.      Tenderness: There is no abdominal tenderness. There is no guarding or rebound.   Musculoskeletal:         General: Swelling (Right 3rd DIP joint) present.      Cervical back: Normal range of motion and neck supple. No rigidity or tenderness.      Right lower leg: No edema.      Left lower leg: No edema.   Lymphadenopathy:      Cervical: No cervical adenopathy.   Skin:     Capillary Refill: Capillary refill takes less than 2 seconds.      Coloration: Skin is not jaundiced or pale.      Findings: No bruising, erythema, lesion or rash.   Neurological:      General: No focal deficit present.      Mental Status: She is alert and oriented to person, place, and time.      Motor: No weakness.      Coordination: Coordination normal.      Gait: Gait normal.        Sodium (mmol/L)   Date Value   03/05/2024 136   09/05/2023 137   03/02/2023 136     Potassium (mmol/L)   Date Value   03/05/2024 4.5   09/05/2023 4.0   03/02/2023 4.4     Chloride (mmol/L)   Date Value   03/05/2024 103   09/05/2023 102   03/02/2023 102     CO2 (mmol/L)   Date Value   03/05/2024 24   09/05/2023 24   03/02/2023 27     BUN (mg/dL)   Date Value   03/05/2024 10   09/05/2023 8   03/02/2023 7 (L)     Creatinine (mg/dL)   Date Value   03/05/2024 0.8   09/05/2023 0.7   03/02/2023 0.7     Glucose (mg/dL)   Date Value   03/05/2024 93   09/05/2023 85   03/02/2023 88     Calcium (mg/dL)   Date Value   03/05/2024 10.2   09/05/2023 9.4   03/02/2023 10.0     Alkaline Phosphatase (U/L)   Date Value   03/05/2024 55   09/05/2023 55   03/02/2023 50 (L)     ALT (U/L)   Date Value   03/05/2024 16   09/05/2023 14   03/02/2023 24      AST (U/L)   Date Value   03/05/2024 22   09/05/2023 21   03/02/2023 26     Albumin (g/dL)   Date Value   03/05/2024 4.3   09/05/2023 4.1   03/02/2023 4.3     Total Protein (g/dL)   Date Value   03/05/2024 7.5   09/05/2023 7.1   03/02/2023 7.3     Total Bilirubin (mg/dL)   Date Value   03/05/2024 0.5   09/05/2023 0.4   03/02/2023 0.6     INR (no units)   Date Value   04/03/2023 1.0   06/27/2005 1.0   06/28/2004 1.1       WBC (K/uL)   Date Value   03/05/2024 3.14 (L)   09/05/2023 2.80 (L)   04/03/2023 3.21 (L)     Hemoglobin (g/dL)   Date Value   03/05/2024 13.7   09/05/2023 12.7   04/03/2023 13.1     Platelets (K/uL)   Date Value   03/05/2024 318   09/05/2023 286   04/03/2023 307     ASSESSMENT -- PLAN:   Tomasa Reed is a 73 y.o. female with a relevant medical history of DJD, hypothyroidism who is being treated for joint pain.     1. Hypothyroidism  Patient with PMHx of thyroid nodules, hyperlipidemia, and downtrending free T4 levels.   -continue levothyroxine   -continue rosuvastatin   -f/u and labs in 6 months for TSH and free T4    2.Degenerative joint disease of the hands  Patient with PMHx of DJD, L knee replacement and surgical fixation of R DIP, reporting pain and stiffness of 3rd DIP in R hand.   -f/u with orthopedic surgery in a month    3. Nail changes  Patient with PMHx hypothyroidism and DJD reporting nail discoloration and brittleness.  -f/u with dermatology

## 2024-03-13 ENCOUNTER — TELEPHONE (OUTPATIENT)
Dept: DERMATOLOGY | Facility: CLINIC | Age: 74
End: 2024-03-13
Payer: MEDICARE

## 2024-03-13 ENCOUNTER — HOSPITAL ENCOUNTER (OUTPATIENT)
Dept: RADIOLOGY | Facility: HOSPITAL | Age: 74
Discharge: HOME | End: 2024-03-13
Attending: INTERNAL MEDICINE
Payer: MEDICARE

## 2024-03-13 DIAGNOSIS — Z78.0 MENOPAUSE: ICD-10-CM

## 2024-03-13 PROCEDURE — 77080 DXA BONE DENSITY AXIAL: CPT | Mod: 26,,, | Performed by: INTERNAL MEDICINE

## 2024-03-13 PROCEDURE — 77080 DXA BONE DENSITY AXIAL: CPT | Mod: TC

## 2024-03-13 NOTE — TELEPHONE ENCOUNTER
I left a voice mail stating the purpose of my call is to assist with scheduling an appointment with dermatology.  I suggested calling he clinic back or sending the clinic a message via the portal.

## 2024-03-15 ENCOUNTER — PATIENT MESSAGE (OUTPATIENT)
Dept: INTERNAL MEDICINE | Facility: CLINIC | Age: 74
End: 2024-03-15
Payer: MEDICARE

## 2024-03-15 DIAGNOSIS — M81.0 AGE-RELATED OSTEOPOROSIS WITHOUT CURRENT PATHOLOGICAL FRACTURE: Primary | ICD-10-CM

## 2024-03-18 NOTE — TELEPHONE ENCOUNTER
Patient is requesting a referral to an endocrinologist due to the result of her bone density scan.

## 2024-03-27 ENCOUNTER — OFFICE VISIT (OUTPATIENT)
Dept: OTOLARYNGOLOGY | Facility: CLINIC | Age: 74
End: 2024-03-27
Payer: MEDICARE

## 2024-03-27 VITALS — BODY MASS INDEX: 27.06 KG/M2 | WEIGHT: 147.94 LBS

## 2024-03-27 DIAGNOSIS — J30.9 ALLERGIC RHINITIS, UNSPECIFIED SEASONALITY, UNSPECIFIED TRIGGER: Primary | ICD-10-CM

## 2024-03-27 DIAGNOSIS — J34.3 HYPERTROPHY OF INFERIOR NASAL TURBINATE: ICD-10-CM

## 2024-03-27 PROCEDURE — 99214 OFFICE O/P EST MOD 30 MIN: CPT | Mod: S$GLB,,, | Performed by: PHYSICIAN ASSISTANT

## 2024-03-27 PROCEDURE — 99999 PR PBB SHADOW E&M-EST. PATIENT-LVL III: CPT | Mod: PBBFAC,,, | Performed by: PHYSICIAN ASSISTANT

## 2024-03-27 RX ORDER — AZELASTINE 1 MG/ML
1 SPRAY, METERED NASAL 2 TIMES DAILY
Qty: 30 ML | Refills: 11 | Status: SHIPPED | OUTPATIENT
Start: 2024-03-27 | End: 2025-03-27

## 2024-03-27 NOTE — PROGRESS NOTES
Subjective:     HPI: Tomasa Reed is a 73 y.o. female who was self-referred for rhinorrhea.    Patient reports developing significant left maxillary fullness with associated itching nose, rhinorrhea, watery eyes, and left eye twitching.  Patient reports overall symptoms have improved since last week.  This is an ongoing pattern in October and March.  Patient suspects a tree allergy.  Patient currently using Claritin in the morning and Benadryl at night    Current sinonasal medications include nasonex at present.    She does not recall previously having allergy testing.  She denies a history of asthma.  She denies a history of reflux symptoms.    She denies a diagnosis of obstructive sleep apnea.   She does not recall a prior history of nasal trauma.  She has not had sinonasal surgery.    She has had a tonsillectomy.  She is not a tobacco smoker.     SNOT-22 score: : (P) 33  NOSE score:: (P) 20%  ETDQ-7 score:: (P) 5.9    Past Medical/Past Surgical History  Past Medical History:   Diagnosis Date    Cataract     OU    Chronic interstitial cystitis     History of hepatitis C; S/p RX with SVR 12 documented 06/2017 06/22/2017    HLD (hyperlipidemia)     Malignant melanoma of skin of trunk, except scrotum     Migraine, unspecified, without mention of intractable migraine without mention of status migrainosus     Nonspecific abnormal results of liver function study     Nontoxic multinodular goiter     Osteopenia      She has a past surgical history that includes Total abdominal hysterectomy; TONSILLECTOMY, ADENOIDECTOMY; Mole removal; Finger surgery (Right, 2010); Colonoscopy (N/A, 12/14/2016); Breast biopsy; Colonoscopy (N/A, 1/14/2020); Cataract extraction (Right, 03/02/2020); cataracts; and Total knee arthroplasty (Left, 4/12/2023).    Family History/Social History  Her family history includes Breast cancer (age of onset: 52) in her daughter; Cancer in her mother; Cataracts in her father and mother; Chronic  back pain in her daughter; Diabetes in her mother; Fibromyalgia in her sister; GI problems in her daughter; Glaucoma in her sister; Heart failure in her father; Hypertension in her father; Kidney failure in her mother; Macular degeneration in her mother; Obesity in her daughter; Osteoarthritis in her mother; Other in her daughter and sister; Stroke in her father; Thyroid disease in her mother.  She reports that she has never smoked. She has never used smokeless tobacco. She reports current alcohol use. She reports that she does not currently use drugs.    Allergies/Immunizations  She is allergic to iodine and iodide containing products and sulfa (sulfonamide antibiotics).  Immunization History   Administered Date(s) Administered    COVID-19, MRNA, LN-S, PF (Pfizer) (Gray Cap) 08/24/2022    COVID-19, MRNA, LN-S, PF (Pfizer) (Purple Cap) 01/08/2021, 01/30/2021, 12/30/2021    Hepatitis B, Adult 02/15/2017, 03/14/2017, 08/15/2017    Influenza (FLUAD) - Quadrivalent - Adjuvanted - PF *Preferred* (65+) 10/05/2020, 10/07/2021, 09/26/2022, 09/21/2023    Influenza - High Dose - PF (65 years and older) 10/14/2015, 10/04/2016, 10/13/2017, 10/11/2018, 09/16/2019    Influenza - Quadrivalent - PF *Preferred* (6 months and older) 02/03/2005, 10/17/2007, 10/10/2008, 09/19/2009, 10/03/2010, 09/26/2011, 10/10/2013, 10/10/2013    Influenza - Trivalent (ADULT) 02/03/2005, 10/10/2008, 09/19/2009, 10/03/2010, 09/26/2011, 10/10/2013    Influenza - Trivalent - PF (ADULT) 10/17/2007    Pneumococcal Conjugate - 13 Valent 09/14/2015    Pneumococcal Polysaccharide - 23 Valent 12/11/2013, 12/02/2019    Td (Adult), Unspecified Formulation 02/04/2016    Tdap 12/03/2012    Typhoid - ViCPs 05/27/2019    Yellow Fever 05/27/2019    Zoster 10/23/2013, 10/12/2014    Zoster Recombinant 06/06/2019, 06/06/2019, 08/07/2019, 08/07/2019        Medications   acetaminophen Tbsr  alendronate  celecoxib  cetirizine Cap  dietary supplement Pack  fluticasone  propionate  GLUCOS CHOND CPLX ADVANCED ORAL  glycerin adult  levothyroxine  magnesium oxide  MISCELLANEOUS MEDICAL SUPPLY MISC  mometasone  OCEAN NASAL Spra  ropivacaine 0.2% Perineural Pump infusion 500 ML  rosuvastatin  TUMS Chew     Review of Systems     Constitutional: Negative for appetite change, chills, fatigue, fever and unexpected weight loss.      HENT: Positive for ear pain, hearing loss and sinus pressure.      Eyes:  Positive for eye drainage and eye itching.     Respiratory:  Negative for cough, shortness of breath, sleep apnea, snoring and wheezing.      Cardiovascular:  Negative for chest pain, foot swelling, irregular heartbeat and swollen veins.     Gastrointestinal:  Negative for abdominal pain, acid reflux, constipation, diarrhea, heartburn and vomiting.     Genitourinary: Negative for difficulty urinating, sexual problems and frequent urination.     Musc: Positive for neck pain.     Skin: Negative for rash.     Allergy: Positive for seasonal allergies.     Endocrine: Negative for cold intolerance and heat intolerance.      Neurological: Negative for dizziness, headaches, light-headedness, seizures and tremors.      Hematologic: Positive for bruises/bleeds easily.     Psychiatric: Negative for decreased concentration, depression, nervous/anxious and sleep disturbance.            Objective:     Wt 67.1 kg (147 lb 14.9 oz)   BMI 27.06 kg/m²      Physical Exam  Vitals reviewed.   Constitutional:       Appearance: Normal appearance. She is well-developed.   HENT:      Head: Normocephalic and atraumatic.      Right Ear: External ear normal.      Left Ear: External ear normal.      Ears:      Comments: Wearing hearing aids     Nose: Mucosal edema and rhinorrhea present. No septal deviation.      Right Nostril: No epistaxis.      Left Nostril: No epistaxis.      Right Turbinates: Enlarged.      Left Turbinates: Enlarged.      Right Sinus: No maxillary sinus tenderness or frontal sinus tenderness.       "Left Sinus: No maxillary sinus tenderness or frontal sinus tenderness.      Mouth/Throat:      Lips: Pink. No lesions.      Tongue: No lesions. Tongue does not deviate from midline.      Palate: No mass and lesions.      Pharynx: Oropharynx is clear. Uvula midline. No pharyngeal swelling, oropharyngeal exudate, posterior oropharyngeal erythema or uvula swelling.      Tonsils: No tonsillar exudate or tonsillar abscesses. 0 on the right. 0 on the left.   Eyes:      Extraocular Movements: Extraocular movements intact.      Conjunctiva/sclera: Conjunctivae normal.   Neck:      Thyroid: No thyromegaly or thyroid tenderness.   Lymphadenopathy:      Cervical: No cervical adenopathy.   Psychiatric:         Mood and Affect: Mood normal.         Behavior: Behavior normal. Behavior is cooperative.          Procedure    None    Data Reviewed  I personally reviewed the chart, including any outside records, and pertinent data below:    I reviewed the following notes: ENT, Internal Medicine    WBC (K/uL)   Date Value   03/05/2024 3.14 (L)     Eosinophil % (%)   Date Value   03/05/2024 2.9     Eos # (K/uL)   Date Value   03/05/2024 0.1     Platelets (K/uL)   Date Value   03/05/2024 318     Glucose (mg/dL)   Date Value   03/05/2024 93     No results found for: "IGE"    No sinus imaging available.    Assessment & Plan:     1. Allergic rhinitis, unspecified seasonality, unspecified trigger  2. Hypertrophy of inferior nasal turbinate   - symptoms improvement but suspect some level of allergic rhinitis.  Patient also with symptoms vasomotor rhinitis but will start patient on azelastine and intranasal corticosteroid 1st and reassess response.   - if symptoms not improved in 2 weeks, advised to message me  -     azelastine (ASTELIN) 137 mcg (0.1 %) nasal spray; 1 spray (137 mcg total) by Nasal route 2 (two) times daily.  Dispense: 30 mL; Refill: 11    She will follow up as needed  I had a discussion with the patient regarding her " condition and the further workup and management options.    All questions were answered, and the patient is in agreement with the above.     Disclaimer:  This note may have been prepared utilizing voice recognition software which may result in occasional typographical errors in the text such as sound alike words.   If further clarification is needed, please contact the ENT department of [facility].

## 2024-04-10 ENCOUNTER — PATIENT MESSAGE (OUTPATIENT)
Dept: ADMINISTRATIVE | Facility: OTHER | Age: 74
End: 2024-04-10
Payer: MEDICARE

## 2024-04-17 ENCOUNTER — LAB VISIT (OUTPATIENT)
Dept: LAB | Facility: HOSPITAL | Age: 74
End: 2024-04-17
Attending: PHYSICIAN ASSISTANT
Payer: MEDICARE

## 2024-04-17 ENCOUNTER — OFFICE VISIT (OUTPATIENT)
Dept: OTOLARYNGOLOGY | Facility: CLINIC | Age: 74
End: 2024-04-17
Payer: MEDICARE

## 2024-04-17 VITALS
BODY MASS INDEX: 27.14 KG/M2 | DIASTOLIC BLOOD PRESSURE: 90 MMHG | WEIGHT: 148.38 LBS | SYSTOLIC BLOOD PRESSURE: 153 MMHG | HEART RATE: 84 BPM

## 2024-04-17 DIAGNOSIS — M26.651 ARTHROPATHY OF RIGHT TEMPOROMANDIBULAR JOINT: Primary | ICD-10-CM

## 2024-04-17 DIAGNOSIS — J31.0 CHRONIC RHINITIS: ICD-10-CM

## 2024-04-17 DIAGNOSIS — J34.3 HYPERTROPHY OF INFERIOR NASAL TURBINATE: ICD-10-CM

## 2024-04-17 DIAGNOSIS — J34.2 DEVIATED NASAL SEPTUM: ICD-10-CM

## 2024-04-17 LAB — IGE SERPL-ACNC: <35 IU/ML (ref 0–100)

## 2024-04-17 PROCEDURE — 36415 COLL VENOUS BLD VENIPUNCTURE: CPT | Performed by: PHYSICIAN ASSISTANT

## 2024-04-17 PROCEDURE — 3080F DIAST BP >= 90 MM HG: CPT | Mod: CPTII,S$GLB,, | Performed by: PHYSICIAN ASSISTANT

## 2024-04-17 PROCEDURE — 99999 PR PBB SHADOW E&M-EST. PATIENT-LVL III: CPT | Mod: PBBFAC,,, | Performed by: PHYSICIAN ASSISTANT

## 2024-04-17 PROCEDURE — 1101F PT FALLS ASSESS-DOCD LE1/YR: CPT | Mod: CPTII,S$GLB,, | Performed by: PHYSICIAN ASSISTANT

## 2024-04-17 PROCEDURE — 86003 ALLG SPEC IGE CRUDE XTRC EA: CPT | Mod: 59 | Performed by: PHYSICIAN ASSISTANT

## 2024-04-17 PROCEDURE — 1126F AMNT PAIN NOTED NONE PRSNT: CPT | Mod: CPTII,S$GLB,, | Performed by: PHYSICIAN ASSISTANT

## 2024-04-17 PROCEDURE — 99214 OFFICE O/P EST MOD 30 MIN: CPT | Mod: S$GLB,,, | Performed by: PHYSICIAN ASSISTANT

## 2024-04-17 PROCEDURE — 1159F MED LIST DOCD IN RCRD: CPT | Mod: CPTII,S$GLB,, | Performed by: PHYSICIAN ASSISTANT

## 2024-04-17 PROCEDURE — 3288F FALL RISK ASSESSMENT DOCD: CPT | Mod: CPTII,S$GLB,, | Performed by: PHYSICIAN ASSISTANT

## 2024-04-17 PROCEDURE — 3077F SYST BP >= 140 MM HG: CPT | Mod: CPTII,S$GLB,, | Performed by: PHYSICIAN ASSISTANT

## 2024-04-17 PROCEDURE — 82785 ASSAY OF IGE: CPT | Performed by: PHYSICIAN ASSISTANT

## 2024-04-17 PROCEDURE — 86003 ALLG SPEC IGE CRUDE XTRC EA: CPT | Performed by: PHYSICIAN ASSISTANT

## 2024-04-17 NOTE — PROGRESS NOTES
CC: Left TKA follow up     DATE OF PROCEDURE: 4/12/2023   PROCEDURES PERFORMED:   Left total knee arthroplasty (CPT 24064)    Tomasa Reed presents today for follow up appointment of her left knee. Patient is now 1 year status post above procedure. She is doing very well and is not experiencing any pain at this time. She reports some residual tingling along scar and slight stiffness anteriorly when flexing her knee in a seated position. Denies any falls or trauma to knee since last appointment. Since being cleared to use her inversion table, he back pain has resolved.     Prior Hx 7/17/2023:   Tomasa Reed reports to be doing well just over 6 months s/p the above mentioned procedure.  The postoperative course was complicated by sciatica and low back pathology.  Pain management referral placed on 6/22/23 for persistent symptoms despite initial conservative treatment.  This was approved by insurance but the patient was not contacted by the pain management team and she has no appointment. She was finally scheduled but had to cancel based on conflict and now has an appointment set for late November. Fortunately her sciatica symptoms are much improved.  She attributes this to doing some work on her inversion table. In fact she does not think she needs to be seen in pain management at this point. In regard to the knee she is doing well overall. She is off all pain medication. Does endorse some numbness over the incision. She is able to do all activity without issues. She does home exercises. Very happy with her progress.    REVIEW OF SYSTEMS:   Constitution: Negative. Negative for chills, fever and night sweats.    Hematologic/Lymphatic: Negative for bleeding problem. Does not bruise/bleed easily.   Skin: Negative for dry skin, itching and rash.   Musculoskeletal: Negative for falls. Neg for left knee pain and muscle weakness.     All other review of symptoms were reviewed and found to be  noncontributory.     PAST MEDICAL HISTORY:   Past Medical History:   Diagnosis Date    Cataract     OU    Chronic interstitial cystitis     History of hepatitis C; S/p RX with SVR 12 documented 06/2017 06/22/2017    HLD (hyperlipidemia)     Malignant melanoma of skin of trunk, except scrotum     Migraine, unspecified, without mention of intractable migraine without mention of status migrainosus     Nonspecific abnormal results of liver function study     Nontoxic multinodular goiter     Osteopenia      PAST SURGICAL HISTORY:   Past Surgical History:   Procedure Laterality Date    BREAST BIOPSY      CATARACT EXTRACTION Right 03/02/2020    cataracts      B/L at [facility]    COLONOSCOPY N/A 12/14/2016    Procedure: COLONOSCOPY;  Surgeon: Wicho Painting MD;  [facility]  Service: Endoscopy;  Laterality: N/A;    COLONOSCOPY N/A 1/14/2020    Procedure: COLONOSCOPY;  Surgeon: ANNCY Maher MD;  [facility]  Service: Endoscopy;  Laterality: N/A;  1/8/20 left vm to confirm appt-rb    FINGER SURGERY Right 2010    index finger fused    MOLE REMOVAL      TONSILLECTOMY, ADENOIDECTOMY      TOTAL ABDOMINAL HYSTERECTOMY      TOTAL KNEE ARTHROPLASTY Left 4/12/2023    Procedure: ARTHROPLASTY, KNEE, TOTAL;  Surgeon: REMY Zuñiga MD;  Location: [facility] OR;  Service: Orthopedics;  Laterality: Left;  regional w/ catheter  spinal  adductor  pericapsular injection 0.25 ropivacaine     FAMILY HISTORY:   Family History   Problem Relation Name Age of Onset    Osteoarthritis Mother          s/p hip fx    Diabetes Mother      Cancer Mother          colon    Thyroid disease Mother      Kidney failure Mother          d.93 s/p PAD procedure    Cataracts Mother      Macular degeneration Mother      Heart failure Father      Stroke Father          d.97 complications    Hypertension Father      Cataracts Father      Fibromyalgia Sister Karla     Glaucoma Sister Rosalba         dry eyes, s/p duct  surgery    Other Sister Rosalba         acoustic tumor,s/p removal & vertigo resolved, +cochlear implant    Other Daughter Merry         inflammation, better off red meat    Chronic back pain Daughter Merry     GI problems Daughter Merry         liver w/up and on cholestyramine    Breast cancer Daughter Merry 52        s/p mastectomy, ductal, tx hormonal    Obesity Daughter Magui     Melanoma Neg Hx      Amblyopia Neg Hx      Blindness Neg Hx      Retinal detachment Neg Hx      Strabismus Neg Hx       SOCIAL HISTORY:   Social History     Socioeconomic History    Marital status:    Occupational History    Occupation: retired     Employer: [employer]   Tobacco Use    Smoking status: Never    Smokeless tobacco: Never   Substance and Sexual Activity    Alcohol use: Yes     Comment: socially    Drug use: Not Currently    Sexual activity: Yes     Partners: Male   Social History Narrative        Retired  of an elementary school    Has 2 daughters, no thyroid problems    Helping out w/ 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 She has severe DJD and is diabetic.     Dad d. 97 status post stroke, CHF. Dependent, bed to w/c.      Significant memory decline, usually doesn't recognize family        Two sisters in good health.         SCREENING TESTS:      Social Determinants of Health     Financial Resource Strain: Low Risk  (9/20/2023)    Overall Financial Resource Strain (CARDIA)     Difficulty of Paying Living Expenses: Not very hard   Food Insecurity: No Food Insecurity (9/20/2023)    Hunger Vital Sign     Worried About Running Out of Food in the Last Year: Never true     Ran Out of Food in the Last Year: Never true   Transportation Needs: No Transportation Needs (9/20/2023)    PRAPARE - Transportation     Lack of Transportation (Medical): No     Lack of Transportation  (Non-Medical): No   Physical Activity: Insufficiently Active (9/20/2023)    Exercise Vital Sign     Days of Exercise per Week: 6 days     Minutes of Exercise per Session: 20 min   Stress: No Stress Concern Present (9/20/2023)    [place] [employer] - Occupational Stress Questionnaire     Feeling of Stress : Only a little   Social Connections: Unknown (9/20/2023)    Social Connection and Isolation Panel [NHANES]     Frequency of Communication with Friends and Family: More than three times a week     Frequency of Social Gatherings with Friends and Family: Twice a week     Active Member of Clubs or Organizations: Yes     Attends Club or Organization Meetings: More than 4 times per year     Marital Status:    Housing Stability: Low Risk  (9/20/2023)    Housing Stability Vital Sign     Unable to Pay for Housing in the Last Year: No     Number of Places Lived in the Last Year: 1     Unstable Housing in the Last Year: No     MEDICATIONS:     Current Outpatient Medications:     acetaminophen (TYLENOL) 650 MG TbSR, Take 650 mg by mouth every 8 (eight) hours., Disp: , Rfl:     alendronate (FOSAMAX) 70 MG tablet, Take 1 tablet (70 mg total) by mouth every 7 days., Disp: 12 tablet, Rfl: 3    azelastine (ASTELIN) 137 mcg (0.1 %) nasal spray, 1 spray (137 mcg total) by Nasal route 2 (two) times daily., Disp: 30 mL, Rfl: 11    cetirizine 10 mg Cap, PRN, Disp: , Rfl:     dietary supplement Pack, Take 1 tablet by mouth 2 (two) times daily., Disp: , Rfl:     fluticasone propionate (FLONASE) 50 mcg/actuation nasal spray, 1 spray (50 mcg total) by Each Nostril route once daily., Disp: 16 g, Rfl: 0    glucosam/chondro/herb 149/hyal (GLUCOS CHOND CPLX ADVANCED ORAL), Take by mouth., Disp: , Rfl:     glycerin adult suppository, as needed. , Disp: , Rfl:     levothyroxine (SYNTHROID) 25 MCG tablet, Take 1 tablet (25 mcg total) by mouth before breakfast., Disp: 90 tablet, Rfl: 3    magnesium oxide (MAG-OX) 400 mg  "(241.3 mg magnesium) tablet, Take 400 mg by mouth once daily., Disp: , Rfl:     MISCELLANEOUS MEDICAL SUPPLY [facility], as needed. EYE ITCH RELIEF 0.25 EYE DROPS, Disp: , Rfl:     mometasone (NASONEX) 50 mcg/actuation nasal spray, 2 sprays by Nasal route once daily., Disp: , Rfl:     OCEAN NASAL 0.65 % nasal spray, as needed. , Disp: , Rfl:     rosuvastatin (CRESTOR) 10 MG tablet, TAKE 1 TABLET BY MOUTH EVERY DAY, Disp: 90 tablet, Rfl: 3    TUMS 200 mg calcium (500 mg) chewable tablet, 1 tablet as needed. , Disp: , Rfl:   No current facility-administered medications for this visit.    Facility-Administered Medications Ordered in Other Visits:     ropivacaine 0.2% Nimbus PainPRO Pump infusion 500 ML, , Perineural, Continuous, Jericho Alcantar MD, New Bag at 04/12/23 0942    ALLERGIES:   Review of patient's allergies indicates:   Allergen Reactions    Iodine and iodide containing products Nausea And Vomiting     Other reaction(s): SEVERE    Sulfa (sulfonamide antibiotics)      Other reaction(s): unknown  Other reaction(s): RASH      PHYSICAL EXAMINATION:  BP (!) 150/82   Pulse 76   Ht 5' 2" (1.575 m)   Wt 67.3 kg (148 lb 5.9 oz)   BMI 27.14 kg/m²   General: Well-developed well-nourished 73 y.o. femalein no acute distress   Cardiovascular: Regular rhythm by palpation of distal pulse, normal color and temperature, no concerning varicosities on symptomatic side   Lungs: No labored breathing or wheezing appreciated   Neuro: Alert and oriented ×3   Psychiatric: well oriented to person, place and time, demonstrates normal mood and affect   Skin: No rashes, lesions or ulcers, normal temperature, turgor, and texture on involved extremity    Ortho/SPM Exam  Examination of the left knee again demonstrates intact extensor mechanism.  No residual soft tissue swelling.  No effusion.  Active extension is full.  Active assisted flexion to 130°.  Central patellar tracking.  Stable to varus and valgus stress.  No mid flexion instability.  " No tenderness to palpation.  Negative straight leg raise for radicular complaints.  No hypersensitivity to touch over the anterior incision.  Appropriate.  Good quad activation and strength.    IMAGING:  X-rays including standing, weight bearing AP and flexion bilateral knees, LEFT knee lateral and sunrise views ordered and images reviewed by me show:   Stable total knee prosthesis.  Well aligned.  No fracture, subchondral bone reaction or evidence of loosening.  No lucencies.    ASSESSMENT:      ICD-10-CM ICD-9-CM   1. Primary osteoarthritis of left knee  M17.12 715.16   2. S/P total knee arthroplasty, left  Z96.652 V43.65     Doing well    PLAN:     Clinically doing quite well.  Previously discussed dental prophylaxis.  The patient is very pleased with her recovery.  Continue current care.

## 2024-04-17 NOTE — PROGRESS NOTES
Subjective:      Tomasa is a 73 y.o. female who comes for follow-up of  rhinitis .  Her last visit with me was on 3/27/2024.      Patient reports overall sinonasal symptoms have improved since last visit.  Patient continues use azelastine and Flonase.    Recently patient developed right ear pain/jaw pain described as stabbing and intermittent.  Patient states it is worse with lying on right side of her face as well as chewing.  Patient denies any significant changes to hearing or other new ear symptoms    Per last office visit 3/27/2024 :  Patient reports developing significant left maxillary fullness with associated itching nose, rhinorrhea, watery eyes, and left eye twitching.  Patient reports overall symptoms have improved since last week.  This is an ongoing pattern in October and March.  Patient suspects a tree allergy.  Patient currently using Claritin in the morning and Benadryl at night     Current sinonasal medications include nasonex at present.    She does not recall previously having allergy testing.  She denies a history of asthma.  She denies a history of reflux symptoms.    She denies a diagnosis of obstructive sleep apnea.   She does not recall a prior history of nasal trauma.  She has not had sinonasal surgery.    She has had a tonsillectomy.  She is not a tobacco smoker.     1. Allergic rhinitis, unspecified seasonality, unspecified trigger  2. Hypertrophy of inferior nasal turbinate              - symptoms improvement but suspect some level of allergic rhinitis.  Patient also with symptoms vasomotor rhinitis but will start patient on azelastine and intranasal corticosteroid 1st and reassess response.              - if symptoms not improved in 2 weeks, advised to message me  -     azelastine (ASTELIN) 137 mcg (0.1 %) nasal spray; 1 spray (137 mcg total) by Nasal route 2 (two) times daily.  Dispense: 30 mL; Refill: 11    The patient's medications, allergies, past medical, surgical, social and  "family histories were reviewed and updated as appropriate.    A detailed review of systems was obtained with pertinent positives as per the above HPI, and otherwise negative.        Objective:     BP (!) 153/90 (BP Location: Left arm, Patient Position: Sitting, BP Method: Medium (Automatic))   Pulse 84   Wt 67.3 kg (148 lb 5.9 oz)   BMI 27.14 kg/m²      Physical Exam  Vitals reviewed.   Constitutional:       Appearance: Normal appearance.   HENT:      Head: Normocephalic and atraumatic.      Jaw: Pain on movement present. No trismus or tenderness.      Right Ear: Tympanic membrane, ear canal and external ear normal.      Left Ear: Tympanic membrane, ear canal and external ear normal.      Nose: Septal deviation (right) and mucosal edema present.      Right Nostril: Epistaxis (scant erythema to R IT) present.      Left Nostril: No epistaxis.      Right Turbinates: Enlarged.      Left Turbinates: Enlarged.      Right Sinus: No maxillary sinus tenderness or frontal sinus tenderness.      Left Sinus: No maxillary sinus tenderness or frontal sinus tenderness.   Pulmonary:      Effort: Pulmonary effort is normal.   Neurological:      Mental Status: She is alert.          Procedure    None    Data Reviewed    WBC (K/uL)   Date Value   03/05/2024 3.14 (L)     Eosinophil % (%)   Date Value   03/05/2024 2.9     Eos # (K/uL)   Date Value   03/05/2024 0.1     Platelets (K/uL)   Date Value   03/05/2024 318     Glucose (mg/dL)   Date Value   03/05/2024 93     No results found for: "IGE"    Assessment:     1. Arthropathy of right temporomandibular joint    2. Chronic rhinitis    3. Hypertrophy of inferior nasal turbinate    4. Deviated nasal septum         Plan:     Ear exam WNL, no signs of infection.    Discussed with patient the etiology of TMJ syndrome and management strategies.    Recommended conservative treatment measures   OTC anti-inflammatories  "Jaw rest" (soft foods, smaller bites, no crunchy foods, gum or ice " chewing) x2 weeks    ice pack on jaw joint and stress reduction/ behavioral management.    Follow-up with physical therapy OR dentist or oral/ maxillofacial specialist if symptoms persist.    Checking inhalant immunocaps  Continue INCS and azelastine    She will follow up as needed  I had a discussion with the patient regarding her condition and the further workup and management options.    All questions were answered, and the patient is in agreement with the above.     Disclaimer:  This note may have been prepared utilizing voice recognition software which may result in occasional typographical errors in the text such as sound alike words.   If further clarification is needed, please contact the ENT department of [facility].

## 2024-04-18 ENCOUNTER — OFFICE VISIT (OUTPATIENT)
Dept: SPORTS MEDICINE | Facility: CLINIC | Age: 74
End: 2024-04-18
Payer: MEDICARE

## 2024-04-18 ENCOUNTER — HOSPITAL ENCOUNTER (OUTPATIENT)
Dept: RADIOLOGY | Facility: HOSPITAL | Age: 74
Discharge: HOME | End: 2024-04-18
Attending: ORTHOPAEDIC SURGERY
Payer: MEDICARE

## 2024-04-18 VITALS
SYSTOLIC BLOOD PRESSURE: 150 MMHG | DIASTOLIC BLOOD PRESSURE: 82 MMHG | HEIGHT: 62 IN | WEIGHT: 148.38 LBS | HEART RATE: 76 BPM | BODY MASS INDEX: 27.3 KG/M2

## 2024-04-18 DIAGNOSIS — M17.12 PRIMARY OSTEOARTHRITIS OF LEFT KNEE: Primary | ICD-10-CM

## 2024-04-18 DIAGNOSIS — Z96.652 S/P TOTAL KNEE ARTHROPLASTY, LEFT: ICD-10-CM

## 2024-04-18 DIAGNOSIS — M25.562 LEFT KNEE PAIN, UNSPECIFIED CHRONICITY: ICD-10-CM

## 2024-04-18 PROCEDURE — 73564 X-RAY EXAM KNEE 4 OR MORE: CPT | Mod: 26,LT,, | Performed by: RADIOLOGY

## 2024-04-18 PROCEDURE — 1101F PT FALLS ASSESS-DOCD LE1/YR: CPT | Mod: CPTII,S$GLB,, | Performed by: ORTHOPAEDIC SURGERY

## 2024-04-18 PROCEDURE — 3077F SYST BP >= 140 MM HG: CPT | Mod: CPTII,S$GLB,, | Performed by: ORTHOPAEDIC SURGERY

## 2024-04-18 PROCEDURE — 73562 X-RAY EXAM OF KNEE 3: CPT | Mod: 26,RT,, | Performed by: RADIOLOGY

## 2024-04-18 PROCEDURE — 3288F FALL RISK ASSESSMENT DOCD: CPT | Mod: CPTII,S$GLB,, | Performed by: ORTHOPAEDIC SURGERY

## 2024-04-18 PROCEDURE — 99999 PR PBB SHADOW E&M-EST. PATIENT-LVL II: CPT | Mod: PBBFAC,,, | Performed by: ORTHOPAEDIC SURGERY

## 2024-04-18 PROCEDURE — 3079F DIAST BP 80-89 MM HG: CPT | Mod: CPTII,S$GLB,, | Performed by: ORTHOPAEDIC SURGERY

## 2024-04-18 PROCEDURE — 99214 OFFICE O/P EST MOD 30 MIN: CPT | Mod: S$GLB,,, | Performed by: ORTHOPAEDIC SURGERY

## 2024-04-18 PROCEDURE — 73562 X-RAY EXAM OF KNEE 3: CPT | Mod: TC,59,RT

## 2024-04-23 LAB
A ALTERNATA IGE QN: <0.1 KU/L
A FUMIGATUS IGE QN: <0.1 KU/L
BERMUDA GRASS IGE QN: <0.1 KU/L
CAT DANDER IGE QN: <0.1 KU/L
CEDAR IGE QN: <0.1 KU/L
D FARINAE IGE QN: <0.1 KU/L
D PTERONYSS IGE QN: <0.1 KU/L
DEPRECATED A ALTERNATA IGE RAST QL: NORMAL
DEPRECATED A FUMIGATUS IGE RAST QL: NORMAL
DEPRECATED BERMUDA GRASS IGE RAST QL: NORMAL
DEPRECATED CAT DANDER IGE RAST QL: NORMAL
DEPRECATED CEDAR IGE RAST QL: NORMAL
DEPRECATED D FARINAE IGE RAST QL: NORMAL
DEPRECATED D PTERONYSS IGE RAST QL: NORMAL
DEPRECATED DOG DANDER IGE RAST QL: NORMAL
DEPRECATED ELDER IGE RAST QL: NORMAL
DEPRECATED ENGL PLANTAIN IGE RAST QL: NORMAL
DEPRECATED PECAN/HICK TREE IGE RAST QL: NORMAL
DEPRECATED ROACH IGE RAST QL: NORMAL
DEPRECATED TIMOTHY IGE RAST QL: NORMAL
DEPRECATED WEST RAGWEED IGE RAST QL: NORMAL
DEPRECATED WHITE OAK IGE RAST QL: NORMAL
DOG DANDER IGE QN: <0.1 KU/L
ELDER IGE QN: <0.1 KU/L
ENGL PLANTAIN IGE QN: <0.1 KU/L
PECAN/HICK TREE IGE QN: <0.1 KU/L
ROACH IGE QN: <0.1 KU/L
TIMOTHY IGE QN: <0.1 KU/L
WEST RAGWEED IGE QN: <0.1 KU/L
WHITE OAK IGE QN: <0.1 KU/L

## 2024-04-24 ENCOUNTER — LAB VISIT (OUTPATIENT)
Dept: LAB | Facility: HOSPITAL | Age: 74
End: 2024-04-24
Attending: INTERNAL MEDICINE
Payer: MEDICARE

## 2024-04-24 ENCOUNTER — PATIENT MESSAGE (OUTPATIENT)
Dept: ENDOCRINOLOGY | Facility: CLINIC | Age: 74
End: 2024-04-24

## 2024-04-24 ENCOUNTER — OFFICE VISIT (OUTPATIENT)
Dept: ENDOCRINOLOGY | Facility: CLINIC | Age: 74
End: 2024-04-24
Payer: MEDICARE

## 2024-04-24 VITALS
OXYGEN SATURATION: 98 % | DIASTOLIC BLOOD PRESSURE: 84 MMHG | HEART RATE: 69 BPM | BODY MASS INDEX: 27.43 KG/M2 | HEIGHT: 62 IN | WEIGHT: 149.06 LBS | SYSTOLIC BLOOD PRESSURE: 156 MMHG

## 2024-04-24 DIAGNOSIS — E04.2 NONTOXIC MULTINODULAR GOITER: ICD-10-CM

## 2024-04-24 DIAGNOSIS — M81.0 AGE-RELATED OSTEOPOROSIS WITHOUT CURRENT PATHOLOGICAL FRACTURE: Primary | ICD-10-CM

## 2024-04-24 DIAGNOSIS — R73.09 ABNORMAL GLUCOSE: ICD-10-CM

## 2024-04-24 DIAGNOSIS — E55.9 VITAMIN D DEFICIENCY: ICD-10-CM

## 2024-04-24 DIAGNOSIS — E55.9 VITAMIN D DEFICIENCY: Primary | ICD-10-CM

## 2024-04-24 DIAGNOSIS — E03.9 HYPOTHYROIDISM (ACQUIRED): ICD-10-CM

## 2024-04-24 DIAGNOSIS — E66.3 OVERWEIGHT (BMI 25.0-29.9): ICD-10-CM

## 2024-04-24 DIAGNOSIS — I12.9 HYPERTENSIVE CHRONIC KIDNEY DISEASE WITH STAGE 1 THROUGH STAGE 4 CHRONIC KIDNEY DISEASE, OR UNSPECIFIED CHRONIC KIDNEY DISEASE: ICD-10-CM

## 2024-04-24 LAB
25(OH)D3+25(OH)D2 SERPL-MCNC: 68 NG/ML (ref 30–96)
ESTIMATED AVG GLUCOSE: 103 MG/DL (ref 68–131)
HBA1C MFR BLD: 5.2 % (ref 4–5.6)

## 2024-04-24 PROCEDURE — 99999 PR PBB SHADOW E&M-EST. PATIENT-LVL IV: CPT | Mod: PBBFAC,,, | Performed by: INTERNAL MEDICINE

## 2024-04-24 PROCEDURE — 3077F SYST BP >= 140 MM HG: CPT | Mod: CPTII,S$GLB,, | Performed by: INTERNAL MEDICINE

## 2024-04-24 PROCEDURE — 36415 COLL VENOUS BLD VENIPUNCTURE: CPT | Performed by: INTERNAL MEDICINE

## 2024-04-24 PROCEDURE — 82306 VITAMIN D 25 HYDROXY: CPT | Performed by: INTERNAL MEDICINE

## 2024-04-24 PROCEDURE — 3288F FALL RISK ASSESSMENT DOCD: CPT | Mod: CPTII,S$GLB,, | Performed by: INTERNAL MEDICINE

## 2024-04-24 PROCEDURE — 1101F PT FALLS ASSESS-DOCD LE1/YR: CPT | Mod: CPTII,S$GLB,, | Performed by: INTERNAL MEDICINE

## 2024-04-24 PROCEDURE — 3044F HG A1C LEVEL LT 7.0%: CPT | Mod: CPTII,S$GLB,, | Performed by: INTERNAL MEDICINE

## 2024-04-24 PROCEDURE — 99204 OFFICE O/P NEW MOD 45 MIN: CPT | Mod: S$GLB,,, | Performed by: INTERNAL MEDICINE

## 2024-04-24 PROCEDURE — 3079F DIAST BP 80-89 MM HG: CPT | Mod: CPTII,S$GLB,, | Performed by: INTERNAL MEDICINE

## 2024-04-24 PROCEDURE — 1126F AMNT PAIN NOTED NONE PRSNT: CPT | Mod: CPTII,S$GLB,, | Performed by: INTERNAL MEDICINE

## 2024-04-24 PROCEDURE — 83036 HEMOGLOBIN GLYCOSYLATED A1C: CPT | Performed by: INTERNAL MEDICINE

## 2024-04-24 NOTE — PATIENT INSTRUCTIONS
Take calcium total 1200 mg a day from diet and supplements.   Avoid too much calcium supplements - limit to 600 mg from supplements.  Continue your vitamin D daily.  Avoid bending forwards at the waist and deep twisting.  Continue weight bearing exercises like walking and do light weights.  Take fall precautions.  Call if you have any new fractures.  Get regular dental visit and let your dentist know that you are on osteoporosis medication.        Follow-up in 1 year.

## 2024-04-24 NOTE — PROGRESS NOTES
ENDOCRINOLOGY CLINIC PATIENT NOTE    Subjective:     Patient ID: Tomasa Reed is referred by Rosanna Huizar     Chief Complaint:  Osteoporosis    HPI:   Tomasa Reed is a 73 y.o. female who presents for evaluation of osteoporosis.  Previously seen Dr. Mendez in 2013 for MNG.    T scores   Year Lumbar Spine Hip Femoral neck   2022 -2.3 -1.3 -1.1       BMD: T-score and % change  Year Lumbar Spine %ch vs prior Hip %ch. vs prior Femoral neck   03/2024 -2.2 Stable -1.3 Stable -1.4       FRAX 10 year probability of fracture:   Major Osteoporotic Fracture:  18%   Hip Fracture:  8.7%    Lab Results   Component Value Date    CALCIUM 10.2 03/05/2024    ALBUMIN 4.3 03/05/2024    CREATININE 0.8 03/05/2024    ALKPHOS 55 03/05/2024    UCLSBXBS44MQ 53 11/25/2019    TSH 0.584 03/05/2024    EGFRNORACEVR >60.0 03/05/2024        Current treatment:  On Fosamax 70 mg weekly since 02/2022.  Taking regularly and tolerating well.    Previous Treatment: She was diagnosed with osteopenia around 2007, used Fosamax for 2 yrs at that time, then stopped.  Was prescribed Fosamax in 11/2020 but was discontinued.    History of previous fracture: No  Parent with fractured hip: Yes patient's mother had hip fracture around her 80s.  Smoking status: No  Glucocorticoid use: No  History of RA: No  Alcohol 3 or more/day: No  Nephrolithiasis: No  Diabetes: No  Frequent falls: No  Loss of height: Yes 1/2 inch  Menopause: She had a hysterectomy at 33 y/o and menopausal symptoms at 51 y/o.   Afib: No  CKD: No  Cancer/XRT: She has h/o malignant melanoma insitu, s/p resection, did not have any radiation treatment.     Dental visits:  Every 6 months. No current dental procedures planned.      GERD: No PPI use.  Reports occasional symptoms and takes a mint/TUMS prn.   PUD: No    Supplements:   Ca: 250 mg from multivitamin, daily.  Takes  1 serving of dairy   VitD: 20 mcg daily from multivitamin and takes additional vitamin D daily.      Exercise: Walks 3 times a day and [place] for 20 mins.  Is active with gardening.    Family history:   Osteoporosis: Mother      Thyroid nodules    Patient was diagnosed with a MNG many years ago, had 2 US thyroid in 2003 and 2004 which did not show any change, never had FNA.    In 12/2012, her PCP palpated a neck mass and she had an US and CT scan of her neck.       US thyroid 2016:  The thyroid gland is not enlarged.  The right lobe measures 4.7 x 1.5 x 1.3 cm and the left lobe measures 4.2 x 1.2 x 1.1 cm.       There are 3 heterogeneous subcentimeter nodules in the mid and upper pole of the right thyroid gland.  There is a 2.0 x 1.2 x 1.1 cm heterogeneous nodule in the lower pole of the right thyroid gland (previously 2.1 x 1.0 x 1.7 cm), which has been previously FNA.     There is a heterogeneous nodule in the lower pole of the left thyroid lobe measured 1.2 x 0.7 x 0.9 cm (previously 1.9 x 0.9 x 1.3 cm) which has been previously FNA.  There are 2 subcentimeter heterogeneous nodules in the midpole of the left thyroid lobe.     Blood flow to the thyroid is not increased on color imaging.  No evidence of abnormal lymphadenopathy.    IMPRESSION:      Bilateral thyroid nodules as detailed above, with nodules in bilateral lower pole meeting criteria for FNA, however per chart review these have been previously FNA and showed to be benign.  These measure slightly smaller since prior exam in 2012.      FNA biopsy 02/27/2013   Right thyroid nodule:  Benign  Left thyroid nodule: Benign    Head and neck radiation:  Denies    Compressive neck symptoms:  Denies    Family history of thyroid cancer:  Denies    Family history of thyroid disease:  Mother had thyroid surgery but not sure whether it was benign or malignant      Lab Results   Component Value Date    TSH 0.584 03/05/2024    TSH 0.572 09/05/2023    TSH 0.645 03/02/2023    TSH 0.714 01/06/2022    TSH 0.663 11/23/2020    FREET4 0.79 03/05/2024    FREET4 0.84  "09/05/2023    FREET4 0.99 11/08/2016         Hypothyroidism    Current medications:  Levothyroxine 25 mcg daily, started 9/2023 by her PCP.    Cardiovascular disorder:  Denies    Thyroid symptoms:  Denies any new symptoms today    Lab Results   Component Value Date    TSH 0.584 03/05/2024    TSH 0.572 09/05/2023    TSH 0.645 03/02/2023    TSH 0.714 01/06/2022    TSH 0.663 11/23/2020    FREET4 0.79 03/05/2024    FREET4 0.84 09/05/2023    FREET4 0.99 11/08/2016    FREET4 1.09 12/02/2013    FREET4 1.17 06/29/2007     ROS: see HPI       Objective:     Physical Exam     BP (!) 156/84 (BP Location: Left arm, Patient Position: Sitting, BP Method: Medium (Automatic))   Pulse 69   Ht 5' 2" (1.575 m)   Wt 67.6 kg (149 lb 0.5 oz)   SpO2 98%   BMI 27.26 kg/m²     Wt Readings from Last 3 Encounters:   04/24/24 67.6 kg (149 lb 0.5 oz)   04/18/24 67.3 kg (148 lb 5.9 oz)   04/17/24 67.3 kg (148 lb 5.9 oz)       Constitutional:  Pleasant, in no acute distress.   HENT:   Head:    Normocephalic and atraumatic.   Eyes:    EOMI. No scleral icterus.   Cardiovascular:  Normal rate  Respiratory:   Effort normal   Neurological:  No tremor, normal speech  Skin:    Skin is warm, dry    LABORATORY REVIEW:    Chemistry        Component Value Date/Time     03/05/2024 0751    K 4.5 03/05/2024 0751     03/05/2024 0751    CO2 24 03/05/2024 0751    BUN 10 03/05/2024 0751    CREATININE 0.8 03/05/2024 0751    GLU 93 03/05/2024 0751        Component Value Date/Time    CALCIUM 10.2 03/05/2024 0751    ALKPHOS 55 03/05/2024 0751    AST 22 03/05/2024 0751    ALT 16 03/05/2024 0751    BILITOT 0.5 03/05/2024 0751    ESTGFRAFRICA >60.0 01/06/2022 0816    EGFRNONAA >60.0 01/06/2022 0816        Lab Results   Component Value Date    HGBA1C 4.9 06/23/2004       Assessment/Plan:     Problem List Items Addressed This Visit          Endocrine    Age-related osteoporosis without current pathological fracture - Primary       On Fosamax 70 mg weekly " since 02/2022.  Denies history of fragility fractures.   Recent DXA scan showed stable BMD.  Discussed treatment for up to 5 years if BMD stable and no fragility fractures.  Discussed switching to IV Reclast or Prolia injection if she has any intolerance to the Fosamax.    Discussed her risk of fractures and benefits of osteoporosis treatment  Side effects including osteonecrosis of the jaw and atypical femoral fractures discussed with patient    Encouraged safe movements and fall precautions  Continue routine dental visits  Instructed on Calcium and vitamin D   DXA scan due in 03/2026.              Nontoxic multinodular goiter       FNA biopsy in 2013 of the right thyroid nodule and left thyroid nodule was benign.  US thyroid in 2016 showed nodule slightly smaller since prior exam in 2012.  Denies any compressive neck symptoms today.  Repeat ultrasound thyroid.           Hypothyroidism (acquired)       Currently on levothyroxine 25 mcg daily, started 09/2023.  Clinically and biochemically euthyroid.  Monitor thyroid function test.             Follow up in about 1 year (around 4/24/2025).     Nishi Solano MD

## 2024-04-25 ENCOUNTER — HOSPITAL ENCOUNTER (OUTPATIENT)
Dept: RADIOLOGY | Facility: HOSPITAL | Age: 74
Discharge: HOME | End: 2024-04-25
Attending: INTERNAL MEDICINE
Payer: MEDICARE

## 2024-04-25 DIAGNOSIS — E04.2 NONTOXIC MULTINODULAR GOITER: ICD-10-CM

## 2024-04-25 PROCEDURE — 76536 US EXAM OF HEAD AND NECK: CPT | Mod: 26,,, | Performed by: RADIOLOGY

## 2024-04-25 PROCEDURE — 76536 US EXAM OF HEAD AND NECK: CPT | Mod: TC

## 2024-04-30 ENCOUNTER — PATIENT MESSAGE (OUTPATIENT)
Dept: SPORTS MEDICINE | Facility: CLINIC | Age: 74
End: 2024-04-30
Payer: MEDICARE

## 2024-04-30 RX ORDER — ALENDRONATE SODIUM 70 MG/1
70 TABLET ORAL
Qty: 12 TABLET | Refills: 3 | Status: SHIPPED | OUTPATIENT
Start: 2024-04-30 | End: 2024-06-12 | Stop reason: ALTCHOICE

## 2024-05-01 DIAGNOSIS — M17.12 PRIMARY OSTEOARTHRITIS OF LEFT KNEE: Primary | ICD-10-CM

## 2024-05-01 RX ORDER — CEPHALEXIN 500 MG/1
500 CAPSULE ORAL 4 TIMES DAILY
Qty: 4 CAPSULE | Refills: 0 | Status: SHIPPED | OUTPATIENT
Start: 2024-05-01 | End: 2024-05-02

## 2024-05-05 PROBLEM — E03.9 HYPOTHYROIDISM (ACQUIRED): Status: ACTIVE | Noted: 2024-05-05

## 2024-05-05 NOTE — ASSESSMENT & PLAN NOTE
On Fosamax 70 mg weekly since 02/2022.  Denies history of fragility fractures.   Recent DXA scan showed stable BMD.  Discussed treatment for up to 5 years if BMD stable and no fragility fractures.  Discussed switching to IV Reclast or Prolia injection if she has any intolerance to the Fosamax.    Discussed her risk of fractures and benefits of osteoporosis treatment  Side effects including osteonecrosis of the jaw and atypical femoral fractures discussed with patient    Encouraged safe movements and fall precautions  Continue routine dental visits  Instructed on Calcium and vitamin D   DXA scan due in 03/2026.

## 2024-05-05 NOTE — ASSESSMENT & PLAN NOTE
FNA biopsy in 2013 of the right thyroid nodule and left thyroid nodule was benign.  US thyroid in 2016 showed nodule slightly smaller since prior exam in 2012.  Denies any compressive neck symptoms today.  Repeat ultrasound thyroid.

## 2024-05-05 NOTE — ASSESSMENT & PLAN NOTE
Currently on levothyroxine 25 mcg daily, started 09/2023.  Clinically and biochemically euthyroid.  Monitor thyroid function test.

## 2024-05-06 ENCOUNTER — PATIENT MESSAGE (OUTPATIENT)
Dept: ENDOCRINOLOGY | Facility: CLINIC | Age: 74
End: 2024-05-06
Payer: MEDICARE

## 2024-06-06 ENCOUNTER — PATIENT MESSAGE (OUTPATIENT)
Dept: ENDOCRINOLOGY | Facility: CLINIC | Age: 74
End: 2024-06-06
Payer: MEDICARE

## 2024-06-07 ENCOUNTER — TELEPHONE (OUTPATIENT)
Dept: ADMINISTRATIVE | Facility: CLINIC | Age: 74
End: 2024-06-07
Payer: MEDICARE

## 2024-06-10 ENCOUNTER — OFFICE VISIT (OUTPATIENT)
Dept: DERMATOLOGY | Facility: CLINIC | Age: 74
End: 2024-06-10
Payer: MEDICARE

## 2024-06-10 ENCOUNTER — OFFICE VISIT (OUTPATIENT)
Dept: PRIMARY CARE CLINIC | Facility: CLINIC | Age: 74
End: 2024-06-10
Payer: MEDICARE

## 2024-06-10 ENCOUNTER — PATIENT MESSAGE (OUTPATIENT)
Dept: DERMATOLOGY | Facility: CLINIC | Age: 74
End: 2024-06-10

## 2024-06-10 VITALS
WEIGHT: 145.75 LBS | DIASTOLIC BLOOD PRESSURE: 78 MMHG | HEIGHT: 62 IN | HEART RATE: 74 BPM | BODY MASS INDEX: 26.82 KG/M2 | SYSTOLIC BLOOD PRESSURE: 130 MMHG | RESPIRATION RATE: 16 BRPM | OXYGEN SATURATION: 97 %

## 2024-06-10 DIAGNOSIS — E04.2 NONTOXIC MULTINODULAR GOITER: ICD-10-CM

## 2024-06-10 DIAGNOSIS — R06.83 SNORING: ICD-10-CM

## 2024-06-10 DIAGNOSIS — L81.4 LENTIGINES: ICD-10-CM

## 2024-06-10 DIAGNOSIS — M17.12 PRIMARY OSTEOARTHRITIS OF LEFT KNEE: ICD-10-CM

## 2024-06-10 DIAGNOSIS — R20.2 TINGLING IN EXTREMITIES: ICD-10-CM

## 2024-06-10 DIAGNOSIS — J30.89 ENVIRONMENTAL AND SEASONAL ALLERGIES: ICD-10-CM

## 2024-06-10 DIAGNOSIS — L82.1 SK (SEBORRHEIC KERATOSIS): Primary | ICD-10-CM

## 2024-06-10 DIAGNOSIS — Z12.83 SKIN CANCER SCREENING: ICD-10-CM

## 2024-06-10 DIAGNOSIS — Z96.652 HISTORY OF TOTAL KNEE REPLACEMENT, LEFT: ICD-10-CM

## 2024-06-10 DIAGNOSIS — Z85.820 HISTORY OF MELANOMA: ICD-10-CM

## 2024-06-10 DIAGNOSIS — E66.3 OVERWEIGHT (BMI 25.0-29.9): ICD-10-CM

## 2024-06-10 DIAGNOSIS — Z00.00 ENCOUNTER FOR MEDICARE ANNUAL WELLNESS EXAM: Primary | ICD-10-CM

## 2024-06-10 DIAGNOSIS — L82.0 INFLAMED SEBORRHEIC KERATOSIS: ICD-10-CM

## 2024-06-10 DIAGNOSIS — D22.9 MULTIPLE BENIGN NEVI: ICD-10-CM

## 2024-06-10 DIAGNOSIS — Z97.4 DOES USE HEARING AID: ICD-10-CM

## 2024-06-10 DIAGNOSIS — D18.01 CHERRY ANGIOMA: ICD-10-CM

## 2024-06-10 DIAGNOSIS — M81.0 AGE-RELATED OSTEOPOROSIS WITHOUT CURRENT PATHOLOGICAL FRACTURE: ICD-10-CM

## 2024-06-10 DIAGNOSIS — E78.49 OTHER HYPERLIPIDEMIA: ICD-10-CM

## 2024-06-10 DIAGNOSIS — Z86.19 HISTORY OF HEPATITIS C: ICD-10-CM

## 2024-06-10 DIAGNOSIS — E03.9 HYPOTHYROIDISM (ACQUIRED): ICD-10-CM

## 2024-06-10 PROCEDURE — 1160F RVW MEDS BY RX/DR IN RCRD: CPT | Mod: CPTII,S$GLB,, | Performed by: NURSE PRACTITIONER

## 2024-06-10 PROCEDURE — 1158F ADVNC CARE PLAN TLK DOCD: CPT | Mod: CPTII,S$GLB,, | Performed by: NURSE PRACTITIONER

## 2024-06-10 PROCEDURE — 99213 OFFICE O/P EST LOW 20 MIN: CPT | Mod: S$GLB,,, | Performed by: DERMATOLOGY

## 2024-06-10 PROCEDURE — 99999 PR PBB SHADOW E&M-EST. PATIENT-LVL III: CPT | Mod: PBBFAC,,, | Performed by: DERMATOLOGY

## 2024-06-10 PROCEDURE — 1158F ADVNC CARE PLAN TLK DOCD: CPT | Mod: CPTII,S$GLB,, | Performed by: DERMATOLOGY

## 2024-06-10 PROCEDURE — 3288F FALL RISK ASSESSMENT DOCD: CPT | Mod: CPTII,S$GLB,, | Performed by: DERMATOLOGY

## 2024-06-10 PROCEDURE — 1126F AMNT PAIN NOTED NONE PRSNT: CPT | Mod: CPTII,S$GLB,, | Performed by: DERMATOLOGY

## 2024-06-10 PROCEDURE — G0439 PPPS, SUBSEQ VISIT: HCPCS | Mod: S$GLB,,, | Performed by: NURSE PRACTITIONER

## 2024-06-10 PROCEDURE — 99999 PR PBB SHADOW E&M-EST. PATIENT-LVL V: CPT | Mod: PBBFAC,,, | Performed by: NURSE PRACTITIONER

## 2024-06-10 PROCEDURE — 1159F MED LIST DOCD IN RCRD: CPT | Mod: CPTII,S$GLB,, | Performed by: NURSE PRACTITIONER

## 2024-06-10 PROCEDURE — 3078F DIAST BP <80 MM HG: CPT | Mod: CPTII,S$GLB,, | Performed by: NURSE PRACTITIONER

## 2024-06-10 PROCEDURE — 3075F SYST BP GE 130 - 139MM HG: CPT | Mod: CPTII,S$GLB,, | Performed by: NURSE PRACTITIONER

## 2024-06-10 PROCEDURE — 1101F PT FALLS ASSESS-DOCD LE1/YR: CPT | Mod: CPTII,S$GLB,, | Performed by: DERMATOLOGY

## 2024-06-10 PROCEDURE — 1160F RVW MEDS BY RX/DR IN RCRD: CPT | Mod: CPTII,S$GLB,, | Performed by: DERMATOLOGY

## 2024-06-10 PROCEDURE — 1170F FXNL STATUS ASSESSED: CPT | Mod: CPTII,S$GLB,, | Performed by: NURSE PRACTITIONER

## 2024-06-10 PROCEDURE — 1159F MED LIST DOCD IN RCRD: CPT | Mod: CPTII,S$GLB,, | Performed by: DERMATOLOGY

## 2024-06-10 NOTE — PATIENT INSTRUCTIONS
Sun Protection      The [facility] would like to remind you of the importance of sun protection all year round and particularly during the summer when the suns rays are the strongest. It has been proven that both acute and chronic sun exposure damages our cells and leads to skin cancer. Beyond skin cancer, the sun causes 90% of the symptoms of premature skin aging, including wrinkles, lentigines (brown spots), and thin, easily bruised skin. Proper sun protection can help prevent these unwanted conditions.    Many patients report that they dont go in the sun. It has been shown that the average person receives 18 hours of incidental sun exposure per week during activities such as walking through parking lots, driving, or sitting next to windows. This accumulates to several bad sunburns per year!    In choosing sunscreen, you want one that protects against both UVA and UVB rays (broad spectrum). It is recommended that you use one of SPF 30 or higher. It is important to apply the sunscreen about 20 minutes prior to sun exposure. Most sunscreens are chemical sunscreens and a reaction must take place in the skin so that they are effective. If they are applied and then you are immediately exposed to the sun or start sweating, this reaction has not had time to take place and you are therefore unprotected. Sunscreen needs to be reapplied every 2 hours if you are participating in water sports or sweating. We recommend Elta MD or CeraVe sunscreens for daily use; however there are many options and it is most important for you to find one that you will use on a consistent basis.    If you have sensitive skin, you may do best with a sunscreen that contains only physical blockers in the active ingredient section. The only physical blockers available in the [place] currently are titanium dioxide or zinc oxide. These are typically thicker and harder to apply, however they afford very good protection.  Neutrogena Sensitive Skin, Blue Lizard Sensitive Skin (pink top) or Neutrogena Pure and Free are popular ones.     Aside from sunscreen, clothes with UV protection (UPF), wide brimmed hats, and sunglasses are other means of sun protection that we recommend.      Based on a recent study (6/2021) and out of an abundance of caution, we are recommending that you AVOID the following sunscreens as they may contain the carcinogen, benzene:    Spray and gel sunscreens  Any [facility] or [facility] brands as well as Max Block and TopCare brands   Neutrogena Ultra Sheer Dry-touch Water Resistant Sunscreen LOTION SPF 70   Neutrogena Sheer Zinc Dry-touch Face Sunscreen LOTION SPF 50   5.   Aveeno Baby Continuous Protection Sensitive Skin Sunscreen LOTION - Broad Spectrum SPF 50    Please note that Benzene is not an ingredient or the degradation product of any ingredient in any sunscreen. This study suggested that the findings are a result of contamination in the manufacturing process. At this point, we don't know how effectively Benzene gets through the skin, if it gets absorbed systemically, and what effects it may have.     We do know that ultraviolet radiation is a well-established carcinogen. Please use daily sun protection/avoidance and use of at least SPF 30, broad-spectrum sunscreen not listed above.                       [facility] - DERMATOLOGY [address]  [address]  [place] [address]  Dept: [phone]  Dept Fax: [phone]     CRYOSURGERY      Your doctor has used a method called cryosurgery to treat your skin condition. Cryosurgery refers to the use of very cold substances to treat a variety of skin conditions such as warts, pre-skin cancers, molluscum contagiosum, sun spots, and several benign growths. The substance we use in cryosurgery is liquid nitrogen and is so cold (-195 degrees Celsius) that is burns when administered.     Following treatment in the office, the skin may  immediately burn and become red. You may find the area around the lesion is affected as well. It is sometimes necessary to treat not only the lesion, but a small area of the surrounding normal skin to achieve a good response.     A blister, and even a blood filled blister, may form after treatment.   This is a normal response. If the blister is painful, it is acceptable to sterilize a needle and with rubbing alcohol and gently pop the blister. It is important that you gently wash the area with soap and warm water as the blister fluid may contain wart virus if a wart was treated. Do no remove the roof of the blister.     The area treated can take anywhere from 1-3 weeks to heal. Healing time depends on the kind skin lesion treated, the location, and how aggressively the lesion was treated. It is recommended that the areas treated are covered with Vaseline or bacitracin ointment and a band-aid. If a band-aid is not practical, just ointment applied several times per day will do. Keeping these areas moist will speed the healing time.    Treatment with liquid nitrogen can leave a scar. In dark skin, it may be a light or dark scar, in light skin it may be a white or pink scar. These will generally fade with time, but may never go away completely.     If you have any concerns after your treatment, please feel free to call the office.       [facility] [place] [address]/ [phone] FAX/ [email or web address]

## 2024-06-10 NOTE — PATIENT INSTRUCTIONS
Counseling and Referral of Other Preventative  (Italic type indicates deductible and co-insurance are waived)    Patient Name: Tomasa Reed  Today's Date: 6/10/2024    Health Maintenance       Date Due Completion Date    RSV Vaccine (Age 60+ and Pregnant patients) (1 - 1-dose 60+ series) Never done ---    TETANUS VACCINE 02/04/2026 2/4/2016    Cologuard 02/27/2026 2/27/2023    DEXA Scan 03/13/2026 3/13/2024    Lipid Panel 03/05/2029 3/5/2024        No orders of the defined types were placed in this encounter.    The following information is provided to all patients.  This information is to help you find resources for any of the problems found today that may be affecting your health:                  Living healthy guide: www.[facility].[place].gov      Understanding Diabetes: www.diabetes.org      Eating healthy: www.cdc.gov/healthyweight      [facility] home safety checklist: www.cdc.gov/steadi/patient.html      Agency on Aging: www.goea.[place].gov      Alcoholics anonymous (AA): www.aa.org      Physical Activity: www.alisson.nih.gov/cu1endh      Tobacco use: www.quitwithusla.org

## 2024-06-10 NOTE — PROGRESS NOTES
Subjective:      Patient ID:  Tomasa Reed is a 74 y.o. female who presents for   Chief Complaint   Patient presents with    Skin Check     TBSE      Patient here for Total Body Skin Exam    Last seen by dermatologist: 05/05/2023    yes - personal history of atypical moles removed  yes - personal history of MM   none - family history of MM  yes - childhood blistering sunburns  none - tanning bed use  none - personal history of NMSC    Patient with specific complaint of lesion(s)  Location: l-lower leg   Duration: 6 month   Symptoms: none   Relieving factors/Previous treatments: none     Patient with new area of concern:   Location: L-breast   Previous treatments: none     Pt had a melanoma in situ removed from her R chest in 10/2011 by Dr. Shaw. Also had a mildly atypical nevus biopsied from L chest in 2013.        Review of Systems   Constitutional:  Negative for fever, chills, weight loss, fatigue, night sweats and malaise.   HENT:  Negative for headaches.    Respiratory:  Negative for cough and shortness of breath.    Gastrointestinal:  Negative for indigestion.   Skin:  Positive for daily sunscreen use (Face), activity-related sunscreen use and wears hat (Outside for extended periods). Negative for recent sunburn.   Neurological:  Negative for headaches.   Hematologic/Lymphatic: Negative for adenopathy. Bruises/bleeds easily.       Objective:   Physical Exam   Constitutional: She appears well-developed and well-nourished. No distress.   Musculoskeletal: Lymphadenopathy:      Cervical: No cervical adenopathy.      Upper Body:   No axillary adenopathy present.No supraclavicular adenopathy is present.      Lower Body:   No inguinal adenopathy.    Lymphadenopathy: No supraclavicular adenopathy is present.     She has no cervical adenopathy.     She has no axillary adenopathy.     She has no inguinal adenopathy.   Neurological: She is alert and oriented to person, place, and time. She is not  disoriented.   Psychiatric: She has a normal mood and affect.   Skin:   Areas Examined (abnormalities noted in diagram):   Scalp / Hair Palpated and Inspected  Head / Face Inspection Performed  Neck Inspection Performed  Chest / Axilla Inspection Performed  Abdomen Inspection Performed  Genitals / Buttocks / Groin Inspection Performed  Back Inspection Performed  RUE Inspected  LUE Inspection Performed  RLE Inspected  LLE Inspection Performed  Nails and Digits Inspection Performed  Gland Inspection Performed                 Diagram Legend     Erythematous scaling macule/papule c/w actinic keratosis       Vascular papule c/w angioma      Pigmented verrucoid papule/plaque c/w seborrheic keratosis      Yellow umbilicated papule c/w sebaceous hyperplasia      Irregularly shaped tan macule c/w lentigo     1-2 mm smooth white papules consistent with Milia      Movable subcutaneous cyst with punctum c/w epidermal inclusion cyst      Subcutaneous movable cyst c/w pilar cyst      Firm pink to brown papule c/w dermatofibroma      Pedunculated fleshy papule(s) c/w skin tag(s)      Evenly pigmented macule c/w junctional nevus     Mildly variegated pigmented, slightly irregular-bordered macule c/w mildly atypical nevus      Flesh colored to evenly pigmented papule c/w intradermal nevus       Pink pearly papule/plaque c/w basal cell carcinoma      Erythematous hyperkeratotic cursted plaque c/w SCC      Surgical scar with no sign of skin cancer recurrence      Open and closed comedones      Inflammatory papules and pustules      Verrucoid papule consistent consistent with wart     Erythematous eczematous patches and plaques     Dystrophic onycholytic nail with subungual debris c/w onychomycosis     Umbilicated papule    Erythematous-base heme-crusted tan verrucoid plaque consistent with inflamed seborrheic keratosis     Erythematous Silvery Scaling Plaque c/w Psoriasis     See annotation      Assessment / Plan:        SK  (seborrheic keratosis)  These are benign inherited growths without a malignant potential. Reassurance given to patient. No treatment is necessary.   Treatment of benign, asymptomatic lesions may be considered cosmetic.  Warned about risk of hypo- or hyperpigmentation with treatment and risk of recurrence.    Cherry angioma  This is a benign vascular lesion. Reassurance given. No treatment required. Treatment of benign, asymptomatic lesions may be considered cosmetic.    Lentigines  These are benign sun spots which should be monitored for changes. Patient instructed in importance of daily broad spectrum sunscreen use with spf at least 30. Sun avoidance and topical protection/protective clothing discussed.    Multiple benign nevi  Benign-appearing nevi present on exam today. Reassurance provided. Periodically examine moles and return to clinic if any moles change or become symptomatic (bleeding, itching, pain, etc).    Skin cancer screening  History of melanoma  Total body skin examination performed today including at least 12 points as noted in physical examination. No lesions suspicious for malignancy noted.  Patient instructed in importance of daily broad spectrum sunscreen use with spf at least 30. Sun avoidance and topical protection/protective clothing discussed.      Follow up in about 1 year (around 6/10/2025) for skin check or sooner for any concerns.

## 2024-06-10 NOTE — PROGRESS NOTES
Tomasa Reed presented for a follow-up Medicare AWV today. The following components were reviewed and updated:    Medical history  Family History  Social history  Allergies and Current Medications  Health Risk Assessment  Health Maintenance      Care Team    **See Completed Assessments for Annual Wellness visit with in the encounter summary    The following assessments were completed:  Depression Screening  Cognitive function Screening  Timed Get Up Test  Whisper Test      Opioid documentation:      Patient does not have a current opioid prescription.   [facility] Note    Chief Complaint      Chief Complaint   Patient presents with    Medicare AWV     History of Present Illness      Tomasa Reed is a 74 y.o. female patient of Dr. Dominguez who presents today for AWV.    Health Maintenance   Topic Date Due    TETANUS VACCINE  02/04/2026    Cologuard  02/27/2026    DEXA Scan  03/13/2026    Lipid Panel  03/05/2029    Hepatitis C Screening  Completed    Shingles Vaccine  Completed       Past Medical History:   Diagnosis Date    Age-related osteoporosis without current pathological fracture 4/24/2024    Cataract     OU    Chronic interstitial cystitis     History of hepatitis C; S/p RX with SVR 12 documented 06/2017 06/22/2017    HLD (hyperlipidemia)     Hypothyroidism (acquired) 5/5/2024    Malignant melanoma of skin of trunk, except scrotum     Migraine, unspecified, without mention of intractable migraine without mention of status migrainosus     Nonspecific abnormal results of liver function study     Nontoxic multinodular goiter     Osteopenia        Past Surgical History:   Procedure Laterality Date    BREAST BIOPSY      CATARACT EXTRACTION Right 03/02/2020    cataracts      B/L at [facility]    COLONOSCOPY N/A 12/14/2016    Procedure: COLONOSCOPY;  Surgeon: Wicho Painting MD;  Location: [address];  Service: Endoscopy;  Laterality: N/A;    COLONOSCOPY N/A  1/14/2020    Procedure: COLONOSCOPY;  Surgeon: NANCY Maher MD;  [facility]  Service: Endoscopy;  Laterality: N/A;  1/8/20 left vm to confirm appt-rb    FINGER SURGERY Right 2010    index finger fused    MOLE REMOVAL      TONSILLECTOMY, ADENOIDECTOMY      TOTAL ABDOMINAL HYSTERECTOMY      TOTAL KNEE ARTHROPLASTY Left 4/12/2023    Procedure: ARTHROPLASTY, KNEE, TOTAL;  Surgeon: REMY Zuñiga MD;  Location: [facility];  Service: Orthopedics;  Laterality: Left;  regional w/ catheter  spinal  adductor  pericapsular injection 0.25 ropivacaine       family history includes Breast cancer (age of onset: 52) in her daughter; Cancer in her mother; Cataracts in her father and mother; Chronic back pain in her daughter; Diabetes in her mother; Fibromyalgia in her sister; GI problems in her daughter; Glaucoma in her sister; Heart failure in her father; Hypertension in her father; Kidney failure in her mother; Macular degeneration in her mother; Obesity in her daughter; Osteoarthritis in her mother; Other in her daughter and sister; Stroke in her father; Thyroid disease in her mother.    Social History     Tobacco Use    Smoking status: Never    Smokeless tobacco: Never   Substance Use Topics    Alcohol use: Yes     Comment: socially    Drug use: Not Currently       Review of Systems   Constitutional:  Negative for chills and fever.   HENT:  Negative for congestion, sinus pain and sore throat.    Eyes:  Negative for blurred vision.   Respiratory:  Negative for cough, shortness of breath and wheezing.    Cardiovascular:  Negative for chest pain, palpitations and leg swelling.   Gastrointestinal:  Negative for abdominal pain, constipation, diarrhea, nausea and vomiting.   Genitourinary:  Negative for dysuria.   Musculoskeletal:  Negative for myalgias.   Skin:  Negative for rash.   Neurological:  Negative for dizziness, weakness and headaches.   Psychiatric/Behavioral:  Negative for depression. The patient is  not nervous/anxious.       Outpatient Encounter Medications as of 6/10/2024   Medication Sig Note Dispense Refill    acetaminophen (TYLENOL) 650 MG TbSR Take 650 mg by mouth every 8 (eight) hours.       alendronate (FOSAMAX) 70 MG tablet TAKE 1 TABLET (70 MG TOTAL) BY MOUTH EVERY 7 DAYS  12 tablet 3    azelastine (ASTELIN) 137 mcg (0.1 %) nasal spray 1 spray (137 mcg total) by Nasal route 2 (two) times daily.  30 mL 11    cetirizine 10 mg Cap PRN 4/3/2023: Take as needed.      dietary supplement Pack Take 1 tablet by mouth 2 (two) times daily. 4/3/2023: Hold 7 days prior to surgery.      fluticasone propionate (FLONASE) 50 mcg/actuation nasal spray 1 spray (50 mcg total) by Each Nostril route once daily. 4/3/2023: Hold AM of surgery. 16 g 0    glucosam/chondro/herb 149/hyal (GLUCOS CHOND CPLX ADVANCED ORAL) Take by mouth. 4/3/2023: Hold 7 days prior to surgery.       glycerin adult suppository as needed.  4/3/2023: Hold AM of surgery.      levothyroxine (SYNTHROID) 25 MCG tablet Take 1 tablet (25 mcg total) by mouth before breakfast.  90 tablet 3    magnesium oxide (MAG-OX) 400 mg (241.3 mg magnesium) tablet Take 400 mg by mouth once daily. 4/3/2023: Hold AM of surgery.      MISCELLANEOUS MEDICAL SUPPLY [facility] as needed. EYE ITCH RELIEF 0.25 EYE DROPS 4/3/2023: Use as needed.       mometasone (NASONEX) 50 mcg/actuation nasal spray 2 sprays by Nasal route once daily. 4/3/2023: Hold AM of surgery.      OCEAN NASAL 0.65 % nasal spray as needed.  4/3/2023: Hold AM of surgery.      rosuvastatin (CRESTOR) 10 MG tablet TAKE 1 TABLET BY MOUTH EVERY DAY  90 tablet 3    TUMS 200 mg calcium (500 mg) chewable tablet 1 tablet as needed.  4/3/2023: Hold AM of surgery.       Facility-Administered Encounter Medications as of 6/10/2024   Medication Dose Route Frequency Provider Last Rate Last Admin    ropivacaine 0.2% Nimbus PainPRO Pump infusion 500 ML   Perineural Continuous Jericho Alcantar MD   New Bag at 04/12/23 0942        Review  "of patient's allergies indicates:   Allergen Reactions    Sulfa (sulfonamide antibiotics)      Other reaction(s): unknown  Other reaction(s): RASH    Iodine and iodide containing products Nausea And Vomiting     Other reaction(s): SEVERE  migraines       Physical Exam      Vital Signs  Pulse: 74  Resp: 16  SpO2: 97 %  BP: 130/78  BP Location: Right arm  Patient Position: Sitting  Height and Weight  Height: 5' 2" (157.5 cm)  Weight: 66.1 kg (145 lb 11.6 oz)  BSA (Calculated - sq m): 1.7 sq meters  BMI (Calculated): 26.6  Weight in (lb) to have BMI = 25: 136.4    Physical Exam  Vitals and nursing note reviewed.   Constitutional:       General: She is not in acute distress.     Appearance: Normal appearance. She is not ill-appearing.   HENT:      Head: Normocephalic and atraumatic.      Right Ear: Tympanic membrane normal.      Left Ear: Tympanic membrane normal.      Nose: Nose normal.      Mouth/Throat:      Mouth: Mucous membranes are moist.      Pharynx: No posterior oropharyngeal erythema.   Eyes:      Pupils: Pupils are equal, round, and reactive to light.   Cardiovascular:      Rate and Rhythm: Normal rate and regular rhythm.      Heart sounds: Normal heart sounds.   Pulmonary:      Effort: Pulmonary effort is normal. No respiratory distress.      Breath sounds: Normal breath sounds.   Abdominal:      Palpations: Abdomen is soft.   Musculoskeletal:         General: Normal range of motion.      Cervical back: Normal range of motion.   Lymphadenopathy:      Cervical: No cervical adenopathy.   Skin:     General: Skin is warm and dry.      Findings: Bruising present.   Neurological:      General: No focal deficit present.      Mental Status: She is alert and oriented to person, place, and time.   Psychiatric:         Mood and Affect: Mood normal.         Behavior: Behavior normal.         Thought Content: Thought content normal.         Judgment: Judgment normal.        Laboratory:  CBC:  Lab Results   Component " Value Date    WBC 3.14 (L) 03/05/2024    RBC 4.22 03/05/2024    HGB 13.7 03/05/2024    HCT 40.6 03/05/2024     03/05/2024    MCV 96 03/05/2024    MCH 32.5 (H) 03/05/2024    MCHC 33.7 03/05/2024    MCHC 33.9 09/05/2023    MCHC 34.6 04/03/2023     CMP:  Lab Results   Component Value Date    GLU 93 03/05/2024    CALCIUM 10.2 03/05/2024    ALBUMIN 4.3 03/05/2024    PROT 7.5 03/05/2024     03/05/2024    K 4.5 03/05/2024    CO2 24 03/05/2024     03/05/2024    BUN 10 03/05/2024    ALKPHOS 55 03/05/2024    ALT 16 03/05/2024    AST 22 03/05/2024    BILITOT 0.5 03/05/2024    BILITOT 0.4 09/05/2023    BILITOT 0.6 03/02/2023     URINALYSIS:  Lab Results   Component Value Date    COLORU Yellow 01/06/2022    SPECGRAV 1.010 01/06/2022    PHUR 7.0 01/06/2022    PROTEINUA Negative 01/06/2022    BACTERIA Rare 11/23/2020    NITRITE Negative 01/06/2022    LEUKOCYTESUR Negative 01/06/2022    UROBILINOGEN negative 10/23/2018      LIPIDS:  Lab Results   Component Value Date    TSH 0.584 03/05/2024    TSH 0.572 09/05/2023    TSH 0.645 03/02/2023    HDL 63 03/05/2024    HDL 68 03/02/2023    HDL 72 05/26/2022    CHOL 151 03/05/2024    CHOL 154 03/02/2023    CHOL 155 05/26/2022    TRIG 78 03/05/2024    TRIG 74 03/02/2023    TRIG 45 05/26/2022    LDLCALC 72.4 03/05/2024    LDLCALC 71.2 03/02/2023    LDLCALC 74.0 05/26/2022    CHOLHDL 41.7 03/05/2024    CHOLHDL 44.2 03/02/2023    CHOLHDL 46.5 05/26/2022    NONHDLCHOL 88 03/05/2024    NONHDLCHOL 86 03/02/2023    NONHDLCHOL 83 05/26/2022    TOTALCHOLEST 2.4 03/05/2024    TOTALCHOLEST 2.3 03/02/2023    TOTALCHOLEST 2.2 05/26/2022     TSH:  Lab Results   Component Value Date    TSH 0.584 03/05/2024    TSH 0.572 09/05/2023    TSH 0.645 03/02/2023     A1C:  Lab Results   Component Value Date    HGBA1C 5.2 04/24/2024    HGBA1C 4.9 06/23/2004         Assessment/Plan     Tomasa Alfonso Reed is a 74 y.o.female with:    Encounter for Medicare annual wellness exam  -     Ambulatory  "Referral/Consult to Enhanced Annual Wellness Visit (eAWV)    Other hyperlipidemia  Pt stable on crestor, will continue to monitor    Hypothyroidism (acquired)  Stable on current dose of synthroid, will continue to monitor    Nontoxic multinodular goiter  Has been stable, will continue to monitor    Age-related osteoporosis without current pathological fracture  Pt currently taking fosamax, reports doesn't want to switch to prolia due to possible side effects    Primary osteoarthritis of left knee  History of total knee replacement, left  Does take glucosamine chondroitin, doing well, will continue to monitor    Tingling in extremities  Only at rest, no redness, swelling or warmth, no pain, +pulses noted    Does use hearing aid  Doing well, will continue to monitor    Overweight (BMI 25.0-29.9)  Pt exercises with walking, and [place], uses 3 lb weight    Snoring  Stable no problems, will continue to monitor    History of melanoma  Is being monitored by dermatology and has been stable    History of hepatitis C; S/p RX with SVR 24 documented 09/2017  Resolved    Environmental and seasonal allergies  Stable at this time, will continue to monitor        Health Maintenance Due   Topic Date Due    RSV Vaccine (Age 60+ and Pregnant patients) (1 - 1-dose 60+ series) Never done        I spent 45 minutes on the day of this encounter for preparing for, evaluating, treating, and managing this patient.      -Continue current medications and maintain follow up with specialists.  Return to clinic in 1 year    No follow-ups on file.       Erin Jiminez, NP-C [facility] location                     Vitals:    06/10/24 0806 06/10/24 0856 06/10/24 0857   BP: 132/80 130/76 130/78   BP Location: Right arm     Patient Position: Sitting     Pulse: 74     Resp: 16     SpO2: 97%     Weight: 66.1 kg (145 lb 11.6 oz)     Height: 5' 2" (1.575 m)       Body mass index is 26.65 kg/m².       Physical Exam  Vitals and nursing note " reviewed.   Constitutional:       General: She is not in acute distress.     Appearance: Normal appearance. She is not ill-appearing.   HENT:      Head: Normocephalic and atraumatic.      Right Ear: Tympanic membrane normal.      Left Ear: Tympanic membrane normal.      Nose: Nose normal.      Mouth/Throat:      Mouth: Mucous membranes are moist.      Pharynx: No posterior oropharyngeal erythema.   Eyes:      Pupils: Pupils are equal, round, and reactive to light.   Cardiovascular:      Rate and Rhythm: Normal rate and regular rhythm.      Heart sounds: Normal heart sounds.   Pulmonary:      Effort: Pulmonary effort is normal. No respiratory distress.      Breath sounds: Normal breath sounds.   Abdominal:      Palpations: Abdomen is soft.   Musculoskeletal:         General: Normal range of motion.      Cervical back: Normal range of motion.   Lymphadenopathy:      Cervical: No cervical adenopathy.   Skin:     General: Skin is warm and dry.      Findings: Bruising present.   Neurological:      General: No focal deficit present.      Mental Status: She is alert and oriented to person, place, and time.   Psychiatric:         Mood and Affect: Mood normal.         Behavior: Behavior normal.         Thought Content: Thought content normal.         Judgment: Judgment normal.         Diagnoses and health risks identified today and associated recommendations/orders:  1. Encounter for Medicare annual wellness exam  - Ambulatory Referral/Consult to Enhanced Annual Wellness Visit (eAWV)    2. Encounter for preventive health examination    Provided Tomasa with a 5-10 year written screening schedule and personal prevention plan. Recommendations were developed using the USPSTF age appropriate recommendations. Education, counseling, and referrals were provided as needed.  After Visit Summary printed and given to patient which includes a list of additional screenings\tests needed.    No follow-ups on file.      Olivia Nunez  NP    I offered to discuss advanced care planning, including how to pick a person who would make decisions for you if you were unable to make them for yourself, called a health care power of , and what kind of decisions you might make such as use of life sustaining treatments such as ventilators and tube feeding when faced with a life limiting illness recorded on a living will that they will need to know. (How you want to be cared for as you near the end of your natural life)     X Patient is interested in learning more about how to make advanced directives.  I provided them paperwork and offered to discuss this with them.

## 2024-06-19 ENCOUNTER — PATIENT MESSAGE (OUTPATIENT)
Dept: INTERNAL MEDICINE | Facility: CLINIC | Age: 74
End: 2024-06-19
Payer: MEDICARE

## 2024-06-19 NOTE — TELEPHONE ENCOUNTER
Pt states she will continue on Fosamax after her discussion with Dr. Solano and going over her insurance. She does not like the side effects of Reclast infusion. Pt needs a new prescription sent.

## 2024-06-20 RX ORDER — ALENDRONATE SODIUM 70 MG/1
70 TABLET ORAL
Qty: 12 TABLET | Refills: 3 | Status: SHIPPED | OUTPATIENT
Start: 2024-06-20 | End: 2025-06-20

## 2024-08-28 ENCOUNTER — OFFICE VISIT (OUTPATIENT)
Dept: UROLOGY | Facility: CLINIC | Age: 74
End: 2024-08-28
Payer: MEDICARE

## 2024-08-28 VITALS
HEART RATE: 86 BPM | SYSTOLIC BLOOD PRESSURE: 172 MMHG | DIASTOLIC BLOOD PRESSURE: 64 MMHG | BODY MASS INDEX: 27.1 KG/M2 | WEIGHT: 148.13 LBS

## 2024-08-28 DIAGNOSIS — R30.0 DYSURIA: Primary | ICD-10-CM

## 2024-08-28 DIAGNOSIS — N39.41 URGENCY INCONTINENCE: ICD-10-CM

## 2024-08-28 PROCEDURE — 87086 URINE CULTURE/COLONY COUNT: CPT

## 2024-08-28 PROCEDURE — 99999 PR PBB SHADOW E&M-EST. PATIENT-LVL III: CPT | Mod: PBBFAC,,,

## 2024-08-28 RX ORDER — NITROFURANTOIN 25; 75 MG/1; MG/1
100 CAPSULE ORAL 2 TIMES DAILY
Qty: 10 CAPSULE | Refills: 0 | Status: SHIPPED | OUTPATIENT
Start: 2024-08-28 | End: 2024-09-02

## 2024-08-28 NOTE — PROGRESS NOTES
[facility]      Urinary Tract Infection Evaluation Note    8/28/2024    Referred by:  No ref. provider found    HPI: Tomasa Reed is a very pleasant 74 y.o. female who is an established patient seen today for evaluation of acute cystitis. She reports that symptoms began 2 weeks ago. Symptoms suggestive that this episode is associated with urinary tract infection include: dysuria, suprapubic pain, frequency, urgency, and incontinence.     She also reports a hx of IC. She has had a partial hysterectomy but denies other pelvic surgeries, nephrolithiasis, or hematuria.     She reports no bowel symptoms. She reports allergies to oral antibiotics. She  is postmenopausal. She does not use topical vaginal estrogen.     Independent of episodes of infection, she denies symptoms of irritative voiding including dysuria, frequency, incontinence, and urgency. She denies symptoms of obstructive voiding including decreased stream, hesitancy, intermittency, and post void dribbling. Catheterized PVR was 150 mL.  She reports urgency incontinence.     A review of 10+ systems was conducted with pertinent positive and negative findings documented in HPI with all other systems reviewed and negative.    Past medical, family, surgical and social history reviewed as documented in chart with pertinent positive medical, family, surgical and social history detailed in HPI.    Exam Findings:    NOTE:  the exam was carried out with a nurse chaperone present  Normal external female genitalia  Urethral meatus is normal  Urethra and bladder are nontender to bimanual exam  Well supported anteriorly and posteriorly   Uterus and cervix are surgically absent  Vaginal Mucosa: mild atrophy  Cath  mL Const: no acute distress, conversant and alert  Eyes: anicteric, extraocular muscles intact  ENMT: normocephalic, Nl oral membranes  Cardio: no cyanosis, nl cap refill  Pulm: no tachypnea; no resp distress  Abd: soft, no  tenderness  Musc: no laceration, no tenderness  Neuro: alert; oriented x 3  Skin: warm, dry; no petichiae  Psych: no anxiety; normal speech       Assessment/Plan:    Acute Cystitis (est, addt'l workup):    Patient appears to present today with symptomatic lower urinary tract infection; will begin therapy for acute cystitis with nitrofurantoin 100 mg po BID x 5 days  Follow up in 4 weeks to ensure resolution of UTI symptoms and manage possible OAB.     I spent a total of 30 minutes on the day of the visit.This includes face to face time and non-face to face time preparing to see the patient (eg, review of tests), obtaining and/or reviewing separately obtained history, documenting clinical information in the electronic or other health record, independently interpreting results and communicating results to the patient/family/caregiver, or care coordinator.

## 2024-08-29 LAB — BACTERIA UR CULT: NO GROWTH

## 2024-08-30 ENCOUNTER — PATIENT MESSAGE (OUTPATIENT)
Dept: UROLOGY | Facility: CLINIC | Age: 74
End: 2024-08-30
Payer: MEDICARE

## 2024-08-30 RX ORDER — PHENAZOPYRIDINE HYDROCHLORIDE 200 MG/1
200 TABLET, FILM COATED ORAL 3 TIMES DAILY PRN
Qty: 30 TABLET | Refills: 0 | Status: SHIPPED | OUTPATIENT
Start: 2024-08-30 | End: 2024-09-09

## 2024-09-10 ENCOUNTER — TELEPHONE (OUTPATIENT)
Dept: INTERNAL MEDICINE | Facility: CLINIC | Age: 74
End: 2024-09-10
Payer: MEDICARE

## 2024-09-10 ENCOUNTER — LAB VISIT (OUTPATIENT)
Dept: LAB | Facility: HOSPITAL | Age: 74
End: 2024-09-10
Attending: INTERNAL MEDICINE
Payer: MEDICARE

## 2024-09-10 DIAGNOSIS — D70.9 NEUTROPENIA, UNSPECIFIED TYPE: ICD-10-CM

## 2024-09-10 DIAGNOSIS — E03.4 HYPOTHYROIDISM DUE TO ACQUIRED ATROPHY OF THYROID: ICD-10-CM

## 2024-09-10 DIAGNOSIS — E78.5 HYPERLIPIDEMIA, UNSPECIFIED HYPERLIPIDEMIA TYPE: ICD-10-CM

## 2024-09-10 LAB
BASOPHILS # BLD AUTO: 0.04 K/UL (ref 0–0.2)
BASOPHILS NFR BLD: 1.1 % (ref 0–1.9)
CHOLEST SERPL-MCNC: 137 MG/DL (ref 120–199)
CHOLEST/HDLC SERPL: 2.2 {RATIO} (ref 2–5)
DIFFERENTIAL METHOD BLD: ABNORMAL
EOSINOPHIL # BLD AUTO: 0.1 K/UL (ref 0–0.5)
EOSINOPHIL NFR BLD: 3.3 % (ref 0–8)
ERYTHROCYTE [DISTWIDTH] IN BLOOD BY AUTOMATED COUNT: 12.4 % (ref 11.5–14.5)
HCT VFR BLD AUTO: 39.6 % (ref 37–48.5)
HDLC SERPL-MCNC: 62 MG/DL (ref 40–75)
HDLC SERPL: 45.3 % (ref 20–50)
HGB BLD-MCNC: 13.3 G/DL (ref 12–16)
IMM GRANULOCYTES # BLD AUTO: 0 K/UL (ref 0–0.04)
IMM GRANULOCYTES NFR BLD AUTO: 0 % (ref 0–0.5)
LDLC SERPL CALC-MCNC: 64.2 MG/DL (ref 63–159)
LYMPHOCYTES # BLD AUTO: 1.7 K/UL (ref 1–4.8)
LYMPHOCYTES NFR BLD: 47.5 % (ref 18–48)
MCH RBC QN AUTO: 31.7 PG (ref 27–31)
MCHC RBC AUTO-ENTMCNC: 33.6 G/DL (ref 32–36)
MCV RBC AUTO: 94 FL (ref 82–98)
MONOCYTES # BLD AUTO: 0.3 K/UL (ref 0.3–1)
MONOCYTES NFR BLD: 7.8 % (ref 4–15)
NEUTROPHILS # BLD AUTO: 1.5 K/UL (ref 1.8–7.7)
NEUTROPHILS NFR BLD: 40.3 % (ref 38–73)
NONHDLC SERPL-MCNC: 75 MG/DL
NRBC BLD-RTO: 0 /100 WBC
PLATELET # BLD AUTO: 269 K/UL (ref 150–450)
PMV BLD AUTO: 9.9 FL (ref 9.2–12.9)
RBC # BLD AUTO: 4.2 M/UL (ref 4–5.4)
T4 FREE SERPL-MCNC: 0.87 NG/DL (ref 0.71–1.51)
TRIGL SERPL-MCNC: 54 MG/DL (ref 30–150)
TSH SERPL DL<=0.005 MIU/L-ACNC: 0.8 UIU/ML (ref 0.4–4)
WBC # BLD AUTO: 3.6 K/UL (ref 3.9–12.7)

## 2024-09-10 PROCEDURE — 85025 COMPLETE CBC W/AUTO DIFF WBC: CPT | Performed by: INTERNAL MEDICINE

## 2024-09-10 PROCEDURE — 84443 ASSAY THYROID STIM HORMONE: CPT | Performed by: INTERNAL MEDICINE

## 2024-09-10 PROCEDURE — 80061 LIPID PANEL: CPT | Performed by: INTERNAL MEDICINE

## 2024-09-10 PROCEDURE — 36415 COLL VENOUS BLD VENIPUNCTURE: CPT | Mod: PO | Performed by: INTERNAL MEDICINE

## 2024-09-10 PROCEDURE — 84439 ASSAY OF FREE THYROXINE: CPT | Performed by: INTERNAL MEDICINE

## 2024-09-10 NOTE — TELEPHONE ENCOUNTER
----- Message from Rosanna Huizar MD sent at 9/10/2024  2:57 PM CDT -----  Please review your lab work and we will discuss at your pending office visit.     verna

## 2024-09-12 NOTE — PROGRESS NOTES
74 year-old female presents  for well care.   Nonsmoker, nonalcohol consumer.     SCREENING TESTS:   Cholesterol/ lab, reviewed   Colonoscopy 1/20 no polyps, rec 5 yrs   Mammogram, 2021  WWE, MARLENE, ovaries intact.  DEXA 2022- resumed alendronate then d/c 2/2 constipation, now resumed   As she has started eating prunes w/ improvement in her BM  Taking Calcium and Vit D3    Eye-UTD  DDS-UTD    VACCINATIONS:  Tdap, 12/2012  Zostavax, 10/2012, pharmacist niece gave vaccine  Shingrix: UTD  Flu vaccine yearly  Pneumovax, 12/2013   Prevnar, 9/2015  COvid: 2/2 + booster    MEDS: Reviewed.     REVIEW OF SYSTEMS:   + reflux 10:30-11 AM daily, held Fosamax w/o improvement  Then held fish oil w/ improvement  Tx: saltine crackers helped calm down temporarily   +sinus w/ PND  +IC flare that has improved w/ Smart water + Aloe tablets  +eye mm twitching  + anxiety w/ news and world unrest, has stopped watching 24hr news channels   And she feels the anxiety is causing stress that is driving her sx  +elevated BP today, pt denied extra salt in diet, +/-caffeine, no decongestants       ADL's: 100% independent  Memory: Good, some delayed recall  Mental Health: Good  AD's: + will, and living will and M/POA   Nutrition: Good, eating very healthy  Gait: No falls- 2022-23  Safety: Intact  Urinary incontinence:  + nocturia x 1  Remainder of review negative except as previously noted.       PHYSICAL EXAM:  VSS:  GENERAL: Alert and oriented, in no acute distress. Well developed, well   nourished, conversant and cooperative, pleasant as always   EYES: Conjunctivae and lids unremarkable. Sclerae anicteric  NECK: Supple. Prominent thyroid, no LN  RESPIRATORY: Efforts unlabored.   LUNGS: Clear to auscultation.   HEART: Regular rate and rhythm. No carotid bruits noted,   1+ pedal pulse. No edema.   ABDOMEN: Bowel sounds present, soft, nontender.   No hepatosplenomegaly.  MUSCULOSKELETAL: Gait normal.   No clubbing, cyanosis or edema noted.   Calves  nontender  NEURO: LARSEN. No tremor noted, ++ lower left eye twitching  SKIN: warm and dry    IMPRESSION/PLAN:    Annual PE.     HLD,stable  - continue rosuvastatin 10mg  - continue low fat diet   Multinodular thyroid, stable  Hypothyroidism, stable  -continue levothyroxine 25mcg   Osteopenia, stable,   - continue Ca and vitamin D,  - continue alendronate      Vitamin D deficiency, stable  - continue vitamin D3, 2K IU qd  Acid reflux, improved w/ holding Fish oil  -rec no caffeine after AM coffee  Interstitial cystitis flare, Urology recs helped  -continue Smart water and Aloe caps qd  Eye twitching  -trial TUMS 500mg calcium   Anxiety  -trial Buspar 5mg tid, start today to see if anxiety responds  Elevated BP w/o dx HTN, pt thinks elevation 2/2 anxiety  -avoid salt  -recheck BP and advise of results  -RTC after trip to [place], pt to call     Hx Hep C - s/p tx  Hx melanoma   Neutropenia , stable-  HM  -mammo  -reviewed vaccine recs  -rtc 6 mos routine

## 2024-09-18 ENCOUNTER — OFFICE VISIT (OUTPATIENT)
Dept: INTERNAL MEDICINE | Facility: CLINIC | Age: 74
End: 2024-09-18
Payer: MEDICARE

## 2024-09-18 VITALS
TEMPERATURE: 97 F | DIASTOLIC BLOOD PRESSURE: 76 MMHG | BODY MASS INDEX: 26.98 KG/M2 | HEART RATE: 78 BPM | OXYGEN SATURATION: 98 % | RESPIRATION RATE: 13 BRPM | WEIGHT: 146.63 LBS | HEIGHT: 62 IN | SYSTOLIC BLOOD PRESSURE: 156 MMHG

## 2024-09-18 DIAGNOSIS — Z00.00 ANNUAL PHYSICAL EXAM: Primary | ICD-10-CM

## 2024-09-18 DIAGNOSIS — Z86.19 HISTORY OF HEPATITIS C: ICD-10-CM

## 2024-09-18 DIAGNOSIS — M85.80 OSTEOPENIA, UNSPECIFIED LOCATION: ICD-10-CM

## 2024-09-18 DIAGNOSIS — E55.9 VITAMIN D DEFICIENCY: ICD-10-CM

## 2024-09-18 DIAGNOSIS — Z12.31 ENCOUNTER FOR SCREENING MAMMOGRAM FOR BREAST CANCER: ICD-10-CM

## 2024-09-18 DIAGNOSIS — D70.9 NEUTROPENIA, UNSPECIFIED TYPE: ICD-10-CM

## 2024-09-18 DIAGNOSIS — N30.10 INTERSTITIAL CYSTITIS: ICD-10-CM

## 2024-09-18 DIAGNOSIS — Z85.820 HISTORY OF MELANOMA: ICD-10-CM

## 2024-09-18 DIAGNOSIS — E78.5 HYPERLIPIDEMIA, UNSPECIFIED HYPERLIPIDEMIA TYPE: ICD-10-CM

## 2024-09-18 DIAGNOSIS — K21.9 GASTROESOPHAGEAL REFLUX DISEASE, UNSPECIFIED WHETHER ESOPHAGITIS PRESENT: ICD-10-CM

## 2024-09-18 DIAGNOSIS — E04.2 NONTOXIC MULTINODULAR GOITER: ICD-10-CM

## 2024-09-18 DIAGNOSIS — E03.4 HYPOTHYROIDISM DUE TO ACQUIRED ATROPHY OF THYROID: ICD-10-CM

## 2024-09-18 PROCEDURE — 99999 PR PBB SHADOW E&M-EST. PATIENT-LVL V: CPT | Mod: PBBFAC,,, | Performed by: INTERNAL MEDICINE

## 2024-09-18 PROCEDURE — 1126F AMNT PAIN NOTED NONE PRSNT: CPT | Mod: CPTII,S$GLB,, | Performed by: INTERNAL MEDICINE

## 2024-09-18 PROCEDURE — 1159F MED LIST DOCD IN RCRD: CPT | Mod: CPTII,S$GLB,, | Performed by: INTERNAL MEDICINE

## 2024-09-18 PROCEDURE — 3288F FALL RISK ASSESSMENT DOCD: CPT | Mod: CPTII,S$GLB,, | Performed by: INTERNAL MEDICINE

## 2024-09-18 PROCEDURE — 99397 PER PM REEVAL EST PAT 65+ YR: CPT | Mod: S$GLB,,, | Performed by: INTERNAL MEDICINE

## 2024-09-18 PROCEDURE — 3078F DIAST BP <80 MM HG: CPT | Mod: CPTII,S$GLB,, | Performed by: INTERNAL MEDICINE

## 2024-09-18 PROCEDURE — 3077F SYST BP >= 140 MM HG: CPT | Mod: CPTII,S$GLB,, | Performed by: INTERNAL MEDICINE

## 2024-09-18 PROCEDURE — 1101F PT FALLS ASSESS-DOCD LE1/YR: CPT | Mod: CPTII,S$GLB,, | Performed by: INTERNAL MEDICINE

## 2024-09-18 RX ORDER — BUSPIRONE HYDROCHLORIDE 5 MG/1
5 TABLET ORAL 3 TIMES DAILY PRN
Qty: 90 TABLET | Refills: 0 | Status: SHIPPED | OUTPATIENT
Start: 2024-09-18 | End: 2025-09-18

## 2024-09-18 RX ORDER — TOBRAMYCIN AND DEXAMETHASONE 3; 1 MG/G; MG/G
OINTMENT OPHTHALMIC
COMMUNITY
Start: 2024-08-06

## 2024-09-19 ENCOUNTER — PATIENT MESSAGE (OUTPATIENT)
Dept: INTERNAL MEDICINE | Facility: CLINIC | Age: 74
End: 2024-09-19
Payer: MEDICARE

## 2024-10-10 ENCOUNTER — HOSPITAL ENCOUNTER (OUTPATIENT)
Dept: RADIOLOGY | Facility: HOSPITAL | Age: 74
Discharge: HOME | End: 2024-10-10
Attending: INTERNAL MEDICINE
Payer: MEDICARE

## 2024-10-10 DIAGNOSIS — Z12.31 ENCOUNTER FOR SCREENING MAMMOGRAM FOR BREAST CANCER: ICD-10-CM

## 2024-10-10 PROCEDURE — 77067 SCR MAMMO BI INCL CAD: CPT | Mod: 26,,, | Performed by: RADIOLOGY

## 2024-10-10 PROCEDURE — 77063 BREAST TOMOSYNTHESIS BI: CPT | Mod: 26,,, | Performed by: RADIOLOGY

## 2024-10-10 PROCEDURE — 77067 SCR MAMMO BI INCL CAD: CPT | Mod: TC

## 2024-10-11 ENCOUNTER — TELEPHONE (OUTPATIENT)
Dept: INTERNAL MEDICINE | Facility: CLINIC | Age: 74
End: 2024-10-11
Payer: MEDICARE

## 2024-10-11 DIAGNOSIS — Z12.11 COLON CANCER SCREENING: Primary | ICD-10-CM

## 2024-10-11 NOTE — TELEPHONE ENCOUNTER
----- Message from Rosanna Huizar MD sent at 10/11/2024 11:45 AM CDT -----  Please note that your mammogram was read as follows  Impression:  There is no mammographic evidence of malignancy.   verna

## 2024-10-11 NOTE — TELEPHONE ENCOUNTER
Rec - colonoscopy as it is the gold standard    Order placed, the endoscopy dept will call her but it may take 1-2 weeks

## 2024-10-11 NOTE — TELEPHONE ENCOUNTER
Spoke to patient, advised her of mammogram results. She also mentioned that she was supposed to get an appt for a colonoscopy but no one ever called her. I don't see a referral. She did tell me that she prefers the cologuard again but will do the colonoscopy if you prefer her to.    Thank you!

## 2024-10-22 ENCOUNTER — OFFICE VISIT (OUTPATIENT)
Dept: UROLOGY | Facility: CLINIC | Age: 74
End: 2024-10-22
Payer: MEDICARE

## 2024-10-22 VITALS
SYSTOLIC BLOOD PRESSURE: 138 MMHG | BODY MASS INDEX: 27.3 KG/M2 | WEIGHT: 149.25 LBS | DIASTOLIC BLOOD PRESSURE: 79 MMHG | HEART RATE: 78 BPM

## 2024-10-22 DIAGNOSIS — N30.10 IC (INTERSTITIAL CYSTITIS): Primary | ICD-10-CM

## 2024-10-22 PROCEDURE — 87086 URINE CULTURE/COLONY COUNT: CPT

## 2024-10-22 PROCEDURE — 99999 PR PBB SHADOW E&M-EST. PATIENT-LVL IV: CPT | Mod: PBBFAC,,,

## 2024-10-22 RX ORDER — BUPIVACAINE HYDROCHLORIDE 5 MG/ML
20 INJECTION, SOLUTION PERINEURAL ONCE
Status: COMPLETED | OUTPATIENT
Start: 2024-10-22 | End: 2024-10-22

## 2024-10-22 RX ORDER — HEPARIN SODIUM 10000 [USP'U]/ML
10000 INJECTION, SOLUTION INTRAVENOUS; SUBCUTANEOUS
Status: DISCONTINUED | OUTPATIENT
Start: 2024-10-22 | End: 2024-10-22

## 2024-10-22 RX ORDER — LIDOCAINE HYDROCHLORIDE 10 MG/ML
20 INJECTION, SOLUTION INFILTRATION; PERINEURAL
Status: COMPLETED | OUTPATIENT
Start: 2024-10-22 | End: 2024-10-22

## 2024-10-22 RX ORDER — HEPARIN SODIUM 10000 [USP'U]/ML
10000 INJECTION, SOLUTION INTRAVENOUS; SUBCUTANEOUS
Status: COMPLETED | OUTPATIENT
Start: 2024-10-22 | End: 2024-10-22

## 2024-10-22 RX ADMIN — LIDOCAINE HYDROCHLORIDE 20 ML: 10 INJECTION, SOLUTION INFILTRATION; PERINEURAL at 02:10

## 2024-10-22 RX ADMIN — HEPARIN SODIUM 10000 UNITS: 10000 INJECTION, SOLUTION INTRAVENOUS; SUBCUTANEOUS at 02:10

## 2024-10-22 RX ADMIN — BUPIVACAINE HYDROCHLORIDE 100 MG: 5 INJECTION, SOLUTION PERINEURAL at 02:10

## 2024-10-22 NOTE — PROGRESS NOTES
[facility]      IC Evaluation Note    10/22/2024    HPI: Tomasa eRed is a very pleasant 74 y.o. female who is an established patient seen today for evaluation of increased dysuria. She reports that symptoms began about a week ago. Symptoms she is currently experiencing include: dysuria and suprapubic pain.     She has a hx of IC that has been managed with bladder instillations, alkalinization of the urine, and aloe vera capsules. She reports the aloe vera and alkaline water have previously worked well with controlling her symptoms, however, she feels it is not enough to help her symptoms during this current flare.     A review of 10+ systems was conducted with pertinent positive and negative findings documented in HPI with all other systems reviewed and negative.    Past medical, family, surgical and social history reviewed as documented in chart with pertinent positive medical, family, surgical and social history detailed in HPI.    Exam Findings:    NOTE:  the exam was carried out with a nurse chaperone present  Normal external female genitalia  Urethral meatus is normal  Urethra and bladder are nontender to bimanual exam  Well supported anteriorly and posteriorly   Uterus and cervix are surgically absent  Vaginal Mucosa: mild atrophy   Const: no acute distress, conversant and alert  Eyes: anicteric, extraocular muscles intact  ENMT: normocephalic, Nl oral membranes  Cardio: no cyanosis, nl cap refill  Pulm: no tachypnea; no resp distress  Abd: soft, no tenderness  Musc: no laceration, no tenderness  Neuro: alert; oriented x 3  Skin: warm, dry; no petichiae  Psych: no anxiety; normal speech       Assessment/Plan:    IC Flare (est, addt'l workup):    Urine sent for urine culture  Bladder instillation with cocktail: heparin, hydrocortisone, 1% lido, and bupivacaine. She was able to hold the instillation for about 20 minutes and reports relief.   Follow up next week for additional  instillation, if needed.      I spent a total of 40 minutes on the day of the visit.This includes face to face time and non-face to face time preparing to see the patient (eg, review of tests), obtaining and/or reviewing separately obtained history, documenting clinical information in the electronic or other health record, independently interpreting results and communicating results to the patient/family/caregiver, or care coordinator.

## 2024-10-23 LAB — BACTERIA UR CULT: NORMAL

## 2024-10-29 ENCOUNTER — OFFICE VISIT (OUTPATIENT)
Dept: UROLOGY | Facility: CLINIC | Age: 74
End: 2024-10-29
Payer: MEDICARE

## 2024-10-29 DIAGNOSIS — N30.10 IC (INTERSTITIAL CYSTITIS): Primary | ICD-10-CM

## 2024-10-29 PROCEDURE — 99999 PR PBB SHADOW E&M-EST. PATIENT-LVL III: CPT | Mod: PBBFAC,,,

## 2024-10-29 RX ORDER — BUPIVACAINE HYDROCHLORIDE 5 MG/ML
20 INJECTION, SOLUTION EPIDURAL; INTRACAUDAL
Status: COMPLETED | OUTPATIENT
Start: 2024-10-29 | End: 2024-10-29

## 2024-10-29 RX ORDER — HEPARIN SODIUM 5000 [USP'U]/ML
10000 INJECTION, SOLUTION INTRAVENOUS; SUBCUTANEOUS
Status: COMPLETED | OUTPATIENT
Start: 2024-10-29 | End: 2024-10-29

## 2024-10-29 RX ORDER — LIDOCAINE HYDROCHLORIDE 10 MG/ML
20 INJECTION, SOLUTION INFILTRATION; PERINEURAL
Status: COMPLETED | OUTPATIENT
Start: 2024-10-29 | End: 2024-10-29

## 2024-10-29 RX ADMIN — BUPIVACAINE HYDROCHLORIDE 100 MG: 5 INJECTION, SOLUTION EPIDURAL; INTRACAUDAL at 10:10

## 2024-10-29 RX ADMIN — HEPARIN SODIUM 10000 UNITS: 5000 INJECTION, SOLUTION INTRAVENOUS; SUBCUTANEOUS at 10:10

## 2024-10-29 RX ADMIN — LIDOCAINE HYDROCHLORIDE 20 ML: 10 INJECTION, SOLUTION INFILTRATION; PERINEURAL at 10:10

## 2024-11-08 ENCOUNTER — PATIENT MESSAGE (OUTPATIENT)
Dept: SPORTS MEDICINE | Facility: CLINIC | Age: 74
End: 2024-11-08
Payer: MEDICARE

## 2024-11-08 RX ORDER — CEPHALEXIN 500 MG/1
500 CAPSULE ORAL 4 TIMES DAILY
Qty: 4 CAPSULE | Refills: 0 | Status: SHIPPED | OUTPATIENT
Start: 2024-11-08

## 2024-11-11 ENCOUNTER — OFFICE VISIT (OUTPATIENT)
Dept: PODIATRY | Facility: CLINIC | Age: 74
End: 2024-11-11
Payer: MEDICARE

## 2024-11-11 VITALS
DIASTOLIC BLOOD PRESSURE: 77 MMHG | HEIGHT: 62 IN | HEART RATE: 80 BPM | WEIGHT: 149.25 LBS | SYSTOLIC BLOOD PRESSURE: 154 MMHG | BODY MASS INDEX: 27.47 KG/M2

## 2024-11-11 DIAGNOSIS — L60.3 DYSTROPHIC NAIL: ICD-10-CM

## 2024-11-11 DIAGNOSIS — M79.675 TOE PAIN, LEFT: ICD-10-CM

## 2024-11-11 DIAGNOSIS — L60.9 NAIL PROBLEM: Primary | ICD-10-CM

## 2024-11-11 PROCEDURE — 99999 PR PBB SHADOW E&M-EST. PATIENT-LVL IV: CPT | Mod: PBBFAC,,, | Performed by: PODIATRIST

## 2024-11-11 PROCEDURE — 3288F FALL RISK ASSESSMENT DOCD: CPT | Mod: CPTII,S$GLB,, | Performed by: PODIATRIST

## 2024-11-11 PROCEDURE — 1160F RVW MEDS BY RX/DR IN RCRD: CPT | Mod: CPTII,S$GLB,, | Performed by: PODIATRIST

## 2024-11-11 PROCEDURE — 1159F MED LIST DOCD IN RCRD: CPT | Mod: CPTII,S$GLB,, | Performed by: PODIATRIST

## 2024-11-11 PROCEDURE — 1101F PT FALLS ASSESS-DOCD LE1/YR: CPT | Mod: CPTII,S$GLB,, | Performed by: PODIATRIST

## 2024-11-11 PROCEDURE — 3077F SYST BP >= 140 MM HG: CPT | Mod: CPTII,S$GLB,, | Performed by: PODIATRIST

## 2024-11-11 PROCEDURE — 3078F DIAST BP <80 MM HG: CPT | Mod: CPTII,S$GLB,, | Performed by: PODIATRIST

## 2024-11-11 PROCEDURE — 99202 OFFICE O/P NEW SF 15 MIN: CPT | Mod: S$GLB,,, | Performed by: PODIATRIST

## 2024-11-11 PROCEDURE — 1126F AMNT PAIN NOTED NONE PRSNT: CPT | Mod: CPTII,S$GLB,, | Performed by: PODIATRIST

## 2024-11-24 ENCOUNTER — PATIENT MESSAGE (OUTPATIENT)
Dept: SPORTS MEDICINE | Facility: CLINIC | Age: 74
End: 2024-11-24
Payer: MEDICARE

## 2024-11-25 NOTE — PROGRESS NOTES
PODIATRY    PATIENT ID:  Tomasa Reed is a 74 y.o. female.     CHIEF CONCERN:   Nail Problem (Nail problem with big toe on left foot.)         MEDICAL DECISION MAKING:      Problem List Items Addressed This Visit    None  Visit Diagnoses       Nail problem    -  Primary    Toe pain, left        Dystrophic nail                I counseled the patient on the patient's conditions, their implications and medical management.    No acute infection.   Continue to monitor.  Avoid narrow toe box shoes.   General nail care measures for abnormal nails include:  Keeping nails trimmed short, but avoid overzealous trimming  Avoiding trauma   Avoiding contact irritants   Keeping nails dry (avoiding wet work)  Avoiding all nail cosmetics  Wearing shoes that fit well at the toe box.  Avoid tight shoes.           HISTORY OF PRESENT ILLNESS:  Tomasa Reed is a 74 y.o. female with concerns regarding: Nail Problem (Nail problem with big toe on left foot.)  Pain about six weeks.  Noted bruising.  Painful to wear close toe shoes.  No fungus on nail.       Patient Active Problem List   Diagnosis    History of melanoma    Nontoxic multinodular goiter    HLD (hyperlipidemia)    Neutropenia    History of hepatitis C; S/p RX with SVR 24 documented 09/2017    Sensorineural hearing loss (SNHL) of both ears    Vitamin D deficiency    Overweight (BMI 25.0-29.9)    Pain in left knee    Primary osteoarthritis of left knee    Snoring    Elevated BP without diagnosis of hypertension    Decreased ROM of left knee    Age-related osteoporosis without current pathological fracture    Hypothyroidism (acquired)    Does use hearing aid    History of total knee replacement, left    Tingling in extremities       Current Outpatient Medications on File Prior to Visit   Medication Sig Dispense Refill    acetaminophen (TYLENOL) 650 MG TbSR Take 650 mg by mouth every 8 (eight) hours.      alendronate (FOSAMAX) 70 MG tablet Take 1 tablet (70 mg  total) by mouth every 7 days. 12 tablet 3    azelastine (ASTELIN) 137 mcg (0.1 %) nasal spray 1 spray (137 mcg total) by Nasal route 2 (two) times daily. 30 mL 11    busPIRone (BUSPAR) 5 MG Tab Take 1 tablet (5 mg total) by mouth 3 (three) times daily as needed (anxiety). 90 tablet 0    cephALEXin (KEFLEX) 500 MG capsule Take 1 capsule (500 mg total) by mouth 4 (four) times daily. 4 capsule 0    cetirizine 10 mg Cap PRN      dietary supplement Pack Take 1 tablet by mouth 2 (two) times daily.      fluticasone propionate (FLONASE) 50 mcg/actuation nasal spray 1 spray (50 mcg total) by Each Nostril route once daily. 16 g 0    glucosam/chondro/herb 149/hyal (GLUCOS CHOND CPLX ADVANCED ORAL) Take by mouth.      glycerin adult suppository as needed.       levothyroxine (SYNTHROID) 25 MCG tablet Take 1 tablet (25 mcg total) by mouth before breakfast. 90 tablet 3    magnesium oxide (MAG-OX) 400 mg (241.3 mg magnesium) tablet Take 400 mg by mouth once daily.      MISCELLANEOUS MEDICAL SUPPLY MIS as needed. EYE ITCH RELIEF 0.25 EYE DROPS      mometasone (NASONEX) 50 mcg/actuation nasal spray 2 sprays by Nasal route once daily.      OCEAN NASAL 0.65 % nasal spray as needed.       rosuvastatin (CRESTOR) 10 MG tablet TAKE 1 TABLET BY MOUTH EVERY DAY 90 tablet 3    TOBRADEX 0.3-0.1 % Oint APPLY A SMALL AMOUNT INTO AFFECTED EYE ONCE A DAY      TUMS 200 mg calcium (500 mg) chewable tablet 1 tablet as needed.        Current Facility-Administered Medications on File Prior to Visit   Medication Dose Route Frequency Provider Last Rate Last Admin    ropivacaine 0.2% Nimbus PainPRO Pump infusion 500 ML   Perineural Continuous Jericho Alcantar MD   New Bag at 04/12/23 1041       Review of patient's allergies indicates:   Allergen Reactions    Sulfa (sulfonamide antibiotics)      Other reaction(s): unknown  Other reaction(s): RASH    Iodine and iodide containing products Nausea And Vomiting     Other reaction(s): SEVERE  migraines  "        ROS:   General ROS: negative for - chills, fever or night sweats  Respiratory ROS: no cough, shortness of breath, or wheezing  Cardiovascular ROS: no chest pain or dyspnea on exertion      EXAM:     Vitals:    11/11/24 1457   BP: (!) 154/77   Pulse: 80   Weight: 67.7 kg (149 lb 4 oz)   Height: 5' 2" (1.575 m)        General:  Alert and Oriented x 3;  No acute distress    Vascular:   Dorsalis Pedis:  present   Posterior Tibial:  present  Capillary refill time:  3 seconds  Temperature of toes warm to touch  Edema:  Trace       Neurological:     Sharp touch:  normal  Light touch: normal  Tinels Sign:  Absent  Mulders Click:   Absent      Dermatological:   Skin: thin, warm, and appropriate for age  Wounds/Ulcers:  Absent  Bruising:  Absent  Erythema:  Absent  LEFT hallux nail dystrophic but no apparent acute infection or fungal infection.       Musculoskeletal:   Metatarsophalangeal range of motion:   diminished range of motion  Subtalar joint range of motion: diminished range of motion  Ankle joint range of motion:  full range of motion  5/5 muscle strength       "

## 2025-01-07 ENCOUNTER — TELEPHONE (OUTPATIENT)
Dept: ENDOSCOPY | Facility: HOSPITAL | Age: 75
End: 2025-01-07

## 2025-01-07 ENCOUNTER — CLINICAL SUPPORT (OUTPATIENT)
Dept: ENDOSCOPY | Facility: HOSPITAL | Age: 75
End: 2025-01-07
Attending: INTERNAL MEDICINE
Payer: MEDICARE

## 2025-01-07 VITALS — BODY MASS INDEX: 26.13 KG/M2 | HEIGHT: 62 IN | WEIGHT: 142 LBS

## 2025-01-07 DIAGNOSIS — Z12.11 COLON CANCER SCREENING: ICD-10-CM

## 2025-01-07 RX ORDER — SODIUM, POTASSIUM,MAG SULFATES 17.5-3.13G
1 SOLUTION, RECONSTITUTED, ORAL ORAL DAILY
Qty: 1 KIT | Refills: 0 | Status: SHIPPED | OUTPATIENT
Start: 2025-01-07 | End: 2025-01-09

## 2025-01-07 NOTE — TELEPHONE ENCOUNTER
Referral for procedure from PAT appointment      Spoke to patient to schedule procedure(s) Colonoscopy       Physician to perform procedure(s) Dr. CECILIA Hernandez  Date of Procedure (s) 02/20/25  Arrival Time 07:00 AM  Time of Procedure(s) 08:00 AM   Location of Procedure(s) [facility] [address] Floor  Type of Rx Prep sent to patient: Suprep  Instructions provided to patient via [facility]    Patient was informed on the following information and verbalized understanding. Screening questionnaire reviewed with patient and complete. If procedure requires anesthesia, a responsible adult needs to be present to accompany the patient home, patient cannot drive after receiving anesthesia. Appointment details are tentative, especially check-in time. Patient will receive a prep-op call 7 days prior to confirm check-in time for procedure. If applicable the patient should contact their pharmacy to verify Rx for procedure prep is ready for pick-up. Patient was advised to call the scheduling department at [phone] if pharmacy states no Rx is available. Patient was advised to call the endoscopy scheduling department if any questions or concerns arise.      [facility] Scheduling Department

## 2025-01-13 ENCOUNTER — PATIENT MESSAGE (OUTPATIENT)
Dept: ORTHOPEDICS | Facility: CLINIC | Age: 75
End: 2025-01-13
Payer: MEDICARE

## 2025-01-14 ENCOUNTER — HOSPITAL ENCOUNTER (OUTPATIENT)
Dept: RADIOLOGY | Facility: HOSPITAL | Age: 75
Discharge: HOME | End: 2025-01-14
Attending: ORTHOPAEDIC SURGERY
Payer: MEDICARE

## 2025-01-14 ENCOUNTER — OFFICE VISIT (OUTPATIENT)
Dept: ORTHOPEDICS | Facility: CLINIC | Age: 75
End: 2025-01-14
Payer: MEDICARE

## 2025-01-14 VITALS — BODY MASS INDEX: 25.97 KG/M2 | HEIGHT: 62 IN | WEIGHT: 141.13 LBS

## 2025-01-14 DIAGNOSIS — M15.1 DEGENERATIVE ARTHRITIS OF DISTAL INTERPHALANGEAL JOINT OF MIDDLE FINGER OF RIGHT HAND: Primary | ICD-10-CM

## 2025-01-14 DIAGNOSIS — M15.2 DEGENERATIVE ARTHRITIS OF PROXIMAL INTERPHALANGEAL JOINT OF MIDDLE FINGER OF RIGHT HAND: ICD-10-CM

## 2025-01-14 DIAGNOSIS — M79.644 FINGER PAIN, RIGHT: ICD-10-CM

## 2025-01-14 DIAGNOSIS — M79.644 FINGER PAIN, RIGHT: Primary | ICD-10-CM

## 2025-01-14 PROCEDURE — 1159F MED LIST DOCD IN RCRD: CPT | Mod: CPTII,S$GLB,, | Performed by: ORTHOPAEDIC SURGERY

## 2025-01-14 PROCEDURE — 73140 X-RAY EXAM OF FINGER(S): CPT | Mod: 26,RT,, | Performed by: RADIOLOGY

## 2025-01-14 PROCEDURE — 99203 OFFICE O/P NEW LOW 30 MIN: CPT | Mod: 25,S$GLB,, | Performed by: ORTHOPAEDIC SURGERY

## 2025-01-14 PROCEDURE — 20600 DRAIN/INJ JOINT/BURSA W/O US: CPT | Mod: RT,S$GLB,, | Performed by: ORTHOPAEDIC SURGERY

## 2025-01-14 PROCEDURE — 1101F PT FALLS ASSESS-DOCD LE1/YR: CPT | Mod: CPTII,S$GLB,, | Performed by: ORTHOPAEDIC SURGERY

## 2025-01-14 PROCEDURE — 1125F AMNT PAIN NOTED PAIN PRSNT: CPT | Mod: CPTII,S$GLB,, | Performed by: ORTHOPAEDIC SURGERY

## 2025-01-14 PROCEDURE — 73140 X-RAY EXAM OF FINGER(S): CPT | Mod: TC,RT

## 2025-01-14 PROCEDURE — 99999 PR PBB SHADOW E&M-EST. PATIENT-LVL III: CPT | Mod: PBBFAC,,, | Performed by: ORTHOPAEDIC SURGERY

## 2025-01-14 PROCEDURE — 3288F FALL RISK ASSESSMENT DOCD: CPT | Mod: CPTII,S$GLB,, | Performed by: ORTHOPAEDIC SURGERY

## 2025-01-14 RX ORDER — TRIAMCINOLONE ACETONIDE 40 MG/ML
40 INJECTION, SUSPENSION INTRA-ARTICULAR; INTRAMUSCULAR
Status: DISCONTINUED | OUTPATIENT
Start: 2025-01-14 | End: 2025-01-14 | Stop reason: HOSPADM

## 2025-01-14 RX ADMIN — TRIAMCINOLONE ACETONIDE 40 MG: 40 INJECTION, SUSPENSION INTRA-ARTICULAR; INTRAMUSCULAR at 09:01

## 2025-01-14 NOTE — PROGRESS NOTES
[facility]  History & Physical  Orthopedics    SUBJECTIVE:      History of Present Illness    CHIEF COMPLAINT:  - Right middle finger pain, specifically in the PIP joint.    HPI:  Tomasa presents with right hand concerns. In 2011, she underwent a right index finger DIP fusion due to an arthritic cyst, performed by Dr. Ortega. Since then, she has been using her ring finger for work instead of her index finger. Her main complaint is pain in her right middle finger, specifically in the PIP joint and to a lesser extent, the DIP joint. Pain has been present for about two years, worsening with increased use and affected by weather changes. She reports that the pain has reached a point where intervention is necessary, indicating significant impact on her daily life. She expresses hesitancy about undergoing another fusion procedure. She mentions having a joint replacement in another finger (specific finger not specified).    PREVIOUS TREATMENTS:  - None    SURGICAL HISTORY:  - Right index finger DIP fusion: 2011, for arthritis with cyst, performed by Dr. Ortega    WORK STATUS:  - Tomasa is retired      ROS:  Musculoskeletal: +joint pain, +joint swelling, -joint stiffness         Past Medical History:   Diagnosis Date    Age-related osteoporosis without current pathological fracture 4/24/2024    Cataract     OU    Chronic interstitial cystitis     History of hepatitis C; S/p RX with SVR 12 documented 06/2017 06/22/2017    HLD (hyperlipidemia)     Hypothyroidism (acquired) 5/5/2024    Malignant melanoma of skin of trunk, except scrotum     Migraine, unspecified, without mention of intractable migraine without mention of status migrainosus     Nonspecific abnormal results of liver function study     Nontoxic multinodular goiter     Osteopenia      Past Surgical History:   Procedure Laterality Date    BREAST BIOPSY      CATARACT EXTRACTION Right 03/02/2020    cataracts      B/L at [facility]     COLONOSCOPY N/A 12/14/2016    Procedure: COLONOSCOPY;  Surgeon: Wicho Painting MD;  Location: [facility] ENDO (4TH FLR);  Service: Endoscopy;  Laterality: N/A;    COLONOSCOPY N/A 1/14/2020    Procedure: COLONOSCOPY;  Surgeon: NANCY Maher MD;  Location: [facility] ENDO (4TH FLR);  Service: Endoscopy;  Laterality: N/A;  1/8/20 left vm to confirm appt-rb    FINGER SURGERY Right 2010    index finger fused    MOLE REMOVAL      TONSILLECTOMY, ADENOIDECTOMY      TOTAL ABDOMINAL HYSTERECTOMY      TOTAL KNEE ARTHROPLASTY Left 4/12/2023    Procedure: ARTHROPLASTY, KNEE, TOTAL;  Surgeon: REMY Zuñiga MD;  Location: [facility] OR;  Service: Orthopedics;  Laterality: Left;  regional w/ catheter  spinal  adductor  pericapsular injection 0.25 ropivacaine     Review of patient's allergies indicates:   Allergen Reactions    Sulfa (sulfonamide antibiotics)      Other reaction(s): unknown  Other reaction(s): RASH    Iodine and iodide containing products Nausea And Vomiting     Other reaction(s): SEVERE  migraines     Social History     Social History Narrative        Retired  of an elementary school    Has 2 daughters, no thyroid problems    Helping out w/ 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 She has severe DJD and is diabetic.     Dad d. 97 status post stroke, CHF. Dependent, bed to w/c.      Significant memory decline, usually doesn't recognize family        Two sisters in good health.         SCREENING TESTS:      Family History   Problem Relation Name Age of Onset    Osteoarthritis Mother          s/p hip fx    Diabetes Mother      Cancer Mother          colon    Thyroid disease Mother      Kidney failure Mother          d.93 s/p PAD procedure    Cataracts Mother      Macular degeneration Mother      Heart failure Father      Stroke Father          d.97 complications    Hypertension  Father      Cataracts Father      Fibromyalgia Sister Karla     Glaucoma Sister Rosalba         dry eyes, s/p duct surgery    Other Sister Rosalba         acoustic tumor,s/p removal & vertigo resolved, +cochlear implant    Other Daughter Merry         inflammation, better off red meat    Chronic back pain Daughter Merry     GI problems Daughter Merry         liver w/up and on cholestyramine    Breast cancer Daughter Merry 52        s/p mastectomy, ductal, tx hormonal    Diabetes type II Daughter Merry     Obesity Daughter Magui     Melanoma Neg Hx      Amblyopia Neg Hx      Blindness Neg Hx      Retinal detachment Neg Hx      Strabismus Neg Hx           Current Outpatient Medications:     acetaminophen (TYLENOL) 650 MG TbSR, Take 650 mg by mouth every 8 (eight) hours., Disp: , Rfl:     alendronate (FOSAMAX) 70 MG tablet, Take 1 tablet (70 mg total) by mouth every 7 days., Disp: 12 tablet, Rfl: 3    azelastine (ASTELIN) 137 mcg (0.1 %) nasal spray, 1 spray (137 mcg total) by Nasal route 2 (two) times daily., Disp: 30 mL, Rfl: 11    busPIRone (BUSPAR) 5 MG Tab, Take 1 tablet (5 mg total) by mouth 3 (three) times daily as needed (anxiety)., Disp: 90 tablet, Rfl: 0    cetirizine 10 mg Cap, PRN, Disp: , Rfl:     dietary supplement Pack, Take 1 tablet by mouth 2 (two) times daily., Disp: , Rfl:     fluticasone propionate (FLONASE) 50 mcg/actuation nasal spray, 1 spray (50 mcg total) by Each Nostril route once daily., Disp: 16 g, Rfl: 0    glucosam/chondro/herb 149/hyal (GLUCOS CHOND CPLX ADVANCED ORAL), Take by mouth., Disp: , Rfl:     glycerin adult suppository, as needed. , Disp: , Rfl:     levothyroxine (SYNTHROID) 25 MCG tablet, Take 1 tablet (25 mcg total) by mouth before breakfast., Disp: 90 tablet, Rfl: 3    magnesium oxide (MAG-OX) 400 mg (241.3 mg magnesium) tablet, Take 400 mg by mouth once daily., Disp: , Rfl:     MISCELLANEOUS MEDICAL SUPPLY MISC, as needed. EYE ITCH RELIEF 0.25 EYE DROPS, Disp: , Rfl:      "mometasone (NASONEX) 50 mcg/actuation nasal spray, 2 sprays by Nasal route once daily., Disp: , Rfl:     OCEAN NASAL 0.65 % nasal spray, as needed. , Disp: , Rfl:     rosuvastatin (CRESTOR) 10 MG tablet, TAKE 1 TABLET BY MOUTH EVERY DAY, Disp: 90 tablet, Rfl: 3    TOBRADEX 0.3-0.1 % Oint, APPLY A SMALL AMOUNT INTO AFFECTED EYE ONCE A DAY, Disp: , Rfl:     TUMS 200 mg calcium (500 mg) chewable tablet, 1 tablet as needed. , Disp: , Rfl:     cephALEXin (KEFLEX) 500 MG capsule, Take 1 capsule (500 mg total) by mouth 4 (four) times daily., Disp: 4 capsule, Rfl: 0  No current facility-administered medications for this visit.    Facility-Administered Medications Ordered in Other Visits:     ropivacaine 0.2% Nimbus PainPRO Pump infusion 500 ML, , Perineural, Continuous, Jericho Alcantar MD, New Bag at 04/12/23 0942    OBJECTIVE:      Vital Signs (Most Recent):  Vitals:    01/14/25 0934   Weight: 64 kg (141 lb 1.5 oz)   Height: 5' 2" (1.575 m)     Body mass index is 25.81 kg/m².    Physical Exam    Musculoskeletal: Arthritis in DIP joints. Severe arthritis in PIP joints.         Right Hand/Wrist Examination:    Observation/Inspection:  Swelling   Present right long finger IP joints    Deformity   Present right long finger  Discoloration  none     Scars   none    Atrophy  none    HAND/WRIST EXAMINATION:  Finkelstein's Test   Neg  WHAT Test    Neg  Snuff box tenderness   Neg  Kaufman's Test    Neg  Hook of Hamate Tenderness  Neg  CMC grind    Neg  Circumduction test   Neg    Neurovascular Exam:  Digits WWP, brisk CR < 3s throughout  NVI motor/LTS to M/R/U nerves, radial pulse 2+  Tinel's Test - Carpal Tunnel  Neg  Tinel's Test - Cubital Tunnel  Neg  Phalen's Test    Neg  Median Nerve Compression Test Neg    ROM hand  restricted and  painful of the right long finger secondary to known osteoarthritic disease    ROM wrist full, painless    ROM elbow full, painless    Abdomen not guarded  Respirations nonlabored  Perfusion " intact    Diagnostic Results:     Imaging - I independently viewed the patient's imaging as well as the radiology report.  Xrays of the patient's  right long finger  demonstrate no evidence of any acute fractures or dislocations  with severe PIP and D IP arthritis    EMG -  none    ASSESSMENT/PLAN:      74 y.o. yo female with  arthritis right long finger PIP and D IP joints  Plan: The patient and I had a thorough discussion today.  We discussed the working diagnosis as well as several other potential alternative diagnoses.  Treatment options were discussed, both conservative and surgical.  Conservative treatment options would include things such as activity modifications, workplace modifications, a period of rest, oral vs topical OTC and prescription anti-inflammatory medications, occupational therapy, splinting/bracing, immobilization, corticosteroid injections, and others.  Surgical options were discussed as well.     Assessment & Plan    PROCEDURES:  - Discussed treatment options for the patient's arthritic fingers, including joint replacement for the PIP joint and fusion for the DIP joint.  - Right middle finger PIP and DIP joint steroid injections administered today.         Should the patient's symptoms worsen, persist, or fail to improve they should return for reevaluation and I would be happy to see them back anytime.        Alex Pascal M.D.    Please be aware that this note has been generated with the assistance of [employer] voice-to-text.  Please excuse any spelling or grammatical errors.    Thank you for choosing Dr. Alex Pascal for your orthopedic hand and upper extremity care. It is our goal to provide you with exceptional care that will help keep you healthy, active, and get you back in the game.     If you felt that you received exemplary care today, please consider leaving feedback for Dr. Pascal on [email or web address] at [email or web address].    Please do not hesitate  to reach out to us via email, phone, or [employer] with any questions, concerns, or feedback.

## 2025-01-14 NOTE — PROCEDURES
Small Joint Aspiration/Injection: R long DIP    Date/Time: 1/14/2025 9:45 AM    Performed by: Alex Pascal MD  Authorized by: Alex Pascal MD    Consent Done?:  Yes (Verbal)  Indications:  Arthritis and pain  Site marked: the procedure site was marked    Timeout: prior to procedure the correct patient, procedure, and site was verified    Prep: patient was prepped and draped in usual sterile fashion      Local anesthesia used?: Yes    Local anesthetic:  Topical anesthetic  Location:  Long finger  Site:  R long DIP  Needle size:  25 G  Approach:  Dorsal  Medications:  40 mg triamcinolone acetonide 40 mg/mL  Patient tolerance:  Patient tolerated the procedure well with no immediate complications

## 2025-01-14 NOTE — PROCEDURES
Small Joint Aspiration/Injection: R long PIP    Date/Time: 1/14/2025 9:45 AM    Performed by: Alex Pascal MD  Authorized by: Alex Pascal MD    Consent Done?:  Yes (Verbal)  Indications:  Arthritis and pain  Site marked: the procedure site was marked    Timeout: prior to procedure the correct patient, procedure, and site was verified    Prep: patient was prepped and draped in usual sterile fashion      Local anesthesia used?: Yes    Local anesthetic:  Topical anesthetic  Location:  Long finger  Site:  R long PIP  Needle size:  25 G  Approach:  Dorsal  Medications:  40 mg triamcinolone acetonide 40 mg/mL  Patient tolerance:  Patient tolerated the procedure well with no immediate complications

## 2025-01-17 ENCOUNTER — ANESTHESIA EVENT (OUTPATIENT)
Dept: ENDOSCOPY | Facility: HOSPITAL | Age: 75
End: 2025-01-17
Payer: MEDICARE

## 2025-01-17 NOTE — ANESTHESIA PREPROCEDURE EVALUATION
01/17/2025  Tomasa Reed is a 74 y.o., female.  [facility]  Anesthesia Pre-Operative Evaluation       Patient Name: Tomasa Reed  YOB: 1950  MRN: [id number]  CSN: [id number]      Code Status: No Order   Date of Procedure: 2/20/2025  Anesthesia: Choice Procedure: Procedure(s) (LRB):  COLONOSCOPY, SCREENING, HIGH RISK PATIENT (N/A)  Pre-Operative Diagnosis: Colon cancer screening [Z12.11]  Proceduralist: Surgeons and Role:     * Mao Hernandez MD - Primary        SUBJECTIVE:   Tomasa Reed is a 74 y.o. female who  has a past medical history of Age-related osteoporosis without current pathological fracture (4/24/2024), Cataract, Chronic interstitial cystitis, History of hepatitis C; S/p RX with SVR 12 documented 06/2017 (06/22/2017), HLD (hyperlipidemia), Hypothyroidism (acquired) (5/5/2024), Malignant melanoma of skin of trunk, except scrotum, Migraine, unspecified, without mention of intractable migraine without mention of status migrainosus, Nonspecific abnormal results of liver function study, Nontoxic multinodular goiter, and Osteopenia..     she has a current medication list which includes the following long-term medication(s): alendronate, azelastine, buspirone, cetirizine, levothyroxine, rosuvastatin, and tums.     ALLERGIES:     Review of patient's allergies indicates:   Allergen Reactions    Sulfa (sulfonamide antibiotics)      Other reaction(s): unknown  Other reaction(s): RASH    Iodine and iodide containing products Nausea And Vomiting     Other reaction(s): SEVERE  migraines     LDA:          Lines/Drains/Airways       None                  Anesthesia Evaluation      Airway   Mallampati: II  TM distance: Normal  Dental      Pulmonary  Sleep apnea: Snoring.  Cardiovascular     Rate: Normal    Neuro/Psych    (+) headaches     GI/Hepatic/Renal    (+) hepatitis, liver disease    Endo/Other    (+) hypothyroidism, arthritis  Abdominal                   MEDICATIONS:     Antibiotics (From admission, onward)      None          VTE Risk Mitigation (From admission, onward)      None              No current facility-administered medications for this encounter.     Current Outpatient Medications   Medication Sig Dispense Refill    acetaminophen (TYLENOL) 650 MG TbSR Take 650 mg by mouth every 8 (eight) hours.      alendronate (FOSAMAX) 70 MG tablet Take 1 tablet (70 mg total) by mouth every 7 days. 12 tablet 3    azelastine (ASTELIN) 137 mcg (0.1 %) nasal spray 1 spray (137 mcg total) by Nasal route 2 (two) times daily. 30 mL 11    busPIRone (BUSPAR) 5 MG Tab Take 1 tablet (5 mg total) by mouth 3 (three) times daily as needed (anxiety). 90 tablet 0    cephALEXin (KEFLEX) 500 MG capsule Take 1 capsule (500 mg total) by mouth 4 (four) times daily. 4 capsule 0    cetirizine 10 mg Cap PRN      dietary supplement Pack Take 1 tablet by mouth 2 (two) times daily.      fluticasone propionate (FLONASE) 50 mcg/actuation nasal spray 1 spray (50 mcg total) by Each Nostril route once daily. 16 g 0    glucosam/chondro/herb 149/hyal (GLUCOS CHOND CPLX ADVANCED ORAL) Take by mouth.      glycerin adult suppository as needed.       levothyroxine (SYNTHROID) 25 MCG tablet Take 1 tablet (25 mcg total) by mouth before breakfast. 90 tablet 3    magnesium oxide (MAG-OX) 400 mg (241.3 mg magnesium) tablet Take 400 mg by mouth once daily.      MISCELLANEOUS MEDICAL SUPPLY [facility] as needed. EYE ITCH RELIEF 0.25 EYE DROPS      mometasone (NASONEX) 50 mcg/actuation nasal spray 2 sprays by Nasal route once daily.      OCEAN NASAL 0.65 % nasal spray as needed.       rosuvastatin (CRESTOR) 10 MG tablet TAKE 1 TABLET BY MOUTH EVERY DAY 90 tablet 3    TOBRADEX 0.3-0.1 % Oint APPLY A SMALL AMOUNT INTO AFFECTED EYE ONCE A DAY      TUMS 200 mg calcium (500 mg)  chewable tablet 1 tablet as needed.        Facility-Administered Medications Ordered in Other Encounters   Medication Dose Route Frequency Provider Last Rate Last Admin    ropivacaine 0.2% Nimbus PainPRO Pump infusion 500 ML   Perineural Continuous Jericho Alcantar MD   New Bag at 04/12/23 0942          History:   There are no hospital problems to display for this patient.    Surgical History:    has a past surgical history that includes Total abdominal hysterectomy; TONSILLECTOMY, ADENOIDECTOMY; Mole removal; Finger surgery (Right, 2010); Colonoscopy (N/A, 12/14/2016); Breast biopsy; Colonoscopy (N/A, 1/14/2020); Cataract extraction (Right, 03/02/2020); cataracts; and Total knee arthroplasty (Left, 4/12/2023).   Social History:    reports being sexually active and has had partner(s) who are male.  reports that she has never smoked. She has never used smokeless tobacco. She reports current alcohol use. She reports that she does not currently use drugs.     OBJECTIVE:     Vital Signs (Most Recent):    Vital Signs Range (Last 24H):          There is no height or weight on file to calculate BMI.   Wt Readings from Last 4 Encounters:   01/14/25 64 kg (141 lb 1.5 oz)   01/07/25 64.4 kg (142 lb)   11/11/24 67.7 kg (149 lb 4 oz)   10/29/24 (P) 67.7 kg (149 lb 4 oz)       Significant Labs:  Lab Results   Component Value Date    WBC 3.60 (L) 09/10/2024    HGB 13.3 09/10/2024    HCT 39.6 09/10/2024     09/10/2024     03/05/2024    K 4.5 03/05/2024     03/05/2024    CREATININE 0.8 03/05/2024    BUN 10 03/05/2024    CO2 24 03/05/2024    GLU 93 03/05/2024    CALCIUM 10.2 03/05/2024    ALKPHOS 55 03/05/2024    ALT 16 03/05/2024    AST 22 03/05/2024    ALBUMIN 4.3 03/05/2024    INR 1.0 04/03/2023    HGBA1C 5.2 04/24/2024     No LMP recorded. Patient has had a hysterectomy.  No results found for this or any previous visit (from the past 72 hours).    EKG:   Results for orders placed or performed in visit on  "06/01/20   IN OFFICE EKG 12-LEAD (to [place])    Collection Time: 06/01/20 10:01 AM    Narrative    Test Reason : Z01.818    Vent. Rate : 063 BPM     Atrial Rate : 063 BPM     P-R Int : 164 ms          QRS Dur : 080 ms      QT Int : 420 ms       P-R-T Axes : 035 048 048 degrees     QTc Int : 429 ms    Normal sinus rhythm  Normal ECG  When compared with ECG of 02-DEC-2019 10:58,  No significant change was found  Confirmed by Kaylynn Alonso MD ([id number]) on 6/1/2020 2:44:02 PM    Referred By: [id number] ZARI LEAL           Confirmed By:Kaylynn Alonso MD       TTE:  No results found for this or any previous visit.  No results found for: "EF"   No results found for this or any previous visit.  LISA:  No results found for this or any previous visit.  Stress Test:  No results found for this or any previous visit.     LHC:  No results found for this or any previous visit.     PFT:  No results found for: "FEV1", "FVC", "UBH0AUX", "TLC", "DLCO"     ASSESSMENT/PLAN:        Pre-op Assessment    I have reviewed the Patient Summary Reports.     I have reviewed the Nursing Notes. I have reviewed the NPO Status.   I have reviewed the Medications.     Review of Systems  Anesthesia Hx:  No problems with previous Anesthesia             Denies Family Hx of Anesthesia complications.    Denies Personal Hx of Anesthesia complications.                    Hematology/Oncology:  Hematology Normal   Oncology Normal                                   EENT/Dental:  EENT/Dental Normal           Cardiovascular:  Cardiovascular Normal                                              Pulmonary:  Pulmonary Normal       Sleep apnea: Snoring.                Renal/:  Renal/ Normal                 Hepatic/GI:      Liver Disease, Hepatitis              Musculoskeletal:  Arthritis               Neurological:      Headaches                                 Endocrine:   Hypothyroidism  Vitamin D deficiency      Obesity / BMI > 30  Dermatological:  Skin Normal  "   Psych:  Psychiatric Normal                  Physical Exam  General: Well nourished    Airway:  Mallampati: II   Mouth Opening: Normal  TM Distance: Normal    Chest/Lungs:  Normal Respiratory Rate    Heart:  Rate: Normal    Anesthesia Plan  Type of Anesthesia, risks & benefits discussed:    Anesthesia Type: Gen Natural Airway  Intra-op Monitoring Plan: Standard ASA Monitors  Post Op Pain Control Plan: multimodal analgesia  Induction:  IV  Informed Consent: Informed consent signed with the Patient and all parties understand the risks and agree with anesthesia plan.  All questions answered.   ASA Score: 2  Day of Surgery Review of History & Physical: H&P Update referred to the surgeon/provider.    Ready For Surgery From Anesthesia Perspective.     .

## 2025-01-29 RX ORDER — ROSUVASTATIN CALCIUM 10 MG/1
TABLET, COATED ORAL
Qty: 90 TABLET | Refills: 3 | Status: SHIPPED | OUTPATIENT
Start: 2025-01-29

## 2025-02-06 ENCOUNTER — TELEPHONE (OUTPATIENT)
Dept: ENDOSCOPY | Facility: HOSPITAL | Age: 75
End: 2025-02-06
Payer: MEDICARE

## 2025-02-06 NOTE — TELEPHONE ENCOUNTER
----- Message from Ruth sent at 2/6/2025  8:35 AM CST -----  Regarding: FW: Reschedule Procedure  Contact: [phone]    ----- Message -----  From: Maryse Hinojosa  Sent: 2/5/2025   1:03 PM CST  To: [facility] Schedulers; #  Subject: Reschedule Procedure                               Reschedule    Caller: The pt     Call back number: [phone]    Current Appt Date: 02/20/25    Type of Appt: Procedure       Provider: Dr. Hernandez      Reason for Rescheduling: Due to scheduling conflict.       Additional Information: Thank you.

## 2025-02-06 NOTE — TELEPHONE ENCOUNTER
Patient rescheduled.  Original referral for procedure from PAT appointment    Spoke to Tomasa Reed to reschedule Colonoscopy       Physician to perform procedure(s) Dr. MARIMAR Amaya  Date of Procedure (s) 3/6/25  Arrival Time 8:45 AM  Time of Procedure(s) 9:45 AM   Location of Procedure(s) [place] 2nd Floor  Type of Rx Prep sent to patient's pharmacy: Suprep  Instructions provided to patient via [facility]  Patient denies use of blood thinners, GLP-1 medications, and weight loss medications.  The following information was discussed with patient, and patient verbalized understanding:  Screening questionnaire reviewed with patient and complete. If procedure requires anesthesia, a responsible adult needs to be present to accompany the patient home. Appointment details are tentative, especially check-in time. Patient will receive a pre-op call 7 days prior to appointment to confirm check-in time for procedure. If applicable the patient should contact their pharmacy to verify Rx for procedure prep is ready for pick-up. Patient was instructed to call the scheduling department at [phone] if pharmacy states no Rx is available. Patient was also advised to call the endoscopy scheduling department if any questions or concerns arise.      [facility] Scheduling Department

## 2025-02-13 ENCOUNTER — TELEPHONE (OUTPATIENT)
Dept: ENDOSCOPY | Facility: HOSPITAL | Age: 75
End: 2025-02-13
Payer: MEDICARE

## 2025-02-13 NOTE — TELEPHONE ENCOUNTER
----- Message -----   From: Angelia Adams   Sent: 1/31/2025   9:26 AM CST   To: [facility] Staff     Type:  Procedure Reschedule Request     Caller is requesting procedure reschedule appointment.    Name of Caller:Ms Randolph     Message taken care of by juan m sunshine-duplicate message

## 2025-02-20 ENCOUNTER — ANESTHESIA (OUTPATIENT)
Dept: ENDOSCOPY | Facility: HOSPITAL | Age: 75
End: 2025-02-20
Payer: MEDICARE

## 2025-02-27 NOTE — PATIENT INSTRUCTIONS
Patient's son called stated estrellajere had angiogram today and it was noticed he has redness on R leg where previous blood clot was.  They would like Dopplar ordered for patient .  Please advise and contact son Sam at [phone] .   Sun Protection      The [facility] would like to remind you of the importance of sun protection all year round and particularly during the summer when the suns rays are the strongest. It has been proven that both acute and chronic sun exposure damages our cells and leads to skin cancer. Beyond skin cancer, the sun causes 90% of the symptoms of premature skin aging, including wrinkles, lentigines (brown spots), and thin, easily bruised skin. Proper sun protection can help prevent these unwanted conditions.    Many patients report that they dont go in the sun. It has been shown that the average person receives 18 hours of incidental sun exposure per week during activities such as walking through parking lots, driving, or sitting next to windows. This accumulates to several bad sunburns per year!    In choosing sunscreen, you want one that protects against both UVA and UVB rays (broad spectrum). It is recommended that you use one of SPF 30 or higher. It is important to apply the sunscreen about 20 minutes prior to sun exposure. Most sunscreens are chemical sunscreens and a reaction must take place in the skin so that they are effective. If they are applied and then you are immediately exposed to the sun or start sweating, this reaction has not had time to take place and you are therefore unprotected. Sunscreen needs to be reapplied every 2 hours if you are participating in water sports or sweating. We recommend Elta MD or CeraVe sunscreens for daily use; however there are many options and it is most important for you to find one that you will use on a consistent basis.    If you have sensitive skin, you may do best with a sunscreen that contains only physical blockers in the active ingredient section. The only physical blockers available in the [place] currently are titanium dioxide or zinc oxide. These are typically thicker and harder to apply, however they afford very good protection.  Neutrogena Sensitive Skin, Blue Lizard Sensitive Skin (pink top) or Neutrogena Pure and Free are popular ones.     Aside from sunscreen, clothes with UV protection (UPF), wide brimmed hats, and sunglasses are other means of sun protection that we recommend.      Based on a recent study (6/2021) and out of an abundance of caution, we are recommending that you AVOID the following sunscreens as they may contain the carcinogen, benzene:    Spray and gel sunscreens  Any [employer] or Wal[employer]reens brands as well as Max Block and TopCare brands   Neutrogena Ultra Sheer Dry-touch Water Resistant Sunscreen LOTION SPF 70   Neutrogena Sheer Zinc Dry-touch Face Sunscreen LOTION SPF 50   5.   Aveeno Baby Continuous Protection Sensitive Skin Sunscreen LOTION - Broad Spectrum SPF 50    Please note that Benzene is not an ingredient or the degradation product of any ingredient in any sunscreen. This study suggested that the findings are a result of contamination in the manufacturing process. At this point, we don't know how effectively Benzene gets through the skin, if it gets absorbed systemically, and what effects it may have.     We do know that ultraviolet radiation is a well-established carcinogen. Please use daily sun protection/avoidance and use of at least SPF 30, broad-spectrum sunscreen not listed above.                       [facility] - DERMATOLOGY [address]  [place] [address]  Dept: [phone]  Dept Fax: [phone]                                                                              SEBORRHEIC KERATOSES        What causes seborrheic keratoses?    Seborrheic keratoses are harmless, common skin growths that first appear during adult life.  As time goes by, more growths appear.  Some persons have a very large number of them.  Seborrheic keratoses appear on both covered and uncovered parts of the body; they are not caused by sunlight.  The tendency to develop seborrheic  keratoses is inherited.    Seborrheic keratoses are harmless and never become malignant.  They begin as slightly raised, light brown spots.  Gradually they thicken and take on a rough wartlike surface.  They slowly darken and may turn black.  These color changes are harmless.  Seborrheic keratoses are superficial and look as if they were stuck on the skin.  Persons who have had several seborrheic keratoses can usually recognize this type of benign growth.  However, if you are concerned or unsure about any growth, consult me.    Treatment    Seborrheic keratoses can easily be removed in the office.  The only reason for removing a seborrheic keratosis is your wish to get rid of it.   CRYOSURGERY      Your doctor has used a method called cryosurgery to treat your skin condition. Cryosurgery refers to the use of very cold substances to treat a variety of skin conditions such as warts, pre-skin cancers, molluscum contagiosum, sun spots, and several benign growths. The substance we use in cryosurgery is liquid nitrogen and is so cold (-195 degrees Celsius) that is burns when administered.     Following treatment in the office, the skin may immediately burn and become red. You may find the area around the lesion is affected as well. It is sometimes necessary to treat not only the lesion, but a small area of the surrounding normal skin to achieve a good response.     A blister, and even a blood filled blister, may form after treatment.   This is a normal response. If the blister is painful, it is acceptable to sterilize a needle and with rubbing alcohol and gently pop the blister. It is important that you gently wash the area with soap and warm water as the blister fluid may contain wart virus if a wart was treated. Do no remove the roof of the blister.     The area treated can take anywhere from 1-3 weeks to heal. Healing time depends on the kind skin lesion treated, the location, and how aggressively the lesion was  treated. It is recommended that the areas treated are covered with Vaseline or bacitracin ointment and a band-aid. If a band-aid is not practical, just ointment applied several times per day will do. Keeping these areas moist will speed the healing time.    Treatment with liquid nitrogen can leave a scar. In dark skin, it may be a light or dark scar, in light skin it may be a white or pink scar. These will generally fade with time, but may never go away completely.     If you have any concerns after your treatment, please feel free to call the office.       [facility] [place] [address]/ [phone] FAX/ [email or web address]

## 2025-03-05 RX ORDER — LEVOTHYROXINE SODIUM 25 UG/1
25 TABLET ORAL
Qty: 90 TABLET | Refills: 3 | Status: SHIPPED | OUTPATIENT
Start: 2025-03-05

## 2025-03-05 NOTE — TELEPHONE ENCOUNTER
Refill Routing Note   Medication(s) are not appropriate for processing by [facility] for the following reason(s):        Non-participating provider    [facility] action(s):  Route             Appointments  past 12m or future 3m with PCP    Date Provider   Last Visit   9/18/2024 Rosanna Huizar MD   Next Visit   4/8/2025 Rosanna Huizar MD   ED visits in past 90 days: 0        Note composed:4:08 PM 03/05/2025

## 2025-03-06 ENCOUNTER — HOSPITAL ENCOUNTER (OUTPATIENT)
Facility: HOSPITAL | Age: 75
Discharge: HOME | End: 2025-03-06
Attending: INTERNAL MEDICINE | Admitting: INTERNAL MEDICINE
Payer: MEDICARE

## 2025-03-06 VITALS
OXYGEN SATURATION: 98 % | TEMPERATURE: 97 F | HEART RATE: 71 BPM | HEIGHT: 62 IN | DIASTOLIC BLOOD PRESSURE: 60 MMHG | RESPIRATION RATE: 15 BRPM | SYSTOLIC BLOOD PRESSURE: 154 MMHG | BODY MASS INDEX: 25.76 KG/M2 | WEIGHT: 140 LBS

## 2025-03-06 DIAGNOSIS — Z86.0100 HISTORY OF COLON POLYPS: Primary | ICD-10-CM

## 2025-03-06 PROCEDURE — 94761 N-INVAS EAR/PLS OXIMETRY MLT: CPT

## 2025-03-06 PROCEDURE — 37000008 HC ANESTHESIA 1ST 15 MINUTES: Performed by: INTERNAL MEDICINE

## 2025-03-06 PROCEDURE — 45385 COLONOSCOPY W/LESION REMOVAL: CPT | Mod: PT,,, | Performed by: INTERNAL MEDICINE

## 2025-03-06 PROCEDURE — 88305 TISSUE EXAM BY PATHOLOGIST: CPT | Performed by: PATHOLOGY

## 2025-03-06 PROCEDURE — 25000003 PHARM REV CODE 250: Performed by: NURSE ANESTHETIST, CERTIFIED REGISTERED

## 2025-03-06 PROCEDURE — 88305 TISSUE EXAM BY PATHOLOGIST: CPT | Mod: 26,,, | Performed by: PATHOLOGY

## 2025-03-06 PROCEDURE — 37000009 HC ANESTHESIA EA ADD 15 MINS: Performed by: INTERNAL MEDICINE

## 2025-03-06 PROCEDURE — 45385 COLONOSCOPY W/LESION REMOVAL: CPT | Mod: PT | Performed by: INTERNAL MEDICINE

## 2025-03-06 PROCEDURE — 27201089 HC SNARE, DISP (ANY): Performed by: INTERNAL MEDICINE

## 2025-03-06 PROCEDURE — 99900035 HC TECH TIME PER 15 MIN (STAT)

## 2025-03-06 PROCEDURE — 63600175 PHARM REV CODE 636 W HCPCS: Performed by: NURSE ANESTHETIST, CERTIFIED REGISTERED

## 2025-03-06 RX ORDER — SODIUM CHLORIDE 9 MG/ML
INJECTION, SOLUTION INTRAVENOUS CONTINUOUS
OUTPATIENT
Start: 2025-03-06

## 2025-03-06 RX ORDER — PROPOFOL 10 MG/ML
VIAL (ML) INTRAVENOUS
Status: DISCONTINUED | OUTPATIENT
Start: 2025-03-06 | End: 2025-03-06

## 2025-03-06 RX ORDER — LIDOCAINE HYDROCHLORIDE 20 MG/ML
INJECTION INTRAVENOUS
Status: DISCONTINUED | OUTPATIENT
Start: 2025-03-06 | End: 2025-03-06

## 2025-03-06 RX ADMIN — PROPOFOL 150 MCG/KG/MIN: 10 INJECTION, EMULSION INTRAVENOUS at 10:03

## 2025-03-06 RX ADMIN — SODIUM CHLORIDE: 0.9 INJECTION, SOLUTION INTRAVENOUS at 10:03

## 2025-03-06 RX ADMIN — GLYCOPYRROLATE 0.2 MG: 0.2 INJECTION, SOLUTION INTRAMUSCULAR; INTRAVENOUS at 10:03

## 2025-03-06 RX ADMIN — LIDOCAINE HYDROCHLORIDE 40 MG: 20 INJECTION INTRAVENOUS at 10:03

## 2025-03-06 RX ADMIN — PROPOFOL 60 MG: 10 INJECTION, EMULSION INTRAVENOUS at 10:03

## 2025-03-06 NOTE — H&P
Short Stay Endoscopy History and Physical    PCP - Rosanna Huizar MD    Procedure - Colonoscopy  Sedation: GA  ASA - per anesthesia  Mallampati - per anesthesia  History of Anesthesia problems - no  Family history Anesthesia problems -  no     HPI:  This is a 74 y.o. female here for evaluation of : History of colon polyps    Reflux - no  Dysphagia - no  Abdominal pain - no  Diarrhea - no    ROS:  Constitutional: No fevers, chills, No weight loss  ENT: No allergies  CV: No chest pain  Pulm: No cough, No shortness of breath  Ophtho: No vision changes  GI: see HPI  Medical History:  has a past medical history of Age-related osteoporosis without current pathological fracture (4/24/2024), Cataract, Chronic interstitial cystitis, History of hepatitis C; S/p RX with SVR 12 documented 06/2017 (06/22/2017), HLD (hyperlipidemia), Hypothyroidism (acquired) (5/5/2024), Malignant melanoma of skin of trunk, except scrotum, Migraine, unspecified, without mention of intractable migraine without mention of status migrainosus, Nonspecific abnormal results of liver function study, Nontoxic multinodular goiter, and Osteopenia.    Surgical History:  has a past surgical history that includes Total abdominal hysterectomy; TONSILLECTOMY, ADENOIDECTOMY; Mole removal; Finger surgery (Right, 2010); Colonoscopy (N/A, 12/14/2016); Breast biopsy; Colonoscopy (N/A, 1/14/2020); Cataract extraction (Right, 03/02/2020); cataracts; and Total knee arthroplasty (Left, 4/12/2023).    Family History: family history includes Breast cancer (age of onset: 52) in her daughter; Cancer in her mother; Cataracts in her father and mother; Chronic back pain in her daughter; Diabetes in her mother; Diabetes type II in her daughter; Fibromyalgia in her sister; GI problems in her daughter; Glaucoma in her sister; Heart failure in her father; Hypertension in her father; Kidney failure in her mother; Macular degeneration in her mother; Obesity in her  daughter; Osteoarthritis in her mother; Other in her daughter and sister; Stroke in her father; Thyroid disease in her mother.. Otherwise no colon cancer, inflammatory bowel disease, or GI malignancies.    Social History:  reports that she has never smoked. She has never used smokeless tobacco. She reports current alcohol use. She reports that she does not currently use drugs.    Review of patient's allergies indicates:   Allergen Reactions    Sulfa (sulfonamide antibiotics)      Other reaction(s): unknown  Other reaction(s): RASH    Iodine and iodide containing products Nausea And Vomiting     Other reaction(s): SEVERE  migraines       Medications:   Prescriptions Prior to Admission[1]    Objective Findings:    Vital Signs: Per nursing notes.    Physical Exam:  General Appearance: Well appearing in no acute distress  Head:   Normocephalic, without obvious abnormality  Eyes:    No scleral icterus  Airway: Open  Neck: No restriction in mobility  Lungs: CTA bilaterally in anterior and posterior fields, no wheezes, no crackles.  Heart:  Regular rate and rhythm, S1, S2 normal, no murmurs heard  Abdomen: Soft, non tender, non distended      Labs:  Lab Results   Component Value Date    WBC 3.60 (L) 09/10/2024    HGB 13.3 09/10/2024    HCT 39.6 09/10/2024     09/10/2024    CHOL 137 09/10/2024    TRIG 54 09/10/2024    HDL 62 09/10/2024    ALT 16 03/05/2024    AST 22 03/05/2024     03/05/2024    K 4.5 03/05/2024     03/05/2024    CREATININE 0.8 03/05/2024    BUN 10 03/05/2024    CO2 24 03/05/2024    TSH 0.797 09/10/2024    INR 1.0 04/03/2023    HGBA1C 5.2 04/24/2024         I have explained the risks and benefits of endoscopy procedures to the patient including but not limited to bleeding, perforation, infection, and death.    Thank you so much for allowing me to participate in the care of Tomasa Amaya MD         [1]   Medications Prior to Admission   Medication Sig Dispense  Refill Last Dose/Taking    acetaminophen (TYLENOL) 650 MG TbSR Take 650 mg by mouth every 8 (eight) hours.       alendronate (FOSAMAX) 70 MG tablet Take 1 tablet (70 mg total) by mouth every 7 days. 12 tablet 3     azelastine (ASTELIN) 137 mcg (0.1 %) nasal spray 1 spray (137 mcg total) by Nasal route 2 (two) times daily. 30 mL 11     busPIRone (BUSPAR) 5 MG Tab Take 1 tablet (5 mg total) by mouth 3 (three) times daily as needed (anxiety). 90 tablet 0     cephALEXin (KEFLEX) 500 MG capsule Take 1 capsule (500 mg total) by mouth 4 (four) times daily. 4 capsule 0     cetirizine 10 mg Cap PRN       dietary supplement Pack Take 1 tablet by mouth 2 (two) times daily.       fluticasone propionate (FLONASE) 50 mcg/actuation nasal spray 1 spray (50 mcg total) by Each Nostril route once daily. 16 g 0     glucosam/chondro/herb 149/hyal (GLUCOS CHOND CPLX ADVANCED ORAL) Take by mouth.       glycerin adult suppository as needed.        levothyroxine (SYNTHROID) 25 MCG tablet TAKE 1 TABLET BY MOUTH BEFORE BREAKFAST. 90 tablet 3     magnesium oxide (MAG-OX) 400 mg (241.3 mg magnesium) tablet Take 400 mg by mouth once daily.       MISCELLANEOUS MEDICAL SUPPLY [facility] as needed. EYE ITCH RELIEF 0.25 EYE DROPS       mometasone (NASONEX) 50 mcg/actuation nasal spray 2 sprays by Nasal route once daily.       OCEAN NASAL 0.65 % nasal spray as needed.        rosuvastatin (CRESTOR) 10 MG tablet TAKE 1 TABLET BY MOUTH EVERY DAY 90 tablet 3     TOBRADEX 0.3-0.1 % Oint APPLY A SMALL AMOUNT INTO AFFECTED EYE ONCE A DAY       TUMS 200 mg calcium (500 mg) chewable tablet 1 tablet as needed.

## 2025-03-06 NOTE — PLAN OF CARE
Chart reviewed. Preop nursing care completed per orders. Safe surgery checklist complete. Pt denies any open wounds cuts or sores. Pt denies any metal in body. Family at bedside and belongings in locker #11. Waiting for anesthesia consent and procedural coinsent prior to surgery. Pt AAOX4, VSS on room air. Pt toileted, Bed locked in lowest position, Call light within reach. Pt denies any needs at this time.

## 2025-03-06 NOTE — TRANSFER OF CARE
"Anesthesia Transfer of Care Note    Patient: Tomasa Reed    Procedure(s) Performed: Procedure(s) (LRB):  COLONOSCOPY, SCREENING, HIGH RISK PATIENT (N/A)    Patient location: PACU    Anesthesia Type: general    Transport from OR: Transported from OR on room air with adequate spontaneous ventilation    Post pain: adequate analgesia    Post assessment: no apparent anesthetic complications and tolerated procedure well    Post vital signs: stable    Level of consciousness: sedated    Nausea/Vomiting: no nausea/vomiting    Complications: none    Transfer of care protocol was followed      Last vitals: Visit Vitals  /60 (BP Location: Left arm, Patient Position: Lying)   Pulse 71   Temp 36.2 °C (97.2 °F) (Temporal)   Resp 16   Ht 5' 2" (1.575 m)   Wt 63.5 kg (140 lb)   SpO2 96%   Breastfeeding No   BMI 25.61 kg/m²     "

## 2025-03-06 NOTE — PROVATION PATIENT INSTRUCTIONS
Discharge Summary/Instructions after an Endoscopic Procedure  Patient Name: Tomasa Reed  Patient MRN: [id number]  Patient YOB: 1950 Thursday, March 6, 2025  Jose Amaya MD  Dear patient,  As a result of recent federal legislation (The Federal Cures Act), you may   receive lab or pathology results from your procedure in your [facility]   account before your physician is able to contact you. Your physician or   their representative will relay the results to you with their   recommendations at their soonest availability.  Thank you,  RESTRICTIONS:  During your procedure today, you received medications for sedation.  These   medications may affect your judgment, balance and coordination.  Therefore,   for 24 hours, you have the following restrictions:   - DO NOT drive a car, operate machinery, make legal/financial decisions,   sign important papers or drink alcohol.    ACTIVITY:  Today: no heavy lifting, straining or running due to procedural   sedation/anesthesia.  The following day: return to full activity including work.  DIET:  Eat and drink normally unless instructed otherwise.     TREATMENT FOR COMMON SIDE EFFECTS:  - Mild abdominal pain, nausea, belching, bloating or excessive gas:  rest,   eat lightly and use a heating pad.  - Sore Throat: treat with throat lozenges and/or gargle with warm salt   water.  - Because air was used during the procedure, expelling large amounts of air   from your rectum or belching is normal.  - If a bowel prep was taken, you may not have a bowel movement for 1-3 days.    This is normal.  SYMPTOMS TO WATCH FOR AND REPORT TO YOUR PHYSICIAN:  1. Abdominal pain or bloating, other than gas cramps.  2. Chest pain.  3. Back pain.  4. Signs of infection such as: chills or fever occurring within 24 hours   after the procedure.  5. Rectal bleeding, which would show as bright red, maroon, or black stools.   (A tablespoon of blood from the rectum is not serious, especially  if   hemorrhoids are present.)  6. Vomiting.  7. Weakness or dizziness.  GO DIRECTLY TO THE NEAREST EMERGENCY ROOM IF YOU HAVE ANY OF THE FOLLOWING:      Difficulty breathing              Chills and/or fever over 101 F   Persistent vomiting and/or vomiting blood   Severe abdominal pain   Severe chest pain   Black, tarry stools   Bleeding- more than one tablespoon   Any other symptom or condition that you feel may need urgent attention  Your doctor recommends these additional instructions:  If any biopsies were taken, your doctors clinic will contact you in 1 to 2   weeks with any results.  - Patient has a contact number available for emergencies.  The signs and   symptoms of potential delayed complications were discussed with the   patient.  Return to normal activities tomorrow.  Written discharge   instructions were provided to the patient.   - Discharge patient to home (ambulatory).   - Resume previous diet.   - Continue present medications.   - Await pathology results.   - Repeat colonoscopy is not recommended for surveillance.   For questions, problems or results please call your physician - Jose Amaya MD at Work:  [phone].  [facility] [place], EMERGENCY ROOM PHONE NUMBER: [phone]  IF A COMPLICATION OR EMERGENCY SITUATION ARISES AND YOU ARE UNABLE TO REACH   YOUR PHYSICIAN - GO DIRECTLY TO THE EMERGENCY ROOM.  Jose Amaya MD  3/6/2025 10:45:25 AM  This report has been verified and signed electronically.  Dear patient,  As a result of recent federal legislation (The Federal Cures Act), you may   receive lab or pathology results from your procedure in your [facility]   account before your physician is able to contact you. Your physician or   their representative will relay the results to you with their   recommendations at their soonest availability.  Thank you,  PROVATION

## 2025-03-07 LAB
FINAL PATHOLOGIC DIAGNOSIS: NORMAL
GROSS: NORMAL
Lab: NORMAL

## 2025-03-10 ENCOUNTER — TELEPHONE (OUTPATIENT)
Dept: GASTROENTEROLOGY | Facility: CLINIC | Age: 75
End: 2025-03-10
Payer: MEDICARE

## 2025-03-10 ENCOUNTER — RESULTS FOLLOW-UP (OUTPATIENT)
Dept: GASTROENTEROLOGY | Facility: CLINIC | Age: 75
End: 2025-03-10

## 2025-03-10 NOTE — TELEPHONE ENCOUNTER
----- Message from Jose Amaya MD sent at 3/10/2025 10:25 AM CDT -----  Please call and notify patient, the colon polyp was benign.    ----- Message -----  From: Sae, [facility] Interface  Sent: 3/7/2025   2:07 PM CDT  To: Jose Amaya MD

## 2025-03-30 ENCOUNTER — PATIENT MESSAGE (OUTPATIENT)
Dept: INTERNAL MEDICINE | Facility: CLINIC | Age: 75
End: 2025-03-30
Payer: MEDICARE

## 2025-03-30 DIAGNOSIS — G51.4 FACIAL TWITCHING: Primary | ICD-10-CM

## 2025-04-06 NOTE — PROGRESS NOTES
"73 yo female w/ HLD, neutropenia, MNT, allergies  And spasms/twitch of left lower eyelid that has become  More problematic.    Pt reported left lower eyelid twitch/spasm that she saw ENT for last year , 4/2024, trial nasal sprays +/- effective ,  Zyrtec +/- effective.  Pt saw ophth last fall and advised the "eye" was okay.  The left side of the face feels tight and different,   ++ left nasal fullness even s/p nasal sprays,   Astelin and Flonase and steroid dosepak was not helpful for facial pressure  + sinus pressure, + visual disturbance, blurry, double  Light and chewing aggravate the eyelid spasms  No HA associated , no stroke like sx associated         Dyslipidemia tx w/rosuvastatin 10 mg  Denied unusual muscle pain or chest pain  LDL==>72.4 (3/5/2024)        Lab Results   Component Value Date     CHOL 154 03/02/2023     CHOL 155 05/26/2022     CHOL 218 (H) 01/06/2022            Lab Results   Component Value Date     HDL 68 03/02/2023     HDL 72 05/26/2022     HDL 61 01/06/2022            Lab Results   Component Value Date     LDLCALC 71.2 03/02/2023     LDLCALC 74.0 05/26/2022     LDLCALC 143.2 01/06/2022            Lab Results   Component Value Date     TRIG 74 03/02/2023     TRIG 45 05/26/2022     TRIG 69 01/06/2022         Neutropenia  Denied fever or chills or night sweats  ANC==> pending     MNT  Hypothyroidism, pt not taking levothyroxine  Denied heat or cold intolerance  TSH==>pending      Nonsmoker, nonalcohol consumer.     SCREENING TESTS:   Cholesterol/ lab, reviewed   Colonoscopy 1/20 no polyps, rec 5 yrs  1/2023 -cologuard was negative  Recall Mom d. Colon cancer and pt s/p polyps- tubular adenomas, 2016  Mammogram, 9/2023  MARLENE RICHEY, ovaries intact.  DEXA 2022- resumed alendronate then d/c 2/2 constipation, now resumed   As she has started eating prunes w/ improvement in her BM  Taking Calcium and Vit D3    Eye-UTD  DDS-UTD    VACCINATIONS:  Tdap, 12/2012  Zostavax, 10/2012, pharmacist niece gave " vaccine  Shingrix: UTD  Flu vaccine yearly  Pneumovax, 12/2013   Prevnar, 9/2015  Covid: 2/2 + booster    MEDS: Reviewed.     REVIEW OF SYSTEMS:   Denied fever, chills, or night sweats  Denied chest pain or heart palpitations  Denied GERD or reflux  Denied change in bowels, blood in stool or urine  Denied heat or cold intolerance  Remainder of review negative except as previously noted.       PHYSICAL EXAM:  VSS:  GENERAL: Alert and oriented, in no acute distress. Well developed, well   nourished, conversant and cooperative, pleasant as always   EYES: Conjunctivae and lids w/ frequent spasms of left lower eyelid. Sclerae anicteric  ENT: Canals w/ slight cerumen right none left,  Nasal unremarkable, o/p unremarkable  Sinus mild tenderness over the left maxillary region  NECK: Supple. Prominent thyroid, no LN  RESPIRATORY: Efforts unlabored.   LUNGS: Clear to auscultation.   HEART: Regular rate and rhythm. No carotid bruits noted,   1+ pedal pulse. No edema.   ABDOMEN: Bowel sounds present, soft, nontender.   No hepatosplenomegaly.  MUSCULOSKELETAL: Gait normal.   No clubbing, cyanosis or edema noted.   NEURO: LARSEN. No tremor noted  CN II-XII grossly intact, some asymmetry of mouth  Pinprick and light touch intact  Motor 5/5  SKIN: warm and dry, fullness left face over maxillary region w/o warmth or erythema , mild tenderness      IMPRESSION/PLAN:   HLD, stable  -continue rosuvastatin 10mg  -continue low fat diet   Neutropenia , stable  MNT, stable  Hypothyroidism, stable  -resume levothyroxine 25mcg   Osteopenia,stable  -DEXA pending  -continue alendronate  -started 11/2020, stopped by 2/2022, restarted 3/2023  -continue  supplements,continue Ca and vitamin D   =Vitamin D deficiency- stable  -continue continue vitamin D3, 2K IU qd      HX hepatitis C, s/p tx  Eye twitch/spasms  Sinusitis  Allergies  Blurry vision  Diplopia  - sinus CT   -head CT  - lab today  -2wks flonase w/ rating for sinus sx 1-10  -followed by 2  wks of astelin w/ rating sinus sx 1-10  -Neurology Consult pending  HM  -reviewed vaccine recs  -DEXA  -rtc 6 mos w/ lab            Answers submitted by the patient for this visit:  Review of Systems Questionnaire (Submitted on 4/1/2025)  activity change: Yes--decreased w/ exacerbation sx  unexpected weight change: No  neck pain: No  hearing loss: Yes---chronic  rhinorrhea: Yes--allergies  trouble swallowing: No  eye discharge: Yes--allergies and visual disturbance  visual disturbance: No  chest tightness: No  wheezing: No  chest pain: No  palpitations: No  blood in stool: No  constipation: No  vomiting: No  diarrhea: No  polydipsia: No  polyuria: No  difficulty urinating: No  hematuria: No  menstrual problem: No  dysuria: No  joint swelling: No  arthralgias: Yes--OA, chronic  headaches: No  weakness: No  confusion: No  dysphoric mood: No    > 55' minutes were spent reviewing results of prior testing, obtaining and/or reviewing separately obtained history, performing a medically appropriate examination and interview, counseling and educating the patient/family, ordering medications/tests/procedures, referring and communicating with other health care professionals, documenting clinical information in the electronic health record, and independently interpreting results.

## 2025-04-08 ENCOUNTER — OFFICE VISIT (OUTPATIENT)
Dept: INTERNAL MEDICINE | Facility: CLINIC | Age: 75
End: 2025-04-08
Payer: MEDICARE

## 2025-04-08 ENCOUNTER — LAB VISIT (OUTPATIENT)
Dept: LAB | Facility: HOSPITAL | Age: 75
End: 2025-04-08
Attending: INTERNAL MEDICINE
Payer: MEDICARE

## 2025-04-08 VITALS
HEIGHT: 62 IN | OXYGEN SATURATION: 97 % | SYSTOLIC BLOOD PRESSURE: 117 MMHG | RESPIRATION RATE: 19 BRPM | TEMPERATURE: 97 F | DIASTOLIC BLOOD PRESSURE: 58 MMHG | WEIGHT: 149.06 LBS | BODY MASS INDEX: 27.43 KG/M2 | HEART RATE: 92 BPM

## 2025-04-08 DIAGNOSIS — E78.5 HYPERLIPIDEMIA, UNSPECIFIED HYPERLIPIDEMIA TYPE: ICD-10-CM

## 2025-04-08 DIAGNOSIS — E03.4 HYPOTHYROIDISM DUE TO ACQUIRED ATROPHY OF THYROID: ICD-10-CM

## 2025-04-08 DIAGNOSIS — J30.1 SEASONAL ALLERGIC RHINITIS DUE TO POLLEN: ICD-10-CM

## 2025-04-08 DIAGNOSIS — H53.8 BLURRY VISION, LEFT EYE: ICD-10-CM

## 2025-04-08 DIAGNOSIS — Z86.19 HISTORY OF HEPATITIS C: ICD-10-CM

## 2025-04-08 DIAGNOSIS — D70.9 NEUTROPENIA, UNSPECIFIED TYPE: ICD-10-CM

## 2025-04-08 DIAGNOSIS — R25.3 EYELID TWITCH: ICD-10-CM

## 2025-04-08 DIAGNOSIS — M85.80 OSTEOPENIA, UNSPECIFIED LOCATION: ICD-10-CM

## 2025-04-08 DIAGNOSIS — E04.2 NONTOXIC MULTINODULAR GOITER: ICD-10-CM

## 2025-04-08 DIAGNOSIS — H53.2 DIPLOPIA: ICD-10-CM

## 2025-04-08 DIAGNOSIS — E78.5 HYPERLIPIDEMIA, UNSPECIFIED HYPERLIPIDEMIA TYPE: Primary | ICD-10-CM

## 2025-04-08 DIAGNOSIS — J32.0 CHRONIC MAXILLARY SINUSITIS: ICD-10-CM

## 2025-04-08 LAB
ABSOLUTE EOSINOPHIL (OHS): 0.08 K/UL
ABSOLUTE MONOCYTE (OHS): 0.27 K/UL (ref 0.3–1)
ABSOLUTE NEUTROPHIL COUNT (OHS): 1.72 K/UL (ref 1.8–7.7)
ALBUMIN SERPL BCP-MCNC: 4.1 G/DL (ref 3.5–5.2)
ALP SERPL-CCNC: 61 UNIT/L (ref 40–150)
ALT SERPL W/O P-5'-P-CCNC: 17 UNIT/L (ref 10–44)
ANION GAP (OHS): 6 MMOL/L (ref 8–16)
AST SERPL-CCNC: 22 UNIT/L (ref 11–45)
BASOPHILS # BLD AUTO: 0.03 K/UL
BASOPHILS NFR BLD AUTO: 0.9 %
BILIRUB SERPL-MCNC: 0.3 MG/DL (ref 0.1–1)
BUN SERPL-MCNC: 9 MG/DL (ref 8–23)
CALCIUM SERPL-MCNC: 9.5 MG/DL (ref 8.7–10.5)
CHLORIDE SERPL-SCNC: 103 MMOL/L (ref 95–110)
CO2 SERPL-SCNC: 25 MMOL/L (ref 23–29)
CREAT SERPL-MCNC: 0.7 MG/DL (ref 0.5–1.4)
CRP SERPL-MCNC: 0.9 MG/L
ERYTHROCYTE [DISTWIDTH] IN BLOOD BY AUTOMATED COUNT: 12.9 % (ref 11.5–14.5)
ERYTHROCYTE [SEDIMENTATION RATE] IN BLOOD BY PHOTOMETRIC METHOD: 10 MM/HR
GFR SERPLBLD CREATININE-BSD FMLA CKD-EPI: >60 ML/MIN/1.73/M2
GLUCOSE SERPL-MCNC: 86 MG/DL (ref 70–110)
HCT VFR BLD AUTO: 39.8 % (ref 37–48.5)
HGB BLD-MCNC: 13.3 GM/DL (ref 12–16)
IMM GRANULOCYTES # BLD AUTO: 0.01 K/UL (ref 0–0.04)
IMM GRANULOCYTES NFR BLD AUTO: 0.3 % (ref 0–0.5)
LYMPHOCYTES # BLD AUTO: 1.2 K/UL (ref 1–4.8)
MAGNESIUM SERPL-MCNC: 2.2 MG/DL (ref 1.6–2.6)
MCH RBC QN AUTO: 31.9 PG (ref 27–31)
MCHC RBC AUTO-ENTMCNC: 33.4 G/DL (ref 32–36)
MCV RBC AUTO: 95 FL (ref 82–98)
NUCLEATED RBC (/100WBC) (OHS): 0 /100 WBC
PLATELET # BLD AUTO: 295 K/UL (ref 150–450)
PMV BLD AUTO: 10.1 FL (ref 9.2–12.9)
POTASSIUM SERPL-SCNC: 5.3 MMOL/L (ref 3.5–5.1)
PROT SERPL-MCNC: 7.4 GM/DL (ref 6–8.4)
RBC # BLD AUTO: 4.17 M/UL (ref 4–5.4)
RELATIVE EOSINOPHIL (OHS): 2.4 %
RELATIVE LYMPHOCYTE (OHS): 36.3 % (ref 18–48)
RELATIVE MONOCYTE (OHS): 8.2 % (ref 4–15)
RELATIVE NEUTROPHIL (OHS): 51.9 % (ref 38–73)
SODIUM SERPL-SCNC: 134 MMOL/L (ref 136–145)
T4 FREE SERPL-MCNC: 1.01 NG/DL (ref 0.71–1.51)
TSH SERPL-ACNC: 0.22 UIU/ML (ref 0.4–4)
WBC # BLD AUTO: 3.31 K/UL (ref 3.9–12.7)

## 2025-04-08 PROCEDURE — 36415 COLL VENOUS BLD VENIPUNCTURE: CPT | Mod: PO

## 2025-04-08 PROCEDURE — 3074F SYST BP LT 130 MM HG: CPT | Mod: CPTII,S$GLB,, | Performed by: INTERNAL MEDICINE

## 2025-04-08 PROCEDURE — 80053 COMPREHEN METABOLIC PANEL: CPT

## 2025-04-08 PROCEDURE — 3288F FALL RISK ASSESSMENT DOCD: CPT | Mod: CPTII,S$GLB,, | Performed by: INTERNAL MEDICINE

## 2025-04-08 PROCEDURE — 1159F MED LIST DOCD IN RCRD: CPT | Mod: CPTII,S$GLB,, | Performed by: INTERNAL MEDICINE

## 2025-04-08 PROCEDURE — 83735 ASSAY OF MAGNESIUM: CPT

## 2025-04-08 PROCEDURE — 85025 COMPLETE CBC W/AUTO DIFF WBC: CPT

## 2025-04-08 PROCEDURE — 84439 ASSAY OF FREE THYROXINE: CPT

## 2025-04-08 PROCEDURE — 84443 ASSAY THYROID STIM HORMONE: CPT

## 2025-04-08 PROCEDURE — 99999 PR PBB SHADOW E&M-EST. PATIENT-LVL IV: CPT | Mod: PBBFAC,,, | Performed by: INTERNAL MEDICINE

## 2025-04-08 PROCEDURE — 86140 C-REACTIVE PROTEIN: CPT

## 2025-04-08 PROCEDURE — 1101F PT FALLS ASSESS-DOCD LE1/YR: CPT | Mod: CPTII,S$GLB,, | Performed by: INTERNAL MEDICINE

## 2025-04-08 PROCEDURE — 99215 OFFICE O/P EST HI 40 MIN: CPT | Mod: S$GLB,,, | Performed by: INTERNAL MEDICINE

## 2025-04-08 PROCEDURE — 3078F DIAST BP <80 MM HG: CPT | Mod: CPTII,S$GLB,, | Performed by: INTERNAL MEDICINE

## 2025-04-08 PROCEDURE — 85652 RBC SED RATE AUTOMATED: CPT

## 2025-04-08 RX ORDER — FLUTICASONE PROPIONATE 50 MCG
1 SPRAY, SUSPENSION (ML) NASAL DAILY
Qty: 16 G | Refills: 2 | Status: SHIPPED | OUTPATIENT
Start: 2025-04-08

## 2025-04-10 ENCOUNTER — RESULTS FOLLOW-UP (OUTPATIENT)
Dept: INTERNAL MEDICINE | Facility: CLINIC | Age: 75
End: 2025-04-10
Payer: MEDICARE

## 2025-04-11 ENCOUNTER — HOSPITAL ENCOUNTER (OUTPATIENT)
Dept: RADIOLOGY | Facility: HOSPITAL | Age: 75
Discharge: HOME | End: 2025-04-11
Attending: INTERNAL MEDICINE
Payer: MEDICARE

## 2025-04-11 DIAGNOSIS — J32.0 CHRONIC MAXILLARY SINUSITIS: ICD-10-CM

## 2025-04-11 DIAGNOSIS — H53.8 BLURRY VISION, LEFT EYE: ICD-10-CM

## 2025-04-11 DIAGNOSIS — H53.2 DIPLOPIA: ICD-10-CM

## 2025-04-11 PROCEDURE — 70450 CT HEAD/BRAIN W/O DYE: CPT | Mod: 26,,, | Performed by: RADIOLOGY

## 2025-04-11 PROCEDURE — 70450 CT HEAD/BRAIN W/O DYE: CPT | Mod: TC

## 2025-04-11 PROCEDURE — 70486 CT MAXILLOFACIAL W/O DYE: CPT | Mod: 26,,, | Performed by: RADIOLOGY

## 2025-04-11 PROCEDURE — 70486 CT MAXILLOFACIAL W/O DYE: CPT | Mod: TC

## 2025-04-17 ENCOUNTER — LAB VISIT (OUTPATIENT)
Dept: LAB | Facility: HOSPITAL | Age: 75
End: 2025-04-17
Attending: INTERNAL MEDICINE
Payer: MEDICARE

## 2025-04-17 DIAGNOSIS — E03.9 HYPOTHYROIDISM (ACQUIRED): ICD-10-CM

## 2025-04-17 DIAGNOSIS — E55.9 VITAMIN D DEFICIENCY: ICD-10-CM

## 2025-04-17 LAB
25(OH)D3+25(OH)D2 SERPL-MCNC: 55 NG/ML (ref 30–96)
ALBUMIN SERPL BCP-MCNC: 4 G/DL (ref 3.5–5.2)
ANION GAP (OHS): 10 MMOL/L (ref 8–16)
BUN SERPL-MCNC: 10 MG/DL (ref 8–23)
CALCIUM SERPL-MCNC: 9.6 MG/DL (ref 8.7–10.5)
CHLORIDE SERPL-SCNC: 101 MMOL/L (ref 95–110)
CO2 SERPL-SCNC: 23 MMOL/L (ref 23–29)
CREAT SERPL-MCNC: 0.6 MG/DL (ref 0.5–1.4)
GFR SERPLBLD CREATININE-BSD FMLA CKD-EPI: >60 ML/MIN/1.73/M2
GLUCOSE SERPL-MCNC: 79 MG/DL (ref 70–110)
PHOSPHATE SERPL-MCNC: 3.9 MG/DL (ref 2.7–4.5)
POTASSIUM SERPL-SCNC: 4.7 MMOL/L (ref 3.5–5.1)
SODIUM SERPL-SCNC: 134 MMOL/L (ref 136–145)
TSH SERPL-ACNC: 0.68 UIU/ML (ref 0.4–4)

## 2025-04-17 PROCEDURE — 84443 ASSAY THYROID STIM HORMONE: CPT

## 2025-04-17 PROCEDURE — 36415 COLL VENOUS BLD VENIPUNCTURE: CPT | Mod: PO

## 2025-04-17 PROCEDURE — 82310 ASSAY OF CALCIUM: CPT

## 2025-04-17 PROCEDURE — 82306 VITAMIN D 25 HYDROXY: CPT

## 2025-04-17 NOTE — PROGRESS NOTES
ENDOCRINOLOGY CLINIC PATIENT NOTE    Subjective:     Chief Complaint:  Osteoporosis, MNG    HPI:   Tomasa Reed is a 74 y.o. female who presents for follow-up of osteoporosis and multinodular goiter.  Patient was last seen in the clinic on 04/24/2024.    Previously seen Dr. Mendez in 2013 for MNG.    T scores   Year Lumbar Spine Hip Femoral neck   2022 -2.3 -1.3 -1.1       BMD: T-score and % change  Year Lumbar Spine %ch vs prior Hip %ch. vs prior Femoral neck   03/2024 -2.2 Stable -1.3 Stable -1.4       FRAX 10 year probability of fracture:   Major Osteoporotic Fracture:  18%   Hip Fracture:  8.7%    Lab Results   Component Value Date    CALCIUM 9.6 04/17/2025    ALBUMIN 4.0 04/17/2025    CREATININE 0.6 04/17/2025    PHOS 3.9 04/17/2025    ALKPHOS 61 04/08/2025    [id number] 55 04/17/2025    TSH 0.681 04/17/2025    EGFRNORACEVR >60 04/17/2025        Current treatment: On Fosamax 70 mg weekly since 02/2022.  Taking regularly and tolerating well.    Patient did not want to start Reclast due to concerns for side effects and continued on the Fosamax.  Prolia not covered by her insurance.    Previous Treatment: She was diagnosed with osteopenia around 2007, used Fosamax for 2 yrs at that time, then stopped.  Was prescribed Fosamax in 11/2020 but was discontinued.    History of previous fracture: No  Parent with fractured hip: Yes patient's mother had hip fracture around her 80s.  Smoking status: No  Glucocorticoid use: No  History of RA: No  Alcohol 3 or more/day: No  Nephrolithiasis: No  Diabetes: No  Frequent falls: No  Loss of height: Yes 1/2 inch  Menopause: She had a hysterectomy at 31 y/o and menopausal symptoms at 53 y/o.   Afib: No  CKD: No  Cancer/XRT: She has h/o malignant melanoma insitu, s/p resection, did not have any radiation treatment.     Dental visits: Every 6 months. No current dental procedures planned.      GERD: No PPI use.  Reports occasional symptoms and takes a mint/TUMS prn.   PUD:  No    Supplements:   Ca: 250 mg from multivitamin daily and calcium 250 mg daily  Takes  1 serving of dairy   VitD: 20 mcg daily from multivitamin and takes additional vitamin D 50 mcg daily.     Exercise: Walks 3 times a day.     Patient has seen PCP for twitching of the left side of her face and referred to neurology.     Family history:   Osteoporosis: Mother      Thyroid nodules    Patient was diagnosed with a MNG many years ago, had 2 US thyroid in 2003 and 2004 which did not show any change, never had FNA.    In 12/2012, her PCP palpated a neck mass and she had an US and CT scan of her neck.       US thyroid 2016:  The thyroid gland is not enlarged.  The right lobe measures 4.7 x 1.5 x 1.3 cm and the left lobe measures 4.2 x 1.2 x 1.1 cm.       There are 3 heterogeneous subcentimeter nodules in the mid and upper pole of the right thyroid gland.  There is a 2.0 x 1.2 x 1.1 cm heterogeneous nodule in the lower pole of the right thyroid gland (previously 2.1 x 1.0 x 1.7 cm), which has been previously FNA.     There is a heterogeneous nodule in the lower pole of the left thyroid lobe measured 1.2 x 0.7 x 0.9 cm (previously 1.9 x 0.9 x 1.3 cm) which has been previously FNA.  There are 2 subcentimeter heterogeneous nodules in the midpole of the left thyroid lobe.     Blood flow to the thyroid is not increased on color imaging.  No evidence of abnormal lymphadenopathy.    IMPRESSION:      Bilateral thyroid nodules as detailed above, with nodules in bilateral lower pole meeting criteria for FNA, however per chart review these have been previously FNA and showed to be benign.  These measure slightly smaller since prior exam in 2012.      FNA biopsy 02/27/2013   Right thyroid nodule:  Benign  Left thyroid nodule: Benign      US thyroid 04/25/2024     FINDINGS:  The thyroid is normal in size.  Right lobe of the thyroid measures 4.9 x 1.8 x 1.8 cm.  Isthmus AP thickness is 3 mm.  Left lobe of the thyroid measures 2.7 x  1.1 x 1.5 cm.  Normal thyroid parenchyma.  Multiple thyroid nodules.  Select nodules described below.     Right thyroid lobe: Mid/lower pole 0.9 x 0.8 x 1.4 cm solid isoechoic nodule small compared to prior, previously 1.2 x 1.1 x 2.0 cm.  Per chart review this nodule has been previously sampled and reported to reflect benign pathology.     Left thyroid lobe: Lower pole 0.9 x 0.6 x 0.8 cm nodule with rim calcifications slightly smaller compared to prior, previously 0.7 x 0.9 x 1.2 cm.  Per chart review this nodule has been previously and reported to reflect benign pathology.     Other subcentimeter nodules in the right and left thyroid lobes none of which meet criteria for FNA or follow-up.     Cervical lymph nodes demonstrate normal morphology and size.     Impression:     Multinodular thyroid.  Index nodules are smaller compared to prior and have been previously sampled showing benign pathology.  Elsewhere there are subcentimeter nodules not meeting criteria for FNA or follow-up.        Head and neck radiation:  Denies    Compressive neck symptoms:  Denies    Family history of thyroid cancer:  Denies    Family history of thyroid disease:  Mother had thyroid surgery but not sure whether it was benign or malignant      Lab Results   Component Value Date    TSH 0.681 04/17/2025    TSH 0.224 (L) 04/08/2025    TSH 0.797 09/10/2024    TSH 0.584 03/05/2024    TSH 0.572 09/05/2023    FREET4 1.01 04/08/2025    FREET4 0.87 09/10/2024    FREET4 0.79 03/05/2024       Hypothyroidism    Current medications:  Levothyroxine 25 mcg daily, started 9/2023 by her PCP.    Cardiovascular disorder:  Denies    Thyroid symptoms:  Clinically euthyroid.    Lab Results   Component Value Date    TSH 0.681 04/17/2025    TSH 0.224 (L) 04/08/2025    TSH 0.797 09/10/2024    TSH 0.584 03/05/2024    TSH 0.572 09/05/2023    FREET4 1.01 04/08/2025    FREET4 0.87 09/10/2024    FREET4 0.79 03/05/2024    FREET4 0.84 09/05/2023    FREET4 0.99 11/08/2016      ROS: see HPI       Objective:     Physical Exam     /68 (BP Location: Right arm, Patient Position: Sitting)   Pulse 82     Wt Readings from Last 3 Encounters:   04/08/25 67.6 kg (149 lb 0.5 oz)   03/06/25 63.5 kg (140 lb)   01/14/25 64 kg (141 lb 1.5 oz)       Constitutional:  Pleasant, in no acute distress.   HENT:   Eyes:    No scleral icterus.   Cardiovascular:  Normal rate  Respiratory:   Effort normal   Neurological:  No tremor, normal speech.  Muscle twitches seen on the left side of her face.   Skin:    Skin is warm, dry    LABORATORY REVIEW:    Chemistry        Component Value Date/Time     (L) 04/17/2025 0734     03/05/2024 0751    K 4.7 04/17/2025 0734    K 4.5 03/05/2024 0751     04/17/2025 0734     03/05/2024 0751    CO2 23 04/17/2025 0734    CO2 24 03/05/2024 0751    BUN 10 04/17/2025 0734    CREATININE 0.6 04/17/2025 0734    GLU 93 03/05/2024 0751        Component Value Date/Time    CALCIUM 9.6 04/17/2025 0734    CALCIUM 10.2 03/05/2024 0751    ALKPHOS 61 04/08/2025 1052    ALKPHOS 55 03/05/2024 0751    AST 22 04/08/2025 1052    AST 22 03/05/2024 0751    ALT 17 04/08/2025 1052    ALT 16 03/05/2024 0751    BILITOT 0.3 04/08/2025 1052    BILITOT 0.5 03/05/2024 0751    ESTGFRAFRICA >60.0 01/06/2022 0816    EGFRNONAA >60.0 01/06/2022 0816        Lab Results   Component Value Date    HGBA1C 5.2 04/24/2024    HGBA1C 4.9 06/23/2004       Assessment/Plan:          1. Age-related osteoporosis without current pathological fracture  Assessment & Plan:    On Fosamax 70 mg weekly since 02/2022.  Denies history of fragility fractures.   Recent DXA scan showed stable BMD.  Discussed treatment for up to 5 years if BMD stable and no fragility fractures.  Prolia not covered by her insurance and did not want to start Reclast due to concerns for side effects.    Discussed her risk of fractures and benefits of osteoporosis treatment  Side effects including osteonecrosis of the jaw and  atypical femoral fractures discussed with patient.    Encouraged safe movements and fall precautions  Continue routine dental visits  Instructed on Calcium and vitamin D   Call if any new fractures.  DXA scan due in 03/2026.         Orders:  -     Vitamin D; Future  -     Renal Function Panel; Future; Expected date: 04/23/2025  -     DXA Bone Density Axial Skeleton 1 or more sites; Future; Expected date: 03/16/2026    2. Nontoxic multinodular goiter  Assessment & Plan:    FNA biopsy in 2013 of the right thyroid nodule and left thyroid nodule was benign.  US thyroid in 2016 showed nodule slightly smaller since prior exam in 2012.  Last US thyroid in 04/2024 showed decrease in size of the thyroid nodules.  Denies any compressive neck symptoms today.  Monitor ultrasound thyroid periodically.          3. Hypothyroidism (acquired)  Assessment & Plan:    Currently on levothyroxine 25 mcg daily, started 09/2023.  Clinically and biochemically euthyroid.  Monitor thyroid function test.      Orders:  -     TSH; Future     Follow up in about 1 year (around 4/23/2026).       Nishi Solano MD         No

## 2025-04-23 ENCOUNTER — OFFICE VISIT (OUTPATIENT)
Dept: ENDOCRINOLOGY | Facility: CLINIC | Age: 75
End: 2025-04-23
Payer: MEDICARE

## 2025-04-23 VITALS — SYSTOLIC BLOOD PRESSURE: 131 MMHG | DIASTOLIC BLOOD PRESSURE: 68 MMHG | HEART RATE: 82 BPM

## 2025-04-23 DIAGNOSIS — E03.9 HYPOTHYROIDISM (ACQUIRED): ICD-10-CM

## 2025-04-23 DIAGNOSIS — E04.2 NONTOXIC MULTINODULAR GOITER: ICD-10-CM

## 2025-04-23 DIAGNOSIS — M81.0 AGE-RELATED OSTEOPOROSIS WITHOUT CURRENT PATHOLOGICAL FRACTURE: Primary | ICD-10-CM

## 2025-04-23 PROCEDURE — 1160F RVW MEDS BY RX/DR IN RCRD: CPT | Mod: CPTII,S$GLB,, | Performed by: INTERNAL MEDICINE

## 2025-04-23 PROCEDURE — 3078F DIAST BP <80 MM HG: CPT | Mod: CPTII,S$GLB,, | Performed by: INTERNAL MEDICINE

## 2025-04-23 PROCEDURE — 99214 OFFICE O/P EST MOD 30 MIN: CPT | Mod: S$GLB,,, | Performed by: INTERNAL MEDICINE

## 2025-04-23 PROCEDURE — 99999 PR PBB SHADOW E&M-EST. PATIENT-LVL IV: CPT | Mod: PBBFAC,,, | Performed by: INTERNAL MEDICINE

## 2025-04-23 PROCEDURE — 3288F FALL RISK ASSESSMENT DOCD: CPT | Mod: CPTII,S$GLB,, | Performed by: INTERNAL MEDICINE

## 2025-04-23 PROCEDURE — 3075F SYST BP GE 130 - 139MM HG: CPT | Mod: CPTII,S$GLB,, | Performed by: INTERNAL MEDICINE

## 2025-04-23 PROCEDURE — 1159F MED LIST DOCD IN RCRD: CPT | Mod: CPTII,S$GLB,, | Performed by: INTERNAL MEDICINE

## 2025-04-23 PROCEDURE — 1101F PT FALLS ASSESS-DOCD LE1/YR: CPT | Mod: CPTII,S$GLB,, | Performed by: INTERNAL MEDICINE

## 2025-04-23 PROCEDURE — 1126F AMNT PAIN NOTED NONE PRSNT: CPT | Mod: CPTII,S$GLB,, | Performed by: INTERNAL MEDICINE

## 2025-05-02 NOTE — PATIENT INSTRUCTIONS
Continue alendronate (Fosamax) 70 milligrams one tablet each WEEK. Please take alendronate on an empty stomach with a full glass of water. After your weekly dose of alendronate, please do not eat, drink or take other medications for one hour and stay upright for at least 60 minutes in order to ensure passage of the pill to the intestine and adequate absorption.    On the days you take fosamax, take your calcium supplements in the afternoon instead of the morning. Read the links below for more information regarding side-effects and how to take the medication.    Medication guide: https://[email or web address]/product/usa/pi_circulars/f/fosamax/fosamax_mg.pdf       Take calcium total 1200 mg a day from diet and supplements.   Continue your vitamin D daily.  Avoid bending forwards at the waist and deep twisting.  Continue weight bearing exercises like walking and do light weights.  Take fall precautions.  Call if you have any new fractures.   Get regular dental visit and let your dentist know that you are on osteoporosis medication.        Please see links below for further information:    Exercise/safe movement:  https://www.[email or web address].org/patients/treatment/exercisesafe-movement/    Calcium content of common foods:  [email or web address]://[email or web address]/education/calcium-content-of-foods      DXA scan due in 03/2026.

## 2025-05-02 NOTE — ASSESSMENT & PLAN NOTE
FNA biopsy in 2013 of the right thyroid nodule and left thyroid nodule was benign.  US thyroid in 2016 showed nodule slightly smaller since prior exam in 2012.  Last US thyroid in 04/2024 showed decrease in size of the thyroid nodules.  Denies any compressive neck symptoms today.  Monitor ultrasound thyroid periodically.

## 2025-05-02 NOTE — ASSESSMENT & PLAN NOTE
On Fosamax 70 mg weekly since 02/2022.  Denies history of fragility fractures.   Recent DXA scan showed stable BMD.  Discussed treatment for up to 5 years if BMD stable and no fragility fractures.  Prolia not covered by her insurance and did not want to start Reclast due to concerns for side effects.    Discussed her risk of fractures and benefits of osteoporosis treatment  Side effects including osteonecrosis of the jaw and atypical femoral fractures discussed with patient.    Encouraged safe movements and fall precautions  Continue routine dental visits  Instructed on Calcium and vitamin D   Call if any new fractures.  DXA scan due in 03/2026.

## 2025-05-22 ENCOUNTER — TELEPHONE (OUTPATIENT)
Dept: DERMATOLOGY | Facility: CLINIC | Age: 75
End: 2025-05-22
Payer: MEDICARE

## 2025-05-22 ENCOUNTER — PATIENT MESSAGE (OUTPATIENT)
Dept: DERMATOLOGY | Facility: CLINIC | Age: 75
End: 2025-05-22
Payer: MEDICARE

## 2025-05-22 NOTE — TELEPHONE ENCOUNTER
"----- Message from Gisel sent at 5/22/2025  8:37 AM CDT -----  Regarding: pt adivce  Contact: [phone]  .Name Of Caller: Self Contact Preference?: [phone] What is the nature of the call?: in reference ot speaking to someone in office , psl call  Additional Notes:"Thank you for all that you do for our patients"  "

## 2025-05-26 ENCOUNTER — OFFICE VISIT (OUTPATIENT)
Facility: CLINIC | Age: 75
End: 2025-05-26
Payer: MEDICARE

## 2025-05-26 VITALS
SYSTOLIC BLOOD PRESSURE: 132 MMHG | HEIGHT: 62 IN | DIASTOLIC BLOOD PRESSURE: 81 MMHG | BODY MASS INDEX: 27.22 KG/M2 | HEART RATE: 78 BPM | WEIGHT: 147.94 LBS

## 2025-05-26 DIAGNOSIS — R90.82 WHITE MATTER DISEASE, UNSPECIFIED: ICD-10-CM

## 2025-05-26 DIAGNOSIS — G51.4 FACIAL TWITCHING: ICD-10-CM

## 2025-05-26 DIAGNOSIS — G51.32 CLONIC HEMIFACIAL SPASM, LEFT: Primary | ICD-10-CM

## 2025-05-26 DIAGNOSIS — Z00.00 ENCOUNTER FOR MEDICARE ANNUAL WELLNESS EXAM: ICD-10-CM

## 2025-05-26 PROCEDURE — 1159F MED LIST DOCD IN RCRD: CPT | Mod: CPTII,S$GLB,, | Performed by: PSYCHIATRY & NEUROLOGY

## 2025-05-26 PROCEDURE — 1126F AMNT PAIN NOTED NONE PRSNT: CPT | Mod: CPTII,S$GLB,, | Performed by: PSYCHIATRY & NEUROLOGY

## 2025-05-26 PROCEDURE — 1101F PT FALLS ASSESS-DOCD LE1/YR: CPT | Mod: CPTII,S$GLB,, | Performed by: PSYCHIATRY & NEUROLOGY

## 2025-05-26 PROCEDURE — 99205 OFFICE O/P NEW HI 60 MIN: CPT | Mod: S$GLB,,, | Performed by: PSYCHIATRY & NEUROLOGY

## 2025-05-26 PROCEDURE — 99999 PR PBB SHADOW E&M-EST. PATIENT-LVL IV: CPT | Mod: PBBFAC,,, | Performed by: PSYCHIATRY & NEUROLOGY

## 2025-05-26 PROCEDURE — 3288F FALL RISK ASSESSMENT DOCD: CPT | Mod: CPTII,S$GLB,, | Performed by: PSYCHIATRY & NEUROLOGY

## 2025-05-26 PROCEDURE — 3075F SYST BP GE 130 - 139MM HG: CPT | Mod: CPTII,S$GLB,, | Performed by: PSYCHIATRY & NEUROLOGY

## 2025-05-26 PROCEDURE — 3079F DIAST BP 80-89 MM HG: CPT | Mod: CPTII,S$GLB,, | Performed by: PSYCHIATRY & NEUROLOGY

## 2025-05-26 RX ORDER — ALPRAZOLAM 0.25 MG/1
0.25 TABLET ORAL 3 TIMES DAILY PRN
Qty: 9 TABLET | Refills: 0 | Status: SHIPPED | OUTPATIENT
Start: 2025-05-26 | End: 2025-05-29

## 2025-05-26 NOTE — PROGRESS NOTES
"MOVEMENT DISORDERS CLINIC NEW CONSULT NOTE    PCP/Referring Provider:   Self, Aaareferral  No address on file  Date of Service: 5/26/2025    Chief Complaint: Spasms of the face    HPI: Tomasa Reed is a 74 y.o. female with PMH most notable for goiter and hypothyroidism, presenting for evaluation.    Twitching started about 9 months to 1 year ago. Left side of face feels tight and "stuffy." Spasms occur intermittently and unpredictably but she notices them on a daily basis.     Denies headaches, double vision, facial weakness, slurring, dysphagia.     She has tried some supplements for "stress" but these have been ineffective. She had a CT of the head and the sinuses that were essentially normal.    She has had some alteration of thyroid but recent TSH was normal.     Current Medications:  Encounter Medications[1]    Past Medical History:  Problem List[2]    Past Surgical History:  Past Surgical History:   Procedure Laterality Date    BREAST BIOPSY      CATARACT EXTRACTION Right 03/02/2020    cataracts      B/L at [facility]    COLONOSCOPY N/A 12/14/2016    Procedure: COLONOSCOPY;  Surgeon: Wicho Painting MD;  Location: [address]);  Service: Endoscopy;  Laterality: N/A;    COLONOSCOPY N/A 1/14/2020    Procedure: COLONOSCOPY;  Surgeon: NANCY Maher MD;  Location: [facility] ([address] [place] [address]);  Service: Endoscopy;  Laterality: N/A;  1/8/20 left vm to confirm appt-rb    COLONOSCOPY, SCREENING, HIGH RISK PATIENT N/A 3/6/2025    Procedure: COLONOSCOPY, SCREENING, HIGH RISK PATIENT;  Surgeon: Jose Amaya MD;  Location: [facility] ENDOSCOPY;  Service: Endoscopy;  Laterality: N/A;  1/7 ref by Dr. Rosanna Ng, Suprep, patient states on the last colonoscopy that she had it was diffcult for the doctor to go through her intestines even with the pediatric scope, portal. kaveh  2/6/25- r/s, has prep, instr to portal. D    FINGER SURGERY Right 2010    index finger fused    MOLE REMOVAL      " "TONSILLECTOMY, ADENOIDECTOMY      TOTAL ABDOMINAL HYSTERECTOMY      TOTAL KNEE ARTHROPLASTY Left 4/12/2023    Procedure: ARTHROPLASTY, KNEE, TOTAL;  Surgeon: REMY Zuñiga MD;  Location: [facility];  Service: Orthopedics;  Laterality: Left;  regional w/ catheter  spinal  adductor  pericapsular injection 0.25 ropivacaine       Social:  Social History[3]    Family History:  Family History   Problem Relation Name Age of Onset    Osteoarthritis Mother          s/p hip fx    Diabetes Mother      Cancer Mother          colon    Thyroid disease Mother      Kidney failure Mother          d.93 s/p PAD procedure    Cataracts Mother      Macular degeneration Mother      Heart failure Father      Stroke Father          d.97 complications    Hypertension Father      Cataracts Father      Fibromyalgia Sister Karla     Glaucoma Sister Rosalba         dry eyes, s/p duct surgery    Other Sister Rosalba         acoustic tumor,s/p removal & vertigo resolved, +cochlear implant    Other Daughter Merry         inflammation, better off red meat    Chronic back pain Daughter Merry     GI problems Daughter Merry         liver w/up and on cholestyramine    Breast cancer Daughter Merry 52        s/p mastectomy, ductal, tx hormonal    Diabetes type II Daughter Merry     Obesity Daughter Magui     Melanoma Neg Hx      Amblyopia Neg Hx      Blindness Neg Hx      Retinal detachment Neg Hx      Strabismus Neg Hx         PHYSICAL:  /81 (BP Location: Left arm, Patient Position: Sitting)   Pulse 78   Ht 5' 2" (1.575 m)   Wt 67.1 kg (147 lb 14.9 oz)   BMI 27.06 kg/m²     General Medical Examination:  General: Good hygiene, appropriate appearance.  HEENT: Normocephalic, atraumatic.   Neck: Supple.   Chest: Unlabored breathing.   Ext: No clubbing, cyanosis, or edema.     Mental Status:  Mood/Affect: Appropriate/congruent.  Level of consciousness: Awake, alert.  Orientation: Oriented to person, place, time and situation.  Language: Normal " "fluency, able to name, repeat, and follow complex multi-step commands    Cranial nerves:  I: Not tested  II: VFF to counting  III, IV, VI: EOMI with conjugate gaze and no nystagmus on end gaze  V: Facial sensation intact and symmetric over the bilateral V1-V3  VII: Facial muscle activation intact and symmetric over the bilateral upper and lower face  VIII: Hearing intact iand symmetrical to finger rub in bilateral ears  IX, X, XII: tongue and palate midline with symmetric movement; no atrophy or fasiculations  X: SCMs and shoulder shrug full strength b/l and symmetric    Motor:   -UE: 5/5 deltoids; 5/5 biceps, triceps; 5/5 wrist flexors, extensors; 5/5 interosseous; 5/5   -LEs: 5/5 hip flexion, extension; 5/5 knee flexion, extension; 5/5 ankle flexion, extension  Fasciculations/Atrophy: none  Tone: normal  Bradykinesia: none  Tremor: mild intention tremor on left  Clear left sided spasm of the orbicularis oculi, upper and lower as well as left zygomaticus    DTRs:  ? Biceps Triceps Brachioradialis Knee Ankle   Left 2+ 2+ 2+ 2+ 2+   Right 2+ 2+ 2+ 2+ 2+   -Plantar reflex: upgoing on left  -Shine's reflex: positive on left    Gait:  -Arises from chair without use of hands.  - Normal narrow based gait    Laboratory Data:    Lab Results   Component Value Date    TSH 0.681 04/17/2025    FREET4 1.01 04/08/2025     No results found for: "[id number]"      Imaging:    Ct Head: per my personal read, +/- some density changes in the emely          Assessment//Plan:   Problem List Items Addressed This Visit    None  Visit Diagnoses         Clonic hemifacial spasm, left    -  Primary    Relevant Medications    ALPRAZolam (XANAX) 0.25 MG tablet    Other Relevant Orders    MRI Brain Without Contrast    MRA Brain without contrast      Facial twitching          White matter disease, unspecified        Relevant Orders    MRA Brain without contrast          Patient with ~ 1 year of facial twitching. Clear spasm of the left face on " exam. No red flag symptoms, but does have some mild UMN features on exam. Will obtain MRI/MRA to further investigate for underlying cause.    Discussed diagnosis at length.     Botox plan:  Left orbicularis oculi: 2.5 units x 4 sites, upper and lower  Left zygomaticus 5 units    Total 15 units     1. Left Hemifacial Spasm:  The patient presents with involuntary, intermittent contractions of the left orbicularis oculi and lower facial muscles, consistent with hemifacial spasm. MRI brain is being ordered to evaluate for potential neurovascular compression or structural lesions. Given the impact on function and quality of life, I am initiating botulinum toxin injection therapy targeting affected facial muscle groups. The decision to begin chemodenervation reflects moderate-to-high risk management, requiring informed consent, procedural planning, and patient education on potential side effects (e.g., ptosis, facial asymmetry).  2. Left-Sided Upper Motor Neuron Signs (Shine's, Babinski):  The patient exhibits asymmetric upper motor neuron signs (Shine's sign and upgoing plantar response on the left), raising concern for underlying corticospinal tract involvement. Further justifying MRI.   3. Mild Left Intention Tremor:  A low-amplitude intention tremor on the left may reflect cerebellar involvement or represent an early essential tremor phenotype. We will defer treatment at this time and reassess following imaging and further diagnostic clarification.  4. Risk Stratification and Multimodal Management:  This patient requires complex diagnostic imaging, initiation of procedural therapy, and potentially coordination with neurosurgery depending on MRI findings. The encounter involved initiation of a high-risk medication/procedure (Botox), evaluation of multiple possible neurologic pathologies, and formulation of a multidisciplinary diagnostic and therapeutic plan.  5. Counseling/Time Spent:  I spent 65 minutes total on  the date of service, including record review, detailed neurologic examination, patient and caregiver counseling regarding hemifacial spasm, Botox injection consent and planning, ordering of two MRIs, and documentation.    To Whom It May Concern,    I am the treating neurologist for this patient and this letter accompanies any other notes requested from clinical evaluations.  It is my understanding that Prior Authorization for this medically necessary injection procedure may require additional information as provided below.  This is NOT being used for cosmetic purposes.     This patient is NOT a good candidate for oral medications alone for hemifacial spasm given the potential side effects of antispasmodics (confusion, fatigue, sleepiness, weakness) and benzodiazepiness (age, falls,confusion).      This patient is an excellent candidate for botulinum toxin injections.  Such injections are medically necessary.    For hemifacial spasm, we will plan to inject up to 15 units each into the facial musculature.    Patient will need up to 14 units for the first injection.  Ongoing botulinum toxin injections are titratable to patient need, efficacy, and side effect profile.    Please contact my team with any additional questions.               Juan J Bansal MD  [facility]  Department of Neurology  Movement Disorders            [1]   Outpatient Encounter Medications as of 5/26/2025   Medication Sig Dispense Refill    acetaminophen (TYLENOL) 650 MG TbSR Take 650 mg by mouth every 8 (eight) hours.      alendronate (FOSAMAX) 70 MG tablet Take 1 tablet (70 mg total) by mouth every 7 days. 12 tablet 3    busPIRone (BUSPAR) 5 MG Tab Take 1 tablet (5 mg total) by mouth 3 (three) times daily as needed (anxiety). 90 tablet 0    cetirizine 10 mg Cap PRN      dietary supplement Pack Take 1 tablet by mouth 2 (two) times daily.      fluticasone propionate (FLONASE) 50 mcg/actuation nasal spray 1 spray (50 mcg total) by Each  Nostril route once daily. 16 g 2    glucosam/chondro/herb 149/hyal (GLUCOS CHOND CPLX ADVANCED ORAL) Take by mouth.      glycerin adult suppository as needed.       levothyroxine (SYNTHROID) 25 MCG tablet TAKE 1 TABLET BY MOUTH BEFORE BREAKFAST. 90 tablet 3    magnesium oxide (MAG-OX) 400 mg (241.3 mg magnesium) tablet Take 400 mg by mouth once daily.      MISCELLANEOUS MEDICAL SUPPL[place] [facility] [place]. EYE ITCH RELIEF 0.25 EYE DROPS      mometasone (NASONEX) 50 mcg/actuation nasal spray 2 sprays by Nasal route once daily.      OCEAN NASAL 0.65 % nasal spray as needed.       rosuvastatin (CRESTOR) 10 MG tablet TAKE 1 TABLET BY MOUTH EVERY DAY 90 tablet 3    TOBRADEX 0.3-0.1 % Oint APPLY A SMALL AMOUNT INTO AFFECTED EYE ONCE A DAY      TUMS 200 mg calcium (500 mg) chewable tablet 1 tablet as needed.       ALPRAZolam (XANAX) 0.25 MG tablet Take 1 tablet (0.25 mg total) by mouth 3 (three) times daily as needed for Anxiety (take 30 min prior to MRI and again directly before if needed). 9 tablet 0    azelastine (ASTELIN) 137 mcg (0.1 %) nasal spray 1 spray (137 mcg total) by Nasal route 2 (two) times daily. 30 mL 11     Facility-Administered Encounter Medications as of 5/26/2025   Medication Dose Route Frequency Provider Last Rate Last Admin    ropivacaine 0.2% Nimbus PainPRO Pump infusion 500 ML   Perineural Continuous Jericho Alcantar MD   New Bag at 04/12/23 0942   [2]   Patient Active Problem List  Diagnosis    History of melanoma    Nontoxic multinodular goiter    HLD (hyperlipidemia)    Neutropenia    History of hepatitis C; S/p RX with SVR 24 documented 09/2017    Sensorineural hearing loss (SNHL) of both ears    Vitamin D deficiency    Overweight (BMI 25.0-29.9)    Pain in left knee    Primary osteoarthritis of left knee    Snoring    Elevated BP without diagnosis of hypertension    Decreased ROM of left knee    Age-related osteoporosis without current pathological fracture    Hypothyroidism (acquired)    Does use  hearing aid    History of total knee replacement, left    Tingling in extremities   [3]   Social History  Socioeconomic History    Marital status:    Occupational History    Occupation: retired     Employer: [employer]   Tobacco Use    Smoking status: Never    Smokeless tobacco: Never   Substance and Sexual Activity    Alcohol use: Yes     Comment: socially    Drug use: Not Currently    Sexual activity: Yes     Partners: Male   Social History Narrative        Retired  of an elementary school    Has 2 daughters, no thyroid problems    Helping 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 She has severe DJD and is diabetic.     Dad d. 97 status post stroke, CHF. Dependent, bed to w/c.      Significant memory decline, usually doesn't recognize family        Two sisters in good health.         SCREENING TESTS:      Social Drivers of Health     Financial Resource Strain: Low Risk  (6/10/2024)    Overall Financial Resource Strain (CARDIA)     Difficulty of Paying Living Expenses: Not very hard   Food Insecurity: No Food Insecurity (6/10/2024)    Hunger Vital Sign     Worried About Running Out of Food in the Last Year: Never true     Ran Out of Food in the Last Year: Never true   Transportation Needs: No Transportation Needs (6/10/2024)    PRAPARE - Transportation     Lack of Transportation (Medical): No     Lack of Transportation (Non-Medical): No   Physical Activity: Unknown (6/10/2024)    Exercise Vital Sign     Days of Exercise per Week: Patient declined     Minutes of Exercise per Session: 20 min   Stress: No Stress Concern Present (6/10/2024)    [employer] - Occupational Stress Questionnaire     Feeling of Stress : Only a little   Housing Stability: Unknown (6/10/2024)    Housing Stability Vital Sign     Unable to Pay for Housing in  the Last Year: No     Homeless in the Last Year: No

## 2025-05-29 ENCOUNTER — TELEPHONE (OUTPATIENT)
Facility: CLINIC | Age: 75
End: 2025-05-29
Payer: MEDICARE

## 2025-05-29 NOTE — TELEPHONE ENCOUNTER
----- Message from Juan J Bansal MD sent at 5/26/2025 10:13 AM CDT -----  Botox initiation, order and PA placed

## 2025-06-10 ENCOUNTER — OFFICE VISIT (OUTPATIENT)
Dept: DERMATOLOGY | Facility: CLINIC | Age: 75
End: 2025-06-10
Payer: MEDICARE

## 2025-06-10 DIAGNOSIS — L57.0 AK (ACTINIC KERATOSIS): ICD-10-CM

## 2025-06-10 DIAGNOSIS — D18.01 CHERRY ANGIOMA: ICD-10-CM

## 2025-06-10 DIAGNOSIS — D22.9 MULTIPLE BENIGN NEVI: ICD-10-CM

## 2025-06-10 DIAGNOSIS — L81.4 LENTIGINES: ICD-10-CM

## 2025-06-10 DIAGNOSIS — Z12.83 SKIN CANCER SCREENING: ICD-10-CM

## 2025-06-10 DIAGNOSIS — L23.9 ALLERGIC CONTACT DERMATITIS, UNSPECIFIED TRIGGER: Primary | ICD-10-CM

## 2025-06-10 DIAGNOSIS — L72.0 MILIA: ICD-10-CM

## 2025-06-10 DIAGNOSIS — L82.1 SK (SEBORRHEIC KERATOSIS): ICD-10-CM

## 2025-06-10 PROCEDURE — 1126F AMNT PAIN NOTED NONE PRSNT: CPT | Mod: CPTII,S$GLB,, | Performed by: DERMATOLOGY

## 2025-06-10 PROCEDURE — 1160F RVW MEDS BY RX/DR IN RCRD: CPT | Mod: CPTII,S$GLB,, | Performed by: DERMATOLOGY

## 2025-06-10 PROCEDURE — 99213 OFFICE O/P EST LOW 20 MIN: CPT | Mod: 25,S$GLB,, | Performed by: DERMATOLOGY

## 2025-06-10 PROCEDURE — 3288F FALL RISK ASSESSMENT DOCD: CPT | Mod: CPTII,S$GLB,, | Performed by: DERMATOLOGY

## 2025-06-10 PROCEDURE — 1101F PT FALLS ASSESS-DOCD LE1/YR: CPT | Mod: CPTII,S$GLB,, | Performed by: DERMATOLOGY

## 2025-06-10 PROCEDURE — 99999 PR PBB SHADOW E&M-EST. PATIENT-LVL III: CPT | Mod: PBBFAC,,, | Performed by: DERMATOLOGY

## 2025-06-10 PROCEDURE — 17000 DESTRUCT PREMALG LESION: CPT | Mod: S$GLB,,, | Performed by: DERMATOLOGY

## 2025-06-10 PROCEDURE — 1159F MED LIST DOCD IN RCRD: CPT | Mod: CPTII,S$GLB,, | Performed by: DERMATOLOGY

## 2025-06-10 RX ORDER — BETAMETHASONE VALERATE 1 MG/G
CREAM TOPICAL 2 TIMES DAILY
Qty: 45 G | Refills: 2 | Status: SHIPPED | OUTPATIENT
Start: 2025-06-10

## 2025-06-10 NOTE — PROGRESS NOTES
Subjective:      Patient ID:  Tomasa Reed is a 75 y.o. female who presents for   Chief Complaint   Patient presents with    Skin Check     Patient here for Total Body Skin Exam    Last seen by dermatologist:06/10/2024    yes - personal history of atypical moles removed  yes - personal history of MM   none - family history of MM  yes - childhood blistering sunburns  none - tanning bed use  none - personal history of NMSC    Patient with new area of concern:   Location: right cheek, itchy  Previous treatments: hydrocortisone cream with improvement    Pt had a melanoma in situ removed from her R chest in 10/2011 by Dr. Shaw. Also had a mildly atypical nevus biopsied from L chest in 2013.        Review of Systems   Constitutional:  Negative for fever and chills.   Respiratory:  Negative for cough and shortness of breath.    Gastrointestinal:  Negative for nausea and vomiting.   Musculoskeletal:  Negative for joint swelling and arthralgias.   Skin:  Negative for daily sunscreen use, activity-related sunscreen use, recent sunburn and wears hat.   All other systems reviewed and are negative.  Hematologic/Lymphatic: Does not bruise/bleed easily.       Objective:   Physical Exam   Constitutional: She appears well-developed and well-nourished. No distress.   Musculoskeletal: Lymphadenopathy:      Cervical: No cervical adenopathy.      Upper Body:   No axillary adenopathy present.No supraclavicular adenopathy is present.      Lower Body:   No inguinal adenopathy.    Lymphadenopathy: No supraclavicular adenopathy is present.     She has no cervical adenopathy.     She has no axillary adenopathy.     She has no inguinal adenopathy.   Neurological: She is alert and oriented to person, place, and time. She is not disoriented.   Psychiatric: She has a normal mood and affect.   Skin:   Areas Examined (abnormalities noted in diagram):   Scalp / Hair Palpated and Inspected  Head / Face Inspection Performed  Neck  Inspection Performed  Chest / Axilla Inspection Performed  Abdomen Inspection Performed  Genitals / Buttocks / Groin Inspection Performed  Back Inspection Performed  RUE Inspected  LUE Inspection Performed  RLE Inspected  LLE Inspection Performed  Nails and Digits Inspection Performed  Gland Inspection Performed                 Diagram Legend     Erythematous scaling macule/papule c/w actinic keratosis       Vascular papule c/w angioma      Pigmented verrucoid papule/plaque c/w seborrheic keratosis      Yellow umbilicated papule c/w sebaceous hyperplasia      Irregularly shaped tan macule c/w lentigo     1-2 mm smooth white papules consistent with Milia      Movable subcutaneous cyst with punctum c/w epidermal inclusion cyst      Subcutaneous movable cyst c/w pilar cyst      Firm pink to brown papule c/w dermatofibroma      Pedunculated fleshy papule(s) c/w skin tag(s)      Evenly pigmented macule c/w junctional nevus     Mildly variegated pigmented, slightly irregular-bordered macule c/w mildly atypical nevus      Flesh colored to evenly pigmented papule c/w intradermal nevus       Pink pearly papule/plaque c/w basal cell carcinoma      Erythematous hyperkeratotic cursted plaque c/w SCC      Surgical scar with no sign of skin cancer recurrence      Open and closed comedones      Inflammatory papules and pustules      Verrucoid papule consistent consistent with wart     Erythematous eczematous patches and plaques     Dystrophic onycholytic nail with subungual debris c/w onychomycosis     Umbilicated papule    Erythematous-base heme-crusted tan verrucoid plaque consistent with inflamed seborrheic keratosis     Erythematous Silvery Scaling Plaque c/w Psoriasis     See annotation      Assessment / Plan:        Allergic contact dermatitis, unspecified trigger  -     betamethasone valerate 0.1% (VALISONE) 0.1 % Crea; Apply topically 2 (two) times daily. x 1-2 wks then prn flares only  Dispense: 45 g; Refill: 2  You have  been prescribed a topical steroid. There are possible side effects from overuse of topical steroids, including thinning of skin, lightening of skin, easy tearing/bruising of skin, stretch marks, etc. It is best to only use it when it's needed, preferably for no more than 2 weeks at a time to the same area, and to take breaks from the topical steroid, especially if any of the above side effects are noticed.    AK (actinic keratosis)  Cryosurgery Procedure Note    Verbal consent from the patient is obtained including, but not limited to, risk of hypopigmentation/hyperpigmentation, scar, recurrence of lesion. The patient is aware of the precancerous quality and need for treatment of these lesions. Liquid nitrogen cryosurgery is applied to the 1 actinic keratosis, as detailed in the physical exam, to produce a freeze injury. The patient is aware that blisters may form and is instructed on wound care with gentle cleansing and use of vaseline ointment to keep moist until healed. The patient is supplied a handout on cryosurgery and is instructed to call if lesions do not completely resolve.  '  Lentigines  These are benign sun spots which should be monitored for changes. Patient instructed in importance of daily broad spectrum sunscreen use with spf at least 30. Sun avoidance and topical protection/protective clothing discussed.    Multiple benign nevi  Benign-appearing nevi present on exam today. Reassurance provided. Periodically examine moles and return to clinic if any moles change or become symptomatic (bleeding, itching, pain, etc).    SK (seborrheic keratosis)  These are benign inherited growths without a malignant potential. Reassurance given to patient. No treatment is necessary.   Treatment of benign, asymptomatic lesions may be considered cosmetic.  Warned about risk of hypo- or hyperpigmentation with treatment and risk of recurrence.    Cherry angioma  This is a benign vascular lesion. Reassurance given. No  treatment required. Treatment of benign, asymptomatic lesions may be considered cosmetic.    Donatoia  This is a benign cyst. Reassurance provided. No treatment is necessary unless it is symptomatic. These may resolve on their own.    Skin cancer screening  Total body skin examination performed today including at least 12 points as noted in physical examination. No lesions suspicious for malignancy noted.  Patient instructed in importance of daily broad spectrum sunscreen use with spf at least 30. Sun avoidance and topical protection/protective clothing discussed.      Follow up in about 4 weeks (around 7/8/2025) for skin check or sooner for any concerns. To re-check rash and lesion on lip treated with cryo today

## 2025-06-10 NOTE — PATIENT INSTRUCTIONS
CRYOSURGERY      Your doctor has used a method called cryosurgery to treat your skin condition. Cryosurgery refers to the use of very cold substances to treat a variety of skin conditions such as warts, pre-skin cancers, molluscum contagiosum, sun spots, and several benign growths. The substance we use in cryosurgery is liquid nitrogen and is so cold (-195 degrees Celsius) that is burns when administered.     Following treatment in the office, the skin may immediately burn and become red. You may find the area around the lesion is affected as well. It is sometimes necessary to treat not only the lesion, but a small area of the surrounding normal skin to achieve a good response.     A blister, and even a blood filled blister, may form after treatment.   This is a normal response. If the blister is painful, it is acceptable to sterilize a needle and with rubbing alcohol and gently pop the blister. It is important that you gently wash the area with soap and warm water as the blister fluid may contain wart virus if a wart was treated. Do no remove the roof of the blister.     The area treated can take anywhere from 1-3 weeks to heal. Healing time depends on the kind skin lesion treated, the location, and how aggressively the lesion was treated. It is recommended that the areas treated are covered with Vaseline or bacitracin ointment and a band-aid. If a band-aid is not practical, just ointment applied several times per day will do. Keeping these areas moist will speed the healing time.    Treatment with liquid nitrogen can leave a scar. In dark skin, it may be a light or dark scar, in light skin it may be a white or pink scar. These will generally fade with time, but may never go away completely.     If you have any concerns after your treatment, please feel free to call the office.       [address] [place] [address]/ [phone] FAX/ [email or web address] Sun Protection      The [facility] would like to remind you of the importance of sun protection all year round and particularly during the summer when the suns rays are the strongest. It has been proven that both acute and chronic sun exposure damages our cells and leads to skin cancer. Beyond skin cancer, the sun causes 90% of the symptoms of premature skin aging, including wrinkles, lentigines (brown spots), and thin, easily bruised skin. Proper sun protection can help prevent these unwanted conditions.    Many patients report that they dont go in the sun. It has been shown that the average person receives 18 hours of incidental sun exposure per week during activities such as walking through parking lots, driving, or sitting next to windows. This accumulates to several bad sunburns per year!    In choosing sunscreen, you want one that protects against both UVA and UVB rays (broad spectrum). It is recommended that you use one of SPF 30 or higher. It is important to apply the sunscreen about 20 minutes prior to sun exposure. Most sunscreens are chemical sunscreens and a reaction must take place in the skin so that they are effective. If they are applied and then you are immediately exposed to the sun or start sweating, this reaction has not had time to take place and you are therefore unprotected. Sunscreen needs to be reapplied every 2 hours if you are participating in water sports or sweating. We recommend Elta MD or CeraVe sunscreens for daily use; however there are many options and it is most important for you to find one that you will use on a consistent basis.    If you have sensitive skin, you may do best with a sunscreen that contains only physical blockers in the active ingredient section. The only physical blockers available in the [place] currently are titanium dioxide or zinc oxide. These are typically thicker and harder to apply, however they afford very good protection. Neutrogena Sensitive Skin, Blue Lizard  Sensitive Skin (pink top) or Neutrogena Pure and Free are popular ones.     Aside from sunscreen, clothes with UV protection (UPF), wide brimmed hats, and sunglasses are other means of sun protection that we recommend.      Based on a recent study (6/2021) and out of an abundance of caution, we are recommending that you AVOID the following sunscreens as they may contain the carcinogen, benzene:    Spray and gel sunscreens  Any [employer] or [employer] brands as well as Max Block and TopCare brands   Neutrogena Ultra Sheer Dry-touch Water Resistant Sunscreen LOTION SPF 70   Neutrogena Sheer Zinc Dry-touch Face Sunscreen LOTION SPF 50   5.   Aveeno Baby Continuous Protection Sensitive Skin Sunscreen LOTION - Broad Spectrum SPF 50    Please note that Benzene is not an ingredient or the degradation product of any ingredient in any sunscreen. This study suggested that the findings are a result of contamination in the manufacturing process. At this point, we don't know how effectively Benzene gets through the skin, if it gets absorbed systemically, and what effects it may have.     We do know that ultraviolet radiation is a well-established carcinogen. Please use daily sun protection/avoidance and use of at least SPF 30, broad-spectrum sunscreen not listed above.                       [facility]  [facility] - DERMATOLOGY [address]  [facility] [address]  Dept: [phone]  Dept Fax: [phone]
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
## 2025-06-26 ENCOUNTER — HOSPITAL ENCOUNTER (OUTPATIENT)
Dept: RADIOLOGY | Facility: HOSPITAL | Age: 75
Discharge: HOME | End: 2025-06-26
Attending: PSYCHIATRY & NEUROLOGY
Payer: MEDICARE

## 2025-06-26 DIAGNOSIS — G51.32 CLONIC HEMIFACIAL SPASM, LEFT: ICD-10-CM

## 2025-06-26 DIAGNOSIS — R90.82 WHITE MATTER DISEASE, UNSPECIFIED: ICD-10-CM

## 2025-06-26 PROCEDURE — 70544 MR ANGIOGRAPHY HEAD W/O DYE: CPT | Mod: TC

## 2025-06-26 PROCEDURE — 70551 MRI BRAIN STEM W/O DYE: CPT | Mod: TC

## 2025-07-01 ENCOUNTER — PROCEDURE VISIT (OUTPATIENT)
Facility: CLINIC | Age: 75
End: 2025-07-01
Payer: MEDICARE

## 2025-07-01 VITALS
HEART RATE: 83 BPM | WEIGHT: 148.38 LBS | DIASTOLIC BLOOD PRESSURE: 93 MMHG | SYSTOLIC BLOOD PRESSURE: 125 MMHG | HEIGHT: 62 IN | BODY MASS INDEX: 27.3 KG/M2

## 2025-07-01 DIAGNOSIS — G51.32 CLONIC HEMIFACIAL SPASM, LEFT: Primary | ICD-10-CM

## 2025-07-01 DIAGNOSIS — J30.9 ALLERGIC RHINITIS, UNSPECIFIED SEASONALITY, UNSPECIFIED TRIGGER: ICD-10-CM

## 2025-07-01 PROCEDURE — 64612 DESTROY NERVE FACE MUSCLE: CPT | Mod: LT,S$GLB,, | Performed by: PSYCHIATRY & NEUROLOGY

## 2025-07-01 PROCEDURE — 99499 UNLISTED E&M SERVICE: CPT | Mod: S$GLB,,, | Performed by: PSYCHIATRY & NEUROLOGY

## 2025-07-01 RX ORDER — AZELASTINE 1 MG/ML
1 SPRAY, METERED NASAL 2 TIMES DAILY
Qty: 90 ML | Refills: 3 | Status: SHIPPED | OUTPATIENT
Start: 2025-07-01

## 2025-07-01 NOTE — PROCEDURES
"MOVEMENT DISORDERS PROCEDURE NOTE    PCP/Referring Provider:   Juan J Bansal MD  [address] [place] [address]  Date of Service: 7/2/2025    Chief Complaint: Hemifacial spasms    HPI: Tomasa Reed is a 75 y.o. female presenting for botulinum toxin injection.       Initial:   Twitching started about 9 months to 1 year ago. Left side of face feels tight and "stuffy." Spasms occur intermittently and unpredictably but she notices them on a daily basis.      Denies headaches, double vision, facial weakness, slurring, dysphagia.      She has tried some supplements for "stress" but these have been ineffective. She had a CT of the head and the sinuses that were essentially normal.     She has had some alteration of thyroid but recent TSH was normal.      Interval:   No change, here for initial injection.     Current Medications:  Encounter Medications[1]    Past Medical History:  Problem List[2]    Past Surgical History:  Past Surgical History:   Procedure Laterality Date    BREAST BIOPSY      CATARACT EXTRACTION Right 03/02/2020    cataracts      B/L at [facility]    COLONOSCOPY N/A 12/14/2016    Procedure: COLONOSCOPY;  Surgeon: Wicho Painting MD;  Location: [address]);  Service: Endoscopy;  Laterality: N/A;    COLONOSCOPY N/A 1/14/2020    Procedure: COLONOSCOPY;  Surgeon: NANCY Maher MD;  Location: [address]);  Service: Endoscopy;  Laterality: N/A;  1/8/20 left vm to confirm appt-rb    COLONOSCOPY, SCREENING, HIGH RISK PATIENT N/A 3/6/2025    Procedure: COLONOSCOPY, SCREENING, HIGH RISK PATIENT;  Surgeon: Jose Amaya MD;  Location: [facility] ENDOSCOPY;  Service: Endoscopy;  Laterality: N/A;  [facility] patient states on the last colonoscopy that she had it was diffcult for the doctor to go through her intestines even with the pediatric scope, portal. kaveh  2/6/25- r/s, has prep, instr to portal. D    FINGER SURGERY Right " "2010    index finger fused    MOLE REMOVAL      TONSILLECTOMY, ADENOIDECTOMY      TOTAL ABDOMINAL HYSTERECTOMY      TOTAL KNEE ARTHROPLASTY Left 4/12/2023    Procedure: ARTHROPLASTY, KNEE, TOTAL;  Surgeon: REMY Zuñiga MD;  Location: [facility];  Service: Orthopedics;  Laterality: Left;  regional w/ catheter  spinal  adductor  pericapsular injection 0.25 ropivacaine       Social:  Social History[3]    Family History:  Family History   Problem Relation Name Age of Onset    Osteoarthritis Mother          s/p hip fx    Diabetes Mother      Cancer Mother          colon    Thyroid disease Mother      Kidney failure Mother          d.93 s/p PAD procedure    Cataracts Mother      Macular degeneration Mother      Heart failure Father      Stroke Father          d.97 complications    Hypertension Father      Cataracts Father      Fibromyalgia Sister Karla     Glaucoma Sister Rosalba         dry eyes, s/p duct surgery    Other Sister Rosalba         acoustic tumor,s/p removal & vertigo resolved, +cochlear implant    Other Daughter Merry         inflammation, better off red meat    Chronic back pain Daughter Merry     GI problems Daughter Merry         liver w/up and on cholestyramine    Breast cancer Daughter Merry 52        s/p mastectomy, ductal, tx hormonal    Diabetes type II Daughter Merry     Obesity Daughter Magui     Melanoma Neg Hx      Amblyopia Neg Hx      Blindness Neg Hx      Retinal detachment Neg Hx      Strabismus Neg Hx         PHYSICAL:  BP (!) 125/93 (BP Location: Right arm, Patient Position: Sitting)   Pulse 83   Ht 5' 2" (1.575 m)   Wt 67.3 kg (148 lb 5.9 oz)   BMI 27.14 kg/m²     General Medical Examination:  General: Good hygiene, appropriate appearance.  HEENT: Normocephalic, atraumatic.   Chest: Unlabored breathing.   Ext: No clubbing, cyanosis, or edema.     Clear left sided spasm of the orbicularis oculi, upper and lower as well as left zygomaticus     Laboratory Data:    Lab Results " "  Component Value Date    TSH 0.681 04/17/2025    FREET4 1.01 04/08/2025     No results found for: "[id number]"    Imaging:  Results for orders placed or performed during the hospital encounter of 06/26/25 (from the past 2160 hours)   MRI Brain Without Contrast    Impression    No acute intracranial process.    MRA shows no high-grade stenosis, occlusion or aneurysm.    No obvious vascular compression of the trigeminal nerves, noting suboptimal evaluation of these structures.  If there is persistent clinical concern, consider MRI of the brain with IAC protocol.    Electronically signed by resident: Ron Ayala  Date:    06/26/2025  Time:    15:28    Electronically signed by: Benton Prado MD  Date:    06/26/2025  Time:    17:41   Results for orders placed or performed during the hospital encounter of 06/26/25 (from the past 2160 hours)   MRA Brain without contrast    Impression    No acute intracranial process.    MRA shows no high-grade stenosis, occlusion or aneurysm.    No obvious vascular compression of the trigeminal nerves, noting suboptimal evaluation of these structures.  If there is persistent clinical concern, consider MRI of the brain with IAC protocol.    Electronically signed by resident: Ron Ayala  Date:    06/26/2025  Time:    15:28    Electronically signed by: Benton Prado MD  Date:    06/26/2025  Time:    17:41     Procedure      Patient was prepped in usual fashion with alcohol pads.   EMG was not used for this procedure (as per previous injection history)  Patient signed consent forms and we answered all questions about risks and benefits of botox injections. They agreed to the procedure.     Dilution: 1mL 0.9% normal saline to 100 units Botox    100 units of onobotulinum toxin was prepared  90 units were wasted.   Muscles and doses that were injected:       Muscle Units EMG   Orbicularis oculi (left): upper medial and upper lateral 2.5/2.5    Orbicularis oculi (left): lateral  2.5  "   Orbicularis oculi (left) lower midline 2.5                                  ? ?    ? ?      The patient tolerated the procedure well and there were no immediate complications following the procedure.     Assessment//Plan:   Problem List Items Addressed This Visit    None  Visit Diagnoses         Clonic hemifacial spasm, left    -  Primary    Relevant Medications    onabotulinumtoxina injection 100 Units (Start on 7/2/2025  8:45 AM)    Other Relevant Orders    Prior authorization Order        Patient with left hemifacial spasm. First round of botulinum today. Will continue to titrate to effect.         RTC 3 months     Juan J Bansal MD  [facility]  Department of Neurology  Movement Disorders            [1]   Outpatient Encounter Medications as of 7/1/2025   Medication Sig Dispense Refill    acetaminophen (TYLENOL) 650 MG TbSR Take 650 mg by mouth every 8 (eight) hours.      azelastine (ASTELIN) 137 mcg (0.1 %) nasal spray SPRAY 1 SPRAY BY NASAL ROUTE 2 TIMES DAILY. 90 mL 3    betamethasone valerate 0.1% (VALISONE) 0.1 % Crea Apply topically 2 (two) times daily. x 1-2 wks then prn flares only 45 g 2    busPIRone (BUSPAR) 5 MG Tab Take 1 tablet (5 mg total) by mouth 3 (three) times daily as needed (anxiety). 90 tablet 0    cetirizine 10 mg Cap PRN      dietary supplement Pack Take 1 tablet by mouth 2 (two) times daily.      fluticasone propionate (FLONASE) 50 mcg/actuation nasal spray 1 spray (50 mcg total) by Each Nostril route once daily. 16 g 2    glucosam/chondro/herb 149/hyal (GLUCOS CHOND CPLX ADVANCED ORAL) Take by mouth.      glycerin adult suppository as needed.       levothyroxine (SYNTHROID) 25 MCG tablet TAKE 1 TABLET BY MOUTH BEFORE BREAKFAST. 90 tablet 3    magnesium oxide (MAG-OX) 400 mg (241.3 mg magnesium) tablet Take 400 mg by mouth once daily.      MISCELLANEOUS MEDICAL SUPPLY MISC as needed. EYE ITCH RELIEF 0.25 EYE DROPS      mometasone (NASONEX) 50 mcg/actuation nasal spray 2 sprays by  Nasal route once daily.      OCEAN NASAL 0.65 % nasal spray as needed.       rosuvastatin (CRESTOR) 10 MG tablet TAKE 1 TABLET BY MOUTH EVERY DAY 90 tablet 3    TOBRADEX 0.3-0.1 % Oint APPLY A SMALL AMOUNT INTO AFFECTED EYE ONCE A DAY      TUMS 200 mg calcium (500 mg) chewable tablet 1 tablet as needed.       alendronate (FOSAMAX) 70 MG tablet Take 1 tablet (70 mg total) by mouth every 7 days. 12 tablet 3    ALPRAZolam (XANAX) 0.25 MG tablet Take 1 tablet (0.25 mg total) by mouth 3 (three) times daily as needed for Anxiety (take 30 min prior to MRI and again directly before if needed). 9 tablet 0    [DISCONTINUED] azelastine (ASTELIN) 137 mcg (0.1 %) nasal spray 1 spray (137 mcg total) by Nasal route 2 (two) times daily. 30 mL 11     Facility-Administered Encounter Medications as of 7/1/2025   Medication Dose Route Frequency Provider Last Rate Last Admin    onabotulinumtoxina injection 100 Units  100 Units Intramuscular 1 time in Clinic/HOD         onabotulinumtoxina injection 15 Units  15 Units Intramuscular 1 time in Clinic/HOD         ropivacaine 0.2% Nimbus PainPRO Pump infusion 500 ML   Perineural Continuous Jericho Alcantar MD   New Bag at 04/12/23 0942   [2]   Patient Active Problem List  Diagnosis    History of melanoma    Nontoxic multinodular goiter    HLD (hyperlipidemia)    Neutropenia    History of hepatitis C; S/p RX with SVR 24 documented 09/2017    Sensorineural hearing loss (SNHL) of both ears    Vitamin D deficiency    Overweight (BMI 25.0-29.9)    Pain in left knee    Primary osteoarthritis of left knee    Snoring    Elevated BP without diagnosis of hypertension    Decreased ROM of left knee    Age-related osteoporosis without current pathological fracture    Hypothyroidism (acquired)    Does use hearing aid    History of total knee replacement, left    Tingling in extremities   [3]   Social History  Socioeconomic History    Marital status:    Occupational History    Occupation: retired      Employer: [employer]   Tobacco Use    Smoking status: Never    Smokeless tobacco: Never   Substance and Sexual Activity    Alcohol use: Yes     Comment: socially    Drug use: Not Currently    Sexual activity: Yes     Partners: Male   Social History Narrative        Retired  of an elementary school    Has 2 daughters, no thyroid problems    Helping out w/ 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 She has severe DJD and is diabetic.     Dad d. 97 status post stroke, CHF. Dependent, bed to w/c.      Significant memory decline, usually doesn't recognize family        Two sisters in good health.         SCREENING TESTS:      Social Drivers of Health     Financial Resource Strain: Low Risk  (6/10/2024)    Overall Financial Resource Strain (CARDIA)     Difficulty of Paying Living Expenses: Not very hard   Food Insecurity: No Food Insecurity (6/10/2024)    Hunger Vital Sign     Worried About Running Out of Food in the Last Year: Never true     Ran Out of Food in the Last Year: Never true   Transportation Needs: No Transportation Needs (6/10/2024)    PRAPARE - Transportation     Lack of Transportation (Medical): No     Lack of Transportation (Non-Medical): No   Physical Activity: Unknown (6/10/2024)    Exercise Vital Sign     Days of Exercise per Week: Patient declined     Minutes of Exercise per Session: 20 min   Stress: No Stress Concern Present (6/10/2024)    [employer] - Occupational Stress Questionnaire     Feeling of Stress : Only a little   Housing Stability: Unknown (6/10/2024)    Housing Stability Vital Sign     Unable to Pay for Housing in the Last Year: No     Homeless in the Last Year: No

## 2025-07-02 ENCOUNTER — OFFICE VISIT (OUTPATIENT)
Dept: DERMATOLOGY | Facility: CLINIC | Age: 75
End: 2025-07-02
Payer: MEDICARE

## 2025-07-02 DIAGNOSIS — Z87.2 HISTORY OF ACTINIC KERATOSES: Primary | ICD-10-CM

## 2025-07-02 PROCEDURE — 3288F FALL RISK ASSESSMENT DOCD: CPT | Mod: CPTII,S$GLB,, | Performed by: DERMATOLOGY

## 2025-07-02 PROCEDURE — 1101F PT FALLS ASSESS-DOCD LE1/YR: CPT | Mod: CPTII,S$GLB,, | Performed by: DERMATOLOGY

## 2025-07-02 PROCEDURE — 99999 PR PBB SHADOW E&M-EST. PATIENT-LVL III: CPT | Mod: PBBFAC,,, | Performed by: DERMATOLOGY

## 2025-07-02 PROCEDURE — 1159F MED LIST DOCD IN RCRD: CPT | Mod: CPTII,S$GLB,, | Performed by: DERMATOLOGY

## 2025-07-02 PROCEDURE — 99212 OFFICE O/P EST SF 10 MIN: CPT | Mod: S$GLB,,, | Performed by: DERMATOLOGY

## 2025-07-02 PROCEDURE — 1160F RVW MEDS BY RX/DR IN RCRD: CPT | Mod: CPTII,S$GLB,, | Performed by: DERMATOLOGY

## 2025-07-02 NOTE — PATIENT INSTRUCTIONS

## 2025-07-02 NOTE — PROGRESS NOTES
Subjective:      Patient ID:  Tomasa Reed is a 75 y.o. female who presents for   Chief Complaint   Patient presents with    Actinic Keratosis     Actinic Keratosis - Follow-up  Affected locations: face      Had AK on R upper lip treated with cryo last visit. Here to ensure it's resolved.    Review of Systems   Constitutional:  Negative for fever and chills.   Respiratory:  Negative for cough and shortness of breath.    Gastrointestinal:  Negative for nausea and vomiting.   Musculoskeletal:  Negative for joint swelling and arthralgias.   Skin:  Negative for daily sunscreen use, activity-related sunscreen use, recent sunburn and wears hat.   All other systems reviewed and are negative.  Hematologic/Lymphatic: Does not bruise/bleed easily.       Objective:   Physical Exam   Constitutional: She appears well-developed and well-nourished.   Neurological: She is alert and oriented to person, place, and time.   Psychiatric: She has a normal mood and affect.   Skin:   Areas Examined (abnormalities noted in diagram):   Head / Face Inspection Performed            Diagram Legend     Erythematous scaling macule/papule c/w actinic keratosis       Vascular papule c/w angioma      Pigmented verrucoid papule/plaque c/w seborrheic keratosis      Yellow umbilicated papule c/w sebaceous hyperplasia      Irregularly shaped tan macule c/w lentigo     1-2 mm smooth white papules consistent with Milia      Movable subcutaneous cyst with punctum c/w epidermal inclusion cyst      Subcutaneous movable cyst c/w pilar cyst      Firm pink to brown papule c/w dermatofibroma      Pedunculated fleshy papule(s) c/w skin tag(s)      Evenly pigmented macule c/w junctional nevus     Mildly variegated pigmented, slightly irregular-bordered macule c/w mildly atypical nevus      Flesh colored to evenly pigmented papule c/w intradermal nevus       Pink pearly papule/plaque c/w basal cell carcinoma      Erythematous hyperkeratotic cursted  plaque c/w SCC      Surgical scar with no sign of skin cancer recurrence      Open and closed comedones      Inflammatory papules and pustules      Verrucoid papule consistent consistent with wart     Erythematous eczematous patches and plaques     Dystrophic onycholytic nail with subungual debris c/w onychomycosis     Umbilicated papule    Erythematous-base heme-crusted tan verrucoid plaque consistent with inflamed seborrheic keratosis     Erythematous Silvery Scaling Plaque c/w Psoriasis     See annotation      Assessment / Plan:        History of actinic keratoses  - No signs of recurrence on exam today    Follow up in about 6 months (around 1/2/2026) for skin check or sooner for any concerns.

## 2025-07-08 ENCOUNTER — TELEPHONE (OUTPATIENT)
Facility: CLINIC | Age: 75
End: 2025-07-08
Payer: MEDICARE

## 2025-07-08 NOTE — TELEPHONE ENCOUNTER
Scheduled for 10/3 for botox appt. At 10AM  Placed call to patient. She is I agreement with this date/time. Cx appt that she had made herself for the same day.

## 2025-08-08 RX ORDER — ALENDRONATE SODIUM 70 MG/1
70 TABLET ORAL
Qty: 12 TABLET | Refills: 3 | Status: SHIPPED | OUTPATIENT
Start: 2025-08-08 | End: 2026-08-08

## 2025-09-02 ENCOUNTER — IMMUNIZATION (OUTPATIENT)
Dept: INTERNAL MEDICINE | Facility: CLINIC | Age: 75
End: 2025-09-02
Payer: MEDICARE

## 2025-09-02 DIAGNOSIS — Z23 NEED FOR VACCINATION: Primary | ICD-10-CM

## 2025-09-02 PROCEDURE — 90653 IIV ADJUVANT VACCINE IM: CPT | Mod: S$GLB,,, | Performed by: INTERNAL MEDICINE

## 2025-09-02 PROCEDURE — G0008 ADMIN INFLUENZA VIRUS VAC: HCPCS | Mod: S$GLB,,, | Performed by: INTERNAL MEDICINE

## (undated) DEVICE — SUT VICRYL 3-0 27 SH

## (undated) DEVICE — SUT 0 VICRYL / CT-1

## (undated) DEVICE — SOL NACL IRR 3000ML

## (undated) DEVICE — GLOVE BIOGEL SKINSENSE PI 8.0

## (undated) DEVICE — PAD CAST SPECIALIST STRL 6

## (undated) DEVICE — BLADE SAW STERN .076MM 6.1MM L

## (undated) DEVICE — BANDAGE MATRIX HK LOOP 6IN 5YD

## (undated) DEVICE — ELECTRODE REM PLYHSV RETURN 9

## (undated) DEVICE — WRAP PROTECTIVE LEG POS STRL

## (undated) DEVICE — BETADINE OPTHALMIC SOL 5% 30ML

## (undated) DEVICE — KIT TOTAL KNEE TKOFG OMC

## (undated) DEVICE — UNDERGLOVES BIOGEL PI SIZE 8.5

## (undated) DEVICE — HOOD T7 W/ PEEL AWAY LENS

## (undated) DEVICE — DRAPE STERI INSTRUMENT 1018

## (undated) DEVICE — STAPLER SKIN REGULAR

## (undated) DEVICE — SUT VICRYL BR 1 GEN 27 CT-1

## (undated) DEVICE — COVER CAMERA OPERATING ROOM

## (undated) DEVICE — TOURNIQUET SB QC DP 34X4IN

## (undated) DEVICE — BOWL CEMENT

## (undated) DEVICE — SOL NACL IRR 1000ML BTL

## (undated) DEVICE — DRESSING AQUACEL AG ADV 3.5X12

## (undated) DEVICE — DRAPE T EXTRM SURG 121X128X90

## (undated) DEVICE — BLADE SAGITTAL 18 X 1.27 X 90M

## (undated) DEVICE — PIN FIX TEMP 1/8X2.5IN LF STER
Type: IMPLANTABLE DEVICE | Site: KNEE | Status: NON-FUNCTIONAL
Removed: 2023-04-12

## (undated) DEVICE — ALCOHOL 70% ISO RUBBING 16OZ

## (undated) DEVICE — WRAP KNEE ACCU THERM GEL PACK

## (undated) DEVICE — SPONGE GAUZE 16PLY 4X4

## (undated) DEVICE — ADHESIVE DERMABOND ADVANCED

## (undated) DEVICE — KIT IRR SUCTION HND PIECE

## (undated) DEVICE — BANDAGE ESMARK 6X12